# Patient Record
Sex: FEMALE | Race: WHITE | Employment: UNEMPLOYED | URBAN - METROPOLITAN AREA
[De-identification: names, ages, dates, MRNs, and addresses within clinical notes are randomized per-mention and may not be internally consistent; named-entity substitution may affect disease eponyms.]

---

## 2017-01-01 ENCOUNTER — APPOINTMENT (OUTPATIENT)
Dept: GENERAL RADIOLOGY | Facility: CLINIC | Age: 67
DRG: 374 | End: 2017-01-01
Attending: FAMILY MEDICINE
Payer: MEDICARE

## 2017-01-01 ENCOUNTER — CARE COORDINATION (OUTPATIENT)
Dept: GASTROENTEROLOGY | Facility: CLINIC | Age: 67
End: 2017-01-01

## 2017-01-01 ENCOUNTER — APPOINTMENT (OUTPATIENT)
Dept: GENERAL RADIOLOGY | Facility: CLINIC | Age: 67
DRG: 374 | End: 2017-01-01
Attending: INTERNAL MEDICINE
Payer: MEDICARE

## 2017-01-01 ENCOUNTER — HOSPITAL ENCOUNTER (OUTPATIENT)
Facility: CLINIC | Age: 67
Discharge: HOME | End: 2017-06-06
Attending: INTERNAL MEDICINE | Admitting: INTERNAL MEDICINE
Payer: MEDICARE

## 2017-01-01 ENCOUNTER — ONCOLOGY VISIT (OUTPATIENT)
Dept: ONCOLOGY | Facility: CLINIC | Age: 67
End: 2017-01-01
Attending: INTERNAL MEDICINE
Payer: MEDICARE

## 2017-01-01 ENCOUNTER — HOSPITAL ENCOUNTER (OUTPATIENT)
Dept: PET IMAGING | Facility: CLINIC | Age: 67
Discharge: HOME | End: 2017-04-12
Attending: INTERNAL MEDICINE | Admitting: INTERNAL MEDICINE
Payer: MEDICARE

## 2017-01-01 ENCOUNTER — DOCUMENTATION ONLY (OUTPATIENT)
Dept: ONCOLOGY | Facility: CLINIC | Age: 67
End: 2017-01-01

## 2017-01-01 ENCOUNTER — TELEPHONE (OUTPATIENT)
Dept: NUTRITION | Facility: CLINIC | Age: 67
End: 2017-01-01

## 2017-01-01 ENCOUNTER — TELEPHONE (OUTPATIENT)
Dept: ONCOLOGY | Facility: CLINIC | Age: 67
End: 2017-01-01

## 2017-01-01 ENCOUNTER — HOSPITAL ENCOUNTER (OUTPATIENT)
Dept: PHYSICAL THERAPY | Facility: CLINIC | Age: 67
Setting detail: THERAPIES SERIES
End: 2017-07-21
Attending: INTERNAL MEDICINE
Payer: MEDICARE

## 2017-01-01 ENCOUNTER — INFUSION THERAPY VISIT (OUTPATIENT)
Dept: INFUSION THERAPY | Facility: CLINIC | Age: 67
End: 2017-01-01
Attending: INTERNAL MEDICINE
Payer: MEDICARE

## 2017-01-01 ENCOUNTER — INFUSION THERAPY VISIT (OUTPATIENT)
Dept: INFUSION THERAPY | Facility: CLINIC | Age: 67
End: 2017-01-01
Attending: RADIOLOGY
Payer: MEDICARE

## 2017-01-01 ENCOUNTER — SURGERY (OUTPATIENT)
Age: 67
End: 2017-01-01

## 2017-01-01 ENCOUNTER — ONCOLOGY VISIT (OUTPATIENT)
Dept: ONCOLOGY | Facility: CLINIC | Age: 67
End: 2017-01-01
Attending: INTERNAL MEDICINE
Payer: COMMERCIAL

## 2017-01-01 ENCOUNTER — HOSPITAL ENCOUNTER (OUTPATIENT)
Facility: CLINIC | Age: 67
Discharge: HOME | End: 2017-05-10
Attending: INTERNAL MEDICINE | Admitting: INTERNAL MEDICINE
Payer: MEDICARE

## 2017-01-01 ENCOUNTER — TELEPHONE (OUTPATIENT)
Dept: FAMILY MEDICINE | Facility: CLINIC | Age: 67
End: 2017-01-01

## 2017-01-01 ENCOUNTER — ANESTHESIA EVENT (OUTPATIENT)
Dept: SURGERY | Facility: CLINIC | Age: 67
DRG: 374 | End: 2017-01-01
Payer: MEDICARE

## 2017-01-01 ENCOUNTER — HOSPITAL ENCOUNTER (OUTPATIENT)
Facility: CLINIC | Age: 67
Discharge: HOME | End: 2017-06-21
Attending: INTERNAL MEDICINE | Admitting: INTERNAL MEDICINE
Payer: MEDICARE

## 2017-01-01 ENCOUNTER — APPOINTMENT (OUTPATIENT)
Dept: GENERAL RADIOLOGY | Facility: CLINIC | Age: 67
DRG: 374 | End: 2017-01-01
Attending: RADIOLOGY
Payer: MEDICARE

## 2017-01-01 ENCOUNTER — APPOINTMENT (OUTPATIENT)
Dept: ULTRASOUND IMAGING | Facility: CLINIC | Age: 67
DRG: 374 | End: 2017-01-01
Attending: INTERNAL MEDICINE
Payer: MEDICARE

## 2017-01-01 ENCOUNTER — OFFICE VISIT (OUTPATIENT)
Dept: RADIATION THERAPY | Facility: OUTPATIENT CENTER | Age: 67
End: 2017-01-01

## 2017-01-01 ENCOUNTER — HOSPITAL ENCOUNTER (OUTPATIENT)
Facility: CLINIC | Age: 67
Discharge: HOME | End: 2017-04-04
Attending: INTERNAL MEDICINE | Admitting: INTERNAL MEDICINE
Payer: MEDICARE

## 2017-01-01 ENCOUNTER — HOSPITAL ENCOUNTER (OUTPATIENT)
Facility: CLINIC | Age: 67
Discharge: HOME | End: 2017-05-18
Attending: INTERNAL MEDICINE | Admitting: INTERNAL MEDICINE
Payer: MEDICARE

## 2017-01-01 ENCOUNTER — HOSPITAL ENCOUNTER (OUTPATIENT)
Facility: CLINIC | Age: 67
Discharge: HOME | End: 2017-03-21
Attending: INTERNAL MEDICINE | Admitting: INTERNAL MEDICINE
Payer: MEDICARE

## 2017-01-01 ENCOUNTER — ANESTHESIA (OUTPATIENT)
Dept: SURGERY | Facility: CLINIC | Age: 67
DRG: 374 | End: 2017-01-01
Payer: MEDICARE

## 2017-01-01 ENCOUNTER — HOSPITAL ENCOUNTER (OUTPATIENT)
Dept: PHYSICAL THERAPY | Facility: CLINIC | Age: 67
Setting detail: THERAPIES SERIES
End: 2017-07-24
Attending: INTERNAL MEDICINE
Payer: MEDICARE

## 2017-01-01 ENCOUNTER — ANESTHESIA EVENT (OUTPATIENT)
Dept: SURGERY | Facility: CLINIC | Age: 67
End: 2017-01-01
Payer: MEDICARE

## 2017-01-01 ENCOUNTER — HEALTH MAINTENANCE LETTER (OUTPATIENT)
Age: 67
End: 2017-01-01

## 2017-01-01 ENCOUNTER — OFFICE VISIT (OUTPATIENT)
Dept: PALLIATIVE MEDICINE | Facility: CLINIC | Age: 67
End: 2017-01-01
Payer: COMMERCIAL

## 2017-01-01 ENCOUNTER — APPOINTMENT (OUTPATIENT)
Dept: PHYSICAL THERAPY | Facility: CLINIC | Age: 67
DRG: 374 | End: 2017-01-01
Payer: MEDICARE

## 2017-01-01 ENCOUNTER — APPOINTMENT (OUTPATIENT)
Dept: OCCUPATIONAL THERAPY | Facility: CLINIC | Age: 67
DRG: 374 | End: 2017-01-01
Payer: MEDICARE

## 2017-01-01 ENCOUNTER — CARE COORDINATION (OUTPATIENT)
Dept: CARE COORDINATION | Facility: CLINIC | Age: 67
End: 2017-01-01

## 2017-01-01 ENCOUNTER — HOSPITAL ENCOUNTER (OUTPATIENT)
Facility: CLINIC | Age: 67
Discharge: HOME | End: 2017-01-10
Attending: INTERNAL MEDICINE | Admitting: INTERNAL MEDICINE
Payer: MEDICARE

## 2017-01-01 ENCOUNTER — TELEPHONE (OUTPATIENT)
Dept: NURSING | Facility: CLINIC | Age: 67
End: 2017-01-01

## 2017-01-01 ENCOUNTER — ANESTHESIA (OUTPATIENT)
Dept: SURGERY | Facility: CLINIC | Age: 67
End: 2017-01-01
Payer: MEDICARE

## 2017-01-01 ENCOUNTER — HOSPITAL ENCOUNTER (OUTPATIENT)
Facility: CLINIC | Age: 67
Discharge: HOME | End: 2017-06-27
Attending: INTERNAL MEDICINE | Admitting: INTERNAL MEDICINE
Payer: MEDICARE

## 2017-01-01 ENCOUNTER — HOSPITAL ENCOUNTER (OUTPATIENT)
Facility: CLINIC | Age: 67
Discharge: HOME | End: 2017-06-20
Attending: INTERNAL MEDICINE | Admitting: INTERNAL MEDICINE
Payer: MEDICARE

## 2017-01-01 ENCOUNTER — APPOINTMENT (OUTPATIENT)
Dept: CT IMAGING | Facility: CLINIC | Age: 67
DRG: 374 | End: 2017-01-01
Attending: FAMILY MEDICINE
Payer: MEDICARE

## 2017-01-01 ENCOUNTER — APPOINTMENT (OUTPATIENT)
Dept: CARDIOLOGY | Facility: CLINIC | Age: 67
DRG: 374 | End: 2017-01-01
Attending: INTERNAL MEDICINE
Payer: MEDICARE

## 2017-01-01 ENCOUNTER — OFFICE VISIT (OUTPATIENT)
Dept: SPIRITUAL SERVICES | Facility: CLINIC | Age: 67
End: 2017-01-01

## 2017-01-01 ENCOUNTER — HOSPITAL ENCOUNTER (OUTPATIENT)
Facility: CLINIC | Age: 67
Discharge: HOME | End: 2017-03-07
Attending: INTERNAL MEDICINE | Admitting: INTERNAL MEDICINE
Payer: MEDICARE

## 2017-01-01 ENCOUNTER — APPOINTMENT (OUTPATIENT)
Dept: GENERAL RADIOLOGY | Facility: CLINIC | Age: 67
End: 2017-01-01
Attending: INTERNAL MEDICINE
Payer: MEDICARE

## 2017-01-01 ENCOUNTER — INFUSION THERAPY VISIT (OUTPATIENT)
Dept: INFUSION THERAPY | Facility: CLINIC | Age: 67
DRG: 374 | End: 2017-01-01
Attending: INTERNAL MEDICINE
Payer: MEDICARE

## 2017-01-01 ENCOUNTER — HOSPITAL ENCOUNTER (INPATIENT)
Facility: CLINIC | Age: 67
LOS: 1 days | Discharge: HOME | DRG: 374 | End: 2017-05-06
Attending: INTERNAL MEDICINE | Admitting: INTERNAL MEDICINE
Payer: MEDICARE

## 2017-01-01 ENCOUNTER — HOSPITAL ENCOUNTER (OUTPATIENT)
Facility: CLINIC | Age: 67
Discharge: HOME | End: 2017-02-07
Attending: INTERNAL MEDICINE | Admitting: INTERNAL MEDICINE
Payer: MEDICARE

## 2017-01-01 ENCOUNTER — HOSPITAL ENCOUNTER (OUTPATIENT)
Dept: PHYSICAL THERAPY | Facility: CLINIC | Age: 67
Setting detail: THERAPIES SERIES
End: 2017-07-19
Attending: INTERNAL MEDICINE
Payer: MEDICARE

## 2017-01-01 ENCOUNTER — HOSPITAL ENCOUNTER (OUTPATIENT)
Facility: CLINIC | Age: 67
Discharge: HOME | End: 2017-01-03
Attending: INTERNAL MEDICINE | Admitting: INTERNAL MEDICINE
Payer: MEDICARE

## 2017-01-01 ENCOUNTER — APPOINTMENT (OUTPATIENT)
Dept: GENERAL RADIOLOGY | Facility: CLINIC | Age: 67
DRG: 374 | End: 2017-01-01
Attending: EMERGENCY MEDICINE
Payer: MEDICARE

## 2017-01-01 ENCOUNTER — HOSPITAL ENCOUNTER (OUTPATIENT)
Dept: MRI IMAGING | Facility: CLINIC | Age: 67
Discharge: HOME | End: 2017-05-04
Attending: INTERNAL MEDICINE | Admitting: INTERNAL MEDICINE
Payer: MEDICARE

## 2017-01-01 ENCOUNTER — HOSPITAL ENCOUNTER (OUTPATIENT)
Facility: CLINIC | Age: 67
Discharge: HOME | End: 2017-01-31
Attending: INTERNAL MEDICINE | Admitting: INTERNAL MEDICINE
Payer: MEDICARE

## 2017-01-01 ENCOUNTER — HOSPITAL ENCOUNTER (OUTPATIENT)
Dept: ULTRASOUND IMAGING | Facility: CLINIC | Age: 67
Discharge: HOME | End: 2017-05-02
Attending: INTERNAL MEDICINE | Admitting: INTERNAL MEDICINE
Payer: MEDICARE

## 2017-01-01 ENCOUNTER — HOSPITAL ENCOUNTER (OUTPATIENT)
Dept: PET IMAGING | Facility: CLINIC | Age: 67
Discharge: HOME | End: 2017-07-19
Attending: INTERNAL MEDICINE | Admitting: INTERNAL MEDICINE
Payer: MEDICARE

## 2017-01-01 ENCOUNTER — HOSPITAL ENCOUNTER (OUTPATIENT)
Facility: CLINIC | Age: 67
Discharge: HOME | End: 2017-01-17
Attending: INTERNAL MEDICINE | Admitting: INTERNAL MEDICINE
Payer: MEDICARE

## 2017-01-01 ENCOUNTER — HOSPITAL ENCOUNTER (OUTPATIENT)
Facility: CLINIC | Age: 67
Discharge: HOME | End: 2017-01-24
Attending: INTERNAL MEDICINE | Admitting: INTERNAL MEDICINE
Payer: MEDICARE

## 2017-01-01 ENCOUNTER — HOSPITAL ENCOUNTER (OUTPATIENT)
Facility: CLINIC | Age: 67
Discharge: HOME | End: 2017-04-18
Attending: INTERNAL MEDICINE | Admitting: INTERNAL MEDICINE
Payer: MEDICARE

## 2017-01-01 ENCOUNTER — HOSPITAL ENCOUNTER (INPATIENT)
Facility: CLINIC | Age: 67
LOS: 12 days | Discharge: HOME HEALTH | DRG: 374 | End: 2017-08-05
Attending: EMERGENCY MEDICINE | Admitting: FAMILY MEDICINE
Payer: MEDICARE

## 2017-01-01 ENCOUNTER — HOSPITAL ENCOUNTER (OUTPATIENT)
Facility: CLINIC | Age: 67
Discharge: HOME | End: 2017-07-11
Attending: INTERNAL MEDICINE | Admitting: INTERNAL MEDICINE
Payer: MEDICARE

## 2017-01-01 ENCOUNTER — HOSPITAL ENCOUNTER (OUTPATIENT)
Facility: CLINIC | Age: 67
Discharge: HOME | End: 2017-05-23
Attending: INTERNAL MEDICINE | Admitting: INTERNAL MEDICINE
Payer: MEDICARE

## 2017-01-01 VITALS
HEIGHT: 63 IN | RESPIRATION RATE: 18 BRPM | HEART RATE: 94 BPM | DIASTOLIC BLOOD PRESSURE: 82 MMHG | SYSTOLIC BLOOD PRESSURE: 131 MMHG | OXYGEN SATURATION: 98 % | BODY MASS INDEX: 29.69 KG/M2 | TEMPERATURE: 97.6 F | WEIGHT: 167.55 LBS

## 2017-01-01 VITALS
TEMPERATURE: 97.8 F | DIASTOLIC BLOOD PRESSURE: 61 MMHG | RESPIRATION RATE: 16 BRPM | BODY MASS INDEX: 27.23 KG/M2 | HEIGHT: 63 IN | OXYGEN SATURATION: 100 % | SYSTOLIC BLOOD PRESSURE: 95 MMHG | WEIGHT: 153.7 LBS | HEART RATE: 79 BPM

## 2017-01-01 VITALS
SYSTOLIC BLOOD PRESSURE: 129 MMHG | WEIGHT: 181.1 LBS | BODY MASS INDEX: 34.19 KG/M2 | DIASTOLIC BLOOD PRESSURE: 72 MMHG | OXYGEN SATURATION: 99 % | RESPIRATION RATE: 18 BRPM | TEMPERATURE: 98.2 F | HEART RATE: 100 BPM | HEIGHT: 61 IN

## 2017-01-01 VITALS
TEMPERATURE: 98 F | DIASTOLIC BLOOD PRESSURE: 75 MMHG | SYSTOLIC BLOOD PRESSURE: 122 MMHG | HEART RATE: 94 BPM | RESPIRATION RATE: 18 BRPM

## 2017-01-01 VITALS
RESPIRATION RATE: 16 BRPM | SYSTOLIC BLOOD PRESSURE: 137 MMHG | DIASTOLIC BLOOD PRESSURE: 83 MMHG | HEIGHT: 63 IN | BODY MASS INDEX: 29.61 KG/M2 | TEMPERATURE: 98.4 F | WEIGHT: 167.11 LBS | OXYGEN SATURATION: 99 %

## 2017-01-01 VITALS
SYSTOLIC BLOOD PRESSURE: 124 MMHG | WEIGHT: 193.5 LBS | HEIGHT: 62 IN | DIASTOLIC BLOOD PRESSURE: 81 MMHG | OXYGEN SATURATION: 97 % | HEART RATE: 107 BPM | TEMPERATURE: 98.7 F | RESPIRATION RATE: 18 BRPM | BODY MASS INDEX: 35.61 KG/M2

## 2017-01-01 VITALS
RESPIRATION RATE: 16 BRPM | SYSTOLIC BLOOD PRESSURE: 126 MMHG | DIASTOLIC BLOOD PRESSURE: 78 MMHG | HEART RATE: 92 BPM | TEMPERATURE: 97.3 F

## 2017-01-01 VITALS
WEIGHT: 169.5 LBS | TEMPERATURE: 97.9 F | SYSTOLIC BLOOD PRESSURE: 123 MMHG | OXYGEN SATURATION: 100 % | BODY MASS INDEX: 32 KG/M2 | DIASTOLIC BLOOD PRESSURE: 75 MMHG | RESPIRATION RATE: 16 BRPM | HEIGHT: 61 IN | HEART RATE: 96 BPM

## 2017-01-01 VITALS
HEART RATE: 79 BPM | OXYGEN SATURATION: 96 % | BODY MASS INDEX: 37.12 KG/M2 | HEIGHT: 61 IN | WEIGHT: 196.6 LBS | TEMPERATURE: 98.7 F | SYSTOLIC BLOOD PRESSURE: 116 MMHG | DIASTOLIC BLOOD PRESSURE: 76 MMHG

## 2017-01-01 VITALS
WEIGHT: 188.6 LBS | HEART RATE: 99 BPM | BODY MASS INDEX: 35.06 KG/M2 | DIASTOLIC BLOOD PRESSURE: 72 MMHG | SYSTOLIC BLOOD PRESSURE: 122 MMHG

## 2017-01-01 VITALS
HEART RATE: 95 BPM | BODY MASS INDEX: 32.05 KG/M2 | DIASTOLIC BLOOD PRESSURE: 61 MMHG | WEIGHT: 172.4 LBS | TEMPERATURE: 97.4 F | SYSTOLIC BLOOD PRESSURE: 115 MMHG

## 2017-01-01 VITALS
DIASTOLIC BLOOD PRESSURE: 81 MMHG | HEART RATE: 88 BPM | TEMPERATURE: 97 F | WEIGHT: 184.6 LBS | SYSTOLIC BLOOD PRESSURE: 129 MMHG | BODY MASS INDEX: 34.32 KG/M2

## 2017-01-01 VITALS — SYSTOLIC BLOOD PRESSURE: 119 MMHG | HEART RATE: 98 BPM | DIASTOLIC BLOOD PRESSURE: 77 MMHG | TEMPERATURE: 98.1 F

## 2017-01-01 VITALS — TEMPERATURE: 97.3 F | HEART RATE: 94 BPM | DIASTOLIC BLOOD PRESSURE: 69 MMHG | SYSTOLIC BLOOD PRESSURE: 120 MMHG

## 2017-01-01 VITALS
OXYGEN SATURATION: 100 % | TEMPERATURE: 98.3 F | WEIGHT: 174.9 LBS | SYSTOLIC BLOOD PRESSURE: 109 MMHG | RESPIRATION RATE: 18 BRPM | HEART RATE: 100 BPM | BODY MASS INDEX: 33.02 KG/M2 | DIASTOLIC BLOOD PRESSURE: 70 MMHG | HEIGHT: 61 IN

## 2017-01-01 VITALS
OXYGEN SATURATION: 96 % | DIASTOLIC BLOOD PRESSURE: 75 MMHG | BODY MASS INDEX: 35.98 KG/M2 | HEART RATE: 86 BPM | TEMPERATURE: 97.4 F | HEIGHT: 61 IN | SYSTOLIC BLOOD PRESSURE: 125 MMHG | WEIGHT: 190.6 LBS

## 2017-01-01 VITALS
SYSTOLIC BLOOD PRESSURE: 128 MMHG | HEART RATE: 80 BPM | DIASTOLIC BLOOD PRESSURE: 70 MMHG | WEIGHT: 194.6 LBS | BODY MASS INDEX: 36.18 KG/M2

## 2017-01-01 VITALS
SYSTOLIC BLOOD PRESSURE: 121 MMHG | TEMPERATURE: 97.5 F | TEMPERATURE: 96 F | RESPIRATION RATE: 18 BRPM | HEART RATE: 92 BPM | DIASTOLIC BLOOD PRESSURE: 74 MMHG | DIASTOLIC BLOOD PRESSURE: 68 MMHG | HEART RATE: 84 BPM | SYSTOLIC BLOOD PRESSURE: 127 MMHG

## 2017-01-01 VITALS — DIASTOLIC BLOOD PRESSURE: 72 MMHG | TEMPERATURE: 97.9 F | HEART RATE: 89 BPM | SYSTOLIC BLOOD PRESSURE: 127 MMHG

## 2017-01-01 VITALS
DIASTOLIC BLOOD PRESSURE: 78 MMHG | HEIGHT: 61 IN | TEMPERATURE: 98.3 F | TEMPERATURE: 97.6 F | BODY MASS INDEX: 33.78 KG/M2 | HEART RATE: 82 BPM | SYSTOLIC BLOOD PRESSURE: 114 MMHG | WEIGHT: 184 LBS | WEIGHT: 178.9 LBS | HEART RATE: 99 BPM | SYSTOLIC BLOOD PRESSURE: 124 MMHG | DIASTOLIC BLOOD PRESSURE: 67 MMHG | RESPIRATION RATE: 16 BRPM | BODY MASS INDEX: 34.21 KG/M2 | OXYGEN SATURATION: 99 %

## 2017-01-01 VITALS — TEMPERATURE: 97.5 F | DIASTOLIC BLOOD PRESSURE: 82 MMHG | HEART RATE: 102 BPM | SYSTOLIC BLOOD PRESSURE: 122 MMHG

## 2017-01-01 VITALS
WEIGHT: 170 LBS | BODY MASS INDEX: 31.28 KG/M2 | SYSTOLIC BLOOD PRESSURE: 134 MMHG | HEIGHT: 62 IN | HEART RATE: 106 BPM | DIASTOLIC BLOOD PRESSURE: 87 MMHG

## 2017-01-01 VITALS
RESPIRATION RATE: 16 BRPM | DIASTOLIC BLOOD PRESSURE: 73 MMHG | WEIGHT: 167.6 LBS | HEART RATE: 86 BPM | TEMPERATURE: 98.2 F | SYSTOLIC BLOOD PRESSURE: 127 MMHG | BODY MASS INDEX: 31.15 KG/M2

## 2017-01-01 VITALS
BODY MASS INDEX: 28.07 KG/M2 | HEART RATE: 88 BPM | SYSTOLIC BLOOD PRESSURE: 105 MMHG | DIASTOLIC BLOOD PRESSURE: 50 MMHG | WEIGHT: 158.4 LBS

## 2017-01-01 VITALS — DIASTOLIC BLOOD PRESSURE: 72 MMHG | TEMPERATURE: 98 F | SYSTOLIC BLOOD PRESSURE: 119 MMHG | HEART RATE: 104 BPM

## 2017-01-01 VITALS
SYSTOLIC BLOOD PRESSURE: 134 MMHG | BODY MASS INDEX: 29.41 KG/M2 | WEIGHT: 166 LBS | TEMPERATURE: 97.3 F | HEART RATE: 90 BPM | DIASTOLIC BLOOD PRESSURE: 66 MMHG

## 2017-01-01 VITALS — HEART RATE: 93 BPM | DIASTOLIC BLOOD PRESSURE: 69 MMHG | SYSTOLIC BLOOD PRESSURE: 121 MMHG | TEMPERATURE: 97.9 F

## 2017-01-01 VITALS
DIASTOLIC BLOOD PRESSURE: 82 MMHG | SYSTOLIC BLOOD PRESSURE: 143 MMHG | WEIGHT: 172.4 LBS | HEART RATE: 108 BPM | TEMPERATURE: 97.4 F | OXYGEN SATURATION: 100 % | BODY MASS INDEX: 32.05 KG/M2

## 2017-01-01 VITALS
DIASTOLIC BLOOD PRESSURE: 61 MMHG | WEIGHT: 159.8 LBS | SYSTOLIC BLOOD PRESSURE: 101 MMHG | OXYGEN SATURATION: 98 % | TEMPERATURE: 98.5 F | HEIGHT: 63 IN | HEART RATE: 110 BPM | BODY MASS INDEX: 28.31 KG/M2 | RESPIRATION RATE: 18 BRPM

## 2017-01-01 VITALS
RESPIRATION RATE: 18 BRPM | BODY MASS INDEX: 30.82 KG/M2 | WEIGHT: 173.94 LBS | DIASTOLIC BLOOD PRESSURE: 55 MMHG | SYSTOLIC BLOOD PRESSURE: 90 MMHG | HEART RATE: 107 BPM | OXYGEN SATURATION: 95 % | HEIGHT: 63 IN | TEMPERATURE: 96 F

## 2017-01-01 VITALS
DIASTOLIC BLOOD PRESSURE: 58 MMHG | HEART RATE: 85 BPM | WEIGHT: 191 LBS | SYSTOLIC BLOOD PRESSURE: 123 MMHG | BODY MASS INDEX: 35.51 KG/M2

## 2017-01-01 VITALS — SYSTOLIC BLOOD PRESSURE: 136 MMHG | TEMPERATURE: 97.8 F | HEART RATE: 80 BPM | DIASTOLIC BLOOD PRESSURE: 74 MMHG

## 2017-01-01 VITALS
OXYGEN SATURATION: 97 % | RESPIRATION RATE: 18 BRPM | BODY MASS INDEX: 35.51 KG/M2 | TEMPERATURE: 98.3 F | HEIGHT: 61 IN | WEIGHT: 188.1 LBS | HEART RATE: 123 BPM | SYSTOLIC BLOOD PRESSURE: 99 MMHG | DIASTOLIC BLOOD PRESSURE: 69 MMHG

## 2017-01-01 VITALS
HEIGHT: 62 IN | WEIGHT: 170.7 LBS | BODY MASS INDEX: 31.41 KG/M2 | HEART RATE: 113 BPM | SYSTOLIC BLOOD PRESSURE: 119 MMHG | RESPIRATION RATE: 16 BRPM | DIASTOLIC BLOOD PRESSURE: 79 MMHG

## 2017-01-01 VITALS
BODY MASS INDEX: 29.3 KG/M2 | HEIGHT: 63 IN | TEMPERATURE: 98.5 F | HEART RATE: 110 BPM | RESPIRATION RATE: 16 BRPM | OXYGEN SATURATION: 100 % | SYSTOLIC BLOOD PRESSURE: 111 MMHG | DIASTOLIC BLOOD PRESSURE: 83 MMHG | WEIGHT: 165.4 LBS

## 2017-01-01 VITALS
WEIGHT: 193.2 LBS | DIASTOLIC BLOOD PRESSURE: 61 MMHG | BODY MASS INDEX: 35.92 KG/M2 | HEART RATE: 81 BPM | SYSTOLIC BLOOD PRESSURE: 128 MMHG

## 2017-01-01 VITALS
SYSTOLIC BLOOD PRESSURE: 96 MMHG | HEIGHT: 63 IN | RESPIRATION RATE: 18 BRPM | OXYGEN SATURATION: 99 % | BODY MASS INDEX: 28.23 KG/M2 | DIASTOLIC BLOOD PRESSURE: 64 MMHG | TEMPERATURE: 98.7 F | WEIGHT: 159.3 LBS | HEART RATE: 111 BPM

## 2017-01-01 VITALS — SYSTOLIC BLOOD PRESSURE: 128 MMHG | DIASTOLIC BLOOD PRESSURE: 67 MMHG | HEART RATE: 91 BPM

## 2017-01-01 VITALS
BODY MASS INDEX: 34.84 KG/M2 | DIASTOLIC BLOOD PRESSURE: 70 MMHG | WEIGHT: 187.4 LBS | HEART RATE: 94 BPM | TEMPERATURE: 97.5 F | SYSTOLIC BLOOD PRESSURE: 121 MMHG

## 2017-01-01 VITALS — SYSTOLIC BLOOD PRESSURE: 130 MMHG | DIASTOLIC BLOOD PRESSURE: 68 MMHG | HEART RATE: 84 BPM

## 2017-01-01 VITALS — HEART RATE: 84 BPM | SYSTOLIC BLOOD PRESSURE: 114 MMHG | DIASTOLIC BLOOD PRESSURE: 56 MMHG | TEMPERATURE: 97.9 F

## 2017-01-01 VITALS — TEMPERATURE: 97.9 F | HEART RATE: 89 BPM | SYSTOLIC BLOOD PRESSURE: 120 MMHG | DIASTOLIC BLOOD PRESSURE: 59 MMHG

## 2017-01-01 VITALS — HEART RATE: 78 BPM | SYSTOLIC BLOOD PRESSURE: 121 MMHG | TEMPERATURE: 98 F | DIASTOLIC BLOOD PRESSURE: 63 MMHG

## 2017-01-01 VITALS — SYSTOLIC BLOOD PRESSURE: 123 MMHG | DIASTOLIC BLOOD PRESSURE: 69 MMHG | HEART RATE: 79 BPM

## 2017-01-01 VITALS — SYSTOLIC BLOOD PRESSURE: 112 MMHG | HEART RATE: 91 BPM | DIASTOLIC BLOOD PRESSURE: 59 MMHG | TEMPERATURE: 98.9 F

## 2017-01-01 VITALS — HEART RATE: 92 BPM | SYSTOLIC BLOOD PRESSURE: 110 MMHG | DIASTOLIC BLOOD PRESSURE: 63 MMHG | TEMPERATURE: 98.5 F

## 2017-01-01 VITALS
SYSTOLIC BLOOD PRESSURE: 129 MMHG | HEART RATE: 84 BPM | DIASTOLIC BLOOD PRESSURE: 72 MMHG | BODY MASS INDEX: 28.28 KG/M2 | WEIGHT: 159.6 LBS

## 2017-01-01 VITALS
WEIGHT: 161 LBS | SYSTOLIC BLOOD PRESSURE: 122 MMHG | HEART RATE: 90 BPM | BODY MASS INDEX: 28.53 KG/M2 | OXYGEN SATURATION: 96 % | TEMPERATURE: 97.6 F | DIASTOLIC BLOOD PRESSURE: 70 MMHG

## 2017-01-01 VITALS — TEMPERATURE: 98.7 F | HEART RATE: 104 BPM | SYSTOLIC BLOOD PRESSURE: 144 MMHG | DIASTOLIC BLOOD PRESSURE: 54 MMHG

## 2017-01-01 DIAGNOSIS — E86.0 DEHYDRATION: Primary | ICD-10-CM

## 2017-01-01 DIAGNOSIS — C16.0 MALIGNANT NEOPLASM OF CARDIA OF STOMACH (H): ICD-10-CM

## 2017-01-01 DIAGNOSIS — C16.0 GE JUNCTION CARCINOMA (H): Primary | ICD-10-CM

## 2017-01-01 DIAGNOSIS — R11.0 NAUSEA: ICD-10-CM

## 2017-01-01 DIAGNOSIS — C16.0 MALIGNANT NEOPLASM OF CARDIA OF STOMACH (H): Primary | ICD-10-CM

## 2017-01-01 DIAGNOSIS — E86.0 DEHYDRATION: ICD-10-CM

## 2017-01-01 DIAGNOSIS — C16.0 GE JUNCTION CARCINOMA (H): ICD-10-CM

## 2017-01-01 DIAGNOSIS — T45.1X5A CHEMOTHERAPY-INDUCED NAUSEA: Primary | ICD-10-CM

## 2017-01-01 DIAGNOSIS — I95.1 ORTHOSTATIC HYPOTENSION: ICD-10-CM

## 2017-01-01 DIAGNOSIS — T45.1X5A CHEMOTHERAPY-INDUCED NAUSEA: ICD-10-CM

## 2017-01-01 DIAGNOSIS — D70.9 NEUTROPENIA, UNSPECIFIED TYPE (H): ICD-10-CM

## 2017-01-01 DIAGNOSIS — R11.0 CHEMOTHERAPY-INDUCED NAUSEA: ICD-10-CM

## 2017-01-01 DIAGNOSIS — R45.7 EMOTIONAL STRESS: ICD-10-CM

## 2017-01-01 DIAGNOSIS — R05.9 COUGH: ICD-10-CM

## 2017-01-01 DIAGNOSIS — D75.9 CYTOPENIA: ICD-10-CM

## 2017-01-01 DIAGNOSIS — R13.10 DYSPHAGIA, UNSPECIFIED TYPE: ICD-10-CM

## 2017-01-01 DIAGNOSIS — J18.9 PNEUMONIA: ICD-10-CM

## 2017-01-01 DIAGNOSIS — R94.5 NONSPECIFIC ABNORMAL RESULTS OF LIVER FUNCTION STUDY: ICD-10-CM

## 2017-01-01 DIAGNOSIS — R74.8 ELEVATED LIVER ENZYMES: ICD-10-CM

## 2017-01-01 DIAGNOSIS — R11.0 CHEMOTHERAPY-INDUCED NAUSEA: Primary | ICD-10-CM

## 2017-01-01 DIAGNOSIS — E80.6 HYPERBILIRUBINEMIA: ICD-10-CM

## 2017-01-01 DIAGNOSIS — K86.89 PANCREATIC MASS: Primary | ICD-10-CM

## 2017-01-01 DIAGNOSIS — J90 PLEURAL EFFUSION ON LEFT: ICD-10-CM

## 2017-01-01 DIAGNOSIS — D70.1 CHEMOTHERAPY-INDUCED NEUTROPENIA (H): Primary | ICD-10-CM

## 2017-01-01 DIAGNOSIS — G62.9 PERIPHERAL POLYNEUROPATHY: Primary | ICD-10-CM

## 2017-01-01 DIAGNOSIS — J34.89 SINUS DRAINAGE: ICD-10-CM

## 2017-01-01 DIAGNOSIS — T45.1X5A CHEMOTHERAPY-INDUCED NEUTROPENIA (H): ICD-10-CM

## 2017-01-01 DIAGNOSIS — R06.09 DYSPNEA ON EXERTION: ICD-10-CM

## 2017-01-01 DIAGNOSIS — K83.09 CHOLANGITIS (H): Primary | ICD-10-CM

## 2017-01-01 DIAGNOSIS — T45.1X5A CHEMOTHERAPY-INDUCED NEUTROPENIA (H): Primary | ICD-10-CM

## 2017-01-01 DIAGNOSIS — J30.2 CHRONIC SEASONAL ALLERGIC RHINITIS, UNSPECIFIED TRIGGER: ICD-10-CM

## 2017-01-01 DIAGNOSIS — D70.1 CHEMOTHERAPY-INDUCED NEUTROPENIA (H): ICD-10-CM

## 2017-01-01 DIAGNOSIS — J18.9 PNEUMONIA OF RIGHT LOWER LOBE DUE TO INFECTIOUS ORGANISM: ICD-10-CM

## 2017-01-01 DIAGNOSIS — I89.0 LYMPHEDEMA: ICD-10-CM

## 2017-01-01 DIAGNOSIS — E88.09 HYPOALBUMINEMIA: ICD-10-CM

## 2017-01-01 DIAGNOSIS — G62.9 NEUROPATHY: ICD-10-CM

## 2017-01-01 DIAGNOSIS — R11.0 CHRONIC NAUSEA: Primary | ICD-10-CM

## 2017-01-01 DIAGNOSIS — D64.9 ANEMIA, UNSPECIFIED TYPE: ICD-10-CM

## 2017-01-01 DIAGNOSIS — R60.0 LOCALIZED EDEMA: ICD-10-CM

## 2017-01-01 DIAGNOSIS — R25.2 CRAMP OF LIMB: Primary | ICD-10-CM

## 2017-01-01 DIAGNOSIS — R11.0 NAUSEA: Primary | ICD-10-CM

## 2017-01-01 DIAGNOSIS — Z98.890 S/P ERCP: ICD-10-CM

## 2017-01-01 DIAGNOSIS — Z98.890 S/P ERCP: Primary | ICD-10-CM

## 2017-01-01 LAB
ABO + RH BLD: NORMAL
ABO + RH BLD: NORMAL
ALBUMIN SERPL-MCNC: 1.4 G/DL (ref 3.4–5)
ALBUMIN SERPL-MCNC: 1.7 G/DL (ref 3.4–5)
ALBUMIN SERPL-MCNC: 1.9 G/DL (ref 3.4–5)
ALBUMIN SERPL-MCNC: 2 G/DL (ref 3.4–5)
ALBUMIN SERPL-MCNC: 2.1 G/DL (ref 3.4–5)
ALBUMIN SERPL-MCNC: 2.2 G/DL (ref 3.4–5)
ALBUMIN SERPL-MCNC: 2.3 G/DL (ref 3.4–5)
ALBUMIN SERPL-MCNC: 2.3 G/DL (ref 3.4–5)
ALBUMIN SERPL-MCNC: 2.5 G/DL (ref 3.4–5)
ALBUMIN SERPL-MCNC: 2.9 G/DL (ref 3.4–5)
ALBUMIN SERPL-MCNC: 3 G/DL (ref 3.4–5)
ALBUMIN SERPL-MCNC: 3 G/DL (ref 3.4–5)
ALBUMIN SERPL-MCNC: 3.2 G/DL (ref 3.4–5)
ALBUMIN UR-MCNC: 10 MG/DL
ALP SERPL-CCNC: 108 U/L (ref 40–150)
ALP SERPL-CCNC: 126 U/L (ref 40–150)
ALP SERPL-CCNC: 129 U/L (ref 40–150)
ALP SERPL-CCNC: 135 U/L (ref 40–150)
ALP SERPL-CCNC: 172 U/L (ref 40–150)
ALP SERPL-CCNC: 202 U/L (ref 40–150)
ALP SERPL-CCNC: 250 U/L (ref 40–150)
ALP SERPL-CCNC: 263 U/L (ref 40–150)
ALP SERPL-CCNC: 278 U/L (ref 40–150)
ALP SERPL-CCNC: 484 U/L (ref 40–150)
ALP SERPL-CCNC: 63 U/L (ref 40–150)
ALP SERPL-CCNC: 65 U/L (ref 40–150)
ALP SERPL-CCNC: 69 U/L (ref 40–150)
ALP SERPL-CCNC: 71 U/L (ref 40–150)
ALP SERPL-CCNC: 72 U/L (ref 40–150)
ALT SERPL W P-5'-P-CCNC: 14 U/L (ref 0–50)
ALT SERPL W P-5'-P-CCNC: 15 U/L (ref 0–50)
ALT SERPL W P-5'-P-CCNC: 18 U/L (ref 0–50)
ALT SERPL W P-5'-P-CCNC: 19 U/L (ref 0–50)
ALT SERPL W P-5'-P-CCNC: 20 U/L (ref 0–50)
ALT SERPL W P-5'-P-CCNC: 20 U/L (ref 0–50)
ALT SERPL W P-5'-P-CCNC: 21 U/L (ref 0–50)
ALT SERPL W P-5'-P-CCNC: 237 U/L (ref 0–50)
ALT SERPL W P-5'-P-CCNC: 25 U/L (ref 0–50)
ALT SERPL W P-5'-P-CCNC: 261 U/L (ref 0–50)
ALT SERPL W P-5'-P-CCNC: 296 U/L (ref 0–50)
ALT SERPL W P-5'-P-CCNC: 30 U/L (ref 0–50)
ALT SERPL W P-5'-P-CCNC: 312 U/L (ref 0–50)
ALT SERPL W P-5'-P-CCNC: 37 U/L (ref 0–50)
ALT SERPL W P-5'-P-CCNC: 39 U/L (ref 0–50)
AMYLASE SERPL-CCNC: 12 U/L (ref 30–110)
AMYLASE SERPL-CCNC: 25 U/L (ref 30–110)
AMYLASE SERPL-CCNC: 30 U/L (ref 30–110)
AMYLASE SERPL-CCNC: 94 U/L (ref 30–110)
ANION GAP SERPL CALCULATED.3IONS-SCNC: 10 MMOL/L (ref 3–14)
ANION GAP SERPL CALCULATED.3IONS-SCNC: 11 MMOL/L (ref 3–14)
ANION GAP SERPL CALCULATED.3IONS-SCNC: 12 MMOL/L (ref 3–14)
ANION GAP SERPL CALCULATED.3IONS-SCNC: 12 MMOL/L (ref 3–14)
ANION GAP SERPL CALCULATED.3IONS-SCNC: 8 MMOL/L (ref 3–14)
ANION GAP SERPL CALCULATED.3IONS-SCNC: 9 MMOL/L (ref 3–14)
ANISOCYTOSIS BLD QL SMEAR: SLIGHT
ANISOCYTOSIS BLD QL SMEAR: SLIGHT
APPEARANCE FLD: NORMAL
APPEARANCE UR: CLEAR
AST SERPL W P-5'-P-CCNC: 11 U/L (ref 0–45)
AST SERPL W P-5'-P-CCNC: 15 U/L (ref 0–45)
AST SERPL W P-5'-P-CCNC: 16 U/L (ref 0–45)
AST SERPL W P-5'-P-CCNC: 17 U/L (ref 0–45)
AST SERPL W P-5'-P-CCNC: 20 U/L (ref 0–45)
AST SERPL W P-5'-P-CCNC: 20 U/L (ref 0–45)
AST SERPL W P-5'-P-CCNC: 22 U/L (ref 0–45)
AST SERPL W P-5'-P-CCNC: 226 U/L (ref 0–45)
AST SERPL W P-5'-P-CCNC: 23 U/L (ref 0–45)
AST SERPL W P-5'-P-CCNC: 244 U/L (ref 0–45)
AST SERPL W P-5'-P-CCNC: 257 U/L (ref 0–45)
AST SERPL W P-5'-P-CCNC: 33 U/L (ref 0–45)
AST SERPL W P-5'-P-CCNC: 344 U/L (ref 0–45)
AST SERPL W P-5'-P-CCNC: 35 U/L (ref 0–45)
AST SERPL W P-5'-P-CCNC: 37 U/L (ref 0–45)
BACTERIA SPEC CULT: NO GROWTH
BACTERIA SPEC CULT: NORMAL
BACTERIA SPEC CULT: NORMAL
BASE DEFICIT BLDV-SCNC: 0.6 MMOL/L
BASE DEFICIT BLDV-SCNC: 6.5 MMOL/L
BASE DEFICIT BLDV-SCNC: 9 MMOL/L
BASOPHILS # BLD AUTO: 0 10E9/L (ref 0–0.2)
BASOPHILS # BLD AUTO: 0.1 10E9/L (ref 0–0.2)
BASOPHILS # BLD AUTO: 0.1 10E9/L (ref 0–0.2)
BASOPHILS NFR BLD AUTO: 0 %
BASOPHILS NFR BLD AUTO: 0.1 %
BASOPHILS NFR BLD AUTO: 0.2 %
BASOPHILS NFR BLD AUTO: 0.3 %
BASOPHILS NFR BLD AUTO: 0.4 %
BASOPHILS NFR BLD AUTO: 0.4 %
BASOPHILS NFR BLD AUTO: 0.6 %
BASOPHILS NFR BLD AUTO: 0.6 %
BASOPHILS NFR BLD AUTO: 0.7 %
BASOPHILS NFR BLD AUTO: 0.8 %
BASOPHILS NFR BLD AUTO: 1.4 %
BASOPHILS NFR BLD AUTO: 1.6 %
BASOPHILS NFR BLD AUTO: 1.8 %
BASOPHILS NFR BLD AUTO: 2.2 %
BASOPHILS NFR BLD AUTO: 2.5 %
BILIRUB DIRECT SERPL-MCNC: 0.1 MG/DL (ref 0–0.2)
BILIRUB DIRECT SERPL-MCNC: 0.7 MG/DL (ref 0–0.2)
BILIRUB SERPL-MCNC: 0.3 MG/DL (ref 0.2–1.3)
BILIRUB SERPL-MCNC: 0.4 MG/DL (ref 0.2–1.3)
BILIRUB SERPL-MCNC: 0.5 MG/DL (ref 0.2–1.3)
BILIRUB SERPL-MCNC: 0.6 MG/DL (ref 0.2–1.3)
BILIRUB SERPL-MCNC: 0.9 MG/DL (ref 0.2–1.3)
BILIRUB SERPL-MCNC: 1.1 MG/DL (ref 0.2–1.3)
BILIRUB SERPL-MCNC: 1.8 MG/DL (ref 0.2–1.3)
BILIRUB SERPL-MCNC: 3.3 MG/DL (ref 0.2–1.3)
BILIRUB SERPL-MCNC: 3.3 MG/DL (ref 0.2–1.3)
BILIRUB SERPL-MCNC: 6.9 MG/DL (ref 0.2–1.3)
BILIRUB UR QL STRIP: NEGATIVE
BLD GP AB SCN SERPL QL: NORMAL
BLD PROD TYP BPU: NORMAL
BLD UNIT ID BPU: 0
BLD UNIT ID BPU: 0
BLOOD BANK CMNT PATIENT-IMP: NORMAL
BLOOD PRODUCT CODE: NORMAL
BLOOD PRODUCT CODE: NORMAL
BPU ID: NORMAL
BPU ID: NORMAL
BUN SERPL-MCNC: 10 MG/DL (ref 7–30)
BUN SERPL-MCNC: 11 MG/DL (ref 7–30)
BUN SERPL-MCNC: 11 MG/DL (ref 7–30)
BUN SERPL-MCNC: 12 MG/DL (ref 7–30)
BUN SERPL-MCNC: 15 MG/DL (ref 7–30)
BUN SERPL-MCNC: 17 MG/DL (ref 7–30)
BUN SERPL-MCNC: 18 MG/DL (ref 7–30)
BUN SERPL-MCNC: 19 MG/DL (ref 7–30)
BUN SERPL-MCNC: 22 MG/DL (ref 7–30)
BUN SERPL-MCNC: 5 MG/DL (ref 7–30)
BUN SERPL-MCNC: 7 MG/DL (ref 7–30)
BUN SERPL-MCNC: 8 MG/DL (ref 7–30)
BUN SERPL-MCNC: 9 MG/DL (ref 7–30)
C DIFF TOX B STL QL: NORMAL
CALCIUM SERPL-MCNC: 7.4 MG/DL (ref 8.5–10.1)
CALCIUM SERPL-MCNC: 7.4 MG/DL (ref 8.5–10.1)
CALCIUM SERPL-MCNC: 7.6 MG/DL (ref 8.5–10.1)
CALCIUM SERPL-MCNC: 7.6 MG/DL (ref 8.5–10.1)
CALCIUM SERPL-MCNC: 7.7 MG/DL (ref 8.5–10.1)
CALCIUM SERPL-MCNC: 7.8 MG/DL (ref 8.5–10.1)
CALCIUM SERPL-MCNC: 7.9 MG/DL (ref 8.5–10.1)
CALCIUM SERPL-MCNC: 8 MG/DL (ref 8.5–10.1)
CALCIUM SERPL-MCNC: 8.1 MG/DL (ref 8.5–10.1)
CALCIUM SERPL-MCNC: 8.2 MG/DL (ref 8.5–10.1)
CALCIUM SERPL-MCNC: 8.4 MG/DL (ref 8.5–10.1)
CALCIUM SERPL-MCNC: 8.5 MG/DL (ref 8.5–10.1)
CALCIUM SERPL-MCNC: 8.5 MG/DL (ref 8.5–10.1)
CALCIUM SERPL-MCNC: 8.6 MG/DL (ref 8.5–10.1)
CALCIUM SERPL-MCNC: 8.7 MG/DL (ref 8.5–10.1)
CALCIUM SERPL-MCNC: 8.7 MG/DL (ref 8.5–10.1)
CALCIUM SERPL-MCNC: 8.8 MG/DL (ref 8.5–10.1)
CALCIUM SERPL-MCNC: 8.9 MG/DL (ref 8.5–10.1)
CALCIUM SERPL-MCNC: 9 MG/DL (ref 8.5–10.1)
CHLORIDE SERPL-SCNC: 100 MMOL/L (ref 94–109)
CHLORIDE SERPL-SCNC: 103 MMOL/L (ref 94–109)
CHLORIDE SERPL-SCNC: 104 MMOL/L (ref 94–109)
CHLORIDE SERPL-SCNC: 104 MMOL/L (ref 94–109)
CHLORIDE SERPL-SCNC: 105 MMOL/L (ref 94–109)
CHLORIDE SERPL-SCNC: 106 MMOL/L (ref 94–109)
CHLORIDE SERPL-SCNC: 107 MMOL/L (ref 94–109)
CHLORIDE SERPL-SCNC: 108 MMOL/L (ref 94–109)
CHLORIDE SERPL-SCNC: 109 MMOL/L (ref 94–109)
CHLORIDE SERPL-SCNC: 109 MMOL/L (ref 94–109)
CHLORIDE SERPL-SCNC: 110 MMOL/L (ref 94–109)
CO2 SERPL-SCNC: 19 MMOL/L (ref 20–32)
CO2 SERPL-SCNC: 20 MMOL/L (ref 20–32)
CO2 SERPL-SCNC: 21 MMOL/L (ref 20–32)
CO2 SERPL-SCNC: 22 MMOL/L (ref 20–32)
CO2 SERPL-SCNC: 23 MMOL/L (ref 20–32)
CO2 SERPL-SCNC: 25 MMOL/L (ref 20–32)
CO2 SERPL-SCNC: 26 MMOL/L (ref 20–32)
COLOR FLD: YELLOW
COLOR UR AUTO: YELLOW
COPATH REPORT: NORMAL
COPATH REPORT: NORMAL
CREAT SERPL-MCNC: 0.54 MG/DL (ref 0.52–1.04)
CREAT SERPL-MCNC: 0.59 MG/DL (ref 0.52–1.04)
CREAT SERPL-MCNC: 0.59 MG/DL (ref 0.52–1.04)
CREAT SERPL-MCNC: 0.6 MG/DL (ref 0.52–1.04)
CREAT SERPL-MCNC: 0.61 MG/DL (ref 0.52–1.04)
CREAT SERPL-MCNC: 0.64 MG/DL (ref 0.52–1.04)
CREAT SERPL-MCNC: 0.65 MG/DL (ref 0.52–1.04)
CREAT SERPL-MCNC: 0.65 MG/DL (ref 0.52–1.04)
CREAT SERPL-MCNC: 0.66 MG/DL (ref 0.52–1.04)
CREAT SERPL-MCNC: 0.67 MG/DL (ref 0.52–1.04)
CREAT SERPL-MCNC: 0.68 MG/DL (ref 0.52–1.04)
CREAT SERPL-MCNC: 0.69 MG/DL (ref 0.52–1.04)
CREAT SERPL-MCNC: 0.7 MG/DL (ref 0.52–1.04)
CREAT SERPL-MCNC: 0.71 MG/DL (ref 0.52–1.04)
CREAT SERPL-MCNC: 0.75 MG/DL (ref 0.52–1.04)
CREAT SERPL-MCNC: 0.87 MG/DL (ref 0.52–1.04)
CREAT SERPL-MCNC: 0.98 MG/DL (ref 0.52–1.04)
CREAT SERPL-MCNC: 1.23 MG/DL (ref 0.52–1.04)
CREAT SERPL-MCNC: 1.31 MG/DL (ref 0.52–1.04)
CREAT SERPL-MCNC: 1.34 MG/DL (ref 0.52–1.04)
CREAT SERPL-MCNC: 1.36 MG/DL (ref 0.52–1.04)
CREAT SERPL-MCNC: 1.52 MG/DL (ref 0.52–1.04)
CREAT SERPL-MCNC: 1.55 MG/DL (ref 0.52–1.04)
CREAT SERPL-MCNC: 1.85 MG/DL (ref 0.52–1.04)
CREAT SERPL-MCNC: 1.98 MG/DL (ref 0.52–1.04)
CREAT UR-MCNC: 185 MG/DL
D DIMER PPP FEU-MCNC: 1.6 UG/ML FEU (ref 0–0.5)
DIFFERENTIAL METHOD BLD: ABNORMAL
ELLIPTOCYTES BLD QL SMEAR: SLIGHT
EOSINOPHIL # BLD AUTO: 0 10E9/L (ref 0–0.7)
EOSINOPHIL # BLD AUTO: 0.1 10E9/L (ref 0–0.7)
EOSINOPHIL NFR BLD AUTO: 0 %
EOSINOPHIL NFR BLD AUTO: 0.1 %
EOSINOPHIL NFR BLD AUTO: 0.3 %
EOSINOPHIL NFR BLD AUTO: 0.4 %
EOSINOPHIL NFR BLD AUTO: 0.5 %
EOSINOPHIL NFR BLD AUTO: 0.5 %
EOSINOPHIL NFR BLD AUTO: 0.6 %
EOSINOPHIL NFR BLD AUTO: 0.6 %
EOSINOPHIL NFR BLD AUTO: 0.7 %
EOSINOPHIL NFR BLD AUTO: 0.8 %
EOSINOPHIL NFR BLD AUTO: 0.8 %
EOSINOPHIL NFR BLD AUTO: 1.1 %
EOSINOPHIL NFR BLD AUTO: 1.7 %
EOSINOPHIL NFR BLD AUTO: 1.7 %
EOSINOPHIL NFR BLD AUTO: 1.8 %
EOSINOPHIL NFR BLD AUTO: 1.9 %
EOSINOPHIL NFR BLD AUTO: 2 %
EOSINOPHIL NFR BLD AUTO: 2.5 %
EOSINOPHIL NFR BLD AUTO: 2.9 %
EOSINOPHIL NFR BLD AUTO: 3.1 %
EOSINOPHIL NFR BLD AUTO: 3.7 %
EOSINOPHIL NFR BLD AUTO: 3.8 %
EOSINOPHIL NFR FLD MANUAL: 1 %
ERCP: NORMAL
ERCP: NORMAL
ERYTHROCYTE [DISTWIDTH] IN BLOOD BY AUTOMATED COUNT: 12.9 % (ref 10–15)
ERYTHROCYTE [DISTWIDTH] IN BLOOD BY AUTOMATED COUNT: 12.9 % (ref 10–15)
ERYTHROCYTE [DISTWIDTH] IN BLOOD BY AUTOMATED COUNT: 13.1 % (ref 10–15)
ERYTHROCYTE [DISTWIDTH] IN BLOOD BY AUTOMATED COUNT: 13.1 % (ref 10–15)
ERYTHROCYTE [DISTWIDTH] IN BLOOD BY AUTOMATED COUNT: 13.3 % (ref 10–15)
ERYTHROCYTE [DISTWIDTH] IN BLOOD BY AUTOMATED COUNT: 13.5 % (ref 10–15)
ERYTHROCYTE [DISTWIDTH] IN BLOOD BY AUTOMATED COUNT: 13.6 % (ref 10–15)
ERYTHROCYTE [DISTWIDTH] IN BLOOD BY AUTOMATED COUNT: 13.7 % (ref 10–15)
ERYTHROCYTE [DISTWIDTH] IN BLOOD BY AUTOMATED COUNT: 13.8 % (ref 10–15)
ERYTHROCYTE [DISTWIDTH] IN BLOOD BY AUTOMATED COUNT: 13.8 % (ref 10–15)
ERYTHROCYTE [DISTWIDTH] IN BLOOD BY AUTOMATED COUNT: 14.4 % (ref 10–15)
ERYTHROCYTE [DISTWIDTH] IN BLOOD BY AUTOMATED COUNT: 14.5 % (ref 10–15)
ERYTHROCYTE [DISTWIDTH] IN BLOOD BY AUTOMATED COUNT: 14.6 % (ref 10–15)
ERYTHROCYTE [DISTWIDTH] IN BLOOD BY AUTOMATED COUNT: 15.1 % (ref 10–15)
ERYTHROCYTE [DISTWIDTH] IN BLOOD BY AUTOMATED COUNT: 15.2 % (ref 10–15)
ERYTHROCYTE [DISTWIDTH] IN BLOOD BY AUTOMATED COUNT: 15.7 % (ref 10–15)
ERYTHROCYTE [DISTWIDTH] IN BLOOD BY AUTOMATED COUNT: 15.8 % (ref 10–15)
ERYTHROCYTE [DISTWIDTH] IN BLOOD BY AUTOMATED COUNT: 15.9 % (ref 10–15)
ERYTHROCYTE [DISTWIDTH] IN BLOOD BY AUTOMATED COUNT: 16.3 % (ref 10–15)
ERYTHROCYTE [DISTWIDTH] IN BLOOD BY AUTOMATED COUNT: 17.6 % (ref 10–15)
ERYTHROCYTE [DISTWIDTH] IN BLOOD BY AUTOMATED COUNT: 17.8 % (ref 10–15)
ERYTHROCYTE [DISTWIDTH] IN BLOOD BY AUTOMATED COUNT: 17.9 % (ref 10–15)
ERYTHROCYTE [DISTWIDTH] IN BLOOD BY AUTOMATED COUNT: 18.1 % (ref 10–15)
ERYTHROCYTE [DISTWIDTH] IN BLOOD BY AUTOMATED COUNT: 18.2 % (ref 10–15)
ERYTHROCYTE [DISTWIDTH] IN BLOOD BY AUTOMATED COUNT: 18.3 % (ref 10–15)
ERYTHROCYTE [DISTWIDTH] IN BLOOD BY AUTOMATED COUNT: 18.7 % (ref 10–15)
FERRITIN SERPL-MCNC: 584 NG/ML (ref 8–252)
FRACT EXCRET NA UR+SERPL-RTO: NORMAL %
GFR SERPL CREATININE-BSD FRML MDRD: 25 ML/MIN/1.7M2
GFR SERPL CREATININE-BSD FRML MDRD: 27 ML/MIN/1.7M2
GFR SERPL CREATININE-BSD FRML MDRD: 33 ML/MIN/1.7M2
GFR SERPL CREATININE-BSD FRML MDRD: 34 ML/MIN/1.7M2
GFR SERPL CREATININE-BSD FRML MDRD: 39 ML/MIN/1.7M2
GFR SERPL CREATININE-BSD FRML MDRD: 39 ML/MIN/1.7M2
GFR SERPL CREATININE-BSD FRML MDRD: 40 ML/MIN/1.7M2
GFR SERPL CREATININE-BSD FRML MDRD: 43 ML/MIN/1.7M2
GFR SERPL CREATININE-BSD FRML MDRD: 56 ML/MIN/1.7M2
GFR SERPL CREATININE-BSD FRML MDRD: 64 ML/MIN/1.7M2
GFR SERPL CREATININE-BSD FRML MDRD: 76 ML/MIN/1.7M2
GFR SERPL CREATININE-BSD FRML MDRD: 82 ML/MIN/1.7M2
GFR SERPL CREATININE-BSD FRML MDRD: 84 ML/MIN/1.7M2
GFR SERPL CREATININE-BSD FRML MDRD: 85 ML/MIN/1.7M2
GFR SERPL CREATININE-BSD FRML MDRD: 85 ML/MIN/1.7M2
GFR SERPL CREATININE-BSD FRML MDRD: 88 ML/MIN/1.7M2
GFR SERPL CREATININE-BSD FRML MDRD: 89 ML/MIN/1.7M2
GFR SERPL CREATININE-BSD FRML MDRD: ABNORMAL ML/MIN/1.7M2
GFR SERPL CREATININE-BSD FRML MDRD: NORMAL ML/MIN/1.7M2
GFR SERPL CREATININE-BSD FRML MDRD: NORMAL ML/MIN/1.7M2
GLUCOSE BLDC GLUCOMTR-MCNC: 107 MG/DL (ref 70–99)
GLUCOSE SERPL-MCNC: 100 MG/DL (ref 70–99)
GLUCOSE SERPL-MCNC: 100 MG/DL (ref 70–99)
GLUCOSE SERPL-MCNC: 113 MG/DL (ref 70–99)
GLUCOSE SERPL-MCNC: 115 MG/DL (ref 70–99)
GLUCOSE SERPL-MCNC: 115 MG/DL (ref 70–99)
GLUCOSE SERPL-MCNC: 117 MG/DL (ref 70–99)
GLUCOSE SERPL-MCNC: 120 MG/DL (ref 70–99)
GLUCOSE SERPL-MCNC: 125 MG/DL (ref 70–99)
GLUCOSE SERPL-MCNC: 134 MG/DL (ref 70–99)
GLUCOSE SERPL-MCNC: 134 MG/DL (ref 70–99)
GLUCOSE SERPL-MCNC: 136 MG/DL (ref 70–99)
GLUCOSE SERPL-MCNC: 137 MG/DL (ref 70–99)
GLUCOSE SERPL-MCNC: 139 MG/DL (ref 70–99)
GLUCOSE SERPL-MCNC: 143 MG/DL (ref 70–99)
GLUCOSE SERPL-MCNC: 145 MG/DL (ref 70–99)
GLUCOSE SERPL-MCNC: 151 MG/DL (ref 70–99)
GLUCOSE SERPL-MCNC: 158 MG/DL (ref 70–99)
GLUCOSE SERPL-MCNC: 89 MG/DL (ref 70–99)
GLUCOSE SERPL-MCNC: 92 MG/DL (ref 70–99)
GLUCOSE SERPL-MCNC: 92 MG/DL (ref 70–99)
GLUCOSE SERPL-MCNC: 97 MG/DL (ref 70–99)
GLUCOSE SERPL-MCNC: 97 MG/DL (ref 70–99)
GLUCOSE SERPL-MCNC: 99 MG/DL (ref 70–99)
GLUCOSE UR STRIP-MCNC: NEGATIVE MG/DL
GRAM STN SPEC: NORMAL
HCO3 BLDV-SCNC: 19 MMOL/L (ref 21–28)
HCO3 BLDV-SCNC: 20 MMOL/L (ref 21–28)
HCO3 BLDV-SCNC: 24 MMOL/L (ref 21–28)
HCT VFR BLD AUTO: 19.8 % (ref 35–47)
HCT VFR BLD AUTO: 25 % (ref 35–47)
HCT VFR BLD AUTO: 25.2 % (ref 35–47)
HCT VFR BLD AUTO: 25.8 % (ref 35–47)
HCT VFR BLD AUTO: 26.7 % (ref 35–47)
HCT VFR BLD AUTO: 27.5 % (ref 35–47)
HCT VFR BLD AUTO: 27.7 % (ref 35–47)
HCT VFR BLD AUTO: 27.7 % (ref 35–47)
HCT VFR BLD AUTO: 27.9 % (ref 35–47)
HCT VFR BLD AUTO: 27.9 % (ref 35–47)
HCT VFR BLD AUTO: 28 % (ref 35–47)
HCT VFR BLD AUTO: 28.3 % (ref 35–47)
HCT VFR BLD AUTO: 28.8 % (ref 35–47)
HCT VFR BLD AUTO: 29 % (ref 35–47)
HCT VFR BLD AUTO: 29 % (ref 35–47)
HCT VFR BLD AUTO: 29.1 % (ref 35–47)
HCT VFR BLD AUTO: 30.1 % (ref 35–47)
HCT VFR BLD AUTO: 30.1 % (ref 35–47)
HCT VFR BLD AUTO: 30.2 % (ref 35–47)
HCT VFR BLD AUTO: 30.4 % (ref 35–47)
HCT VFR BLD AUTO: 30.4 % (ref 35–47)
HCT VFR BLD AUTO: 33 % (ref 35–47)
HCT VFR BLD AUTO: 33.4 % (ref 35–47)
HCT VFR BLD AUTO: 34.4 % (ref 35–47)
HCT VFR BLD AUTO: 35.8 % (ref 35–47)
HCT VFR BLD AUTO: 36.7 % (ref 35–47)
HCT VFR BLD AUTO: 37.9 % (ref 35–47)
HCT VFR BLD AUTO: 38 % (ref 35–47)
HGB BLD-MCNC: 10 G/DL (ref 11.7–15.7)
HGB BLD-MCNC: 10.1 G/DL (ref 11.7–15.7)
HGB BLD-MCNC: 10.1 G/DL (ref 11.7–15.7)
HGB BLD-MCNC: 10.2 G/DL (ref 11.7–15.7)
HGB BLD-MCNC: 10.4 G/DL (ref 11.7–15.7)
HGB BLD-MCNC: 11.2 G/DL (ref 11.7–15.7)
HGB BLD-MCNC: 11.6 G/DL (ref 11.7–15.7)
HGB BLD-MCNC: 11.9 G/DL (ref 11.7–15.7)
HGB BLD-MCNC: 12.2 G/DL (ref 11.7–15.7)
HGB BLD-MCNC: 12.4 G/DL (ref 11.7–15.7)
HGB BLD-MCNC: 12.7 G/DL (ref 11.7–15.7)
HGB BLD-MCNC: 12.9 G/DL (ref 11.7–15.7)
HGB BLD-MCNC: 6.7 G/DL (ref 11.7–15.7)
HGB BLD-MCNC: 8.5 G/DL (ref 11.7–15.7)
HGB BLD-MCNC: 8.6 G/DL (ref 11.7–15.7)
HGB BLD-MCNC: 8.7 G/DL (ref 11.7–15.7)
HGB BLD-MCNC: 8.7 G/DL (ref 11.7–15.7)
HGB BLD-MCNC: 9.1 G/DL (ref 11.7–15.7)
HGB BLD-MCNC: 9.3 G/DL (ref 11.7–15.7)
HGB BLD-MCNC: 9.3 G/DL (ref 11.7–15.7)
HGB BLD-MCNC: 9.5 G/DL (ref 11.7–15.7)
HGB BLD-MCNC: 9.6 G/DL (ref 11.7–15.7)
HGB BLD-MCNC: 9.7 G/DL (ref 11.7–15.7)
HGB BLD-MCNC: 9.7 G/DL (ref 11.7–15.7)
HGB BLD-MCNC: 9.9 G/DL (ref 11.7–15.7)
HGB BLD-MCNC: 9.9 G/DL (ref 11.7–15.7)
HGB UR QL STRIP: NEGATIVE
IMM GRANULOCYTES # BLD: 0 10E9/L (ref 0–0.4)
IMM GRANULOCYTES # BLD: 0.1 10E9/L (ref 0–0.4)
IMM GRANULOCYTES NFR BLD: 0 %
IMM GRANULOCYTES NFR BLD: 0.2 %
IMM GRANULOCYTES NFR BLD: 0.3 %
IMM GRANULOCYTES NFR BLD: 0.5 %
IMM GRANULOCYTES NFR BLD: 0.6 %
IMM GRANULOCYTES NFR BLD: 0.8 %
IMM GRANULOCYTES NFR BLD: 0.8 %
INR PPP: 1.13 (ref 0.86–1.14)
INR PPP: 1.24 (ref 0.86–1.14)
INR PPP: 1.36 (ref 0.86–1.14)
INR PPP: 1.5 (ref 0.86–1.14)
IRON SATN MFR SERPL: 14 % (ref 15–46)
IRON SERPL-MCNC: 18 UG/DL (ref 35–180)
KETONES UR STRIP-MCNC: 5 MG/DL
LACTATE BLD-SCNC: 0.8 MMOL/L (ref 0.7–2.1)
LACTATE BLD-SCNC: 0.9 MMOL/L (ref 0.7–2.1)
LACTATE BLD-SCNC: 1 MMOL/L (ref 0.7–2.1)
LACTATE BLD-SCNC: 1.2 MMOL/L (ref 0.7–2.1)
LACTATE BLD-SCNC: 1.4 MMOL/L (ref 0.7–2.1)
LACTATE BLD-SCNC: 1.4 MMOL/L (ref 0.7–2.1)
LACTATE BLD-SCNC: 1.8 MMOL/L (ref 0.7–2.1)
LDH FLD L TO P-CCNC: 664 U/L
LDH SERPL L TO P-CCNC: 211 U/L (ref 81–234)
LDH SERPL L TO P-CCNC: 228 U/L (ref 81–234)
LEUKOCYTE ESTERASE UR QL STRIP: ABNORMAL
LIPASE SERPL-CCNC: 159 U/L (ref 73–393)
LIPASE SERPL-CCNC: 292 U/L (ref 73–393)
LIPASE SERPL-CCNC: 80 U/L (ref 73–393)
LIPASE SERPL-CCNC: 91 U/L (ref 73–393)
LYMPHOCYTES # BLD AUTO: 0.2 10E9/L (ref 0.8–5.3)
LYMPHOCYTES # BLD AUTO: 0.2 10E9/L (ref 0.8–5.3)
LYMPHOCYTES # BLD AUTO: 0.3 10E9/L (ref 0.8–5.3)
LYMPHOCYTES # BLD AUTO: 0.4 10E9/L (ref 0.8–5.3)
LYMPHOCYTES # BLD AUTO: 0.5 10E9/L (ref 0.8–5.3)
LYMPHOCYTES # BLD AUTO: 0.6 10E9/L (ref 0.8–5.3)
LYMPHOCYTES # BLD AUTO: 0.7 10E9/L (ref 0.8–5.3)
LYMPHOCYTES # BLD AUTO: 0.8 10E9/L (ref 0.8–5.3)
LYMPHOCYTES # BLD AUTO: 0.9 10E9/L (ref 0.8–5.3)
LYMPHOCYTES # BLD AUTO: 0.9 10E9/L (ref 0.8–5.3)
LYMPHOCYTES # BLD AUTO: 1.2 10E9/L (ref 0.8–5.3)
LYMPHOCYTES # BLD AUTO: 1.4 10E9/L (ref 0.8–5.3)
LYMPHOCYTES # BLD AUTO: 1.4 10E9/L (ref 0.8–5.3)
LYMPHOCYTES # BLD AUTO: 1.5 10E9/L (ref 0.8–5.3)
LYMPHOCYTES NFR BLD AUTO: 11.5 %
LYMPHOCYTES NFR BLD AUTO: 11.9 %
LYMPHOCYTES NFR BLD AUTO: 12.2 %
LYMPHOCYTES NFR BLD AUTO: 12.3 %
LYMPHOCYTES NFR BLD AUTO: 12.7 %
LYMPHOCYTES NFR BLD AUTO: 13.3 %
LYMPHOCYTES NFR BLD AUTO: 13.4 %
LYMPHOCYTES NFR BLD AUTO: 15.5 %
LYMPHOCYTES NFR BLD AUTO: 18.2 %
LYMPHOCYTES NFR BLD AUTO: 20.1 %
LYMPHOCYTES NFR BLD AUTO: 23 %
LYMPHOCYTES NFR BLD AUTO: 23.5 %
LYMPHOCYTES NFR BLD AUTO: 25.4 %
LYMPHOCYTES NFR BLD AUTO: 25.5 %
LYMPHOCYTES NFR BLD AUTO: 26.7 %
LYMPHOCYTES NFR BLD AUTO: 28 %
LYMPHOCYTES NFR BLD AUTO: 29.5 %
LYMPHOCYTES NFR BLD AUTO: 30 %
LYMPHOCYTES NFR BLD AUTO: 39 %
LYMPHOCYTES NFR BLD AUTO: 41.5 %
LYMPHOCYTES NFR BLD AUTO: 9.4 %
LYMPHOCYTES NFR BLD AUTO: 9.4 %
LYMPHOCYTES NFR FLD MANUAL: 8 %
Lab: NORMAL
MAGNESIUM SERPL-MCNC: 1.9 MG/DL (ref 1.6–2.3)
MCH RBC QN AUTO: 29.3 PG (ref 26.5–33)
MCH RBC QN AUTO: 29.3 PG (ref 26.5–33)
MCH RBC QN AUTO: 29.4 PG (ref 26.5–33)
MCH RBC QN AUTO: 29.5 PG (ref 26.5–33)
MCH RBC QN AUTO: 29.6 PG (ref 26.5–33)
MCH RBC QN AUTO: 29.6 PG (ref 26.5–33)
MCH RBC QN AUTO: 30 PG (ref 26.5–33)
MCH RBC QN AUTO: 30.7 PG (ref 26.5–33)
MCH RBC QN AUTO: 30.8 PG (ref 26.5–33)
MCH RBC QN AUTO: 30.8 PG (ref 26.5–33)
MCH RBC QN AUTO: 31 PG (ref 26.5–33)
MCH RBC QN AUTO: 31.1 PG (ref 26.5–33)
MCH RBC QN AUTO: 31.5 PG (ref 26.5–33)
MCH RBC QN AUTO: 31.6 PG (ref 26.5–33)
MCH RBC QN AUTO: 31.6 PG (ref 26.5–33)
MCH RBC QN AUTO: 31.7 PG (ref 26.5–33)
MCH RBC QN AUTO: 31.8 PG (ref 26.5–33)
MCH RBC QN AUTO: 31.8 PG (ref 26.5–33)
MCH RBC QN AUTO: 32.1 PG (ref 26.5–33)
MCH RBC QN AUTO: 32.2 PG (ref 26.5–33)
MCH RBC QN AUTO: 32.3 PG (ref 26.5–33)
MCH RBC QN AUTO: 32.4 PG (ref 26.5–33)
MCH RBC QN AUTO: 32.5 PG (ref 26.5–33)
MCH RBC QN AUTO: 32.8 PG (ref 26.5–33)
MCH RBC QN AUTO: 33 PG (ref 26.5–33)
MCH RBC QN AUTO: 33.2 PG (ref 26.5–33)
MCH RBC QN AUTO: 33.3 PG (ref 26.5–33)
MCH RBC QN AUTO: 33.4 PG (ref 26.5–33)
MCHC RBC AUTO-ENTMCNC: 32.6 G/DL (ref 31.5–36.5)
MCHC RBC AUTO-ENTMCNC: 32.9 G/DL (ref 31.5–36.5)
MCHC RBC AUTO-ENTMCNC: 33.3 G/DL (ref 31.5–36.5)
MCHC RBC AUTO-ENTMCNC: 33.3 G/DL (ref 31.5–36.5)
MCHC RBC AUTO-ENTMCNC: 33.4 G/DL (ref 31.5–36.5)
MCHC RBC AUTO-ENTMCNC: 33.4 G/DL (ref 31.5–36.5)
MCHC RBC AUTO-ENTMCNC: 33.5 G/DL (ref 31.5–36.5)
MCHC RBC AUTO-ENTMCNC: 33.6 G/DL (ref 31.5–36.5)
MCHC RBC AUTO-ENTMCNC: 33.7 G/DL (ref 31.5–36.5)
MCHC RBC AUTO-ENTMCNC: 33.8 G/DL (ref 31.5–36.5)
MCHC RBC AUTO-ENTMCNC: 33.9 G/DL (ref 31.5–36.5)
MCHC RBC AUTO-ENTMCNC: 33.9 G/DL (ref 31.5–36.5)
MCHC RBC AUTO-ENTMCNC: 34.1 G/DL (ref 31.5–36.5)
MCHC RBC AUTO-ENTMCNC: 34.2 G/DL (ref 31.5–36.5)
MCHC RBC AUTO-ENTMCNC: 34.3 G/DL (ref 31.5–36.5)
MCHC RBC AUTO-ENTMCNC: 34.4 G/DL (ref 31.5–36.5)
MCHC RBC AUTO-ENTMCNC: 34.4 G/DL (ref 31.5–36.5)
MCHC RBC AUTO-ENTMCNC: 34.6 G/DL (ref 31.5–36.5)
MCHC RBC AUTO-ENTMCNC: 34.7 G/DL (ref 31.5–36.5)
MCHC RBC AUTO-ENTMCNC: 34.7 G/DL (ref 31.5–36.5)
MCHC RBC AUTO-ENTMCNC: 34.8 G/DL (ref 31.5–36.5)
MCHC RBC AUTO-ENTMCNC: 34.9 G/DL (ref 31.5–36.5)
MCV RBC AUTO: 100 FL (ref 78–100)
MCV RBC AUTO: 85 FL (ref 78–100)
MCV RBC AUTO: 86 FL (ref 78–100)
MCV RBC AUTO: 87 FL (ref 78–100)
MCV RBC AUTO: 90 FL (ref 78–100)
MCV RBC AUTO: 90 FL (ref 78–100)
MCV RBC AUTO: 91 FL (ref 78–100)
MCV RBC AUTO: 92 FL (ref 78–100)
MCV RBC AUTO: 93 FL (ref 78–100)
MCV RBC AUTO: 94 FL (ref 78–100)
MCV RBC AUTO: 95 FL (ref 78–100)
MCV RBC AUTO: 96 FL (ref 78–100)
MCV RBC AUTO: 96 FL (ref 78–100)
MCV RBC AUTO: 97 FL (ref 78–100)
MCV RBC AUTO: 97 FL (ref 78–100)
MCV RBC AUTO: 98 FL (ref 78–100)
MCV RBC AUTO: 99 FL (ref 78–100)
MICRO REPORT STATUS: NORMAL
MONOCYTES # BLD AUTO: 0.2 10E9/L (ref 0–1.3)
MONOCYTES # BLD AUTO: 0.3 10E9/L (ref 0–1.3)
MONOCYTES # BLD AUTO: 0.4 10E9/L (ref 0–1.3)
MONOCYTES # BLD AUTO: 0.5 10E9/L (ref 0–1.3)
MONOCYTES # BLD AUTO: 0.6 10E9/L (ref 0–1.3)
MONOCYTES # BLD AUTO: 0.8 10E9/L (ref 0–1.3)
MONOCYTES # BLD AUTO: 0.8 10E9/L (ref 0–1.3)
MONOCYTES # BLD AUTO: 0.9 10E9/L (ref 0–1.3)
MONOCYTES # BLD AUTO: 0.9 10E9/L (ref 0–1.3)
MONOCYTES # BLD AUTO: 1 10E9/L (ref 0–1.3)
MONOCYTES # BLD AUTO: 1.2 10E9/L (ref 0–1.3)
MONOCYTES # BLD AUTO: 1.6 10E9/L (ref 0–1.3)
MONOCYTES NFR BLD AUTO: 10.8 %
MONOCYTES NFR BLD AUTO: 11.7 %
MONOCYTES NFR BLD AUTO: 11.7 %
MONOCYTES NFR BLD AUTO: 12.2 %
MONOCYTES NFR BLD AUTO: 13.2 %
MONOCYTES NFR BLD AUTO: 13.4 %
MONOCYTES NFR BLD AUTO: 14 %
MONOCYTES NFR BLD AUTO: 14.3 %
MONOCYTES NFR BLD AUTO: 14.7 %
MONOCYTES NFR BLD AUTO: 16.1 %
MONOCYTES NFR BLD AUTO: 16.8 %
MONOCYTES NFR BLD AUTO: 17 %
MONOCYTES NFR BLD AUTO: 18.2 %
MONOCYTES NFR BLD AUTO: 18.9 %
MONOCYTES NFR BLD AUTO: 20.2 %
MONOCYTES NFR BLD AUTO: 20.3 %
MONOCYTES NFR BLD AUTO: 22.6 %
MONOCYTES NFR BLD AUTO: 24.3 %
MONOCYTES NFR BLD AUTO: 28.2 %
MONOCYTES NFR BLD AUTO: 29.9 %
MONOCYTES NFR BLD AUTO: 8 %
MONOCYTES NFR BLD AUTO: 9.5 %
MONOS+MACROS NFR FLD MANUAL: 24 %
MUCOUS THREADS #/AREA URNS LPF: PRESENT /LPF
NEUTROPHILS # BLD AUTO: 0.9 10E9/L (ref 1.6–8.3)
NEUTROPHILS # BLD AUTO: 1.1 10E9/L (ref 1.6–8.3)
NEUTROPHILS # BLD AUTO: 1.3 10E9/L (ref 1.6–8.3)
NEUTROPHILS # BLD AUTO: 1.3 10E9/L (ref 1.6–8.3)
NEUTROPHILS # BLD AUTO: 1.5 10E9/L (ref 1.6–8.3)
NEUTROPHILS # BLD AUTO: 1.5 10E9/L (ref 1.6–8.3)
NEUTROPHILS # BLD AUTO: 1.6 10E9/L (ref 1.6–8.3)
NEUTROPHILS # BLD AUTO: 1.8 10E9/L (ref 1.6–8.3)
NEUTROPHILS # BLD AUTO: 2 10E9/L (ref 1.6–8.3)
NEUTROPHILS # BLD AUTO: 2.1 10E9/L (ref 1.6–8.3)
NEUTROPHILS # BLD AUTO: 2.5 10E9/L (ref 1.6–8.3)
NEUTROPHILS # BLD AUTO: 2.7 10E9/L (ref 1.6–8.3)
NEUTROPHILS # BLD AUTO: 2.8 10E9/L (ref 1.6–8.3)
NEUTROPHILS # BLD AUTO: 3.5 10E9/L (ref 1.6–8.3)
NEUTROPHILS # BLD AUTO: 4.4 10E9/L (ref 1.6–8.3)
NEUTROPHILS # BLD AUTO: 4.7 10E9/L (ref 1.6–8.3)
NEUTROPHILS # BLD AUTO: 4.9 10E9/L (ref 1.6–8.3)
NEUTROPHILS # BLD AUTO: 6.4 10E9/L (ref 1.6–8.3)
NEUTROPHILS NFR BLD AUTO: 41.8 %
NEUTROPHILS NFR BLD AUTO: 42.7 %
NEUTROPHILS NFR BLD AUTO: 45 %
NEUTROPHILS NFR BLD AUTO: 45.2 %
NEUTROPHILS NFR BLD AUTO: 48.4 %
NEUTROPHILS NFR BLD AUTO: 52.9 %
NEUTROPHILS NFR BLD AUTO: 54 %
NEUTROPHILS NFR BLD AUTO: 54.7 %
NEUTROPHILS NFR BLD AUTO: 54.8 %
NEUTROPHILS NFR BLD AUTO: 56.5 %
NEUTROPHILS NFR BLD AUTO: 60.2 %
NEUTROPHILS NFR BLD AUTO: 65.3 %
NEUTROPHILS NFR BLD AUTO: 65.5 %
NEUTROPHILS NFR BLD AUTO: 66.7 %
NEUTROPHILS NFR BLD AUTO: 69 %
NEUTROPHILS NFR BLD AUTO: 69.7 %
NEUTROPHILS NFR BLD AUTO: 70.1 %
NEUTROPHILS NFR BLD AUTO: 70.4 %
NEUTROPHILS NFR BLD AUTO: 73.6 %
NEUTROPHILS NFR BLD AUTO: 73.9 %
NEUTROPHILS NFR BLD AUTO: 74.1 %
NEUTROPHILS NFR BLD AUTO: 75.8 %
NEUTS BAND NFR FLD MANUAL: 18 %
NITRATE UR QL: NEGATIVE
NRBC # BLD AUTO: 0 10*3/UL
NRBC BLD AUTO-RTO: 0 /100
NT-PROBNP SERPL-MCNC: 528 PG/ML (ref 0–900)
NUM BPU REQUESTED: 2
OTHER CELLS FLD MANUAL: 49 %
OVALOCYTES BLD QL SMEAR: SLIGHT
PCO2 BLDV: 36 MM HG (ref 40–50)
PCO2 BLDV: 38 MM HG (ref 40–50)
PCO2 BLDV: 58 MM HG (ref 40–50)
PH BLDV: 7.15 PH (ref 7.32–7.43)
PH BLDV: 7.31 PH (ref 7.32–7.43)
PH BLDV: 7.43 PH (ref 7.32–7.43)
PH UR STRIP: 6 PH (ref 5–7)
PLATELET # BLD AUTO: 103 10E9/L (ref 150–450)
PLATELET # BLD AUTO: 110 10E9/L (ref 150–450)
PLATELET # BLD AUTO: 120 10E9/L (ref 150–450)
PLATELET # BLD AUTO: 135 10E9/L (ref 150–450)
PLATELET # BLD AUTO: 136 10E9/L (ref 150–450)
PLATELET # BLD AUTO: 139 10E9/L (ref 150–450)
PLATELET # BLD AUTO: 156 10E9/L (ref 150–450)
PLATELET # BLD AUTO: 168 10E9/L (ref 150–450)
PLATELET # BLD AUTO: 169 10E9/L (ref 150–450)
PLATELET # BLD AUTO: 174 10E9/L (ref 150–450)
PLATELET # BLD AUTO: 174 10E9/L (ref 150–450)
PLATELET # BLD AUTO: 176 10E9/L (ref 150–450)
PLATELET # BLD AUTO: 177 10E9/L (ref 150–450)
PLATELET # BLD AUTO: 178 10E9/L (ref 150–450)
PLATELET # BLD AUTO: 181 10E9/L (ref 150–450)
PLATELET # BLD AUTO: 182 10E9/L (ref 150–450)
PLATELET # BLD AUTO: 185 10E9/L (ref 150–450)
PLATELET # BLD AUTO: 186 10E9/L (ref 150–450)
PLATELET # BLD AUTO: 200 10E9/L (ref 150–450)
PLATELET # BLD AUTO: 202 10E9/L (ref 150–450)
PLATELET # BLD AUTO: 206 10E9/L (ref 150–450)
PLATELET # BLD AUTO: 208 10E9/L (ref 150–450)
PLATELET # BLD AUTO: 213 10E9/L (ref 150–450)
PLATELET # BLD AUTO: 236 10E9/L (ref 150–450)
PLATELET # BLD AUTO: 238 10E9/L (ref 150–450)
PLATELET # BLD AUTO: 257 10E9/L (ref 150–450)
PLATELET # BLD AUTO: 302 10E9/L (ref 150–450)
PLATELET # BLD AUTO: 345 10E9/L (ref 150–450)
PLATELET # BLD AUTO: 93 10E9/L (ref 150–450)
PLATELET # BLD EST: ABNORMAL 10*3/UL
PLATELET # BLD EST: NORMAL 10*3/UL
PO2 BLDV: 32 MM HG (ref 25–47)
PO2 BLDV: 33 MM HG (ref 25–47)
PO2 BLDV: 51 MM HG (ref 25–47)
POIKILOCYTOSIS BLD QL SMEAR: SLIGHT
POTASSIUM SERPL-SCNC: 2.5 MMOL/L (ref 3.4–5.3)
POTASSIUM SERPL-SCNC: 2.7 MMOL/L (ref 3.4–5.3)
POTASSIUM SERPL-SCNC: 2.9 MMOL/L (ref 3.4–5.3)
POTASSIUM SERPL-SCNC: 2.9 MMOL/L (ref 3.4–5.3)
POTASSIUM SERPL-SCNC: 3.2 MMOL/L (ref 3.4–5.3)
POTASSIUM SERPL-SCNC: 3.3 MMOL/L (ref 3.4–5.3)
POTASSIUM SERPL-SCNC: 3.3 MMOL/L (ref 3.4–5.3)
POTASSIUM SERPL-SCNC: 3.4 MMOL/L (ref 3.4–5.3)
POTASSIUM SERPL-SCNC: 3.5 MMOL/L (ref 3.4–5.3)
POTASSIUM SERPL-SCNC: 3.6 MMOL/L (ref 3.4–5.3)
POTASSIUM SERPL-SCNC: 3.7 MMOL/L (ref 3.4–5.3)
POTASSIUM SERPL-SCNC: 3.8 MMOL/L (ref 3.4–5.3)
POTASSIUM SERPL-SCNC: 3.9 MMOL/L (ref 3.4–5.3)
POTASSIUM SERPL-SCNC: 3.9 MMOL/L (ref 3.4–5.3)
POTASSIUM SERPL-SCNC: 4.1 MMOL/L (ref 3.4–5.3)
PROCALCITONIN SERPL-MCNC: 0.07 NG/ML
PROT FLD-MCNC: 2.7 G/DL
PROT SERPL-MCNC: 4.1 G/DL (ref 6.8–8.8)
PROT SERPL-MCNC: 4.2 G/DL (ref 6.8–8.8)
PROT SERPL-MCNC: 4.3 G/DL (ref 6.8–8.8)
PROT SERPL-MCNC: 4.7 G/DL (ref 6.8–8.8)
PROT SERPL-MCNC: 4.8 G/DL (ref 6.8–8.8)
PROT SERPL-MCNC: 5.1 G/DL (ref 6.8–8.8)
PROT SERPL-MCNC: 5.4 G/DL (ref 6.8–8.8)
PROT SERPL-MCNC: 5.6 G/DL (ref 6.8–8.8)
PROT SERPL-MCNC: 5.7 G/DL (ref 6.8–8.8)
PROT SERPL-MCNC: 6 G/DL (ref 6.8–8.8)
PROT SERPL-MCNC: 6.1 G/DL (ref 6.8–8.8)
PROT SERPL-MCNC: 6.2 G/DL (ref 6.8–8.8)
PROT SERPL-MCNC: 6.2 G/DL (ref 6.8–8.8)
PROT SERPL-MCNC: 6.3 G/DL (ref 6.8–8.8)
RBC # BLD AUTO: 2.13 10E12/L (ref 3.8–5.2)
RBC # BLD AUTO: 2.67 10E12/L (ref 3.8–5.2)
RBC # BLD AUTO: 2.7 10E12/L (ref 3.8–5.2)
RBC # BLD AUTO: 2.71 10E12/L (ref 3.8–5.2)
RBC # BLD AUTO: 2.74 10E12/L (ref 3.8–5.2)
RBC # BLD AUTO: 2.79 10E12/L (ref 3.8–5.2)
RBC # BLD AUTO: 2.88 10E12/L (ref 3.8–5.2)
RBC # BLD AUTO: 2.88 10E12/L (ref 3.8–5.2)
RBC # BLD AUTO: 2.9 10E12/L (ref 3.8–5.2)
RBC # BLD AUTO: 2.92 10E12/L (ref 3.8–5.2)
RBC # BLD AUTO: 2.94 10E12/L (ref 3.8–5.2)
RBC # BLD AUTO: 3.05 10E12/L (ref 3.8–5.2)
RBC # BLD AUTO: 3.05 10E12/L (ref 3.8–5.2)
RBC # BLD AUTO: 3.12 10E12/L (ref 3.8–5.2)
RBC # BLD AUTO: 3.12 10E12/L (ref 3.8–5.2)
RBC # BLD AUTO: 3.24 10E12/L (ref 3.8–5.2)
RBC # BLD AUTO: 3.28 10E12/L (ref 3.8–5.2)
RBC # BLD AUTO: 3.28 10E12/L (ref 3.8–5.2)
RBC # BLD AUTO: 3.36 10E12/L (ref 3.8–5.2)
RBC # BLD AUTO: 3.37 10E12/L (ref 3.8–5.2)
RBC # BLD AUTO: 3.54 10E12/L (ref 3.8–5.2)
RBC # BLD AUTO: 3.73 10E12/L (ref 3.8–5.2)
RBC # BLD AUTO: 3.77 10E12/L (ref 3.8–5.2)
RBC # BLD AUTO: 3.79 10E12/L (ref 3.8–5.2)
RBC # BLD AUTO: 3.96 10E12/L (ref 3.8–5.2)
RBC # BLD AUTO: 4.02 10E12/L (ref 3.8–5.2)
RBC # BLD AUTO: 4.14 10E12/L (ref 3.8–5.2)
RBC # BLD AUTO: 4.16 10E12/L (ref 3.8–5.2)
RBC #/AREA URNS AUTO: 2 /HPF (ref 0–2)
RBC MORPH BLD: NORMAL
SODIUM SERPL-SCNC: 134 MMOL/L (ref 133–144)
SODIUM SERPL-SCNC: 135 MMOL/L (ref 133–144)
SODIUM SERPL-SCNC: 137 MMOL/L (ref 133–144)
SODIUM SERPL-SCNC: 138 MMOL/L (ref 133–144)
SODIUM SERPL-SCNC: 139 MMOL/L (ref 133–144)
SODIUM SERPL-SCNC: 140 MMOL/L (ref 133–144)
SODIUM SERPL-SCNC: 141 MMOL/L (ref 133–144)
SODIUM SERPL-SCNC: 142 MMOL/L (ref 133–144)
SODIUM SERPL-SCNC: 142 MMOL/L (ref 133–144)
SODIUM UR-SCNC: 11 MMOL/L
SP GR UR STRIP: 1.02 (ref 1–1.03)
SPECIMEN EXP DATE BLD: NORMAL
SPECIMEN SOURCE FLD: NORMAL
SPECIMEN SOURCE: NORMAL
SQUAMOUS #/AREA URNS AUTO: 3 /HPF (ref 0–1)
TIBC SERPL-MCNC: 131 UG/DL (ref 240–430)
TRANSFERRIN SERPL-MCNC: 63 MG/DL (ref 210–360)
TRANSFUSION STATUS PATIENT QL: NORMAL
TROPONIN I SERPL-MCNC: 0.11 UG/L (ref 0–0.04)
TROPONIN I SERPL-MCNC: NORMAL UG/L (ref 0–0.04)
UPPER EUS: NORMAL
UPPER EUS: NORMAL
URN SPEC COLLECT METH UR: ABNORMAL
UROBILINOGEN UR STRIP-MCNC: NORMAL MG/DL (ref 0–2)
VANCOMYCIN SERPL-MCNC: 17.5 MG/L
VANCOMYCIN SERPL-MCNC: 17.5 MG/L
VANCOMYCIN SERPL-MCNC: 29.4 MG/L
WBC # BLD AUTO: 1.6 10E9/L (ref 4–11)
WBC # BLD AUTO: 1.7 10E9/L (ref 4–11)
WBC # BLD AUTO: 1.9 10E9/L (ref 4–11)
WBC # BLD AUTO: 2.2 10E9/L (ref 4–11)
WBC # BLD AUTO: 2.2 10E9/L (ref 4–11)
WBC # BLD AUTO: 2.3 10E9/L (ref 4–11)
WBC # BLD AUTO: 2.3 10E9/L (ref 4–11)
WBC # BLD AUTO: 2.6 10E9/L (ref 4–11)
WBC # BLD AUTO: 2.8 10E9/L (ref 4–11)
WBC # BLD AUTO: 2.9 10E9/L (ref 4–11)
WBC # BLD AUTO: 2.9 10E9/L (ref 4–11)
WBC # BLD AUTO: 3 10E9/L (ref 4–11)
WBC # BLD AUTO: 3.2 10E9/L (ref 4–11)
WBC # BLD AUTO: 3.5 10E9/L (ref 4–11)
WBC # BLD AUTO: 3.5 10E9/L (ref 4–11)
WBC # BLD AUTO: 3.6 10E9/L (ref 4–11)
WBC # BLD AUTO: 3.8 10E9/L (ref 4–11)
WBC # BLD AUTO: 4.6 10E9/L (ref 4–11)
WBC # BLD AUTO: 4.8 10E9/L (ref 4–11)
WBC # BLD AUTO: 5.3 10E9/L (ref 4–11)
WBC # BLD AUTO: 6.3 10E9/L (ref 4–11)
WBC # BLD AUTO: 7.1 10E9/L (ref 4–11)
WBC # BLD AUTO: 7.9 10E9/L (ref 4–11)
WBC # BLD AUTO: 8.7 10E9/L (ref 4–11)
WBC # FLD AUTO: NORMAL /UL
WBC #/AREA URNS AUTO: 4 /HPF (ref 0–2)

## 2017-01-01 PROCEDURE — 96365 THER/PROPH/DIAG IV INF INIT: CPT

## 2017-01-01 PROCEDURE — 25000128 H RX IP 250 OP 636: Performed by: NURSE PRACTITIONER

## 2017-01-01 PROCEDURE — 25000125 ZZHC RX 250: Performed by: INTERNAL MEDICINE

## 2017-01-01 PROCEDURE — 97810 ACUP 1/> WO ESTIM 1ST 15 MIN: CPT | Mod: GA | Performed by: FAMILY MEDICINE

## 2017-01-01 PROCEDURE — 40000099 ZZH STATISTIC LYMPHEDEMA VISIT: Performed by: PHYSICAL THERAPIST

## 2017-01-01 PROCEDURE — 0W9B3ZZ DRAINAGE OF LEFT PLEURAL CAVITY, PERCUTANEOUS APPROACH: ICD-10-PCS | Performed by: RADIOLOGY

## 2017-01-01 PROCEDURE — 99356 ZZC PROLONGED SERV,INPATIENT,1ST HR: CPT | Performed by: NURSE PRACTITIONER

## 2017-01-01 PROCEDURE — C1726 CATH, BAL DIL, NON-VASCULAR: HCPCS | Performed by: INTERNAL MEDICINE

## 2017-01-01 PROCEDURE — 80053 COMPREHEN METABOLIC PANEL: CPT | Performed by: INTERNAL MEDICINE

## 2017-01-01 PROCEDURE — 71000027 ZZH RECOVERY PHASE 2 EACH 15 MINS: Performed by: INTERNAL MEDICINE

## 2017-01-01 PROCEDURE — 85025 COMPLETE CBC W/AUTO DIFF WBC: CPT | Performed by: INTERNAL MEDICINE

## 2017-01-01 PROCEDURE — 82962 GLUCOSE BLOOD TEST: CPT

## 2017-01-01 PROCEDURE — 86850 RBC ANTIBODY SCREEN: CPT | Performed by: EMERGENCY MEDICINE

## 2017-01-01 PROCEDURE — A9270 NON-COVERED ITEM OR SERVICE: HCPCS | Mod: GY | Performed by: INTERNAL MEDICINE

## 2017-01-01 PROCEDURE — 12000000 ZZH R&B MED SURG/OB

## 2017-01-01 PROCEDURE — 82565 ASSAY OF CREATININE: CPT | Performed by: INTERNAL MEDICINE

## 2017-01-01 PROCEDURE — A9270 NON-COVERED ITEM OR SERVICE: HCPCS | Mod: GY | Performed by: NURSE PRACTITIONER

## 2017-01-01 PROCEDURE — 96366 THER/PROPH/DIAG IV INF ADDON: CPT

## 2017-01-01 PROCEDURE — 99215 OFFICE O/P EST HI 40 MIN: CPT | Performed by: INTERNAL MEDICINE

## 2017-01-01 PROCEDURE — 89051 BODY FLUID CELL COUNT: CPT | Performed by: FAMILY MEDICINE

## 2017-01-01 PROCEDURE — 25000132 ZZH RX MED GY IP 250 OP 250 PS 637: Mod: GY | Performed by: NURSE PRACTITIONER

## 2017-01-01 PROCEDURE — 97811 ACUP 1/> W/O ESTIM EA ADD 15: CPT | Mod: GA | Performed by: FAMILY MEDICINE

## 2017-01-01 PROCEDURE — 78815 PET IMAGE W/CT SKULL-THIGH: CPT | Mod: PS

## 2017-01-01 PROCEDURE — 25000128 H RX IP 250 OP 636: Performed by: INTERNAL MEDICINE

## 2017-01-01 PROCEDURE — 25000125 ZZHC RX 250: Performed by: EMERGENCY MEDICINE

## 2017-01-01 PROCEDURE — 96413 CHEMO IV INFUSION 1 HR: CPT

## 2017-01-01 PROCEDURE — 25000128 H RX IP 250 OP 636

## 2017-01-01 PROCEDURE — 97140 MANUAL THERAPY 1/> REGIONS: CPT | Mod: GP | Performed by: REHABILITATION PRACTITIONER

## 2017-01-01 PROCEDURE — 25000125 ZZHC RX 250: Performed by: RADIOLOGY

## 2017-01-01 PROCEDURE — 99214 OFFICE O/P EST MOD 30 MIN: CPT | Performed by: FAMILY MEDICINE

## 2017-01-01 PROCEDURE — P9041 ALBUMIN (HUMAN),5%, 50ML: HCPCS | Performed by: NURSE ANESTHETIST, CERTIFIED REGISTERED

## 2017-01-01 PROCEDURE — 84466 ASSAY OF TRANSFERRIN: CPT | Performed by: EMERGENCY MEDICINE

## 2017-01-01 PROCEDURE — 25000128 H RX IP 250 OP 636: Performed by: FAMILY MEDICINE

## 2017-01-01 PROCEDURE — 37000009 ZZH ANESTHESIA TECHNICAL FEE, EACH ADDTL 15 MIN: Performed by: INTERNAL MEDICINE

## 2017-01-01 PROCEDURE — S0171 BUMETANIDE 0.5 MG: HCPCS | Performed by: INTERNAL MEDICINE

## 2017-01-01 PROCEDURE — 25000125 ZZHC RX 250: Performed by: FAMILY MEDICINE

## 2017-01-01 PROCEDURE — 85027 COMPLETE CBC AUTOMATED: CPT | Performed by: FAMILY MEDICINE

## 2017-01-01 PROCEDURE — 37000008 ZZH ANESTHESIA TECHNICAL FEE, 1ST 30 MIN: Performed by: INTERNAL MEDICINE

## 2017-01-01 PROCEDURE — S0028 INJECTION, FAMOTIDINE, 20 MG: HCPCS | Performed by: INTERNAL MEDICINE

## 2017-01-01 PROCEDURE — 85379 FIBRIN DEGRADATION QUANT: CPT | Performed by: FAMILY MEDICINE

## 2017-01-01 PROCEDURE — 88305 TISSUE EXAM BY PATHOLOGIST: CPT | Performed by: INTERNAL MEDICINE

## 2017-01-01 PROCEDURE — 83735 ASSAY OF MAGNESIUM: CPT | Performed by: INTERNAL MEDICINE

## 2017-01-01 PROCEDURE — 25000128 H RX IP 250 OP 636: Performed by: NURSE ANESTHETIST, CERTIFIED REGISTERED

## 2017-01-01 PROCEDURE — A9270 NON-COVERED ITEM OR SERVICE: HCPCS | Mod: GY | Performed by: EMERGENCY MEDICINE

## 2017-01-01 PROCEDURE — 93306 TTE W/DOPPLER COMPLETE: CPT

## 2017-01-01 PROCEDURE — 96375 TX/PRO/DX INJ NEW DRUG ADDON: CPT

## 2017-01-01 PROCEDURE — 97116 GAIT TRAINING THERAPY: CPT | Mod: GP | Performed by: REHABILITATION PRACTITIONER

## 2017-01-01 PROCEDURE — 99232 SBSQ HOSP IP/OBS MODERATE 35: CPT | Performed by: INTERNAL MEDICINE

## 2017-01-01 PROCEDURE — 25500064 ZZH RX 255 OP 636: Performed by: INTERNAL MEDICINE

## 2017-01-01 PROCEDURE — 83605 ASSAY OF LACTIC ACID: CPT | Performed by: FAMILY MEDICINE

## 2017-01-01 PROCEDURE — 85610 PROTHROMBIN TIME: CPT | Performed by: INTERNAL MEDICINE

## 2017-01-01 PROCEDURE — 25000132 ZZH RX MED GY IP 250 OP 250 PS 637: Mod: GY | Performed by: FAMILY MEDICINE

## 2017-01-01 PROCEDURE — 96361 HYDRATE IV INFUSION ADD-ON: CPT

## 2017-01-01 PROCEDURE — 25000132 ZZH RX MED GY IP 250 OP 250 PS 637: Mod: GY | Performed by: INTERNAL MEDICINE

## 2017-01-01 PROCEDURE — 99233 SBSQ HOSP IP/OBS HIGH 50: CPT | Performed by: INTERNAL MEDICINE

## 2017-01-01 PROCEDURE — A9270 NON-COVERED ITEM OR SERVICE: HCPCS | Mod: GY | Performed by: FAMILY MEDICINE

## 2017-01-01 PROCEDURE — 25000125 ZZHC RX 250: Performed by: NURSE ANESTHETIST, CERTIFIED REGISTERED

## 2017-01-01 PROCEDURE — 96417 CHEMO IV INFUS EACH ADDL SEQ: CPT

## 2017-01-01 PROCEDURE — 96415 CHEMO IV INFUSION ADDL HR: CPT

## 2017-01-01 PROCEDURE — 40000275 ZZH STATISTIC RCP TIME EA 10 MIN

## 2017-01-01 PROCEDURE — 96368 THER/DIAG CONCURRENT INF: CPT

## 2017-01-01 PROCEDURE — 40000193 ZZH STATISTIC PT WARD VISIT: Performed by: REHABILITATION PRACTITIONER

## 2017-01-01 PROCEDURE — P9016 RBC LEUKOCYTES REDUCED: HCPCS | Performed by: EMERGENCY MEDICINE

## 2017-01-01 PROCEDURE — 93306 TTE W/DOPPLER COMPLETE: CPT | Mod: 26 | Performed by: INTERNAL MEDICINE

## 2017-01-01 PROCEDURE — 27210995 ZZH RX 272: Performed by: INTERNAL MEDICINE

## 2017-01-01 PROCEDURE — 88112 CYTOPATH CELL ENHANCE TECH: CPT | Mod: 26 | Performed by: INTERNAL MEDICINE

## 2017-01-01 PROCEDURE — 97110 THERAPEUTIC EXERCISES: CPT | Mod: GO

## 2017-01-01 PROCEDURE — 87493 C DIFF AMPLIFIED PROBE: CPT | Performed by: INTERNAL MEDICINE

## 2017-01-01 PROCEDURE — 83690 ASSAY OF LIPASE: CPT | Performed by: INTERNAL MEDICINE

## 2017-01-01 PROCEDURE — 80202 ASSAY OF VANCOMYCIN: CPT | Performed by: INTERNAL MEDICINE

## 2017-01-01 PROCEDURE — 97810 ACUP 1/> WO ESTIM 1ST 15 MIN: CPT | Performed by: FAMILY MEDICINE

## 2017-01-01 PROCEDURE — 83605 ASSAY OF LACTIC ACID: CPT | Performed by: INTERNAL MEDICINE

## 2017-01-01 PROCEDURE — 99222 1ST HOSP IP/OBS MODERATE 55: CPT | Mod: AI | Performed by: INTERNAL MEDICINE

## 2017-01-01 PROCEDURE — 83615 LACTATE (LD) (LDH) ENZYME: CPT | Performed by: INTERNAL MEDICINE

## 2017-01-01 PROCEDURE — 00000155 ZZHCL STATISTIC H-CELL BLOCK W/STAIN: Performed by: INTERNAL MEDICINE

## 2017-01-01 PROCEDURE — 96523 IRRIG DRUG DELIVERY DEVICE: CPT

## 2017-01-01 PROCEDURE — 96416 CHEMO PROLONG INFUSE W/PUMP: CPT

## 2017-01-01 PROCEDURE — 36430 TRANSFUSION BLD/BLD COMPNT: CPT

## 2017-01-01 PROCEDURE — 96367 TX/PROPH/DG ADDL SEQ IV INF: CPT

## 2017-01-01 PROCEDURE — 81001 URINALYSIS AUTO W/SCOPE: CPT | Performed by: EMERGENCY MEDICINE

## 2017-01-01 PROCEDURE — 34300033 ZZH RX 343: Performed by: INTERNAL MEDICINE

## 2017-01-01 PROCEDURE — 25000132 ZZH RX MED GY IP 250 OP 250 PS 637: Mod: GY | Performed by: EMERGENCY MEDICINE

## 2017-01-01 PROCEDURE — 40000269 ZZH STATISTIC NO CHARGE FACILITY FEE

## 2017-01-01 PROCEDURE — 99207 ZZC MOONLIGHTING INDICATOR: CPT | Performed by: FAMILY MEDICINE

## 2017-01-01 PROCEDURE — 25000128 H RX IP 250 OP 636: Performed by: EMERGENCY MEDICINE

## 2017-01-01 PROCEDURE — 40000797 XR CHEST PORT 1 VW

## 2017-01-01 PROCEDURE — 99232 SBSQ HOSP IP/OBS MODERATE 35: CPT | Mod: 25 | Performed by: INTERNAL MEDICINE

## 2017-01-01 PROCEDURE — 97140 MANUAL THERAPY 1/> REGIONS: CPT | Mod: GP | Performed by: PHYSICAL THERAPIST

## 2017-01-01 PROCEDURE — 84145 PROCALCITONIN (PCT): CPT | Performed by: FAMILY MEDICINE

## 2017-01-01 PROCEDURE — 27210794 ZZH OR GENERAL SUPPLY STERILE: Performed by: INTERNAL MEDICINE

## 2017-01-01 PROCEDURE — 84132 ASSAY OF SERUM POTASSIUM: CPT | Performed by: FAMILY MEDICINE

## 2017-01-01 PROCEDURE — 32555 ASPIRATE PLEURA W/ IMAGING: CPT

## 2017-01-01 PROCEDURE — 12000011 ZZH R&B MS OVERFLOW

## 2017-01-01 PROCEDURE — G8987 SELF CARE CURRENT STATUS: HCPCS | Mod: GP,CK | Performed by: PHYSICAL THERAPIST

## 2017-01-01 PROCEDURE — 20000002 ZZH R&B BMT INTERMEDIATE

## 2017-01-01 PROCEDURE — 27210190 US THORACENTESIS

## 2017-01-01 PROCEDURE — 84132 ASSAY OF SERUM POTASSIUM: CPT | Performed by: INTERNAL MEDICINE

## 2017-01-01 PROCEDURE — 74183 MRI ABD W/O CNTR FLWD CNTR: CPT

## 2017-01-01 PROCEDURE — 99285 EMERGENCY DEPT VISIT HI MDM: CPT | Performed by: EMERGENCY MEDICINE

## 2017-01-01 PROCEDURE — 80048 BASIC METABOLIC PNL TOTAL CA: CPT | Performed by: INTERNAL MEDICINE

## 2017-01-01 PROCEDURE — 97116 GAIT TRAINING THERAPY: CPT | Mod: GP | Performed by: PHYSICAL THERAPIST

## 2017-01-01 PROCEDURE — 40000193 ZZH STATISTIC PT WARD VISIT: Performed by: PHYSICAL THERAPIST

## 2017-01-01 PROCEDURE — 40000133 ZZH STATISTIC OT WARD VISIT

## 2017-01-01 PROCEDURE — 99214 OFFICE O/P EST MOD 30 MIN: CPT | Performed by: INTERNAL MEDICINE

## 2017-01-01 PROCEDURE — 99207 ZZC CDG-CODE CATEGORY CHANGED: CPT | Performed by: NURSE PRACTITIONER

## 2017-01-01 PROCEDURE — 93970 EXTREMITY STUDY: CPT

## 2017-01-01 PROCEDURE — 93005 ELECTROCARDIOGRAM TRACING: CPT

## 2017-01-01 PROCEDURE — 87205 SMEAR GRAM STAIN: CPT | Performed by: FAMILY MEDICINE

## 2017-01-01 PROCEDURE — 20000003 ZZH R&B ICU

## 2017-01-01 PROCEDURE — 25800025 ZZH RX 258: Performed by: ANESTHESIOLOGY

## 2017-01-01 PROCEDURE — 25800025 ZZH RX 258: Performed by: NURSE ANESTHETIST, CERTIFIED REGISTERED

## 2017-01-01 PROCEDURE — 25000566 ZZH SEVOFLURANE, EA 15 MIN: Performed by: INTERNAL MEDICINE

## 2017-01-01 PROCEDURE — 40000279 XR SURGERY CARM FLUORO GREATER THAN 5 MIN W STILLS: Mod: TC

## 2017-01-01 PROCEDURE — 76770 US EXAM ABDO BACK WALL COMP: CPT

## 2017-01-01 PROCEDURE — 82247 BILIRUBIN TOTAL: CPT | Performed by: INTERNAL MEDICINE

## 2017-01-01 PROCEDURE — 83605 ASSAY OF LACTIC ACID: CPT | Performed by: EMERGENCY MEDICINE

## 2017-01-01 PROCEDURE — 85025 COMPLETE CBC W/AUTO DIFF WBC: CPT | Performed by: FAMILY MEDICINE

## 2017-01-01 PROCEDURE — 25000128 H RX IP 250 OP 636: Performed by: RADIOLOGY

## 2017-01-01 PROCEDURE — 96360 HYDRATION IV INFUSION INIT: CPT

## 2017-01-01 PROCEDURE — 99211 OFF/OP EST MAY X REQ PHY/QHP: CPT

## 2017-01-01 PROCEDURE — 99223 1ST HOSP IP/OBS HIGH 75: CPT | Performed by: NURSE PRACTITIONER

## 2017-01-01 PROCEDURE — 80048 BASIC METABOLIC PNL TOTAL CA: CPT | Performed by: FAMILY MEDICINE

## 2017-01-01 PROCEDURE — 40000277 XR SURGERY CARM FLUORO LESS THAN 5 MIN W STILLS: Mod: TC

## 2017-01-01 PROCEDURE — G8988 SELF CARE GOAL STATUS: HCPCS | Mod: GP,CI | Performed by: PHYSICAL THERAPIST

## 2017-01-01 PROCEDURE — 97811 ACUP 1/> W/O ESTIM EA ADD 15: CPT | Performed by: FAMILY MEDICINE

## 2017-01-01 PROCEDURE — 83880 ASSAY OF NATRIURETIC PEPTIDE: CPT | Performed by: EMERGENCY MEDICINE

## 2017-01-01 PROCEDURE — 96411 CHEMO IV PUSH ADDL DRUG: CPT

## 2017-01-01 PROCEDURE — 80076 HEPATIC FUNCTION PANEL: CPT | Performed by: INTERNAL MEDICINE

## 2017-01-01 PROCEDURE — 83540 ASSAY OF IRON: CPT | Performed by: EMERGENCY MEDICINE

## 2017-01-01 PROCEDURE — 99207 ZZC CDG-CORRECTLY CODED, REVIEWED AND AGREE: CPT | Performed by: NURSE PRACTITIONER

## 2017-01-01 PROCEDURE — 36591 DRAW BLOOD OFF VENOUS DEVICE: CPT

## 2017-01-01 PROCEDURE — 40000274 ZZH STATISTIC RCP CONSULT EA 30 MIN

## 2017-01-01 PROCEDURE — 97165 OT EVAL LOW COMPLEX 30 MIN: CPT | Mod: GO

## 2017-01-01 PROCEDURE — 99211 OFF/OP EST MAY X REQ PHY/QHP: CPT | Mod: 25

## 2017-01-01 PROCEDURE — 99239 HOSP IP/OBS DSCHRG MGMT >30: CPT | Performed by: INTERNAL MEDICINE

## 2017-01-01 PROCEDURE — 84484 ASSAY OF TROPONIN QUANT: CPT | Performed by: INTERNAL MEDICINE

## 2017-01-01 PROCEDURE — 82803 BLOOD GASES ANY COMBINATION: CPT | Performed by: EMERGENCY MEDICINE

## 2017-01-01 PROCEDURE — 99212 OFFICE O/P EST SF 10 MIN: CPT | Mod: 25

## 2017-01-01 PROCEDURE — 71000014 ZZH RECOVERY PHASE 1 LEVEL 2 FIRST HR: Performed by: INTERNAL MEDICINE

## 2017-01-01 PROCEDURE — 82565 ASSAY OF CREATININE: CPT | Performed by: FAMILY MEDICINE

## 2017-01-01 PROCEDURE — 99233 SBSQ HOSP IP/OBS HIGH 50: CPT | Mod: 25 | Performed by: NURSE PRACTITIONER

## 2017-01-01 PROCEDURE — 84300 ASSAY OF URINE SODIUM: CPT | Performed by: INTERNAL MEDICINE

## 2017-01-01 PROCEDURE — 99207 ZZC CDG-MDM COMPONENT: MEETS MODERATE - UP CODED: CPT | Performed by: INTERNAL MEDICINE

## 2017-01-01 PROCEDURE — 83615 LACTATE (LD) (LDH) ENZYME: CPT | Performed by: FAMILY MEDICINE

## 2017-01-01 PROCEDURE — 94660 CPAP INITIATION&MGMT: CPT

## 2017-01-01 PROCEDURE — G0378 HOSPITAL OBSERVATION PER HR: HCPCS

## 2017-01-01 PROCEDURE — 97162 PT EVAL MOD COMPLEX 30 MIN: CPT | Mod: GP | Performed by: PHYSICAL THERAPIST

## 2017-01-01 PROCEDURE — 25800025 ZZH RX 258: Performed by: INTERNAL MEDICINE

## 2017-01-01 PROCEDURE — 36000059 ZZH SURGERY LEVEL 3 EA 15 ADDTL MIN UMMC: Performed by: INTERNAL MEDICINE

## 2017-01-01 PROCEDURE — 99285 EMERGENCY DEPT VISIT HI MDM: CPT | Mod: 25

## 2017-01-01 PROCEDURE — 86900 BLOOD TYPING SEROLOGIC ABO: CPT | Performed by: EMERGENCY MEDICINE

## 2017-01-01 PROCEDURE — 71260 CT THORAX DX C+: CPT

## 2017-01-01 PROCEDURE — 85049 AUTOMATED PLATELET COUNT: CPT | Performed by: INTERNAL MEDICINE

## 2017-01-01 PROCEDURE — 87040 BLOOD CULTURE FOR BACTERIA: CPT | Performed by: EMERGENCY MEDICINE

## 2017-01-01 PROCEDURE — 99221 1ST HOSP IP/OBS SF/LOW 40: CPT | Performed by: INTERNAL MEDICINE

## 2017-01-01 PROCEDURE — 84157 ASSAY OF PROTEIN OTHER: CPT | Performed by: FAMILY MEDICINE

## 2017-01-01 PROCEDURE — 71010 XR CHEST PORT 1 VW: CPT

## 2017-01-01 PROCEDURE — 96372 THER/PROPH/DIAG INJ SC/IM: CPT

## 2017-01-01 PROCEDURE — 99357 ZZC PROLONGED SERV,INPATIENT,EA ADD 1/2: CPT | Performed by: NURSE PRACTITIONER

## 2017-01-01 PROCEDURE — 76705 ECHO EXAM OF ABDOMEN: CPT

## 2017-01-01 PROCEDURE — 96372 THER/PROPH/DIAG INJ SC/IM: CPT | Mod: XS

## 2017-01-01 PROCEDURE — 0FJB8ZZ INSPECTION OF HEPATOBILIARY DUCT, VIA NATURAL OR ARTIFICIAL OPENING ENDOSCOPIC: ICD-10-PCS | Performed by: INTERNAL MEDICINE

## 2017-01-01 PROCEDURE — 94640 AIRWAY INHALATION TREATMENT: CPT

## 2017-01-01 PROCEDURE — 40000099 ZZH STATISTIC LYMPHEDEMA VISIT: Performed by: REHABILITATION PRACTITIONER

## 2017-01-01 PROCEDURE — 82150 ASSAY OF AMYLASE: CPT | Performed by: INTERNAL MEDICINE

## 2017-01-01 PROCEDURE — 40000940 XR CHEST PORT 1 VW

## 2017-01-01 PROCEDURE — 97161 PT EVAL LOW COMPLEX 20 MIN: CPT | Mod: GP | Performed by: PHYSICAL THERAPIST

## 2017-01-01 PROCEDURE — 88172 CYTP DX EVAL FNA 1ST EA SITE: CPT | Performed by: INTERNAL MEDICINE

## 2017-01-01 PROCEDURE — C1876 STENT, NON-COA/NON-COV W/DEL: HCPCS | Performed by: INTERNAL MEDICINE

## 2017-01-01 PROCEDURE — 96374 THER/PROPH/DIAG INJ IV PUSH: CPT

## 2017-01-01 PROCEDURE — 85610 PROTHROMBIN TIME: CPT | Performed by: FAMILY MEDICINE

## 2017-01-01 PROCEDURE — 27211024 ZZHC OR SUPPLY OTHER OPNP: Performed by: INTERNAL MEDICINE

## 2017-01-01 PROCEDURE — 85027 COMPLETE CBC AUTOMATED: CPT | Performed by: INTERNAL MEDICINE

## 2017-01-01 PROCEDURE — 97535 SELF CARE MNGMENT TRAINING: CPT | Mod: GO

## 2017-01-01 PROCEDURE — C1769 GUIDE WIRE: HCPCS | Performed by: INTERNAL MEDICINE

## 2017-01-01 PROCEDURE — 36000061 ZZH SURGERY LEVEL 3 W FLUORO 1ST 30 MIN - UMMC: Performed by: INTERNAL MEDICINE

## 2017-01-01 PROCEDURE — 0W993ZZ DRAINAGE OF RIGHT PLEURAL CAVITY, PERCUTANEOUS APPROACH: ICD-10-PCS | Performed by: RADIOLOGY

## 2017-01-01 PROCEDURE — 83550 IRON BINDING TEST: CPT | Performed by: EMERGENCY MEDICINE

## 2017-01-01 PROCEDURE — 71020 XR CHEST 2 VW: CPT

## 2017-01-01 PROCEDURE — 99291 CRITICAL CARE FIRST HOUR: CPT | Performed by: FAMILY MEDICINE

## 2017-01-01 PROCEDURE — 84484 ASSAY OF TROPONIN QUANT: CPT | Performed by: EMERGENCY MEDICINE

## 2017-01-01 PROCEDURE — 88112 CYTOPATH CELL ENHANCE TECH: CPT | Performed by: INTERNAL MEDICINE

## 2017-01-01 PROCEDURE — 99223 1ST HOSP IP/OBS HIGH 75: CPT | Mod: AI | Performed by: FAMILY MEDICINE

## 2017-01-01 PROCEDURE — 82150 ASSAY OF AMYLASE: CPT | Mod: 91 | Performed by: INTERNAL MEDICINE

## 2017-01-01 PROCEDURE — P9047 ALBUMIN (HUMAN), 25%, 50ML: HCPCS | Performed by: INTERNAL MEDICINE

## 2017-01-01 PROCEDURE — 25000128 H RX IP 250 OP 636: Mod: JW | Performed by: INTERNAL MEDICINE

## 2017-01-01 PROCEDURE — 40000170 ZZH STATISTIC PRE-PROCEDURE ASSESSMENT II: Performed by: INTERNAL MEDICINE

## 2017-01-01 PROCEDURE — A9585 GADOBUTROL INJECTION: HCPCS | Performed by: INTERNAL MEDICINE

## 2017-01-01 PROCEDURE — 84155 ASSAY OF PROTEIN SERUM: CPT | Performed by: FAMILY MEDICINE

## 2017-01-01 PROCEDURE — 97110 THERAPEUTIC EXERCISES: CPT | Mod: GP | Performed by: REHABILITATION PRACTITIONER

## 2017-01-01 PROCEDURE — 87070 CULTURE OTHR SPECIMN AEROBIC: CPT | Performed by: FAMILY MEDICINE

## 2017-01-01 PROCEDURE — 25000125 ZZHC RX 250: Mod: GY | Performed by: INTERNAL MEDICINE

## 2017-01-01 PROCEDURE — 80053 COMPREHEN METABOLIC PANEL: CPT | Performed by: EMERGENCY MEDICINE

## 2017-01-01 PROCEDURE — 82570 ASSAY OF URINE CREATININE: CPT | Performed by: INTERNAL MEDICINE

## 2017-01-01 PROCEDURE — 84155 ASSAY OF PROTEIN SERUM: CPT | Performed by: INTERNAL MEDICINE

## 2017-01-01 PROCEDURE — 82803 BLOOD GASES ANY COMBINATION: CPT | Performed by: FAMILY MEDICINE

## 2017-01-01 PROCEDURE — 88342 IMHCHEM/IMCYTCHM 1ST ANTB: CPT | Performed by: INTERNAL MEDICINE

## 2017-01-01 PROCEDURE — A9552 F18 FDG: HCPCS | Performed by: INTERNAL MEDICINE

## 2017-01-01 PROCEDURE — 88305 TISSUE EXAM BY PATHOLOGIST: CPT | Mod: 26 | Performed by: INTERNAL MEDICINE

## 2017-01-01 PROCEDURE — 86923 COMPATIBILITY TEST ELECTRIC: CPT | Performed by: EMERGENCY MEDICINE

## 2017-01-01 PROCEDURE — A9270 NON-COVERED ITEM OR SERVICE: HCPCS | Performed by: INTERNAL MEDICINE

## 2017-01-01 PROCEDURE — 0FBG4ZX EXCISION OF PANCREAS, PERCUTANEOUS ENDOSCOPIC APPROACH, DIAGNOSTIC: ICD-10-PCS | Performed by: INTERNAL MEDICINE

## 2017-01-01 PROCEDURE — 86901 BLOOD TYPING SEROLOGIC RH(D): CPT | Performed by: EMERGENCY MEDICINE

## 2017-01-01 PROCEDURE — 82728 ASSAY OF FERRITIN: CPT | Performed by: EMERGENCY MEDICINE

## 2017-01-01 PROCEDURE — 85025 COMPLETE CBC W/AUTO DIFF WBC: CPT | Performed by: EMERGENCY MEDICINE

## 2017-01-01 PROCEDURE — 00000102 ZZHCL STATISTIC CYTO WRIGHT STAIN TC: Performed by: INTERNAL MEDICINE

## 2017-01-01 PROCEDURE — 88173 CYTOPATH EVAL FNA REPORT: CPT | Performed by: INTERNAL MEDICINE

## 2017-01-01 PROCEDURE — C9399 UNCLASSIFIED DRUGS OR BIOLOG: HCPCS | Performed by: NURSE ANESTHETIST, CERTIFIED REGISTERED

## 2017-01-01 PROCEDURE — 88341 IMHCHEM/IMCYTCHM EA ADD ANTB: CPT | Performed by: INTERNAL MEDICINE

## 2017-01-01 DEVICE — STENT BILIARY SELF-EXPAND ZILVER 635 10MMX8CM: Type: IMPLANTABLE DEVICE | Site: BILE DUCT | Status: FUNCTIONAL

## 2017-01-01 RX ORDER — FUROSEMIDE 10 MG/ML
20 INJECTION INTRAMUSCULAR; INTRAVENOUS ONCE
Status: COMPLETED | OUTPATIENT
Start: 2017-01-01 | End: 2017-01-01

## 2017-01-01 RX ORDER — LIDOCAINE 40 MG/G
CREAM TOPICAL
Status: DISCONTINUED | OUTPATIENT
Start: 2017-01-01 | End: 2017-01-01 | Stop reason: HOSPADM

## 2017-01-01 RX ORDER — HEPARIN SODIUM (PORCINE) LOCK FLUSH IV SOLN 100 UNIT/ML 100 UNIT/ML
500 SOLUTION INTRAVENOUS ONCE
Status: DISCONTINUED | OUTPATIENT
Start: 2017-01-01 | End: 2017-01-01 | Stop reason: HOSPADM

## 2017-01-01 RX ORDER — SODIUM CHLORIDE 9 MG/ML
INJECTION, SOLUTION INTRAVENOUS ONCE
Status: COMPLETED | OUTPATIENT
Start: 2017-01-01 | End: 2017-01-01

## 2017-01-01 RX ORDER — METHYLPREDNISOLONE SODIUM SUCCINATE 125 MG/2ML
125 INJECTION, POWDER, LYOPHILIZED, FOR SOLUTION INTRAMUSCULAR; INTRAVENOUS
Status: CANCELLED
Start: 2017-01-01

## 2017-01-01 RX ORDER — LEVOFLOXACIN 5 MG/ML
750 INJECTION, SOLUTION INTRAVENOUS
Status: DISCONTINUED | OUTPATIENT
Start: 2017-01-01 | End: 2017-01-01

## 2017-01-01 RX ORDER — HEPARIN SODIUM (PORCINE) LOCK FLUSH IV SOLN 100 UNIT/ML 100 UNIT/ML
500 SOLUTION INTRAVENOUS ONCE
Status: COMPLETED | OUTPATIENT
Start: 2017-01-01 | End: 2017-01-01

## 2017-01-01 RX ORDER — HEPARIN SODIUM (PORCINE) LOCK FLUSH IV SOLN 100 UNIT/ML 100 UNIT/ML
5 SOLUTION INTRAVENOUS
Status: DISCONTINUED | OUTPATIENT
Start: 2017-01-01 | End: 2017-01-01 | Stop reason: HOSPADM

## 2017-01-01 RX ORDER — SODIUM CHLORIDE, SODIUM LACTATE, POTASSIUM CHLORIDE, CALCIUM CHLORIDE 600; 310; 30; 20 MG/100ML; MG/100ML; MG/100ML; MG/100ML
INJECTION, SOLUTION INTRAVENOUS CONTINUOUS PRN
Status: DISCONTINUED | OUTPATIENT
Start: 2017-01-01 | End: 2017-01-01

## 2017-01-01 RX ORDER — FLUTICASONE PROPIONATE 50 MCG
2 SPRAY, SUSPENSION (ML) NASAL DAILY
Status: DISCONTINUED | OUTPATIENT
Start: 2017-01-01 | End: 2017-01-01 | Stop reason: HOSPADM

## 2017-01-01 RX ORDER — ACETAMINOPHEN 325 MG/1
650 TABLET ORAL EVERY 6 HOURS PRN
Status: DISCONTINUED | OUTPATIENT
Start: 2017-01-01 | End: 2017-01-01 | Stop reason: HOSPADM

## 2017-01-01 RX ORDER — HEPARIN SODIUM (PORCINE) LOCK FLUSH IV SOLN 100 UNIT/ML 100 UNIT/ML
500 SOLUTION INTRAVENOUS ONCE
Status: CANCELLED
Start: 2017-01-01 | End: 2017-01-01

## 2017-01-01 RX ORDER — ALBUTEROL SULFATE 0.83 MG/ML
2.5 SOLUTION RESPIRATORY (INHALATION) EVERY 4 HOURS PRN
Status: DISCONTINUED | OUTPATIENT
Start: 2017-01-01 | End: 2017-01-01 | Stop reason: HOSPADM

## 2017-01-01 RX ORDER — LORAZEPAM 2 MG/ML
.5-1 INJECTION INTRAMUSCULAR EVERY 4 HOURS PRN
Status: DISCONTINUED | OUTPATIENT
Start: 2017-01-01 | End: 2017-01-01 | Stop reason: HOSPADM

## 2017-01-01 RX ORDER — SODIUM CHLORIDE 9 MG/ML
1000 INJECTION, SOLUTION INTRAVENOUS CONTINUOUS PRN
Status: CANCELLED
Start: 2017-01-01

## 2017-01-01 RX ORDER — FENTANYL CITRATE 50 UG/ML
INJECTION, SOLUTION INTRAMUSCULAR; INTRAVENOUS PRN
Status: DISCONTINUED | OUTPATIENT
Start: 2017-01-01 | End: 2017-01-01

## 2017-01-01 RX ORDER — FLUMAZENIL 0.1 MG/ML
0.2 INJECTION, SOLUTION INTRAVENOUS
Status: ACTIVE | OUTPATIENT
Start: 2017-01-01 | End: 2017-01-01

## 2017-01-01 RX ORDER — FLUOROURACIL 50 MG/ML
400 INJECTION, SOLUTION INTRAVENOUS ONCE
Status: CANCELLED | OUTPATIENT
Start: 2017-01-01

## 2017-01-01 RX ORDER — EPHEDRINE SULFATE 50 MG/ML
INJECTION, SOLUTION INTRAMUSCULAR; INTRAVENOUS; SUBCUTANEOUS PRN
Status: DISCONTINUED | OUTPATIENT
Start: 2017-01-01 | End: 2017-01-01

## 2017-01-01 RX ORDER — POTASSIUM CHLORIDE 1500 MG/1
20-40 TABLET, EXTENDED RELEASE ORAL
Status: DISCONTINUED | OUTPATIENT
Start: 2017-01-01 | End: 2017-01-01 | Stop reason: HOSPADM

## 2017-01-01 RX ORDER — HEPARIN SODIUM 5000 [USP'U]/.5ML
5000 INJECTION, SOLUTION INTRAVENOUS; SUBCUTANEOUS EVERY 12 HOURS
Status: DISCONTINUED | OUTPATIENT
Start: 2017-01-01 | End: 2017-01-01 | Stop reason: HOSPADM

## 2017-01-01 RX ORDER — ALBUTEROL SULFATE 90 UG/1
1-2 AEROSOL, METERED RESPIRATORY (INHALATION)
Status: CANCELLED
Start: 2017-01-01

## 2017-01-01 RX ORDER — ONDANSETRON 4 MG/1
4 TABLET, ORALLY DISINTEGRATING ORAL EVERY 6 HOURS PRN
Status: DISCONTINUED | OUTPATIENT
Start: 2017-01-01 | End: 2017-01-01

## 2017-01-01 RX ORDER — PROCHLORPERAZINE 25 MG
25 SUPPOSITORY, RECTAL RECTAL EVERY 12 HOURS PRN
Qty: 60 SUPPOSITORY | Refills: 0 | Status: CANCELLED | OUTPATIENT
Start: 2017-01-01

## 2017-01-01 RX ORDER — ALBUMIN (HUMAN) 12.5 G/50ML
12.5 SOLUTION INTRAVENOUS ONCE
Status: COMPLETED | OUTPATIENT
Start: 2017-01-01 | End: 2017-01-01

## 2017-01-01 RX ORDER — ALBUTEROL SULFATE 0.83 MG/ML
2.5 SOLUTION RESPIRATORY (INHALATION)
Status: CANCELLED | OUTPATIENT
Start: 2017-01-01

## 2017-01-01 RX ORDER — MEPERIDINE HYDROCHLORIDE 25 MG/ML
25 INJECTION INTRAMUSCULAR; INTRAVENOUS; SUBCUTANEOUS EVERY 30 MIN PRN
Status: CANCELLED | OUTPATIENT
Start: 2017-01-01

## 2017-01-01 RX ORDER — MAGNESIUM HYDROXIDE/ALUMINUM HYDROXICE/SIMETHICONE 120; 1200; 1200 MG/30ML; MG/30ML; MG/30ML
30 SUSPENSION ORAL EVERY 4 HOURS PRN
Status: ON HOLD | COMMUNITY
End: 2017-01-01

## 2017-01-01 RX ORDER — SODIUM CHLORIDE, SODIUM LACTATE, POTASSIUM CHLORIDE, CALCIUM CHLORIDE 600; 310; 30; 20 MG/100ML; MG/100ML; MG/100ML; MG/100ML
INJECTION, SOLUTION INTRAVENOUS CONTINUOUS
Status: DISCONTINUED | OUTPATIENT
Start: 2017-01-01 | End: 2017-01-01 | Stop reason: HOSPADM

## 2017-01-01 RX ORDER — DEXAMETHASONE SODIUM PHOSPHATE 4 MG/ML
4 INJECTION, SOLUTION INTRA-ARTICULAR; INTRALESIONAL; INTRAMUSCULAR; INTRAVENOUS; SOFT TISSUE EVERY 6 HOURS
Status: DISCONTINUED | OUTPATIENT
Start: 2017-01-01 | End: 2017-01-01 | Stop reason: HOSPADM

## 2017-01-01 RX ORDER — LORAZEPAM 2 MG/ML
0.5 INJECTION INTRAMUSCULAR EVERY 4 HOURS PRN
Status: CANCELLED
Start: 2017-01-01

## 2017-01-01 RX ORDER — EPINEPHRINE 0.3 MG/.3ML
0.3 INJECTION SUBCUTANEOUS EVERY 5 MIN PRN
Status: CANCELLED | OUTPATIENT
Start: 2017-01-01

## 2017-01-01 RX ORDER — LIDOCAINE/PRILOCAINE 2.5 %-2.5%
30 CREAM (GRAM) TOPICAL PRN
Qty: 30 G | Refills: 1 | Status: SHIPPED | OUTPATIENT
Start: 2017-01-01

## 2017-01-01 RX ORDER — ONDANSETRON 2 MG/ML
8 INJECTION INTRAMUSCULAR; INTRAVENOUS EVERY 4 HOURS PRN
Status: DISCONTINUED | OUTPATIENT
Start: 2017-01-01 | End: 2017-01-01 | Stop reason: CLARIF

## 2017-01-01 RX ORDER — PROCHLORPERAZINE 25 MG
25 SUPPOSITORY, RECTAL RECTAL EVERY 12 HOURS PRN
Qty: 60 SUPPOSITORY | Refills: 0 | Status: ON HOLD | OUTPATIENT
Start: 2017-01-01 | End: 2017-01-01

## 2017-01-01 RX ORDER — DEXTROSE MONOHYDRATE, SODIUM CHLORIDE, AND POTASSIUM CHLORIDE 50; 1.49; 9 G/1000ML; G/1000ML; G/1000ML
INJECTION, SOLUTION INTRAVENOUS CONTINUOUS
Status: DISCONTINUED | OUTPATIENT
Start: 2017-01-01 | End: 2017-01-01

## 2017-01-01 RX ORDER — DIPHENHYDRAMINE HYDROCHLORIDE 50 MG/ML
50 INJECTION INTRAMUSCULAR; INTRAVENOUS
Status: CANCELLED
Start: 2017-01-01

## 2017-01-01 RX ORDER — METOCLOPRAMIDE HYDROCHLORIDE 5 MG/ML
5 INJECTION INTRAMUSCULAR; INTRAVENOUS EVERY 6 HOURS PRN
Status: DISCONTINUED | OUTPATIENT
Start: 2017-01-01 | End: 2017-01-01 | Stop reason: HOSPADM

## 2017-01-01 RX ORDER — ACYCLOVIR 200 MG/1
60 CAPSULE ORAL ONCE
Status: COMPLETED | OUTPATIENT
Start: 2017-01-01 | End: 2017-01-01

## 2017-01-01 RX ORDER — HEPARIN SODIUM,PORCINE 10 UNIT/ML
5-10 VIAL (ML) INTRAVENOUS
Status: DISCONTINUED | OUTPATIENT
Start: 2017-01-01 | End: 2017-01-01 | Stop reason: HOSPADM

## 2017-01-01 RX ORDER — ONDANSETRON 2 MG/ML
8 INJECTION INTRAMUSCULAR; INTRAVENOUS EVERY 4 HOURS PRN
Status: DISCONTINUED | OUTPATIENT
Start: 2017-01-01 | End: 2017-01-01 | Stop reason: HOSPADM

## 2017-01-01 RX ORDER — IOPAMIDOL 755 MG/ML
71 INJECTION, SOLUTION INTRAVASCULAR ONCE
Status: COMPLETED | OUTPATIENT
Start: 2017-01-01 | End: 2017-01-01

## 2017-01-01 RX ORDER — FENTANYL CITRATE 50 UG/ML
25-50 INJECTION, SOLUTION INTRAMUSCULAR; INTRAVENOUS
Status: DISCONTINUED | OUTPATIENT
Start: 2017-01-01 | End: 2017-01-01 | Stop reason: HOSPADM

## 2017-01-01 RX ORDER — INDOMETHACIN 50 MG/1
100 SUPPOSITORY RECTAL
Status: DISCONTINUED | OUTPATIENT
Start: 2017-01-01 | End: 2017-01-01 | Stop reason: HOSPADM

## 2017-01-01 RX ORDER — INDOMETHACIN 50 MG/1
100 SUPPOSITORY RECTAL
Status: COMPLETED | OUTPATIENT
Start: 2017-01-01 | End: 2017-01-01

## 2017-01-01 RX ORDER — DEXTROSE MONOHYDRATE, SODIUM CHLORIDE, AND POTASSIUM CHLORIDE 50; 1.49; 9 G/1000ML; G/1000ML; G/1000ML
INJECTION, SOLUTION INTRAVENOUS CONTINUOUS
Status: DISCONTINUED | OUTPATIENT
Start: 2017-01-01 | End: 2017-01-01 | Stop reason: CLARIF

## 2017-01-01 RX ORDER — DRONABINOL 5 MG/1
5 CAPSULE ORAL 2 TIMES DAILY PRN
Qty: 60 CAPSULE | Refills: 1 | Status: SHIPPED | OUTPATIENT
Start: 2017-01-01 | End: 2017-01-01

## 2017-01-01 RX ORDER — PROCHLORPERAZINE MALEATE 10 MG
10 TABLET ORAL EVERY 8 HOURS
Qty: 90 TABLET | Refills: 3 | Status: SHIPPED | OUTPATIENT
Start: 2017-01-01

## 2017-01-01 RX ORDER — NALOXONE HYDROCHLORIDE 0.4 MG/ML
.1-.4 INJECTION, SOLUTION INTRAMUSCULAR; INTRAVENOUS; SUBCUTANEOUS
Status: DISCONTINUED | OUTPATIENT
Start: 2017-01-01 | End: 2017-01-01 | Stop reason: HOSPADM

## 2017-01-01 RX ORDER — HEPARIN SODIUM,PORCINE 10 UNIT/ML
5-10 VIAL (ML) INTRAVENOUS EVERY 24 HOURS
Status: DISCONTINUED | OUTPATIENT
Start: 2017-01-01 | End: 2017-01-01 | Stop reason: HOSPADM

## 2017-01-01 RX ORDER — PIPERACILLIN SODIUM, TAZOBACTAM SODIUM 3; .375 G/15ML; G/15ML
3.38 INJECTION, POWDER, LYOPHILIZED, FOR SOLUTION INTRAVENOUS EVERY 6 HOURS
Status: DISCONTINUED | OUTPATIENT
Start: 2017-01-01 | End: 2017-01-01 | Stop reason: HOSPADM

## 2017-01-01 RX ORDER — OXYCODONE HCL 20 MG/ML
5 CONCENTRATE, ORAL ORAL EVERY 4 HOURS PRN
Qty: 30 ML | Refills: 0 | Status: SHIPPED | OUTPATIENT
Start: 2017-01-01

## 2017-01-01 RX ORDER — LORAZEPAM 0.5 MG/1
0.5 TABLET ORAL EVERY 4 HOURS PRN
Qty: 30 TABLET | Refills: 3 | Status: SHIPPED | OUTPATIENT
Start: 2017-01-01 | End: 2017-01-01

## 2017-01-01 RX ORDER — ONDANSETRON 8 MG/1
8 TABLET, ORALLY DISINTEGRATING ORAL EVERY 8 HOURS PRN
Qty: 60 TABLET | Refills: 1 | Status: CANCELLED | OUTPATIENT
Start: 2017-01-01

## 2017-01-01 RX ORDER — LEVOFLOXACIN 500 MG/1
500 TABLET, FILM COATED ORAL DAILY
Qty: 10 TABLET | Refills: 0 | Status: ON HOLD | OUTPATIENT
Start: 2017-01-01 | End: 2017-01-01

## 2017-01-01 RX ORDER — FLUMAZENIL 0.1 MG/ML
0.2 INJECTION, SOLUTION INTRAVENOUS
Status: DISCONTINUED | OUTPATIENT
Start: 2017-01-01 | End: 2017-01-01 | Stop reason: HOSPADM

## 2017-01-01 RX ORDER — ACETAMINOPHEN 325 MG/1
325 TABLET ORAL EVERY 6 HOURS PRN
Status: DISCONTINUED | OUTPATIENT
Start: 2017-01-01 | End: 2017-01-01 | Stop reason: DRUGHIGH

## 2017-01-01 RX ORDER — SODIUM CHLORIDE 9 MG/ML
INJECTION, SOLUTION INTRAVENOUS CONTINUOUS
Status: DISCONTINUED | OUTPATIENT
Start: 2017-01-01 | End: 2017-01-01 | Stop reason: CLARIF

## 2017-01-01 RX ORDER — ACETAMINOPHEN 325 MG/1
650 TABLET ORAL EVERY 4 HOURS PRN
Status: DISCONTINUED | OUTPATIENT
Start: 2017-01-01 | End: 2017-01-01 | Stop reason: HOSPADM

## 2017-01-01 RX ORDER — HYDROMORPHONE HYDROCHLORIDE 1 MG/ML
0.5 INJECTION, SOLUTION INTRAMUSCULAR; INTRAVENOUS; SUBCUTANEOUS
Status: DISCONTINUED | OUTPATIENT
Start: 2017-01-01 | End: 2017-01-01 | Stop reason: HOSPADM

## 2017-01-01 RX ORDER — ONDANSETRON 2 MG/ML
4 INJECTION INTRAMUSCULAR; INTRAVENOUS EVERY 30 MIN PRN
Status: DISCONTINUED | OUTPATIENT
Start: 2017-01-01 | End: 2017-01-01 | Stop reason: HOSPADM

## 2017-01-01 RX ORDER — OLANZAPINE 5 MG/1
5 TABLET, ORALLY DISINTEGRATING ORAL
Qty: 15 TABLET | Refills: 1 | Status: SHIPPED | OUTPATIENT
Start: 2017-01-01

## 2017-01-01 RX ORDER — ONDANSETRON 2 MG/ML
4 INJECTION INTRAMUSCULAR; INTRAVENOUS EVERY 6 HOURS PRN
Status: DISCONTINUED | OUTPATIENT
Start: 2017-01-01 | End: 2017-01-01

## 2017-01-01 RX ORDER — POTASSIUM CHLORIDE 1.5 G/1.58G
20-40 POWDER, FOR SOLUTION ORAL
Status: DISCONTINUED | OUTPATIENT
Start: 2017-01-01 | End: 2017-01-01 | Stop reason: HOSPADM

## 2017-01-01 RX ORDER — ACETAMINOPHEN 325 MG/1
650 TABLET ORAL EVERY 4 HOURS PRN
Status: DISCONTINUED | OUTPATIENT
Start: 2017-01-01 | End: 2017-01-01

## 2017-01-01 RX ORDER — LIDOCAINE HYDROCHLORIDE 20 MG/ML
INJECTION, SOLUTION INFILTRATION; PERINEURAL PRN
Status: DISCONTINUED | OUTPATIENT
Start: 2017-01-01 | End: 2017-01-01

## 2017-01-01 RX ORDER — LEVOFLOXACIN 5 MG/ML
750 INJECTION, SOLUTION INTRAVENOUS EVERY 24 HOURS
Status: DISCONTINUED | OUTPATIENT
Start: 2017-01-01 | End: 2017-01-01

## 2017-01-01 RX ORDER — HEPARIN SODIUM (PORCINE) LOCK FLUSH IV SOLN 100 UNIT/ML 100 UNIT/ML
SOLUTION INTRAVENOUS
Status: COMPLETED
Start: 2017-01-01 | End: 2017-01-01

## 2017-01-01 RX ORDER — MULTIPLE VITAMINS W/ MINERALS TAB 9MG-400MCG
1 TAB ORAL DAILY
Status: DISCONTINUED | OUTPATIENT
Start: 2017-01-01 | End: 2017-01-01 | Stop reason: HOSPADM

## 2017-01-01 RX ORDER — LIDOCAINE 40 MG/G
CREAM TOPICAL
Status: COMPLETED | OUTPATIENT
Start: 2017-01-01 | End: 2017-01-01

## 2017-01-01 RX ORDER — PROCHLORPERAZINE MALEATE 5 MG
5 TABLET ORAL EVERY 6 HOURS
Status: DISCONTINUED | OUTPATIENT
Start: 2017-01-01 | End: 2017-01-01 | Stop reason: HOSPADM

## 2017-01-01 RX ORDER — LORAZEPAM 0.5 MG/1
.5-1 TABLET ORAL EVERY 4 HOURS PRN
Status: DISCONTINUED | OUTPATIENT
Start: 2017-01-01 | End: 2017-01-01 | Stop reason: HOSPADM

## 2017-01-01 RX ORDER — LEVOFLOXACIN 5 MG/ML
INJECTION, SOLUTION INTRAVENOUS PRN
Status: DISCONTINUED | OUTPATIENT
Start: 2017-01-01 | End: 2017-01-01

## 2017-01-01 RX ORDER — DEXAMETHASONE SODIUM PHOSPHATE 4 MG/ML
4 INJECTION, SOLUTION INTRA-ARTICULAR; INTRALESIONAL; INTRAMUSCULAR; INTRAVENOUS; SOFT TISSUE EVERY 6 HOURS
Status: DISCONTINUED | OUTPATIENT
Start: 2017-01-01 | End: 2017-01-01

## 2017-01-01 RX ORDER — POTASSIUM CL/LIDO/0.9 % NACL 10MEQ/0.1L
10 INTRAVENOUS SOLUTION, PIGGYBACK (ML) INTRAVENOUS
Status: DISCONTINUED | OUTPATIENT
Start: 2017-01-01 | End: 2017-01-01 | Stop reason: HOSPADM

## 2017-01-01 RX ORDER — ASCORBIC ACID 250 MG
250 TABLET,CHEWABLE ORAL DAILY
Status: DISCONTINUED | OUTPATIENT
Start: 2017-01-01 | End: 2017-01-01 | Stop reason: HOSPADM

## 2017-01-01 RX ORDER — IOPAMIDOL 408 MG/ML
INJECTION, SOLUTION INTRAVASCULAR PRN
Status: DISCONTINUED | OUTPATIENT
Start: 2017-01-01 | End: 2017-01-01 | Stop reason: HOSPADM

## 2017-01-01 RX ORDER — CARBOXYMETHYLCELLULOSE SODIUM 5 MG/ML
2 SOLUTION/ DROPS OPHTHALMIC DAILY
Status: DISCONTINUED | OUTPATIENT
Start: 2017-01-01 | End: 2017-01-01 | Stop reason: HOSPADM

## 2017-01-01 RX ORDER — FEXOFENADINE HCL 60 MG/1
60 TABLET, FILM COATED ORAL DAILY
Status: DISCONTINUED | OUTPATIENT
Start: 2017-01-01 | End: 2017-01-01 | Stop reason: HOSPADM

## 2017-01-01 RX ORDER — ATROPINE SULFATE 0.1 MG/ML
0.4 INJECTION INTRAVENOUS ONCE
Status: COMPLETED | OUTPATIENT
Start: 2017-01-01 | End: 2017-01-01

## 2017-01-01 RX ORDER — LORAZEPAM 2 MG/ML
.5-1 INJECTION INTRAMUSCULAR EVERY 6 HOURS PRN
Status: DISCONTINUED | OUTPATIENT
Start: 2017-01-01 | End: 2017-01-01 | Stop reason: HOSPADM

## 2017-01-01 RX ORDER — PROCHLORPERAZINE MALEATE 5 MG
5 TABLET ORAL EVERY 6 HOURS PRN
Status: DISCONTINUED | OUTPATIENT
Start: 2017-01-01 | End: 2017-01-01

## 2017-01-01 RX ORDER — PROPOFOL 10 MG/ML
INJECTION, EMULSION INTRAVENOUS PRN
Status: DISCONTINUED | OUTPATIENT
Start: 2017-01-01 | End: 2017-01-01

## 2017-01-01 RX ORDER — POTASSIUM CHLORIDE 29.8 MG/ML
20 INJECTION INTRAVENOUS
Status: DISCONTINUED | OUTPATIENT
Start: 2017-01-01 | End: 2017-01-01 | Stop reason: HOSPADM

## 2017-01-01 RX ORDER — ALBUMIN, HUMAN INJ 5% 5 %
SOLUTION INTRAVENOUS CONTINUOUS PRN
Status: DISCONTINUED | OUTPATIENT
Start: 2017-01-01 | End: 2017-01-01

## 2017-01-01 RX ORDER — ONDANSETRON 4 MG/1
8 TABLET, ORALLY DISINTEGRATING ORAL EVERY 8 HOURS
Status: DISCONTINUED | OUTPATIENT
Start: 2017-01-01 | End: 2017-01-01 | Stop reason: HOSPADM

## 2017-01-01 RX ORDER — ONDANSETRON 4 MG/1
8 TABLET, ORALLY DISINTEGRATING ORAL EVERY 8 HOURS PRN
Status: DISCONTINUED | OUTPATIENT
Start: 2017-01-01 | End: 2017-01-01

## 2017-01-01 RX ORDER — LORAZEPAM 0.5 MG/1
0.5 TABLET ORAL EVERY 4 HOURS PRN
Qty: 30 TABLET | Refills: 1 | Status: CANCELLED | OUTPATIENT
Start: 2017-01-01

## 2017-01-01 RX ORDER — LIDOCAINE/PRILOCAINE 2.5 %-2.5%
CREAM (GRAM) TOPICAL
Status: DISCONTINUED | OUTPATIENT
Start: 2017-01-01 | End: 2017-01-01 | Stop reason: RX

## 2017-01-01 RX ORDER — HYDROMORPHONE HYDROCHLORIDE 1 MG/ML
.3-.5 INJECTION, SOLUTION INTRAMUSCULAR; INTRAVENOUS; SUBCUTANEOUS EVERY 5 MIN PRN
Status: DISCONTINUED | OUTPATIENT
Start: 2017-01-01 | End: 2017-01-01 | Stop reason: HOSPADM

## 2017-01-01 RX ORDER — HEPARIN SODIUM (PORCINE) LOCK FLUSH IV SOLN 100 UNIT/ML 100 UNIT/ML
5 SOLUTION INTRAVENOUS
Status: DISCONTINUED | OUTPATIENT
Start: 2017-01-01 | End: 2017-01-01

## 2017-01-01 RX ORDER — POTASSIUM CHLORIDE 7.45 MG/ML
10 INJECTION INTRAVENOUS
Status: DISCONTINUED | OUTPATIENT
Start: 2017-01-01 | End: 2017-01-01 | Stop reason: HOSPADM

## 2017-01-01 RX ORDER — FUROSEMIDE 10 MG/ML
10 INJECTION INTRAMUSCULAR; INTRAVENOUS EVERY 4 HOURS
Status: DISCONTINUED | OUTPATIENT
Start: 2017-01-01 | End: 2017-01-01

## 2017-01-01 RX ORDER — ALUMINA, MAGNESIA, AND SIMETHICONE 2400; 2400; 240 MG/30ML; MG/30ML; MG/30ML
15 SUSPENSION ORAL EVERY 4 HOURS PRN
Status: DISCONTINUED | OUTPATIENT
Start: 2017-01-01 | End: 2017-01-01 | Stop reason: HOSPADM

## 2017-01-01 RX ORDER — HEPARIN SODIUM (PORCINE) LOCK FLUSH IV SOLN 100 UNIT/ML 100 UNIT/ML
SOLUTION INTRAVENOUS
Status: DISCONTINUED
Start: 2017-01-01 | End: 2017-01-01 | Stop reason: HOSPADM

## 2017-01-01 RX ORDER — DEXAMETHASONE SODIUM PHOSPHATE 4 MG/ML
2 INJECTION, SOLUTION INTRA-ARTICULAR; INTRALESIONAL; INTRAMUSCULAR; INTRAVENOUS; SOFT TISSUE EVERY 6 HOURS PRN
Status: DISCONTINUED | OUTPATIENT
Start: 2017-01-01 | End: 2017-01-01 | Stop reason: HOSPADM

## 2017-01-01 RX ORDER — HEPARIN SODIUM (PORCINE) LOCK FLUSH IV SOLN 100 UNIT/ML 100 UNIT/ML
SOLUTION INTRAVENOUS
Status: DISPENSED
Start: 2017-01-01 | End: 2017-01-01

## 2017-01-01 RX ORDER — LORAZEPAM 0.5 MG/1
0.5 TABLET ORAL EVERY 4 HOURS PRN
Qty: 30 TABLET | Refills: 3 | Status: SHIPPED | OUTPATIENT
Start: 2017-01-01

## 2017-01-01 RX ORDER — ONDANSETRON 8 MG/1
8 TABLET, ORALLY DISINTEGRATING ORAL EVERY 8 HOURS PRN
Qty: 60 TABLET | Refills: 1 | Status: SHIPPED | OUTPATIENT
Start: 2017-01-01

## 2017-01-01 RX ORDER — MAGNESIUM HYDROXIDE/ALUMINUM HYDROXICE/SIMETHICONE 120; 1200; 1200 MG/30ML; MG/30ML; MG/30ML
15-30 SUSPENSION ORAL 4 TIMES DAILY PRN
COMMUNITY

## 2017-01-01 RX ORDER — PYRIDOXINE HCL (VITAMIN B6) 25 MG
100 TABLET ORAL 2 TIMES DAILY
Qty: 60 ML | Refills: 3 | Status: SHIPPED | OUTPATIENT
Start: 2017-01-01

## 2017-01-01 RX ORDER — ONDANSETRON 2 MG/ML
INJECTION INTRAMUSCULAR; INTRAVENOUS PRN
Status: DISCONTINUED | OUTPATIENT
Start: 2017-01-01 | End: 2017-01-01

## 2017-01-01 RX ORDER — ONDANSETRON 8 MG/1
8 TABLET, ORALLY DISINTEGRATING ORAL EVERY 12 HOURS PRN
Qty: 20 TABLET | Refills: 0 | Status: SHIPPED | OUTPATIENT
Start: 2017-01-01 | End: 2017-01-01

## 2017-01-01 RX ORDER — PROCHLORPERAZINE MALEATE 5 MG
5 TABLET ORAL EVERY 6 HOURS PRN
Status: DISCONTINUED | OUTPATIENT
Start: 2017-01-01 | End: 2017-01-01 | Stop reason: HOSPADM

## 2017-01-01 RX ORDER — DIPHENHYDRAMINE HYDROCHLORIDE 50 MG/ML
50 INJECTION INTRAMUSCULAR; INTRAVENOUS
Status: DISCONTINUED | OUTPATIENT
Start: 2017-01-01 | End: 2017-01-01 | Stop reason: HOSPADM

## 2017-01-01 RX ORDER — DEXAMETHASONE SODIUM PHOSPHATE 4 MG/ML
2 INJECTION, SOLUTION INTRA-ARTICULAR; INTRALESIONAL; INTRAMUSCULAR; INTRAVENOUS; SOFT TISSUE EVERY 6 HOURS
Status: DISCONTINUED | OUTPATIENT
Start: 2017-01-01 | End: 2017-01-01

## 2017-01-01 RX ORDER — ONDANSETRON 2 MG/ML
8 INJECTION INTRAMUSCULAR; INTRAVENOUS EVERY 8 HOURS
Status: DISCONTINUED | OUTPATIENT
Start: 2017-01-01 | End: 2017-01-01

## 2017-01-01 RX ORDER — METHYLPREDNISOLONE SODIUM SUCCINATE 125 MG/2ML
125 INJECTION, POWDER, LYOPHILIZED, FOR SOLUTION INTRAMUSCULAR; INTRAVENOUS
Status: DISCONTINUED | OUTPATIENT
Start: 2017-01-01 | End: 2017-01-01 | Stop reason: HOSPADM

## 2017-01-01 RX ORDER — MULTIPLE VITAMINS W/ MINERALS TAB 9MG-400MCG
1 TAB ORAL DAILY
Status: DISCONTINUED | OUTPATIENT
Start: 2017-01-01 | End: 2017-01-01

## 2017-01-01 RX ORDER — ONDANSETRON 2 MG/ML
4 INJECTION INTRAMUSCULAR; INTRAVENOUS EVERY 6 HOURS PRN
Status: DISCONTINUED | OUTPATIENT
Start: 2017-01-01 | End: 2017-01-01 | Stop reason: HOSPADM

## 2017-01-01 RX ORDER — LORAZEPAM 0.5 MG/1
.5-1 TABLET ORAL EVERY 6 HOURS PRN
Status: DISCONTINUED | OUTPATIENT
Start: 2017-01-01 | End: 2017-01-01 | Stop reason: HOSPADM

## 2017-01-01 RX ORDER — LORAZEPAM 0.5 MG/1
0.5 TABLET ORAL AT BEDTIME
Status: DISCONTINUED | OUTPATIENT
Start: 2017-01-01 | End: 2017-01-01 | Stop reason: HOSPADM

## 2017-01-01 RX ORDER — DIPHENHYDRAMINE HYDROCHLORIDE 50 MG/ML
INJECTION INTRAMUSCULAR; INTRAVENOUS
Status: DISCONTINUED
Start: 2017-01-01 | End: 2017-01-01 | Stop reason: WASHOUT

## 2017-01-01 RX ORDER — IOPAMIDOL 510 MG/ML
INJECTION, SOLUTION INTRAVASCULAR PRN
Status: DISCONTINUED | OUTPATIENT
Start: 2017-01-01 | End: 2017-01-01 | Stop reason: HOSPADM

## 2017-01-01 RX ORDER — PROCHLORPERAZINE 25 MG
12.5 SUPPOSITORY, RECTAL RECTAL EVERY 12 HOURS PRN
Status: DISCONTINUED | OUTPATIENT
Start: 2017-01-01 | End: 2017-01-01

## 2017-01-01 RX ORDER — AZITHROMYCIN 500 MG/1
TABLET, FILM COATED ORAL
Qty: 7 TABLET | Refills: 0 | Status: SHIPPED | OUTPATIENT
Start: 2017-01-01 | End: 2017-01-01

## 2017-01-01 RX ORDER — MULTIPLE VITAMINS W/ MINERALS TAB 9MG-400MCG
1 TAB ORAL EVERY EVENING
Status: DISCONTINUED | OUTPATIENT
Start: 2017-01-01 | End: 2017-01-01 | Stop reason: HOSPADM

## 2017-01-01 RX ORDER — GADOBUTROL 604.72 MG/ML
10 INJECTION INTRAVENOUS ONCE
Status: COMPLETED | OUTPATIENT
Start: 2017-01-01 | End: 2017-01-01

## 2017-01-01 RX ORDER — HYDROMORPHONE HYDROCHLORIDE 1 MG/ML
.3-.5 INJECTION, SOLUTION INTRAMUSCULAR; INTRAVENOUS; SUBCUTANEOUS EVERY 10 MIN PRN
Status: DISCONTINUED | OUTPATIENT
Start: 2017-01-01 | End: 2017-01-01 | Stop reason: HOSPADM

## 2017-01-01 RX ORDER — ONDANSETRON 4 MG/1
4 TABLET, ORALLY DISINTEGRATING ORAL EVERY 30 MIN PRN
Status: DISCONTINUED | OUTPATIENT
Start: 2017-01-01 | End: 2017-01-01 | Stop reason: HOSPADM

## 2017-01-01 RX ORDER — ONDANSETRON 2 MG/ML
8 INJECTION INTRAMUSCULAR; INTRAVENOUS EVERY 4 HOURS PRN
Status: DISCONTINUED | OUTPATIENT
Start: 2017-01-01 | End: 2017-01-01

## 2017-01-01 RX ORDER — OXYCODONE HYDROCHLORIDE 5 MG/1
5-10 TABLET ORAL
Status: DISCONTINUED | OUTPATIENT
Start: 2017-01-01 | End: 2017-01-01 | Stop reason: HOSPADM

## 2017-01-01 RX ORDER — LORAZEPAM 2 MG/ML
0.5 INJECTION INTRAMUSCULAR AT BEDTIME
Status: DISCONTINUED | OUTPATIENT
Start: 2017-01-01 | End: 2017-01-01

## 2017-01-01 RX ORDER — HYDROMORPHONE HYDROCHLORIDE 1 MG/ML
INJECTION, SOLUTION INTRAMUSCULAR; INTRAVENOUS; SUBCUTANEOUS
Status: COMPLETED
Start: 2017-01-01 | End: 2017-01-01

## 2017-01-01 RX ORDER — FLUOROURACIL 50 MG/ML
400 INJECTION, SOLUTION INTRAVENOUS ONCE
Status: COMPLETED | OUTPATIENT
Start: 2017-01-01 | End: 2017-01-01

## 2017-01-01 RX ORDER — LOPERAMIDE HCL 2 MG
CAPSULE ORAL
Qty: 30 CAPSULE | Refills: 0 | Status: SHIPPED | OUTPATIENT
Start: 2017-01-01 | End: 2017-01-01

## 2017-01-01 RX ORDER — LIDOCAINE/PRILOCAINE 2.5 %-2.5%
30 CREAM (GRAM) TOPICAL PRN
Qty: 30 G | Refills: 1 | Status: SHIPPED | OUTPATIENT
Start: 2017-01-01 | End: 2017-01-01

## 2017-01-01 RX ORDER — LORAZEPAM 0.5 MG/1
0.5 TABLET ORAL EVERY 4 HOURS PRN
Status: DISCONTINUED | OUTPATIENT
Start: 2017-01-01 | End: 2017-01-01

## 2017-01-01 RX ORDER — SODIUM CHLORIDE 9 MG/ML
INJECTION, SOLUTION INTRAVENOUS CONTINUOUS
Status: DISCONTINUED | OUTPATIENT
Start: 2017-01-01 | End: 2017-01-01

## 2017-01-01 RX ORDER — PROCHLORPERAZINE MALEATE 10 MG
5 TABLET ORAL EVERY 6 HOURS PRN
Qty: 30 TABLET | Refills: 3 | Status: ON HOLD | OUTPATIENT
Start: 2017-01-01 | End: 2017-01-01

## 2017-01-01 RX ORDER — FUROSEMIDE 40 MG
40 TABLET ORAL DAILY
Status: DISCONTINUED | OUTPATIENT
Start: 2017-01-01 | End: 2017-01-01

## 2017-01-01 RX ORDER — DEXAMETHASONE SODIUM PHOSPHATE 4 MG/ML
4 INJECTION, SOLUTION INTRA-ARTICULAR; INTRALESIONAL; INTRAMUSCULAR; INTRAVENOUS; SOFT TISSUE EVERY 6 HOURS
Status: DISCONTINUED | OUTPATIENT
Start: 2017-01-01 | End: 2017-01-01 | Stop reason: CLARIF

## 2017-01-01 RX ORDER — MORPHINE SULFATE 2 MG/ML
2 INJECTION, SOLUTION INTRAMUSCULAR; INTRAVENOUS ONCE
Status: COMPLETED | OUTPATIENT
Start: 2017-01-01 | End: 2017-01-01

## 2017-01-01 RX ADMIN — HEPARIN SODIUM 5000 UNITS: 10000 INJECTION, SOLUTION INTRAVENOUS; SUBCUTANEOUS at 19:54

## 2017-01-01 RX ADMIN — ONDANSETRON 4 MG: 4 TABLET, ORALLY DISINTEGRATING ORAL at 16:41

## 2017-01-01 RX ADMIN — DEXAMETHASONE SODIUM PHOSPHATE: 10 INJECTION, SOLUTION INTRAMUSCULAR; INTRAVENOUS at 09:38

## 2017-01-01 RX ADMIN — GADOBUTROL 10 ML: 604.72 INJECTION INTRAVENOUS at 07:55

## 2017-01-01 RX ADMIN — SODIUM CHLORIDE, PRESERVATIVE FREE 5 ML: 5 INJECTION INTRAVENOUS at 12:08

## 2017-01-01 RX ADMIN — PIPERACILLIN SODIUM,TAZOBACTAM SODIUM 3.38 G: 3; .375 INJECTION, POWDER, FOR SOLUTION INTRAVENOUS at 07:54

## 2017-01-01 RX ADMIN — VITAMIN D, TAB 1000IU (100/BT) 2000 UNITS: 25 TAB at 08:35

## 2017-01-01 RX ADMIN — BUMETANIDE 1 MG/HR: 0.25 INJECTION, SOLUTION INTRAMUSCULAR; INTRAVENOUS at 04:29

## 2017-01-01 RX ADMIN — PROCHLORPERAZINE EDISYLATE 5 MG: 5 INJECTION INTRAMUSCULAR; INTRAVENOUS at 19:14

## 2017-01-01 RX ADMIN — SODIUM CHLORIDE, PRESERVATIVE FREE 500 UNITS: 5 INJECTION INTRAVENOUS at 11:10

## 2017-01-01 RX ADMIN — VITAMIN D, TAB 1000IU (100/BT) 2000 UNITS: 25 TAB at 07:55

## 2017-01-01 RX ADMIN — CARBOXYMETHYLCELLULOSE SODIUM 2 DROP: 5 SOLUTION/ DROPS OPHTHALMIC at 07:58

## 2017-01-01 RX ADMIN — PROCHLORPERAZINE EDISYLATE 5 MG: 5 INJECTION INTRAMUSCULAR; INTRAVENOUS at 06:06

## 2017-01-01 RX ADMIN — FLUTICASONE FUROATE AND VILANTEROL TRIFENATATE 1 PUFF: 100; 25 POWDER RESPIRATORY (INHALATION) at 09:44

## 2017-01-01 RX ADMIN — LORAZEPAM 0.5 MG: 0.5 TABLET ORAL at 02:19

## 2017-01-01 RX ADMIN — MULTIPLE VITAMINS W/ MINERALS TAB 1 TABLET: TAB at 08:34

## 2017-01-01 RX ADMIN — IRINOTECAN HYDROCHLORIDE 330 MG: 20 INJECTION, SOLUTION INTRAVENOUS at 12:04

## 2017-01-01 RX ADMIN — VANCOMYCIN HYDROCHLORIDE 1500 MG: 10 INJECTION, POWDER, LYOPHILIZED, FOR SOLUTION INTRAVENOUS at 19:12

## 2017-01-01 RX ADMIN — LORAZEPAM 0.5 MG: 0.5 TABLET ORAL at 21:05

## 2017-01-01 RX ADMIN — SODIUM CHLORIDE, PRESERVATIVE FREE 5 ML: 5 INJECTION INTRAVENOUS at 12:24

## 2017-01-01 RX ADMIN — IRINOTECAN HYDROCHLORIDE 330 MG: 20 INJECTION, SOLUTION INTRAVENOUS at 10:54

## 2017-01-01 RX ADMIN — POTASSIUM CHLORIDE 20 MEQ: 29.8 INJECTION, SOLUTION INTRAVENOUS at 08:28

## 2017-01-01 RX ADMIN — TAZOBACTAM SODIUM AND PIPERACILLIN SODIUM 4.5 G: 500; 4 INJECTION, SOLUTION INTRAVENOUS at 01:49

## 2017-01-01 RX ADMIN — PIPERACILLIN SODIUM,TAZOBACTAM SODIUM 3.38 G: 3; .375 INJECTION, POWDER, FOR SOLUTION INTRAVENOUS at 02:37

## 2017-01-01 RX ADMIN — TAZOBACTAM SODIUM AND PIPERACILLIN SODIUM 4.5 G: 500; 4 INJECTION, SOLUTION INTRAVENOUS at 08:00

## 2017-01-01 RX ADMIN — POTASSIUM CHLORIDE 40 MEQ: 1500 TABLET, EXTENDED RELEASE ORAL at 09:30

## 2017-01-01 RX ADMIN — DEXAMETHASONE SODIUM PHOSPHATE: 10 INJECTION, SOLUTION INTRAMUSCULAR; INTRAVENOUS at 09:12

## 2017-01-01 RX ADMIN — ONDANSETRON 8 MG: 4 TABLET, ORALLY DISINTEGRATING ORAL at 20:13

## 2017-01-01 RX ADMIN — TAZOBACTAM SODIUM AND PIPERACILLIN SODIUM 2.25 G: 250; 2 INJECTION, SOLUTION INTRAVENOUS at 08:59

## 2017-01-01 RX ADMIN — POTASSIUM CHLORIDE 10 MEQ: 14.9 INJECTION, SOLUTION, CONCENTRATE PARENTERAL at 18:11

## 2017-01-01 RX ADMIN — ONDANSETRON 4 MG: 2 INJECTION INTRAMUSCULAR; INTRAVENOUS at 15:48

## 2017-01-01 RX ADMIN — ACETAMINOPHEN 650 MG: 325 TABLET, FILM COATED ORAL at 01:43

## 2017-01-01 RX ADMIN — DEXAMETHASONE SODIUM PHOSPHATE: 10 INJECTION, SOLUTION INTRAMUSCULAR; INTRAVENOUS at 09:30

## 2017-01-01 RX ADMIN — LEUCOVORIN CALCIUM 658 MG: 350 INJECTION, POWDER, LYOPHILIZED, FOR SOLUTION INTRAMUSCULAR; INTRAVENOUS at 11:54

## 2017-01-01 RX ADMIN — FLUTICASONE FUROATE AND VILANTEROL TRIFENATATE 1 PUFF: 100; 25 POWDER RESPIRATORY (INHALATION) at 08:35

## 2017-01-01 RX ADMIN — ATROPINE SULFATE 0.4 MG: 0.4 INJECTION, SOLUTION INTRAMUSCULAR; INTRAVENOUS; SUBCUTANEOUS at 11:30

## 2017-01-01 RX ADMIN — CARBOXYMETHYLCELLULOSE SODIUM 2 DROP: 5 SOLUTION/ DROPS OPHTHALMIC at 08:34

## 2017-01-01 RX ADMIN — PROCHLORPERAZINE MALEATE 5 MG: 5 TABLET, FILM COATED ORAL at 16:10

## 2017-01-01 RX ADMIN — ROCURONIUM BROMIDE 10 MG: 10 INJECTION INTRAVENOUS at 14:33

## 2017-01-01 RX ADMIN — ONDANSETRON 8 MG: 2 INJECTION INTRAMUSCULAR; INTRAVENOUS at 08:40

## 2017-01-01 RX ADMIN — SODIUM CHLORIDE 150 MG: 9 INJECTION, SOLUTION INTRAVENOUS at 10:40

## 2017-01-01 RX ADMIN — ONDANSETRON 8 MG: 2 INJECTION INTRAMUSCULAR; INTRAVENOUS at 08:39

## 2017-01-01 RX ADMIN — PROCHLORPERAZINE MALEATE 5 MG: 5 TABLET, FILM COATED ORAL at 20:34

## 2017-01-01 RX ADMIN — TAZOBACTAM SODIUM AND PIPERACILLIN SODIUM 2.25 G: 250; 2 INJECTION, SOLUTION INTRAVENOUS at 08:40

## 2017-01-01 RX ADMIN — VANCOMYCIN HYDROCHLORIDE 1500 MG: 10 INJECTION, POWDER, LYOPHILIZED, FOR SOLUTION INTRAVENOUS at 17:44

## 2017-01-01 RX ADMIN — TAZOBACTAM SODIUM AND PIPERACILLIN SODIUM 4.5 G: 500; 4 INJECTION, SOLUTION INTRAVENOUS at 23:21

## 2017-01-01 RX ADMIN — DEXTROSE MONOHYDRATE 250 ML: 50 INJECTION, SOLUTION INTRAVENOUS at 10:10

## 2017-01-01 RX ADMIN — FLUTICASONE FUROATE AND VILANTEROL TRIFENATATE 1 PUFF: 100; 25 POWDER RESPIRATORY (INHALATION) at 08:46

## 2017-01-01 RX ADMIN — ALBUMIN (HUMAN): 12.5 SOLUTION INTRAVENOUS at 12:10

## 2017-01-01 RX ADMIN — SODIUM CHLORIDE, PRESERVATIVE FREE 500 UNITS: 5 INJECTION INTRAVENOUS at 11:39

## 2017-01-01 RX ADMIN — PROCHLORPERAZINE MALEATE 5 MG: 5 TABLET, FILM COATED ORAL at 11:32

## 2017-01-01 RX ADMIN — ONDANSETRON 8 MG: 2 INJECTION INTRAMUSCULAR; INTRAVENOUS at 09:06

## 2017-01-01 RX ADMIN — DEXTROSE MONOHYDRATE 250 ML: 50 INJECTION, SOLUTION INTRAVENOUS at 10:47

## 2017-01-01 RX ADMIN — PIPERACILLIN SODIUM,TAZOBACTAM SODIUM 3.38 G: 3; .375 INJECTION, POWDER, FOR SOLUTION INTRAVENOUS at 20:16

## 2017-01-01 RX ADMIN — PROCHLORPERAZINE EDISYLATE 5 MG: 5 INJECTION INTRAMUSCULAR; INTRAVENOUS at 06:19

## 2017-01-01 RX ADMIN — POTASSIUM CHLORIDE 40 MEQ: 1.5 POWDER, FOR SOLUTION ORAL at 10:43

## 2017-01-01 RX ADMIN — VANCOMYCIN HYDROCHLORIDE 1500 MG: 10 INJECTION, POWDER, LYOPHILIZED, FOR SOLUTION INTRAVENOUS at 16:15

## 2017-01-01 RX ADMIN — LORAZEPAM 0.5 MG: 0.5 TABLET ORAL at 01:00

## 2017-01-01 RX ADMIN — ONDANSETRON 4 MG: 4 TABLET, ORALLY DISINTEGRATING ORAL at 10:31

## 2017-01-01 RX ADMIN — SODIUM CHLORIDE 250 ML: 9 INJECTION, SOLUTION INTRAVENOUS at 09:57

## 2017-01-01 RX ADMIN — SODIUM CHLORIDE, PRESERVATIVE FREE 5 ML: 5 INJECTION INTRAVENOUS at 18:38

## 2017-01-01 RX ADMIN — DEXAMETHASONE SODIUM PHOSPHATE: 10 INJECTION, SOLUTION INTRAMUSCULAR; INTRAVENOUS at 10:29

## 2017-01-01 RX ADMIN — ALBUTEROL SULFATE 2.5 MG: 2.5 SOLUTION RESPIRATORY (INHALATION) at 00:55

## 2017-01-01 RX ADMIN — ONDANSETRON 8 MG: 2 INJECTION INTRAMUSCULAR; INTRAVENOUS at 15:43

## 2017-01-01 RX ADMIN — FAMOTIDINE 20 MG: 20 INJECTION, SOLUTION INTRAVENOUS at 10:11

## 2017-01-01 RX ADMIN — HEPARIN SODIUM 5000 UNITS: 10000 INJECTION, SOLUTION INTRAVENOUS; SUBCUTANEOUS at 19:52

## 2017-01-01 RX ADMIN — FUROSEMIDE 20 MG: 10 INJECTION, SOLUTION INTRAVENOUS at 18:38

## 2017-01-01 RX ADMIN — SODIUM CHLORIDE, PRESERVATIVE FREE 500 UNITS: 5 INJECTION INTRAVENOUS at 11:18

## 2017-01-01 RX ADMIN — POTASSIUM CHLORIDE: 2 INJECTION, SOLUTION, CONCENTRATE INTRAVENOUS at 09:07

## 2017-01-01 RX ADMIN — POTASSIUM CHLORIDE: 2 INJECTION, SOLUTION, CONCENTRATE INTRAVENOUS at 09:22

## 2017-01-01 RX ADMIN — TAZOBACTAM SODIUM AND PIPERACILLIN SODIUM 2.25 G: 250; 2 INJECTION, SOLUTION INTRAVENOUS at 01:57

## 2017-01-01 RX ADMIN — SODIUM CHLORIDE, PRESERVATIVE FREE 500 UNITS: 5 INJECTION INTRAVENOUS at 12:14

## 2017-01-01 RX ADMIN — LIDOCAINE HYDROCHLORIDE 8 ML: 10 INJECTION, SOLUTION INFILTRATION; PERINEURAL at 10:20

## 2017-01-01 RX ADMIN — FLUTICASONE FUROATE AND VILANTEROL TRIFENATATE 1 PUFF: 100; 25 POWDER RESPIRATORY (INHALATION) at 08:02

## 2017-01-01 RX ADMIN — PACLITAXEL 100 MG: 6 INJECTION, SOLUTION INTRAVENOUS at 09:55

## 2017-01-01 RX ADMIN — OMEPRAZOLE 40 MG: 20 CAPSULE, DELAYED RELEASE ORAL at 08:28

## 2017-01-01 RX ADMIN — CARBOPLATIN 200 MG: 10 INJECTION, SOLUTION INTRAVENOUS at 11:52

## 2017-01-01 RX ADMIN — SODIUM CHLORIDE 250 ML: 9 INJECTION, SOLUTION INTRAVENOUS at 09:31

## 2017-01-01 RX ADMIN — OXALIPLATIN 160 MG: 5 INJECTION, SOLUTION INTRAVENOUS at 11:56

## 2017-01-01 RX ADMIN — FLUTICASONE PROPIONATE 2 SPRAY: 50 SPRAY, METERED NASAL at 08:47

## 2017-01-01 RX ADMIN — ATROPINE SULFATE 0.4 MG: 0.4 INJECTION, SOLUTION INTRAMUSCULAR; INTRAVENOUS; SUBCUTANEOUS at 11:25

## 2017-01-01 RX ADMIN — LORAZEPAM 0.5 MG: 0.5 TABLET ORAL at 22:00

## 2017-01-01 RX ADMIN — VITAMIN D, TAB 1000IU (100/BT) 2000 UNITS: 25 TAB at 08:28

## 2017-01-01 RX ADMIN — ACETAMINOPHEN 650 MG: 325 TABLET, FILM COATED ORAL at 22:19

## 2017-01-01 RX ADMIN — IRINOTECAN HYDROCHLORIDE 330 MG: 20 INJECTION, SOLUTION INTRAVENOUS at 11:28

## 2017-01-01 RX ADMIN — LORAZEPAM 0.5 MG: 0.5 TABLET ORAL at 01:43

## 2017-01-01 RX ADMIN — PROCHLORPERAZINE MALEATE 5 MG: 5 TABLET, FILM COATED ORAL at 04:03

## 2017-01-01 RX ADMIN — GLUCAGON HYDROCHLORIDE 0.4 MG: 1 INJECTION, POWDER, FOR SOLUTION INTRAMUSCULAR; INTRAVENOUS; SUBCUTANEOUS at 16:07

## 2017-01-01 RX ADMIN — HYDROMORPHONE HYDROCHLORIDE 0.5 MG: 1 INJECTION, SOLUTION INTRAMUSCULAR; INTRAVENOUS; SUBCUTANEOUS at 09:50

## 2017-01-01 RX ADMIN — FEXOFENADINE 60 MG: 60 TABLET, FILM COATED ORAL at 08:39

## 2017-01-01 RX ADMIN — LEVOFLOXACIN 750 MG: 5 INJECTION, SOLUTION INTRAVENOUS at 21:29

## 2017-01-01 RX ADMIN — PROCHLORPERAZINE MALEATE 5 MG: 5 TABLET, FILM COATED ORAL at 04:37

## 2017-01-01 RX ADMIN — TAZOBACTAM SODIUM AND PIPERACILLIN SODIUM 4.5 G: 500; 4 INJECTION, SOLUTION INTRAVENOUS at 05:38

## 2017-01-01 RX ADMIN — POTASSIUM CHLORIDE 40 MEQ: 1.5 POWDER, FOR SOLUTION ORAL at 08:23

## 2017-01-01 RX ADMIN — LIDOCAINE HYDROCHLORIDE 60 MG: 20 INJECTION, SOLUTION INFILTRATION; PERINEURAL at 11:48

## 2017-01-01 RX ADMIN — PHENYLEPHRINE HYDROCHLORIDE 200 MCG: 10 INJECTION, SOLUTION INTRAMUSCULAR; INTRAVENOUS; SUBCUTANEOUS at 12:06

## 2017-01-01 RX ADMIN — POTASSIUM CHLORIDE 40 MEQ: 1500 TABLET, EXTENDED RELEASE ORAL at 11:50

## 2017-01-01 RX ADMIN — TAZOBACTAM SODIUM AND PIPERACILLIN SODIUM 4.5 G: 500; 4 INJECTION, SOLUTION INTRAVENOUS at 23:16

## 2017-01-01 RX ADMIN — MULTIPLE VITAMINS W/ MINERALS TAB 1 TABLET: TAB at 07:58

## 2017-01-01 RX ADMIN — SODIUM CHLORIDE, PRESERVATIVE FREE 500 UNITS: 5 INJECTION INTRAVENOUS at 12:16

## 2017-01-01 RX ADMIN — POTASSIUM CHLORIDE: 2 INJECTION, SOLUTION, CONCENTRATE INTRAVENOUS at 12:31

## 2017-01-01 RX ADMIN — HEPARIN SODIUM (PORCINE) LOCK FLUSH IV SOLN 100 UNIT/ML 5 ML: 100 SOLUTION at 16:57

## 2017-01-01 RX ADMIN — FAMOTIDINE 20 MG: 20 INJECTION, SOLUTION INTRAVENOUS at 10:27

## 2017-01-01 RX ADMIN — OMEPRAZOLE 40 MG: 20 CAPSULE, DELAYED RELEASE ORAL at 08:41

## 2017-01-01 RX ADMIN — PROCHLORPERAZINE EDISYLATE 5 MG: 5 INJECTION INTRAMUSCULAR; INTRAVENOUS at 11:39

## 2017-01-01 RX ADMIN — PROCHLORPERAZINE EDISYLATE 5 MG: 5 INJECTION INTRAMUSCULAR; INTRAVENOUS at 00:48

## 2017-01-01 RX ADMIN — PROCHLORPERAZINE MALEATE 5 MG: 5 TABLET, FILM COATED ORAL at 03:42

## 2017-01-01 RX ADMIN — DEXAMETHASONE SODIUM PHOSPHATE: 10 INJECTION, SOLUTION INTRAMUSCULAR; INTRAVENOUS at 10:15

## 2017-01-01 RX ADMIN — FLUTICASONE FUROATE AND VILANTEROL TRIFENATATE 1 PUFF: 100; 25 POWDER RESPIRATORY (INHALATION) at 09:06

## 2017-01-01 RX ADMIN — ONDANSETRON 4 MG: 2 INJECTION INTRAMUSCULAR; INTRAVENOUS at 16:07

## 2017-01-01 RX ADMIN — MULTIPLE VITAMINS W/ MINERALS TAB 1 TABLET: TAB at 08:39

## 2017-01-01 RX ADMIN — PROCHLORPERAZINE MALEATE 5 MG: 5 TABLET, FILM COATED ORAL at 15:54

## 2017-01-01 RX ADMIN — POTASSIUM CHLORIDE: 2 INJECTION, SOLUTION, CONCENTRATE INTRAVENOUS at 09:25

## 2017-01-01 RX ADMIN — TAZOBACTAM SODIUM AND PIPERACILLIN SODIUM 4.5 G: 500; 4 INJECTION, SOLUTION INTRAVENOUS at 11:14

## 2017-01-01 RX ADMIN — PROCHLORPERAZINE EDISYLATE 5 MG: 5 INJECTION INTRAMUSCULAR; INTRAVENOUS at 17:37

## 2017-01-01 RX ADMIN — SODIUM CHLORIDE 250 ML: 9 INJECTION, SOLUTION INTRAVENOUS at 09:49

## 2017-01-01 RX ADMIN — VITAMIN D, TAB 1000IU (100/BT) 2000 UNITS: 25 TAB at 08:40

## 2017-01-01 RX ADMIN — BUMETANIDE 0.5 MG/HR: 0.25 INJECTION, SOLUTION INTRAMUSCULAR; INTRAVENOUS at 17:27

## 2017-01-01 RX ADMIN — OMEPRAZOLE 40 MG: 20 CAPSULE, DELAYED RELEASE ORAL at 07:55

## 2017-01-01 RX ADMIN — FEXOFENADINE 60 MG: 60 TABLET, FILM COATED ORAL at 08:28

## 2017-01-01 RX ADMIN — FLUOROURACIL 750 MG: 50 INJECTION, SOLUTION INTRAVENOUS at 11:39

## 2017-01-01 RX ADMIN — DEXAMETHASONE SODIUM PHOSPHATE 4 MG: 4 INJECTION, SOLUTION INTRAMUSCULAR; INTRAVENOUS at 09:30

## 2017-01-01 RX ADMIN — LIDOCAINE HYDROCHLORIDE 30 ML: 20 SOLUTION ORAL; TOPICAL at 23:03

## 2017-01-01 RX ADMIN — FLUTICASONE PROPIONATE 2 SPRAY: 50 SPRAY, METERED NASAL at 08:46

## 2017-01-01 RX ADMIN — ALBUMIN (HUMAN) 12.5 G: 12.5 SOLUTION INTRAVENOUS at 10:02

## 2017-01-01 RX ADMIN — SODIUM CHLORIDE, PRESERVATIVE FREE 5 ML: 5 INJECTION INTRAVENOUS at 17:18

## 2017-01-01 RX ADMIN — FLUTICASONE PROPIONATE 2 SPRAY: 50 SPRAY, METERED NASAL at 08:28

## 2017-01-01 RX ADMIN — SODIUM CHLORIDE, PRESERVATIVE FREE 5 ML: 5 INJECTION INTRAVENOUS at 14:21

## 2017-01-01 RX ADMIN — LEVOFLOXACIN 500 MG: 5 INJECTION, SOLUTION INTRAVENOUS at 13:16

## 2017-01-01 RX ADMIN — LIDOCAINE HYDROCHLORIDE 8 ML: 10 INJECTION, SOLUTION EPIDURAL; INFILTRATION; INTRACAUDAL; PERINEURAL at 10:53

## 2017-01-01 RX ADMIN — SODIUM CHLORIDE 98 ML: 9 INJECTION, SOLUTION INTRAVENOUS at 05:34

## 2017-01-01 RX ADMIN — SODIUM CHLORIDE, POTASSIUM CHLORIDE, SODIUM LACTATE AND CALCIUM CHLORIDE: 600; 310; 30; 20 INJECTION, SOLUTION INTRAVENOUS at 12:53

## 2017-01-01 RX ADMIN — SODIUM CHLORIDE 1000 ML: 9 INJECTION, SOLUTION INTRAVENOUS at 10:15

## 2017-01-01 RX ADMIN — SODIUM CHLORIDE 250 ML: 9 INJECTION, SOLUTION INTRAVENOUS at 10:10

## 2017-01-01 RX ADMIN — TAZOBACTAM SODIUM AND PIPERACILLIN SODIUM 2.25 G: 250; 2 INJECTION, SOLUTION INTRAVENOUS at 20:23

## 2017-01-01 RX ADMIN — LEUCOVORIN CALCIUM 658 MG: 350 INJECTION, POWDER, LYOPHILIZED, FOR SOLUTION INTRAMUSCULAR; INTRAVENOUS at 11:55

## 2017-01-01 RX ADMIN — POTASSIUM CHLORIDE: 2 INJECTION, SOLUTION, CONCENTRATE INTRAVENOUS at 09:03

## 2017-01-01 RX ADMIN — ONDANSETRON 8 MG: 4 TABLET, ORALLY DISINTEGRATING ORAL at 04:02

## 2017-01-01 RX ADMIN — CARBOXYMETHYLCELLULOSE SODIUM 2 DROP: 5 SOLUTION/ DROPS OPHTHALMIC at 09:44

## 2017-01-01 RX ADMIN — FENTANYL CITRATE 100 MCG: 50 INJECTION, SOLUTION INTRAMUSCULAR; INTRAVENOUS at 11:48

## 2017-01-01 RX ADMIN — FLUTICASONE PROPIONATE 2 SPRAY: 50 SPRAY, METERED NASAL at 08:35

## 2017-01-01 RX ADMIN — PHENYLEPHRINE HYDROCHLORIDE 200 MCG: 10 INJECTION, SOLUTION INTRAMUSCULAR; INTRAVENOUS; SUBCUTANEOUS at 15:14

## 2017-01-01 RX ADMIN — FAMOTIDINE 20 MG: 20 INJECTION, SOLUTION INTRAVENOUS at 09:33

## 2017-01-01 RX ADMIN — OMEPRAZOLE 40 MG: 20 CAPSULE, DELAYED RELEASE ORAL at 08:34

## 2017-01-01 RX ADMIN — LORAZEPAM 0.5 MG: 0.5 TABLET ORAL at 22:17

## 2017-01-01 RX ADMIN — PROPOFOL 150 MG: 10 INJECTION, EMULSION INTRAVENOUS at 11:48

## 2017-01-01 RX ADMIN — ONDANSETRON 8 MG: 4 TABLET, ORALLY DISINTEGRATING ORAL at 13:17

## 2017-01-01 RX ADMIN — PROCHLORPERAZINE MALEATE 5 MG: 5 TABLET, FILM COATED ORAL at 22:32

## 2017-01-01 RX ADMIN — ROCURONIUM BROMIDE 20 MG: 10 INJECTION INTRAVENOUS at 13:55

## 2017-01-01 RX ADMIN — FLUTICASONE PROPIONATE 2 SPRAY: 50 SPRAY, METERED NASAL at 09:06

## 2017-01-01 RX ADMIN — SODIUM CHLORIDE 250 ML: 9 INJECTION, SOLUTION INTRAVENOUS at 08:50

## 2017-01-01 RX ADMIN — DIPHENHYDRAMINE HYDROCHLORIDE 25 MG: 50 INJECTION, SOLUTION INTRAMUSCULAR; INTRAVENOUS at 09:26

## 2017-01-01 RX ADMIN — TAZOBACTAM SODIUM AND PIPERACILLIN SODIUM 4.5 G: 500; 4 INJECTION, SOLUTION INTRAVENOUS at 05:22

## 2017-01-01 RX ADMIN — CARBOXYMETHYLCELLULOSE SODIUM 2 DROP: 5 SOLUTION/ DROPS OPHTHALMIC at 08:41

## 2017-01-01 RX ADMIN — PROCHLORPERAZINE MALEATE 5 MG: 5 TABLET, FILM COATED ORAL at 21:31

## 2017-01-01 RX ADMIN — FAMOTIDINE 20 MG: 20 INJECTION, SOLUTION INTRAVENOUS at 08:54

## 2017-01-01 RX ADMIN — MULTIPLE VITAMINS W/ MINERALS TAB 1 TABLET: TAB at 08:01

## 2017-01-01 RX ADMIN — FAMOTIDINE 20 MG: 20 INJECTION, SOLUTION INTRAVENOUS at 10:36

## 2017-01-01 RX ADMIN — TAZOBACTAM SODIUM AND PIPERACILLIN SODIUM 3.38 G: 375; 3 INJECTION, SOLUTION INTRAVENOUS at 02:21

## 2017-01-01 RX ADMIN — SODIUM CHLORIDE, PRESERVATIVE FREE 5 ML: 5 INJECTION INTRAVENOUS at 11:58

## 2017-01-01 RX ADMIN — TAZOBACTAM SODIUM AND PIPERACILLIN SODIUM 4.5 G: 500; 4 INJECTION, SOLUTION INTRAVENOUS at 16:36

## 2017-01-01 RX ADMIN — SUCCINYLCHOLINE CHLORIDE 90 MG: 20 INJECTION, SOLUTION INTRAMUSCULAR; INTRAVENOUS at 13:03

## 2017-01-01 RX ADMIN — SODIUM CHLORIDE 60 ML: 9 INJECTION, SOLUTION INTRAMUSCULAR; INTRAVENOUS; SUBCUTANEOUS at 07:55

## 2017-01-01 RX ADMIN — SODIUM CHLORIDE, PRESERVATIVE FREE 500 UNITS: 5 INJECTION INTRAVENOUS at 12:21

## 2017-01-01 RX ADMIN — TAZOBACTAM SODIUM AND PIPERACILLIN SODIUM 2.25 G: 250; 2 INJECTION, SOLUTION INTRAVENOUS at 13:27

## 2017-01-01 RX ADMIN — FENTANYL CITRATE 50 MCG: 50 INJECTION, SOLUTION INTRAMUSCULAR; INTRAVENOUS at 12:25

## 2017-01-01 RX ADMIN — SODIUM CHLORIDE: 9 INJECTION, SOLUTION INTRAVENOUS at 12:18

## 2017-01-01 RX ADMIN — ONDANSETRON 4 MG: 4 TABLET, ORALLY DISINTEGRATING ORAL at 08:43

## 2017-01-01 RX ADMIN — FAMOTIDINE 20 MG: 20 INJECTION, SOLUTION INTRAVENOUS at 12:33

## 2017-01-01 RX ADMIN — PROCHLORPERAZINE EDISYLATE 5 MG: 5 INJECTION INTRAMUSCULAR; INTRAVENOUS at 06:15

## 2017-01-01 RX ADMIN — SODIUM CHLORIDE 150 MG: 9 INJECTION, SOLUTION INTRAVENOUS at 12:11

## 2017-01-01 RX ADMIN — PROCHLORPERAZINE EDISYLATE 5 MG: 5 INJECTION INTRAMUSCULAR; INTRAVENOUS at 17:47

## 2017-01-01 RX ADMIN — CARBOPLATIN 200 MG: 10 INJECTION, SOLUTION INTRAVENOUS at 13:11

## 2017-01-01 RX ADMIN — FENTANYL CITRATE 50 MCG: 50 INJECTION, SOLUTION INTRAMUSCULAR; INTRAVENOUS at 14:01

## 2017-01-01 RX ADMIN — FILGRASTIM 300 MCG: 300 INJECTION, SOLUTION INTRAVENOUS; SUBCUTANEOUS at 10:46

## 2017-01-01 RX ADMIN — LEVOFLOXACIN 750 MG: 5 INJECTION, SOLUTION INTRAVENOUS at 20:33

## 2017-01-01 RX ADMIN — CARBOXYMETHYLCELLULOSE SODIUM 2 DROP: 5 SOLUTION/ DROPS OPHTHALMIC at 08:40

## 2017-01-01 RX ADMIN — HEPARIN SODIUM 5000 UNITS: 10000 INJECTION, SOLUTION INTRAVENOUS; SUBCUTANEOUS at 20:13

## 2017-01-01 RX ADMIN — ONDANSETRON 8 MG: 4 TABLET, ORALLY DISINTEGRATING ORAL at 11:58

## 2017-01-01 RX ADMIN — FLUTICASONE FUROATE AND VILANTEROL TRIFENATATE 1 PUFF: 100; 25 POWDER RESPIRATORY (INHALATION) at 08:29

## 2017-01-01 RX ADMIN — POTASSIUM CHLORIDE 10 MEQ: 14.9 INJECTION, SOLUTION, CONCENTRATE PARENTERAL at 19:21

## 2017-01-01 RX ADMIN — SODIUM CHLORIDE, POTASSIUM CHLORIDE, SODIUM LACTATE AND CALCIUM CHLORIDE: 600; 310; 30; 20 INJECTION, SOLUTION INTRAVENOUS at 22:31

## 2017-01-01 RX ADMIN — IOPAMIDOL 71 ML: 755 INJECTION, SOLUTION INTRAVENOUS at 05:34

## 2017-01-01 RX ADMIN — Medication 250 MG: at 07:55

## 2017-01-01 RX ADMIN — METOCLOPRAMIDE 5 MG: 5 INJECTION, SOLUTION INTRAMUSCULAR; INTRAVENOUS at 23:11

## 2017-01-01 RX ADMIN — TAZOBACTAM SODIUM AND PIPERACILLIN SODIUM 2.25 G: 250; 2 INJECTION, SOLUTION INTRAVENOUS at 09:31

## 2017-01-01 RX ADMIN — ONDANSETRON 8 MG: 2 INJECTION INTRAMUSCULAR; INTRAVENOUS at 00:51

## 2017-01-01 RX ADMIN — PROPOFOL 50 MG: 10 INJECTION, EMULSION INTRAVENOUS at 11:58

## 2017-01-01 RX ADMIN — CARBOXYMETHYLCELLULOSE SODIUM 2 DROP: 5 SOLUTION/ DROPS OPHTHALMIC at 08:38

## 2017-01-01 RX ADMIN — VITAMIN D, TAB 1000IU (100/BT) 2000 UNITS: 25 TAB at 08:37

## 2017-01-01 RX ADMIN — PACLITAXEL 95 MG: 6 INJECTION, SOLUTION INTRAVENOUS at 12:42

## 2017-01-01 RX ADMIN — PROCHLORPERAZINE MALEATE 5 MG: 5 TABLET, FILM COATED ORAL at 16:53

## 2017-01-01 RX ADMIN — POTASSIUM CHLORIDE: 2 INJECTION, SOLUTION, CONCENTRATE INTRAVENOUS at 09:35

## 2017-01-01 RX ADMIN — TAZOBACTAM SODIUM AND PIPERACILLIN SODIUM 3.38 G: 375; 3 INJECTION, SOLUTION INTRAVENOUS at 14:15

## 2017-01-01 RX ADMIN — DEXAMETHASONE SODIUM PHOSPHATE 4 MG: 4 INJECTION, SOLUTION INTRAMUSCULAR; INTRAVENOUS at 10:12

## 2017-01-01 RX ADMIN — TAZOBACTAM SODIUM AND PIPERACILLIN SODIUM 2.25 G: 250; 2 INJECTION, SOLUTION INTRAVENOUS at 19:52

## 2017-01-01 RX ADMIN — DEXAMETHASONE SODIUM PHOSPHATE 4 MG: 4 INJECTION, SOLUTION INTRAMUSCULAR; INTRAVENOUS at 13:05

## 2017-01-01 RX ADMIN — MIDAZOLAM HYDROCHLORIDE 2 MG: 1 INJECTION, SOLUTION INTRAMUSCULAR; INTRAVENOUS at 12:53

## 2017-01-01 RX ADMIN — HEPARIN SODIUM 5000 UNITS: 10000 INJECTION, SOLUTION INTRAVENOUS; SUBCUTANEOUS at 08:18

## 2017-01-01 RX ADMIN — DEXAMETHASONE SODIUM PHOSPHATE: 10 INJECTION, SOLUTION INTRAMUSCULAR; INTRAVENOUS at 09:05

## 2017-01-01 RX ADMIN — POTASSIUM CHLORIDE: 2 INJECTION, SOLUTION, CONCENTRATE INTRAVENOUS at 10:15

## 2017-01-01 RX ADMIN — SODIUM CHLORIDE, PRESERVATIVE FREE 5 ML: 5 INJECTION INTRAVENOUS at 16:19

## 2017-01-01 RX ADMIN — FLUTICASONE PROPIONATE 2 SPRAY: 50 SPRAY, METERED NASAL at 08:41

## 2017-01-01 RX ADMIN — POTASSIUM CHLORIDE 20 MEQ: 29.8 INJECTION, SOLUTION INTRAVENOUS at 10:03

## 2017-01-01 RX ADMIN — Medication 250 MG: at 20:34

## 2017-01-01 RX ADMIN — OMEPRAZOLE 40 MG: 20 CAPSULE, DELAYED RELEASE ORAL at 08:40

## 2017-01-01 RX ADMIN — TAZOBACTAM SODIUM AND PIPERACILLIN SODIUM 4.5 G: 500; 4 INJECTION, SOLUTION INTRAVENOUS at 12:15

## 2017-01-01 RX ADMIN — POTASSIUM CHLORIDE: 2 INJECTION, SOLUTION, CONCENTRATE INTRAVENOUS at 09:38

## 2017-01-01 RX ADMIN — POTASSIUM CHLORIDE: 2 INJECTION, SOLUTION, CONCENTRATE INTRAVENOUS at 09:32

## 2017-01-01 RX ADMIN — PROCHLORPERAZINE EDISYLATE 5 MG: 5 INJECTION INTRAMUSCULAR; INTRAVENOUS at 23:44

## 2017-01-01 RX ADMIN — ONDANSETRON 8 MG: 4 TABLET, ORALLY DISINTEGRATING ORAL at 04:19

## 2017-01-01 RX ADMIN — FEXOFENADINE 60 MG: 60 TABLET, FILM COATED ORAL at 08:46

## 2017-01-01 RX ADMIN — CARBOXYMETHYLCELLULOSE SODIUM 2 DROP: 5 SOLUTION/ DROPS OPHTHALMIC at 08:02

## 2017-01-01 RX ADMIN — SODIUM CHLORIDE, PRESERVATIVE FREE 5 ML: 5 INJECTION INTRAVENOUS at 11:01

## 2017-01-01 RX ADMIN — FAMOTIDINE 20 MG: 20 INJECTION, SOLUTION INTRAVENOUS at 09:26

## 2017-01-01 RX ADMIN — DEXAMETHASONE SODIUM PHOSPHATE: 10 INJECTION, SOLUTION INTRAMUSCULAR; INTRAVENOUS at 10:24

## 2017-01-01 RX ADMIN — WATER 100 ML: 100 IRRIGANT IRRIGATION at 13:47

## 2017-01-01 RX ADMIN — PROCHLORPERAZINE EDISYLATE 5 MG: 5 INJECTION INTRAMUSCULAR; INTRAVENOUS at 23:51

## 2017-01-01 RX ADMIN — ATROPINE SULFATE 0.4 MG: 0.4 INJECTION, SOLUTION INTRAMUSCULAR; INTRAVENOUS; SUBCUTANEOUS at 10:51

## 2017-01-01 RX ADMIN — OXALIPLATIN 141 MG: 5 INJECTION, SOLUTION INTRAVENOUS at 11:50

## 2017-01-01 RX ADMIN — ONDANSETRON 8 MG: 2 INJECTION INTRAMUSCULAR; INTRAVENOUS at 08:04

## 2017-01-01 RX ADMIN — FLUTICASONE PROPIONATE 2 SPRAY: 50 SPRAY, METERED NASAL at 08:38

## 2017-01-01 RX ADMIN — SODIUM CHLORIDE, PRESERVATIVE FREE 500 UNITS: 5 INJECTION INTRAVENOUS at 11:31

## 2017-01-01 RX ADMIN — ONDANSETRON 8 MG: 2 INJECTION INTRAMUSCULAR; INTRAVENOUS at 10:07

## 2017-01-01 RX ADMIN — ONDANSETRON 8 MG: 2 INJECTION INTRAMUSCULAR; INTRAVENOUS at 23:44

## 2017-01-01 RX ADMIN — FILGRASTIM 300 MCG: 300 INJECTION, SOLUTION INTRAVENOUS; SUBCUTANEOUS at 11:56

## 2017-01-01 RX ADMIN — CARBOPLATIN 200 MG: 10 INJECTION, SOLUTION INTRAVENOUS at 13:49

## 2017-01-01 RX ADMIN — POTASSIUM CHLORIDE 20 MEQ: 1500 TABLET, EXTENDED RELEASE ORAL at 11:13

## 2017-01-01 RX ADMIN — ONDANSETRON 8 MG: 2 INJECTION INTRAMUSCULAR; INTRAVENOUS at 08:01

## 2017-01-01 RX ADMIN — FENTANYL CITRATE 100 MCG: 50 INJECTION, SOLUTION INTRAMUSCULAR; INTRAVENOUS at 13:03

## 2017-01-01 RX ADMIN — MULTIPLE VITAMINS W/ MINERALS TAB 1 TABLET: TAB at 08:38

## 2017-01-01 RX ADMIN — FEXOFENADINE 60 MG: 60 TABLET, FILM COATED ORAL at 08:13

## 2017-01-01 RX ADMIN — FLUTICASONE PROPIONATE 2 SPRAY: 50 SPRAY, METERED NASAL at 08:02

## 2017-01-01 RX ADMIN — TAZOBACTAM SODIUM AND PIPERACILLIN SODIUM 2.25 G: 250; 2 INJECTION, SOLUTION INTRAVENOUS at 19:44

## 2017-01-01 RX ADMIN — GLUCAGON HYDROCHLORIDE 0.4 MG: 1 INJECTION, POWDER, FOR SOLUTION INTRAMUSCULAR; INTRAVENOUS; SUBCUTANEOUS at 12:27

## 2017-01-01 RX ADMIN — TAZOBACTAM SODIUM AND PIPERACILLIN SODIUM 2.25 G: 250; 2 INJECTION, SOLUTION INTRAVENOUS at 02:14

## 2017-01-01 RX ADMIN — TAZOBACTAM SODIUM AND PIPERACILLIN SODIUM 4.5 G: 500; 4 INJECTION, SOLUTION INTRAVENOUS at 23:50

## 2017-01-01 RX ADMIN — POTASSIUM CHLORIDE: 2 INJECTION, SOLUTION, CONCENTRATE INTRAVENOUS at 08:56

## 2017-01-01 RX ADMIN — PROCHLORPERAZINE EDISYLATE 5 MG: 5 INJECTION INTRAMUSCULAR; INTRAVENOUS at 00:20

## 2017-01-01 RX ADMIN — PROCHLORPERAZINE MALEATE 5 MG: 5 TABLET, FILM COATED ORAL at 11:28

## 2017-01-01 RX ADMIN — FLUTICASONE PROPIONATE 2 SPRAY: 50 SPRAY, METERED NASAL at 09:45

## 2017-01-01 RX ADMIN — SODIUM CHLORIDE 150 MG: 0.9 INJECTION, SOLUTION INTRAVENOUS at 10:23

## 2017-01-01 RX ADMIN — LEVOFLOXACIN 500 MG: 5 INJECTION, SOLUTION INTRAVENOUS at 12:10

## 2017-01-01 RX ADMIN — FLUTICASONE FUROATE AND VILANTEROL TRIFENATATE 1 PUFF: 100; 25 POWDER RESPIRATORY (INHALATION) at 08:41

## 2017-01-01 RX ADMIN — DEXAMETHASONE SODIUM PHOSPHATE: 10 INJECTION, SOLUTION INTRAMUSCULAR; INTRAVENOUS at 11:20

## 2017-01-01 RX ADMIN — CARBOPLATIN 240 MG: 10 INJECTION, SOLUTION INTRAVENOUS at 11:19

## 2017-01-01 RX ADMIN — PHENYLEPHRINE HYDROCHLORIDE 100 MCG: 10 INJECTION, SOLUTION INTRAMUSCULAR; INTRAVENOUS; SUBCUTANEOUS at 12:08

## 2017-01-01 RX ADMIN — FUROSEMIDE 10 MG: 10 INJECTION, SOLUTION INTRAVENOUS at 15:12

## 2017-01-01 RX ADMIN — PROCHLORPERAZINE EDISYLATE 5 MG: 5 INJECTION INTRAMUSCULAR; INTRAVENOUS at 12:32

## 2017-01-01 RX ADMIN — LORAZEPAM 0.5 MG: 0.5 TABLET ORAL at 22:06

## 2017-01-01 RX ADMIN — SODIUM CHLORIDE, PRESERVATIVE FREE 5 ML: 5 INJECTION INTRAVENOUS at 17:27

## 2017-01-01 RX ADMIN — FAMOTIDINE 20 MG: 20 INJECTION, SOLUTION INTRAVENOUS at 09:35

## 2017-01-01 RX ADMIN — SODIUM CHLORIDE 250 ML: 9 INJECTION, SOLUTION INTRAVENOUS at 11:06

## 2017-01-01 RX ADMIN — HEPARIN SODIUM 5000 UNITS: 10000 INJECTION, SOLUTION INTRAVENOUS; SUBCUTANEOUS at 08:47

## 2017-01-01 RX ADMIN — OXALIPLATIN 160 MG: 5 INJECTION, SOLUTION, CONCENTRATE INTRAVENOUS at 11:02

## 2017-01-01 RX ADMIN — SODIUM CHLORIDE 250 ML: 9 INJECTION, SOLUTION INTRAVENOUS at 10:16

## 2017-01-01 RX ADMIN — FEXOFENADINE 60 MG: 60 TABLET, FILM COATED ORAL at 08:44

## 2017-01-01 RX ADMIN — PHENYLEPHRINE HYDROCHLORIDE 200 MCG: 10 INJECTION, SOLUTION INTRAMUSCULAR; INTRAVENOUS; SUBCUTANEOUS at 13:08

## 2017-01-01 RX ADMIN — SODIUM CHLORIDE, PRESERVATIVE FREE 5 ML: 5 INJECTION INTRAVENOUS at 13:52

## 2017-01-01 RX ADMIN — OMEPRAZOLE 40 MG: 20 CAPSULE, DELAYED RELEASE ORAL at 08:39

## 2017-01-01 RX ADMIN — SODIUM CHLORIDE, PRESERVATIVE FREE 500 UNITS: 5 INJECTION INTRAVENOUS at 12:28

## 2017-01-01 RX ADMIN — LORAZEPAM 0.5 MG: 0.5 TABLET ORAL at 22:19

## 2017-01-01 RX ADMIN — PHENYLEPHRINE HYDROCHLORIDE 200 MCG: 10 INJECTION, SOLUTION INTRAMUSCULAR; INTRAVENOUS; SUBCUTANEOUS at 12:38

## 2017-01-01 RX ADMIN — MULTIPLE VITAMINS W/ MINERALS TAB 1 TABLET: TAB at 17:27

## 2017-01-01 RX ADMIN — TAZOBACTAM SODIUM AND PIPERACILLIN SODIUM 2.25 G: 250; 2 INJECTION, SOLUTION INTRAVENOUS at 15:22

## 2017-01-01 RX ADMIN — FAMOTIDINE 20 MG: 20 INJECTION, SOLUTION INTRAVENOUS at 08:55

## 2017-01-01 RX ADMIN — CARBOXYMETHYLCELLULOSE SODIUM 2 DROP: 5 SOLUTION/ DROPS OPHTHALMIC at 09:08

## 2017-01-01 RX ADMIN — FLUTICASONE PROPIONATE 2 SPRAY: 50 SPRAY, METERED NASAL at 08:29

## 2017-01-01 RX ADMIN — POTASSIUM CHLORIDE 10 MEQ: 14.9 INJECTION, SOLUTION, CONCENTRATE PARENTERAL at 21:03

## 2017-01-01 RX ADMIN — DEXAMETHASONE SODIUM PHOSPHATE: 10 INJECTION, SOLUTION INTRAMUSCULAR; INTRAVENOUS at 09:54

## 2017-01-01 RX ADMIN — POTASSIUM CHLORIDE 20 MEQ: 29.8 INJECTION, SOLUTION INTRAVENOUS at 11:15

## 2017-01-01 RX ADMIN — ONDANSETRON 8 MG: 2 INJECTION INTRAMUSCULAR; INTRAVENOUS at 16:25

## 2017-01-01 RX ADMIN — FEXOFENADINE 60 MG: 60 TABLET, FILM COATED ORAL at 08:04

## 2017-01-01 RX ADMIN — DEXAMETHASONE SODIUM PHOSPHATE 4 MG: 4 INJECTION, SOLUTION INTRAMUSCULAR; INTRAVENOUS at 09:35

## 2017-01-01 RX ADMIN — TAZOBACTAM SODIUM AND PIPERACILLIN SODIUM 2.25 G: 250; 2 INJECTION, SOLUTION INTRAVENOUS at 01:04

## 2017-01-01 RX ADMIN — POTASSIUM CHLORIDE 40 MEQ: 1500 TABLET, EXTENDED RELEASE ORAL at 08:52

## 2017-01-01 RX ADMIN — DEXTROSE MONOHYDRATE: 50 INJECTION, SOLUTION INTRAVENOUS at 08:52

## 2017-01-01 RX ADMIN — ONDANSETRON 8 MG: 2 INJECTION INTRAMUSCULAR; INTRAVENOUS at 00:23

## 2017-01-01 RX ADMIN — TAZOBACTAM SODIUM AND PIPERACILLIN SODIUM 4.5 G: 500; 4 INJECTION, SOLUTION INTRAVENOUS at 17:19

## 2017-01-01 RX ADMIN — VITAMIN D, TAB 1000IU (100/BT) 2000 UNITS: 25 TAB at 08:39

## 2017-01-01 RX ADMIN — FUROSEMIDE 10 MG: 10 INJECTION, SOLUTION INTRAVENOUS at 11:32

## 2017-01-01 RX ADMIN — ONDANSETRON 8 MG: 4 TABLET, ORALLY DISINTEGRATING ORAL at 11:32

## 2017-01-01 RX ADMIN — PROCHLORPERAZINE EDISYLATE 5 MG: 5 INJECTION INTRAMUSCULAR; INTRAVENOUS at 18:11

## 2017-01-01 RX ADMIN — FUROSEMIDE 10 MG/HR: 10 INJECTION, SOLUTION INTRAVENOUS at 08:41

## 2017-01-01 RX ADMIN — PROCHLORPERAZINE MALEATE 5 MG: 5 TABLET, FILM COATED ORAL at 11:49

## 2017-01-01 RX ADMIN — PROCHLORPERAZINE MALEATE 5 MG: 5 TABLET, FILM COATED ORAL at 22:17

## 2017-01-01 RX ADMIN — MULTIPLE VITAMINS W/ MINERALS TAB 1 TABLET: TAB at 08:28

## 2017-01-01 RX ADMIN — DIPHENHYDRAMINE HYDROCHLORIDE 25 MG: 50 INJECTION, SOLUTION INTRAMUSCULAR; INTRAVENOUS at 11:59

## 2017-01-01 RX ADMIN — SODIUM CHLORIDE, PRESERVATIVE FREE 5 ML: 5 INJECTION INTRAVENOUS at 12:37

## 2017-01-01 RX ADMIN — LORAZEPAM 0.5 MG: 0.5 TABLET ORAL at 22:33

## 2017-01-01 RX ADMIN — POTASSIUM CHLORIDE 10 MEQ: 14.9 INJECTION, SOLUTION, CONCENTRATE PARENTERAL at 16:53

## 2017-01-01 RX ADMIN — SUGAMMADEX 150 MG: 100 INJECTION, SOLUTION INTRAVENOUS at 14:29

## 2017-01-01 RX ADMIN — Medication 100 MG: at 07:55

## 2017-01-01 RX ADMIN — ONDANSETRON 8 MG: 2 INJECTION INTRAMUSCULAR; INTRAVENOUS at 12:59

## 2017-01-01 RX ADMIN — LORAZEPAM 0.5 MG: 0.5 TABLET ORAL at 22:32

## 2017-01-01 RX ADMIN — Medication 1 LOZENGE: at 23:20

## 2017-01-01 RX ADMIN — SODIUM CHLORIDE, PRESERVATIVE FREE 5 ML: 5 INJECTION INTRAVENOUS at 11:32

## 2017-01-01 RX ADMIN — SODIUM CHLORIDE, PRESERVATIVE FREE 500 UNITS: 5 INJECTION INTRAVENOUS at 11:25

## 2017-01-01 RX ADMIN — SODIUM CHLORIDE, POTASSIUM CHLORIDE, SODIUM LACTATE AND CALCIUM CHLORIDE 500 ML: 600; 310; 30; 20 INJECTION, SOLUTION INTRAVENOUS at 06:08

## 2017-01-01 RX ADMIN — LORAZEPAM 0.5 MG: 0.5 TABLET ORAL at 22:22

## 2017-01-01 RX ADMIN — ROCURONIUM BROMIDE 50 MG: 10 INJECTION INTRAVENOUS at 11:48

## 2017-01-01 RX ADMIN — ATROPINE SULFATE 0.4 MG: 0.1 INJECTION PARENTERAL at 11:58

## 2017-01-01 RX ADMIN — LORAZEPAM 0.5 MG: 0.5 TABLET ORAL at 21:00

## 2017-01-01 RX ADMIN — LIDOCAINE: 40 CREAM TOPICAL at 11:44

## 2017-01-01 RX ADMIN — DEXTROSE MONOHYDRATE: 50 INJECTION, SOLUTION INTRAVENOUS at 11:15

## 2017-01-01 RX ADMIN — SIMETHICONE 133 MG: 63.3; 3.7 SOLUTION/ DROPS ORAL at 13:46

## 2017-01-01 RX ADMIN — CARBOXYMETHYLCELLULOSE SODIUM 2 DROP: 5 SOLUTION/ DROPS OPHTHALMIC at 08:29

## 2017-01-01 RX ADMIN — LIDOCAINE HYDROCHLORIDE 5 ML: 10 INJECTION, SOLUTION INFILTRATION; PERINEURAL at 11:34

## 2017-01-01 RX ADMIN — VITAMIN D, TAB 1000IU (100/BT) 2000 UNITS: 25 TAB at 08:34

## 2017-01-01 RX ADMIN — FLUTICASONE FUROATE AND VILANTEROL TRIFENATATE 1 PUFF: 100; 25 POWDER RESPIRATORY (INHALATION) at 08:47

## 2017-01-01 RX ADMIN — ONDANSETRON 8 MG: 4 TABLET, ORALLY DISINTEGRATING ORAL at 20:43

## 2017-01-01 RX ADMIN — PROCHLORPERAZINE MALEATE 5 MG: 5 TABLET, FILM COATED ORAL at 21:00

## 2017-01-01 RX ADMIN — ONDANSETRON 8 MG: 2 INJECTION INTRAMUSCULAR; INTRAVENOUS at 16:53

## 2017-01-01 RX ADMIN — HEPARIN SODIUM 5000 UNITS: 10000 INJECTION, SOLUTION INTRAVENOUS; SUBCUTANEOUS at 08:28

## 2017-01-01 RX ADMIN — LORAZEPAM 0.5 MG: 0.5 TABLET ORAL at 21:12

## 2017-01-01 RX ADMIN — DEXAMETHASONE SODIUM PHOSPHATE: 10 INJECTION, SOLUTION INTRAMUSCULAR; INTRAVENOUS at 09:49

## 2017-01-01 RX ADMIN — HEPARIN SODIUM 5000 UNITS: 10000 INJECTION, SOLUTION INTRAVENOUS; SUBCUTANEOUS at 20:23

## 2017-01-01 RX ADMIN — FAMOTIDINE 20 MG: 20 INJECTION, SOLUTION INTRAVENOUS at 09:37

## 2017-01-01 RX ADMIN — OMEPRAZOLE 40 MG: 20 CAPSULE, DELAYED RELEASE ORAL at 08:18

## 2017-01-01 RX ADMIN — ONDANSETRON 8 MG: 4 TABLET, ORALLY DISINTEGRATING ORAL at 12:17

## 2017-01-01 RX ADMIN — POTASSIUM CHLORIDE: 2 INJECTION, SOLUTION, CONCENTRATE INTRAVENOUS at 11:06

## 2017-01-01 RX ADMIN — ONDANSETRON 8 MG: 4 TABLET, ORALLY DISINTEGRATING ORAL at 11:48

## 2017-01-01 RX ADMIN — SODIUM CHLORIDE, PRESERVATIVE FREE 5 ML: 5 INJECTION INTRAVENOUS at 15:24

## 2017-01-01 RX ADMIN — SODIUM CHLORIDE, PRESERVATIVE FREE 500 UNITS: 5 INJECTION INTRAVENOUS at 11:51

## 2017-01-01 RX ADMIN — ONDANSETRON 8 MG: 2 INJECTION INTRAMUSCULAR; INTRAVENOUS at 09:25

## 2017-01-01 RX ADMIN — VITAMIN D, TAB 1000IU (100/BT) 2000 UNITS: 25 TAB at 08:04

## 2017-01-01 RX ADMIN — OXALIPLATIN 160 MG: 100 INJECTION, SOLUTION, CONCENTRATE INTRAVENOUS at 09:25

## 2017-01-01 RX ADMIN — PHENYLEPHRINE HYDROCHLORIDE 200 MCG: 10 INJECTION, SOLUTION INTRAMUSCULAR; INTRAVENOUS; SUBCUTANEOUS at 15:56

## 2017-01-01 RX ADMIN — FEXOFENADINE 60 MG: 60 TABLET, FILM COATED ORAL at 08:38

## 2017-01-01 RX ADMIN — ALBUMIN (HUMAN) 12.5 G: 12.5 SOLUTION INTRAVENOUS at 13:11

## 2017-01-01 RX ADMIN — SODIUM CHLORIDE, PRESERVATIVE FREE 500 UNITS: 5 INJECTION INTRAVENOUS at 11:06

## 2017-01-01 RX ADMIN — CARBOXYMETHYLCELLULOSE SODIUM 2 DROP: 5 SOLUTION/ DROPS OPHTHALMIC at 22:29

## 2017-01-01 RX ADMIN — PROCHLORPERAZINE MALEATE 5 MG: 5 TABLET, FILM COATED ORAL at 10:43

## 2017-01-01 RX ADMIN — PROCHLORPERAZINE EDISYLATE 5 MG: 5 INJECTION INTRAMUSCULAR; INTRAVENOUS at 06:22

## 2017-01-01 RX ADMIN — VITAMIN D, TAB 1000IU (100/BT) 2000 UNITS: 25 TAB at 08:38

## 2017-01-01 RX ADMIN — SODIUM CHLORIDE, PRESERVATIVE FREE 5 ML: 5 INJECTION INTRAVENOUS at 12:02

## 2017-01-01 RX ADMIN — FAMOTIDINE 20 MG: 20 INJECTION, SOLUTION INTRAVENOUS at 10:58

## 2017-01-01 RX ADMIN — SODIUM CHLORIDE, PRESERVATIVE FREE 5 ML: 5 INJECTION INTRAVENOUS at 11:10

## 2017-01-01 RX ADMIN — LORAZEPAM 0.5 MG: 0.5 TABLET ORAL at 21:24

## 2017-01-01 RX ADMIN — DIPHENHYDRAMINE HYDROCHLORIDE 50 MG: 50 INJECTION, SOLUTION INTRAMUSCULAR; INTRAVENOUS at 09:19

## 2017-01-01 RX ADMIN — MORPHINE SULFATE 2 MG: 2 INJECTION, SOLUTION INTRAMUSCULAR; INTRAVENOUS at 02:25

## 2017-01-01 RX ADMIN — LEUCOVORIN CALCIUM 658 MG: 350 INJECTION, POWDER, LYOPHILIZED, FOR SOLUTION INTRAMUSCULAR; INTRAVENOUS at 09:25

## 2017-01-01 RX ADMIN — SODIUM CHLORIDE: 9 INJECTION, SOLUTION INTRAVENOUS at 11:52

## 2017-01-01 RX ADMIN — LEVOFLOXACIN 750 MG: 5 INJECTION, SOLUTION INTRAVENOUS at 20:29

## 2017-01-01 RX ADMIN — ONDANSETRON 8 MG: 2 INJECTION INTRAMUSCULAR; INTRAVENOUS at 15:22

## 2017-01-01 RX ADMIN — PHENYLEPHRINE HYDROCHLORIDE 100 MCG: 10 INJECTION, SOLUTION INTRAMUSCULAR; INTRAVENOUS; SUBCUTANEOUS at 12:23

## 2017-01-01 RX ADMIN — MULTIPLE VITAMINS W/ MINERALS TAB 1 TABLET: TAB at 08:40

## 2017-01-01 RX ADMIN — TAZOBACTAM SODIUM AND PIPERACILLIN SODIUM 4.5 G: 500; 4 INJECTION, SOLUTION INTRAVENOUS at 18:40

## 2017-01-01 RX ADMIN — SODIUM CHLORIDE 150 MG: 9 INJECTION, SOLUTION INTRAVENOUS at 09:52

## 2017-01-01 RX ADMIN — SODIUM CHLORIDE 150 MG: 9 INJECTION, SOLUTION INTRAVENOUS at 10:46

## 2017-01-01 RX ADMIN — LIDOCAINE HYDROCHLORIDE 80 MG: 20 INJECTION, SOLUTION INFILTRATION; PERINEURAL at 13:03

## 2017-01-01 RX ADMIN — POTASSIUM CHLORIDE: 2 INJECTION, SOLUTION, CONCENTRATE INTRAVENOUS at 08:54

## 2017-01-01 RX ADMIN — FAMOTIDINE 20 MG: 20 INJECTION, SOLUTION INTRAVENOUS at 10:25

## 2017-01-01 RX ADMIN — ONDANSETRON 8 MG: 2 INJECTION INTRAMUSCULAR; INTRAVENOUS at 00:48

## 2017-01-01 RX ADMIN — DEXAMETHASONE SODIUM PHOSPHATE: 4 INJECTION, SOLUTION INTRAMUSCULAR; INTRAVENOUS at 09:23

## 2017-01-01 RX ADMIN — LEVOFLOXACIN 750 MG: 5 INJECTION, SOLUTION INTRAVENOUS at 21:12

## 2017-01-01 RX ADMIN — OMEPRAZOLE 40 MG: 20 CAPSULE, DELAYED RELEASE ORAL at 08:36

## 2017-01-01 RX ADMIN — ONDANSETRON 4 MG: 4 TABLET, ORALLY DISINTEGRATING ORAL at 08:54

## 2017-01-01 RX ADMIN — FLUDEOXYGLUCOSE F-18 11.97 MCI.: 500 INJECTION, SOLUTION INTRAVENOUS at 11:12

## 2017-01-01 RX ADMIN — SODIUM CHLORIDE 1000 ML: 9 INJECTION, SOLUTION INTRAVENOUS at 14:14

## 2017-01-01 RX ADMIN — POTASSIUM CHLORIDE: 2 INJECTION, SOLUTION, CONCENTRATE INTRAVENOUS at 10:10

## 2017-01-01 RX ADMIN — OMEPRAZOLE 40 MG: 20 CAPSULE, DELAYED RELEASE ORAL at 08:01

## 2017-01-01 RX ADMIN — PHENYLEPHRINE HYDROCHLORIDE 100 MCG: 10 INJECTION, SOLUTION INTRAMUSCULAR; INTRAVENOUS; SUBCUTANEOUS at 12:29

## 2017-01-01 RX ADMIN — DEXTROSE MONOHYDRATE 330 MG: 50 INJECTION, SOLUTION INTRAVENOUS at 10:04

## 2017-01-01 RX ADMIN — DEXTROSE MONOHYDRATE 250 ML: 50 INJECTION, SOLUTION INTRAVENOUS at 09:46

## 2017-01-01 RX ADMIN — PACLITAXEL 101 MG: 6 INJECTION, SOLUTION INTRAVENOUS at 10:19

## 2017-01-01 RX ADMIN — PHENYLEPHRINE HYDROCHLORIDE 200 MCG: 10 INJECTION, SOLUTION INTRAMUSCULAR; INTRAVENOUS; SUBCUTANEOUS at 14:35

## 2017-01-01 RX ADMIN — SODIUM CHLORIDE: 9 INJECTION, SOLUTION INTRAVENOUS at 15:45

## 2017-01-01 RX ADMIN — DEXAMETHASONE SODIUM PHOSPHATE: 10 INJECTION, SOLUTION INTRAMUSCULAR; INTRAVENOUS at 10:47

## 2017-01-01 RX ADMIN — ONDANSETRON 8 MG: 4 TABLET, ORALLY DISINTEGRATING ORAL at 18:38

## 2017-01-01 RX ADMIN — HEPARIN SODIUM 5000 UNITS: 10000 INJECTION, SOLUTION INTRAVENOUS; SUBCUTANEOUS at 08:49

## 2017-01-01 RX ADMIN — HEPARIN SODIUM 5000 UNITS: 10000 INJECTION, SOLUTION INTRAVENOUS; SUBCUTANEOUS at 08:37

## 2017-01-01 RX ADMIN — FAMOTIDINE 20 MG: 20 INJECTION, SOLUTION INTRAVENOUS at 09:48

## 2017-01-01 RX ADMIN — DIPHENHYDRAMINE HYDROCHLORIDE 25 MG: 50 INJECTION, SOLUTION INTRAMUSCULAR; INTRAVENOUS at 10:11

## 2017-01-01 RX ADMIN — OMEPRAZOLE 40 MG: 20 CAPSULE, DELAYED RELEASE ORAL at 08:47

## 2017-01-01 RX ADMIN — TAZOBACTAM SODIUM AND PIPERACILLIN SODIUM 4.5 G: 500; 4 INJECTION, SOLUTION INTRAVENOUS at 13:55

## 2017-01-01 RX ADMIN — PHENYLEPHRINE HYDROCHLORIDE 100 MCG: 10 INJECTION, SOLUTION INTRAMUSCULAR; INTRAVENOUS; SUBCUTANEOUS at 12:41

## 2017-01-01 RX ADMIN — SODIUM CHLORIDE: 9 INJECTION, SOLUTION INTRAVENOUS at 17:06

## 2017-01-01 RX ADMIN — ONDANSETRON 4 MG: 2 INJECTION INTRAMUSCULAR; INTRAVENOUS at 14:22

## 2017-01-01 RX ADMIN — LEVOFLOXACIN 750 MG: 5 INJECTION, SOLUTION INTRAVENOUS at 20:21

## 2017-01-01 RX ADMIN — SODIUM CHLORIDE, PRESERVATIVE FREE 5 ML: 5 INJECTION INTRAVENOUS at 13:26

## 2017-01-01 RX ADMIN — CARBOXYMETHYLCELLULOSE SODIUM 2 DROP: 5 SOLUTION/ DROPS OPHTHALMIC at 08:28

## 2017-01-01 RX ADMIN — SODIUM CHLORIDE, PRESERVATIVE FREE 5 ML: 5 INJECTION INTRAVENOUS at 06:44

## 2017-01-01 RX ADMIN — ONDANSETRON 8 MG: 2 INJECTION INTRAMUSCULAR; INTRAVENOUS at 09:37

## 2017-01-01 RX ADMIN — MULTIPLE VITAMINS W/ MINERALS TAB 1 TABLET: TAB at 08:37

## 2017-01-01 RX ADMIN — PROCHLORPERAZINE EDISYLATE 5 MG: 5 INJECTION INTRAMUSCULAR; INTRAVENOUS at 18:35

## 2017-01-01 RX ADMIN — DEXAMETHASONE SODIUM PHOSPHATE: 10 INJECTION, SOLUTION INTRAMUSCULAR; INTRAVENOUS at 09:46

## 2017-01-01 RX ADMIN — FUROSEMIDE 20 MG: 10 INJECTION, SOLUTION INTRAVENOUS at 09:03

## 2017-01-01 RX ADMIN — OMEPRAZOLE 40 MG: 20 CAPSULE, DELAYED RELEASE ORAL at 08:04

## 2017-01-01 RX ADMIN — LEUCOVORIN CALCIUM 732 MG: 350 INJECTION, POWDER, LYOPHILIZED, FOR SOLUTION INTRAMUSCULAR; INTRAVENOUS at 10:04

## 2017-01-01 RX ADMIN — DEXTROSE MONOHYDRATE 250 ML: 50 INJECTION, SOLUTION INTRAVENOUS at 08:52

## 2017-01-01 RX ADMIN — CARBOXYMETHYLCELLULOSE SODIUM 2 DROP: 5 SOLUTION/ DROPS OPHTHALMIC at 08:19

## 2017-01-01 RX ADMIN — ONDANSETRON 8 MG: 4 TABLET, ORALLY DISINTEGRATING ORAL at 03:49

## 2017-01-01 RX ADMIN — PROCHLORPERAZINE EDISYLATE 5 MG: 5 INJECTION INTRAMUSCULAR; INTRAVENOUS at 06:14

## 2017-01-01 RX ADMIN — PROCHLORPERAZINE MALEATE 5 MG: 5 TABLET, FILM COATED ORAL at 10:04

## 2017-01-01 RX ADMIN — VANCOMYCIN HYDROCHLORIDE 1500 MG: 10 INJECTION, POWDER, LYOPHILIZED, FOR SOLUTION INTRAVENOUS at 18:52

## 2017-01-01 RX ADMIN — DEXAMETHASONE SODIUM PHOSPHATE: 10 INJECTION, SOLUTION INTRAMUSCULAR; INTRAVENOUS at 10:10

## 2017-01-01 RX ADMIN — FAMOTIDINE 20 MG: 20 INJECTION, SOLUTION INTRAVENOUS at 09:17

## 2017-01-01 RX ADMIN — SODIUM CHLORIDE, PRESERVATIVE FREE 5 ML: 5 INJECTION INTRAVENOUS at 13:15

## 2017-01-01 RX ADMIN — FAMOTIDINE 20 MG: 20 INJECTION, SOLUTION INTRAVENOUS at 08:52

## 2017-01-01 RX ADMIN — VITAMIN D, TAB 1000IU (100/BT) 2000 UNITS: 25 TAB at 17:26

## 2017-01-01 RX ADMIN — SODIUM CHLORIDE, POTASSIUM CHLORIDE, SODIUM LACTATE AND CALCIUM CHLORIDE: 600; 310; 30; 20 INJECTION, SOLUTION INTRAVENOUS at 11:25

## 2017-01-01 RX ADMIN — FEXOFENADINE 60 MG: 60 TABLET, FILM COATED ORAL at 08:41

## 2017-01-01 RX ADMIN — SODIUM CHLORIDE, PRESERVATIVE FREE 500 UNITS: 5 INJECTION INTRAVENOUS at 11:22

## 2017-01-01 RX ADMIN — ONDANSETRON 8 MG: 2 INJECTION INTRAMUSCULAR; INTRAVENOUS at 16:34

## 2017-01-01 RX ADMIN — PROCHLORPERAZINE MALEATE 5 MG: 5 TABLET, FILM COATED ORAL at 04:19

## 2017-01-01 RX ADMIN — SODIUM CHLORIDE 150 MG: 9 INJECTION, SOLUTION INTRAVENOUS at 11:36

## 2017-01-01 RX ADMIN — SODIUM CHLORIDE, PRESERVATIVE FREE 5 ML: 5 INJECTION INTRAVENOUS at 06:21

## 2017-01-01 RX ADMIN — FAMOTIDINE 20 MG: 20 INJECTION, SOLUTION INTRAVENOUS at 12:12

## 2017-01-01 RX ADMIN — FUROSEMIDE 20 MG: 10 INJECTION, SOLUTION INTRAVENOUS at 13:03

## 2017-01-01 RX ADMIN — ONDANSETRON 8 MG: 4 TABLET, ORALLY DISINTEGRATING ORAL at 03:42

## 2017-01-01 RX ADMIN — ALBUMIN (HUMAN): 12.5 SOLUTION INTRAVENOUS at 12:37

## 2017-01-01 RX ADMIN — FAMOTIDINE 20 MG: 20 INJECTION, SOLUTION INTRAVENOUS at 09:52

## 2017-01-01 RX ADMIN — HEPARIN SODIUM 5000 UNITS: 10000 INJECTION, SOLUTION INTRAVENOUS; SUBCUTANEOUS at 20:43

## 2017-01-01 RX ADMIN — FLUDEOXYGLUCOSE F-18 15.43 MCI.: 500 INJECTION, SOLUTION INTRAVENOUS at 11:44

## 2017-01-01 RX ADMIN — FEXOFENADINE 60 MG: 60 TABLET, FILM COATED ORAL at 09:44

## 2017-01-01 RX ADMIN — MIDAZOLAM HYDROCHLORIDE 2 MG: 1 INJECTION, SOLUTION INTRAMUSCULAR; INTRAVENOUS at 11:37

## 2017-01-01 RX ADMIN — HEPARIN SODIUM 5000 UNITS: 10000 INJECTION, SOLUTION INTRAVENOUS; SUBCUTANEOUS at 08:40

## 2017-01-01 RX ADMIN — SODIUM CHLORIDE, PRESERVATIVE FREE 5 ML: 5 INJECTION INTRAVENOUS at 09:06

## 2017-01-01 RX ADMIN — FAMOTIDINE 20 MG: 20 INJECTION, SOLUTION INTRAVENOUS at 10:01

## 2017-01-01 RX ADMIN — CARBOXYMETHYLCELLULOSE SODIUM 2 DROP: 5 SOLUTION/ DROPS OPHTHALMIC at 08:47

## 2017-01-01 RX ADMIN — SODIUM CHLORIDE, PRESERVATIVE FREE 5 ML: 5 INJECTION INTRAVENOUS at 11:17

## 2017-01-01 RX ADMIN — SODIUM CHLORIDE 250 ML: 9 INJECTION, SOLUTION INTRAVENOUS at 08:55

## 2017-01-01 RX ADMIN — FAMOTIDINE 20 MG: 20 INJECTION, SOLUTION INTRAVENOUS at 09:20

## 2017-01-01 RX ADMIN — VANCOMYCIN HYDROCHLORIDE 1500 MG: 10 INJECTION, POWDER, LYOPHILIZED, FOR SOLUTION INTRAVENOUS at 17:37

## 2017-01-01 RX ADMIN — DEXAMETHASONE SODIUM PHOSPHATE: 10 INJECTION, SOLUTION INTRAMUSCULAR; INTRAVENOUS at 09:07

## 2017-01-01 RX ADMIN — DEXAMETHASONE SODIUM PHOSPHATE: 10 INJECTION, SOLUTION INTRAMUSCULAR; INTRAVENOUS at 09:00

## 2017-01-01 RX ADMIN — PROCHLORPERAZINE MALEATE 5 MG: 5 TABLET, FILM COATED ORAL at 11:02

## 2017-01-01 RX ADMIN — DEXAMETHASONE SODIUM PHOSPHATE: 10 INJECTION, SOLUTION INTRAMUSCULAR; INTRAVENOUS at 09:08

## 2017-01-01 RX ADMIN — FENTANYL CITRATE 50 MCG: 50 INJECTION, SOLUTION INTRAMUSCULAR; INTRAVENOUS at 14:08

## 2017-01-01 RX ADMIN — SODIUM CHLORIDE: 9 INJECTION, SOLUTION INTRAVENOUS at 06:16

## 2017-01-01 RX ADMIN — TAZOBACTAM SODIUM AND PIPERACILLIN SODIUM 4.5 G: 500; 4 INJECTION, SOLUTION INTRAVENOUS at 18:53

## 2017-01-01 RX ADMIN — VITAMIN D, TAB 1000IU (100/BT) 2000 UNITS: 25 TAB at 08:01

## 2017-01-01 RX ADMIN — ONDANSETRON 8 MG: 4 TABLET, ORALLY DISINTEGRATING ORAL at 21:00

## 2017-01-01 RX ADMIN — ALBUTEROL SULFATE 2.5 MG: 2.5 SOLUTION RESPIRATORY (INHALATION) at 01:20

## 2017-01-01 RX ADMIN — FAMOTIDINE 20 MG: 20 INJECTION, SOLUTION INTRAVENOUS at 08:49

## 2017-01-01 RX ADMIN — Medication 100 MG: at 20:34

## 2017-01-01 RX ADMIN — TAZOBACTAM SODIUM AND PIPERACILLIN SODIUM 4.5 G: 500; 4 INJECTION, SOLUTION INTRAVENOUS at 05:30

## 2017-01-01 RX ADMIN — HEPARIN SODIUM 5000 UNITS: 10000 INJECTION, SOLUTION INTRAVENOUS; SUBCUTANEOUS at 21:01

## 2017-01-01 RX ADMIN — SODIUM CHLORIDE, PRESERVATIVE FREE 500 UNITS: 5 INJECTION INTRAVENOUS at 14:42

## 2017-01-01 RX ADMIN — DEXAMETHASONE SODIUM PHOSPHATE 4 MG: 4 INJECTION, SOLUTION INTRAMUSCULAR; INTRAVENOUS at 09:11

## 2017-01-01 RX ADMIN — LORAZEPAM 0.5 MG: 0.5 TABLET ORAL at 23:26

## 2017-01-01 RX ADMIN — PROCHLORPERAZINE EDISYLATE 5 MG: 5 INJECTION INTRAMUSCULAR; INTRAVENOUS at 11:34

## 2017-01-01 RX ADMIN — HEPARIN SODIUM 5000 UNITS: 10000 INJECTION, SOLUTION INTRAVENOUS; SUBCUTANEOUS at 19:51

## 2017-01-01 RX ADMIN — PHENYLEPHRINE HYDROCHLORIDE 200 MCG: 10 INJECTION, SOLUTION INTRAMUSCULAR; INTRAVENOUS; SUBCUTANEOUS at 12:21

## 2017-01-01 RX ADMIN — DEXAMETHASONE SODIUM PHOSPHATE: 10 INJECTION, SOLUTION INTRAMUSCULAR; INTRAVENOUS at 09:04

## 2017-01-01 RX ADMIN — LEUCOVORIN CALCIUM 658 MG: 350 INJECTION, POWDER, LYOPHILIZED, FOR SOLUTION INTRAMUSCULAR; INTRAVENOUS at 11:06

## 2017-01-01 RX ADMIN — SODIUM CHLORIDE, PRESERVATIVE FREE 5 ML: 5 INJECTION INTRAVENOUS at 10:44

## 2017-01-01 RX ADMIN — FILGRASTIM 300 MCG: 300 INJECTION, SOLUTION INTRAVENOUS; SUBCUTANEOUS at 10:28

## 2017-01-01 RX ADMIN — TAZOBACTAM SODIUM AND PIPERACILLIN SODIUM 3.38 G: 375; 3 INJECTION, SOLUTION INTRAVENOUS at 08:04

## 2017-01-01 RX ADMIN — FAMOTIDINE 20 MG: 20 INJECTION, SOLUTION INTRAVENOUS at 10:52

## 2017-01-01 RX ADMIN — FUROSEMIDE 20 MG/HR: 10 INJECTION, SOLUTION INTRAVENOUS at 16:05

## 2017-01-01 RX ADMIN — PROCHLORPERAZINE EDISYLATE 5 MG: 5 INJECTION INTRAMUSCULAR; INTRAVENOUS at 11:49

## 2017-01-01 RX ADMIN — Medication 5 MG: at 12:06

## 2017-01-01 RX ADMIN — FLUTICASONE FUROATE AND VILANTEROL TRIFENATATE 1 PUFF: 100; 25 POWDER RESPIRATORY (INHALATION) at 08:28

## 2017-01-01 RX ADMIN — POTASSIUM CHLORIDE 10 MEQ: 14.9 INJECTION, SOLUTION, CONCENTRATE PARENTERAL at 23:41

## 2017-01-01 RX ADMIN — FLUTICASONE FUROATE AND VILANTEROL TRIFENATATE 1 PUFF: 100; 25 POWDER RESPIRATORY (INHALATION) at 08:38

## 2017-01-01 RX ADMIN — PACLITAXEL 101 MG: 6 INJECTION, SOLUTION INTRAVENOUS at 12:02

## 2017-01-01 RX ADMIN — LORAZEPAM 0.5 MG: 0.5 TABLET ORAL at 21:31

## 2017-01-01 RX ADMIN — IOPAMIDOL 60 ML: 408 INJECTION, SOLUTION INTRAVASCULAR at 14:43

## 2017-01-01 RX ADMIN — FAMOTIDINE 20 MG: 20 INJECTION, SOLUTION INTRAVENOUS at 09:57

## 2017-01-01 RX ADMIN — SODIUM CHLORIDE: 9 INJECTION, SOLUTION INTRAVENOUS at 07:40

## 2017-01-01 RX ADMIN — FEXOFENADINE 60 MG: 60 TABLET, FILM COATED ORAL at 18:43

## 2017-01-01 RX ADMIN — TAZOBACTAM SODIUM AND PIPERACILLIN SODIUM 3.38 G: 375; 3 INJECTION, SOLUTION INTRAVENOUS at 20:57

## 2017-01-01 RX ADMIN — TAZOBACTAM SODIUM AND PIPERACILLIN SODIUM 2.25 G: 250; 2 INJECTION, SOLUTION INTRAVENOUS at 14:20

## 2017-01-01 RX ADMIN — SODIUM CHLORIDE, PRESERVATIVE FREE 500 UNITS: 5 INJECTION INTRAVENOUS at 13:28

## 2017-01-01 RX ADMIN — PROCHLORPERAZINE MALEATE 5 MG: 5 TABLET, FILM COATED ORAL at 16:50

## 2017-01-01 RX ADMIN — SODIUM CHLORIDE, PRESERVATIVE FREE 5 ML: 5 INJECTION INTRAVENOUS at 12:05

## 2017-01-01 RX ADMIN — PROPOFOL 150 MG: 10 INJECTION, EMULSION INTRAVENOUS at 13:03

## 2017-01-01 RX ADMIN — LEVOFLOXACIN 750 MG: 5 INJECTION, SOLUTION INTRAVENOUS at 21:06

## 2017-01-01 RX ADMIN — DEXAMETHASONE SODIUM PHOSPHATE: 10 INJECTION, SOLUTION INTRAMUSCULAR; INTRAVENOUS at 10:11

## 2017-01-01 ASSESSMENT — ENCOUNTER SYMPTOMS
ABDOMINAL PAIN: 0
BRUISES/BLEEDS EASILY: 0
SHORTNESS OF BREATH: 1
FATIGUE: 1
FEVER: 0

## 2017-01-01 ASSESSMENT — PAIN SCALES - GENERAL
PAINLEVEL: NO PAIN (0)
PAINLEVEL: MODERATE PAIN (4)
PAINLEVEL: NO PAIN (0)
PAINLEVEL: NO PAIN (0)
PAINLEVEL: NO PAIN (1)
PAINLEVEL: NO PAIN (0)

## 2017-01-01 ASSESSMENT — PAIN DESCRIPTION - DESCRIPTORS: DESCRIPTORS: SORE

## 2017-01-03 NOTE — PROGRESS NOTES
Infusion Nursing Note:  Bren Rendon presents today for PAC labs and chemotherapy.     Patient seen by provider today: No    Note: Labs drawn via her port per [place] protocol. Labs WNL's. Premeds and Chemotherapy infused without complications. Port flushed per [place] protocol. Pt. To return on 1/10/17 for another dose.    Intravenous Access:  Labs drawn without difficulty.  Implanted Port.    Treatment Conditions:  HGB     12.9   1/3/2017  WBC      3.8   1/3/2017   ANEU      2.8   1/3/2017  PLT      202   1/3/2017     NA      142   11/28/2016                POTASSIUM      3.8   11/28/2016        No results found for this basename: mag         CR     0.60   1/3/2017                JARED      9.0   11/28/2016             BILITOTAL      0.4   11/28/2016        ALBUMIN      3.8   11/28/2016                 ALT       29   11/28/2016        AST       19   11/28/2016  Results reviewed, labs MET treatment parameters, ok to proceed with treatment.      Post Infusion Assessment:  Patient tolerated infusion without incident.  Blood return noted pre and post infusion.  Site patent and intact, free from redness, edema or discomfort.  No evidence of extravasations.  Access discontinued per protocol.    Discharge Plan:   Patient and/or family verbalized understanding of discharge instructions and all questions answered.  Patient discharged in stable condition accompanied by: .  Departure Mode: Ambulatory.    Latoya Patel RN

## 2017-01-04 NOTE — PROGRESS NOTES
[facility]  SPECIALIZING IN BREAKTHROUGHS  Radiation Oncology    On Treatment Visit Note      Bren Rendon      Date: 2017   MRN: [id number]   : 1950  Diagnosis: T3N2M1 esophageal cancer      Reason for Visit:  On Radiation Treatment Visit     Treatment Summary to Date  Treatment Site: chest/abdomen Current Dose: 1260/4500 cGy Fractions:       Chemotherapy  Chemo concurrent with radx?: Yes  Oncologist: Dr. El  Drug Name/Frequency 1: weekly carbo/taxol    Subjective:       Nursing ROS:   Nutrition Alteration  Diet Type: Patient's Preference  Nutrition Note: small frequent meals, eating well, dysphagia improved this week  Skin  Skin Reaction: 0 - No changes  ENT and Mouth Exam  Mucositis - Current: 0 - None   Cardiovascular  Respiratory effort: 1 - Normal - without distress  Gastrointestinal  Nausea: 1 - One to two episodes of nausea/24  GI Note: nausea better with ativan and compazine  Genitourinary  Urinary Status: 0 - Normal  Pain Assessment  Pain Note: degenerative back pain    Objective:   /70 mmHg  Pulse 80  Wt 88.27 kg (194 lb 9.6 oz)    Labs:  CBC RESULTS:   Recent Labs   Lab Test  17   0800   WBC  3.8*   RBC  4.16   HGB  12.9   HCT  38.0   MCV  91   MCH  31.0   MCHC  33.9   RDW  12.9   PLT  202     ELECTROLYTES:  Recent Labs   Lab Test  17   0800   16   1121  16   1621   NA   --    --    --   142   POTASSIUM   --    --    --   3.8   CHLORIDE   --    --    --   106   JARED   --    --    --   9.0   CO2   --    --    --   26   BUN   --    --   6*  9   CR  0.60   < >   --   0.77   GLC   --    --    --   95    < > = values in this interval not displayed.       Assessment:    Tolerating radiation therapy well.  All questions and concerns addressed.    Plan:   1. Continue current therapy.      Mosaiq chart and setup information reviewed  MVCT/IGRT images checked  Medication Review  Med list reviewed with patient?: Yes  Med Note: instructed to  take tylenol vs advil for pain (from med onc)           Cassy Ortega MD

## 2017-01-04 NOTE — Clinical Note
2017      RE: Bren Rendon  [address] [place] [address]       [facility]  SPECIALIZING IN BREAKTHROUGHS  Radiation Oncology    On Treatment Visit Note      Bren Rendon      Date: 2017   MRN: [id number]   : 1950  Diagnosis: T3N2M1 esophageal cancer      Reason for Visit:  On Radiation Treatment Visit     Treatment Summary to Date  Treatment Site: chest/abdomen Current Dose: 1260/4500 cGy Fractions:       Chemotherapy  Chemo concurrent with radx?: Yes  Oncologist: Dr. El  Drug Name/Frequency 1: weekly carbo/taxol    Subjective:       Nursing ROS:   Nutrition Alteration  Diet Type: Patient's Preference  Nutrition Note: small frequent meals, eating well, dysphagia improved this week  Skin  Skin Reaction: 0 - No changes  ENT and Mouth Exam  Mucositis - Current: 0 - None   Cardiovascular  Respiratory effort: 1 - Normal - without distress  Gastrointestinal  Nausea: 1 - One to two episodes of nausea/24  GI Note: nausea better with ativan and compazine  Genitourinary  Urinary Status: 0 - Normal  Pain Assessment  Pain Note: degenerative back pain    Objective:   /70 mmHg  Pulse 80  Wt 88.27 kg (194 lb 9.6 oz)    Labs:  CBC RESULTS:   Recent Labs   Lab Test  17   0800   WBC  3.8*   RBC  4.16   HGB  12.9   HCT  38.0   MCV  91   MCH  31.0   MCHC  33.9   RDW  12.9   PLT  202     ELECTROLYTES:  Recent Labs   Lab Test  17   0800   16   1121  16   1621   NA   --    --    --   142   POTASSIUM   --    --    --   3.8   CHLORIDE   --    --    --   106   JARED   --    --    --   9.0   CO2   --    --    --   26   BUN   --    --   6*  9   CR  0.60   < >   --   0.77   GLC   --    --    --   95    < > = values in this interval not displayed.       Assessment:    Tolerating radiation therapy well.  All questions and concerns addressed.    Plan:   1. Continue current therapy.      Mosaiq chart and setup information reviewed  MVCT/IGRT images  checked  Medication Review  Med list reviewed with patient?: Yes  Med Note: instructed to take tylenol vs advil for pain (from med onc)           Cassy Ortega MD

## 2017-01-04 NOTE — MR AVS SNAPSHOT
After Visit Summary   1/4/2017    Bren Rendon    MRN: [id number]           Patient Information     Date Of Birth          1950        Visit Information        Provider Department      1/4/2017 12:00 PM Cassy Ortega MD [facility]        Today's Diagnoses     GE junction carcinoma (H)    -  1        Follow-ups after your visit        Your next 10 appointments already scheduled     Jan 05, 2017 11:45 AM   Linear Accelerator with Lr [facility] Rad Tech   [facility] ([facility])    [address], [address]  [place] [address]   [phone]            Jan 06, 2017 11:45 AM   Linear Accelerator with Lr [facility] Rad Tech   [facility] ([facility])    [address], [address]  [place] [address]   [phone]            Jan 09, 2017 11:30 AM   Linear Accelerator with Lr [facility] Rad Tech   [facility] ([facility])    [address], [address]  [place] [address]   [phone]            Ferdinand 10, 2017  8:00 AM   Level 3 with ROOM 6 [facility] ([facility])    [facility]  [address]  [place] [address]   [phone]            Ferdinand 10, 2017  8:45 AM   Return Visit with Aline El MD   [facility] ([facility])    [facility]  [address]  [place] [address]   [phone]            Ferdinand 10, 2017 11:30 AM   Linear Accelerator with Lr [facility] Rad Tech   [facility] ([facility])    [address]  [place] [address]   [phone]            Jan 11, 2017 11:30 AM   Linear Accelerator with Lr [facility] Rad Tech   [facility] ([facility])    [address]  [place] [address]   [phone]            Jan 11, 2017 11:45 AM   ON TREATMENT VISIT with Cassy Ortega MD   [facility] ([facility])     [address]  [facility] [address]   [phone]            Jan 12, 2017 11:30 AM   Linear Accelerator with Lr [facility]   [facility]iation Therapy Center ([facility])    [address]  [facility] [address]   [phone]            Jan 13, 2017 11:30 AM   Linear Accelerator with Lr [facility]   [facility] ([facility])    [address]  [facility] [address]   [phone]              Who to contact     Please call your clinic at [phone] to:    Ask questions about your health    Make or cancel appointments    Discuss your medicines    Learn about your test results    Speak to your doctor   If you have compliments or concerns about an experience at your clinic, or if you wish to file a complaint, please contact [facility] Patient Relations at [phone] or email us at [email or web address]         Additional Information About Your Visit        Measurablhart Information     [employer] gives you secure access to your electronic health record. If you see a primary care provider, you can also send messages to your care team and make appointments. If you have questions, please call your primary care clinic.  If you do not have a primary care provider, please call [phone] and they will assist you.      [employer] is an electronic gateway that provides easy, online access to your medical records. With [employer], you can request a clinic appointment, read your test results, renew a prescription or communicate with your care team.     To access your existing account, please contact your [facility] or call [phone] for assistance.        Care EveryWhere ID     This is your Care EveryWhere ID. This could be used by other organizations to access your [place] medical records  [id number]        Your Vitals Were     Pulse                   80            Blood Pressure  from Last 3 Encounters:   01/04/17 128/70   01/03/17 116/76   12/28/16 122/72    Weight from Last 3 Encounters:   01/04/17 88.27 kg (194 lb 9.6 oz)   01/03/17 89.177 kg (196 lb 9.6 oz)   12/28/16 89.631 kg (197 lb 9.6 oz)              Today, you had the following     No orders found for display       Primary Care Provider Office Phone # Fax #    Dillan Jackson Amos [phone]       [facility] [address] [facility] [address]        Thank you!     Thank you for choosing [facility]  for your care. Our goal is always to provide you with excellent care. Hearing back from our patients is one way we can continue to improve our services. Please take a few minutes to complete the written survey that you may receive in the mail after your visit with us. Thank you!             Your Updated Medication List - Protect others around you: Learn how to safely use, store and throw away your medicines at www.disposemymeds.org.          This list is accurate as of: 1/4/17  2:12 PM.  Always use your most recent med list.                   Brand Name Dispense Instructions for use    ADVIL PO      Take 200 mg by mouth as needed for moderate pain       ALLEGRA 60 MG tablet   Generic drug:  fexofenadine      Take 60 mg by mouth daily       lidocaine-prilocaine cream    EMLA    30 g    Apply 30 g topically as needed for moderate pain Apply to port site 1 hour before access.       LORazepam 0.5 MG tablet    ATIVAN    30 tablet    Take 1 tablet (0.5 mg) by mouth every 4 hours as needed (Anxiety, Nausea/Vomiting or Sleep)       Multi-vitamin Tabs tablet      Take 1 tablet by mouth daily       NASACORT AQ 55 MCG/ACT Inhaler   Generic drug:  triamcinolone      Spray 2 sprays into both nostrils daily       omega 3 1000 MG Caps      Take 1 g by mouth daily       omeprazole 40 MG capsule    priLOSEC    90 capsule    Take 1 capsule (40 mg) by mouth daily Take 30-60 minutes before a meal.       *  prochlorperazine 10 MG tablet    COMPAZINE    90 tablet    Take 0.5 tablets (5 mg) by mouth every 8 hours as needed for nausea or vomiting       * prochlorperazine 10 MG tablet    COMPAZINE    30 tablet    Take 0.5 tablets (5 mg) by mouth every 6 hours as needed (Nausea/Vomiting)       THERATEARS OP      Apply 2 drops to eye daily       TYLENOL 325 MG tablet   Generic drug:  acetaminophen      Take 325 mg by mouth every 6 hours as needed for mild pain       vitamin D 2000 UNITS tablet      Take 2,000 Units by mouth daily       * Notice:  This list has 2 medication(s) that are the same as other medications prescribed for you. Read the directions carefully, and ask your doctor or other care provider to review them with you.

## 2017-01-07 NOTE — TELEPHONE ENCOUNTER
Call Type: Triage Call    Presenting Problem: Stomach cancer. Is nauseated. Has kept her  breakfast down that she ate at 9 a.m.  Last compazine was last  night. Has not had lorazepam today.  She'll try the compazine now.  If after 1-1.5 hrs nausea persists she'll take lorazepam. She'll  call back an hour after taking the lorazepam, if she does take it,  if nausea persists. Bren stated she gets dizzy from the lorazepam.  I instructed her on the importance of safety when she takes this.  Triage Note:  Guideline Title: Nausea or Vomiting  Recommended Disposition: Provide Home/Self Care  Original Inclination: Wanted to speak with a nurse  Override Disposition:  Intended Action: Follow advice given  Physician Contacted: No  All other situations ?  YES  Abdominal pain is more bothersome than vomiting ? NO  Possible or known pregnancy ? NO  Recent onset of increased urination, thirst, hunger with weight loss, fatigue ? NO  Signs of dehydration ? NO  Known diabetic ? NO  New or worsening signs and symptoms that may indicate shock ? NO  Foreign body in mouth, throat, or esophagus ? NO  Smoke/carbon monoxide exposure ? NO  Any change in vision OR eye pain ? NO  Other vomiting of blood (red, black, coffee ground, scant, or large amount) ? NO  Following ingestion of toxic or caustic substance ? NO  Diagnosed with Meniere's disease or labyrinthitis AND not responding to previous  treatment recommended by provider ? NO  Excessive alcohol use for this individual in last few hours AND has vomited a few  times ? NO  Unbearable abdominal/pelvic pain ? NO  Abruptly stopped or decreased dose of corticosteroids ? NO  Vomiting red, bloody or coffee-ground material, more than streaks of blood or  scant amount (not following nosebleed within past day) ? NO  Continuous or excessive alcohol intake during a few hours AND not oriented to  person, time or place ? NO  Continuous or repeated vomiting for more than 8 hours AND unable to keep  any  fluids down ? NO  Diarrhea (without blood in stool) following crampy abdominal pain AND repeated  vomiting that has not improved with 3 days of home care ? NO  Loss of appetite for 3 or more days OR nausea for 7 or more days ? NO  Symptoms start suddenly with motion, especially travel (ship, plane, train, car) ?  NO  Any vomiting associated with a head injury, now or within previous 48 hours ? NO  New onset of 3-4 episodes vomiting or diarrhea following mild abdominal cramping ?  NO  Sudden onset of diarrhea, usually with abdominal pain, nausea and sometimes  vomiting, occurring within 36 hours after eating unpasteurized, raw or  undercooked foods OR drinking unpurified or nonchlorinated water ? NO  Nausea/vomiting associated with sudden onset of moderate to severe pain in  genitals, groin or lower abdomen. ? NO  New onset nausea/vomiting associated with stiff neck causing pain with forward  head movement, severe generalized headache, fever, or altered mental status ? NO  Sudden onset vomiting following ingestion or inhalation overdose (accidental or  intentional) of illegal, prescription, nonprescription or alternative drugs ? NO  Unable to take or keep down prescription medication (heart, respiratory, diabetes,  thyroid, antibiotics, birth control pills, corticosteroids) because of  nausea/vomiting ? NO  Nausea/vomiting AND fever 101.5 F (38.6 C) or higher AND urinary tract symptoms. ?  NO  Jaundice (yellowish tint to skin or whites of eyes) that is worsening or not  previously evaluated. ? NO  Any other cardiac signs/symptoms for more than 5 minutes, now or within last hour.  Pain is NOT associated with taking a deep breath or a productive cough, movement,  or touch to a localized area on the chest or upper body. ? NO  Three or more episodes of heartburn per week for two weeks or more AND no previous  evaluation by provider ? NO  Three or more episodes of indigestion/reflux per week for two weeks or more  AND  symptoms not relieved with home care ? NO  Vomiting associated with sudden, severe disabling head pain. ? NO  Bloody diarrhea AND has not been evaluated ? NO  Vomiting associated with sudden onset of focal neurological changes (difficulty  speaking, numbness, weakness, paralysis, loss of coordination, change in vision  such as double vision or loss of visual field) ? NO  Symptoms started after recent gastrointestinal (GI)/genitourinary () surgery or  procedure and have lasted longer than defined in provider specified discharge  information ? NO  Symptoms began after starting or changing dose of prescription, nonprescription,  alternative medication, or illicit drug within last 7 days ? NO  High to low (but not zero) risk of exposure to Ebola within the past 21 days ? NO  Traveled out of country in past 2 months and new onset of unexplained symptom(s) ?  NO  Abdominal pain, which may be colicky, followed by episodes of vomiting occurring  for more than 8 hours; abdominal distention may or may not occur ? NO  New onset of vomiting following recent radiation or chemotherapy AND symptoms are  not improving with 12 hours of home care ? NO  Currently being treated for gastrointestinal disease (pancreatitis, gallstones,  ulcers) AND symptoms not improving or are worsening using recommended treatment  for 24 hours or more ? NO  Onset of vomiting associated with severe, worsening headache, especially if  different from previous headaches                       See Provider within 4  hours ? NO  Physician Instructions:  Care Advice: SYMPTOM / CONDITION MANAGEMENT  Nausea Care Advice:  - Drink small amounts of clear, sweetened liquids or  ice cold drinks.   - Eat light, bland foods such as saltine crackers or  plain bread.   - Do not eat high fat, highly seasoned, high fiber, or high  sugar content foods.   - Avoid mixing hot food and cold foods.   - Eat  smaller, more frequent meals.   - Rest as much as possible in a  sitting or  in a propped lying position.  Do not lie flat for at least 2 hours after  eating.   - Do not take pain medication (such as aspirin, NSAIDs) while  nauseated.   - Rest as much as possible until symptoms improve since  activity may worsen nausea.

## 2017-01-09 NOTE — TELEPHONE ENCOUNTER
Patient is here today for Radiation and stopped by to review with nurse nausea unresolved with Compazine and Lorazepam.  Patient is on her way to Radiation today.  Will have chemotherapy tomorrow and will see Dr. El prior.  Called DANIEL Mosley in Radiation and she will check in with patient and have provider review with her as well.  They will connect with Oncology if they feel as though further action is needed by Oncology.    Adam Lay RN, BSN, OCN  1/9/2017, 11:32 AM

## 2017-01-09 NOTE — TELEPHONE ENCOUNTER
Patient returned reporting that she spoke with the Radiation provider about taking Maalox and they were ok with this plan.  Patient is also requesting Zofran for further relief.  Reviewed with Dr. El and ok.  Standing order placed.    Adam Lay RN, BSN, OCN  1/9/2017, 12:10 PM

## 2017-01-10 NOTE — PATIENT INSTRUCTIONS
We would like to see you back in clinic with Dr. El in 2 weeks with chemotherapy to be scheduled per treatment plan.  Copy of appointments, and after visit summary (AVS) given to patient.  If you have any questions during business hours (M-F 8 AM- 4PM), please call Jinny Van RN, BSN, OCN Oncology Hematology /Breast Cancer Navigator at [facility] [phone].   For questions after business hours, or on holidays/weekends, please call our after hours Nurse Triage line [phone]. Thank you.

## 2017-01-10 NOTE — PROGRESS NOTES
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  Eventually had upper endoscopy workup in early 11/2016, identified large fungating submucosal mass within the lower third of the esophagus in the gastric GE junction and in the cardia.  This mass was partially obstructing and circumferential.      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    CT 11/2016 that was negative in terms of mass, gastric wall thickening, lymphadenopathy, found diffuse fatty infiltrate of the liver, pancreatic atrophy.   PET 12/2016 found hypermetabolic uptake SUV 16.6 in the proximal to mid stomach compatible with the patient's recently diagnosed gastric cancer.No Esophageal activity. Suspicious heptogastric LN, PET active SUV 4.2  aorto caval LN.   EUS 12/2016 found A large, fungating and submucosal mass was found at the lower third of the esophagus at 34 cm from the incisors, which was nearly two-thirds circumferential  and is partially obstructing the esophagus and was seen extending to the gastric cardia, endo evidence of, acites was found. 4 abnormal lymph nodes were visualized in the lower paraesophageal mediastinum (level 8L).   Aortacaval LN endoscopic ultrasound-guided fine needle aspiration found positive for malignancy, consistent with metastatic adenocarcinoma consistent with upper gastrointestinal primary.    She is found to have extensive lower esophogeal/gastric cancer with near obstruction lower esophageal /GE junction tumor, cT3N+ (aorta caval LN + on biopsy), with ascites.     She was seen by Dr. Levy (12/14/2016) and deemed not a surgical  "candidate. She saw Rad-Onc and felt due to he local symptoms, RT is beneficial. Concurrent RT/wkly carbo/taxol is offered end of Dec 2016.     Other than dysphagia and weight loss she has no particular pain.  She is otherwise in her usual state of health.      PAST MEDICAL HISTORY:     1.  Reflux disease.     2.  History of cholecystitis.     3.  Rosacea.    4.  Degenerative disk disease.    5.  Obesity.   6.  Dupuytren's disease.      MEDICATIONS:  Reviewed in Epic system.      SOCIAL HISTORY:  She is retired.  She lives in [place].  She is a very active 66-year-old female.  Denies smoking.  She drinks 1-2 glasses of white wine every day.      FAMILY HISTORY:  Positive for dad  from lung cancer who smoked 4 packs per day.  Maternal grandmother  from breast cancer.  No family history of GI cancer.       REVIEW OF SYSTEMS:   Minimal dysphagia, she is eating small, soft frequent meals.   Delayed nausea, leg cramp. Rash on back.     PHYSICAL EXAMINATION:   GENERAL APPEARANCE:  A middle-aged woman, looks like her stated age, very pleasant, not in acute distress.     VITAL SIGNS: Blood pressure 124/81, pulse 107, temperature 98.7  F (37.1  C), temperature source Tympanic, resp. rate 18, height 1.562 m (5' 1.5\"), weight 87.771 kg (193 lb 8 oz), SpO2 97 %, not currently breastfeeding.  HEENT: The patient is normocephalic, atraumatic. Pupils are equally react to light.  Sclerae are anicteric.  Moist oral mucosa.  Negative pharynx.  No oral thrush.   NECK:  Supple.  No jugular venous distention.  Thyroid is not palpable.   LYMPH NODES:  Superficial lymphadenopathy is not appreciable in the bilateral cervical, supraclavicular, axillary or inguinal areas.   CARDIOVASCULAR:  S1, S2 regular with no murmurs or gallops.  No carotid or abdominal bruits.   PULMONARY:  Lungs are clear to auscultation and percussion bilaterally.  There is no wheezing or rhonchi.   GASTROINTESTINAL:  Abdomen is soft, nontender.  No " hepatosplenomegaly.  No signs of ascites.  No mass appreciable.   MUSCULOSKELETAL/EXTREMITIES:  No edema.  No cyanotic changes.  No signs of joint deformity.  No lymphedema.   NEUROLOGIC:  Cranial nerves II-XII are grossly intact.  Sensation intact.  Muscle strength and muscle tone symmetrical 5/5 throughout.   BACK:  No spinal or paraspinal tenderness.  No CVA tenderness.   SKIN:  No petechiae.  No rash.  No signs of cellulitis.   BREASTS:  Bilateral breast exam review.  No mass, no tenderness, no skin changes and no nipple discharge.      CURRENT LABORATORY DATA:   Today lymph 0.4 low, other cbc diff ok, cr is fine     Baseline cbc diff/CMP are nl, CEA 8.7 in 11/2016    Pathology 12/5/2016 Lymph node, aortacaval, endoscopic ultrasound-guided fine needle aspiration with shark core needle:   - Positive for malignancy.   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      EUS 12/5/2016, scope can't pass through GE junction-  A large, fungating and submucosal mass was found at the lower third of the esophagus at 34 cm from the incisors, which was nearly two-thirds circumferential  and is partially obstructing the esophagus and was seen extending to the gastric cardia.  Ascites was found.  4 abnormal lymph nodes were visualized in the lower paraesophageal mediastinum (level 8L). This will likely represent N2 on the evans staging.    Current Image  PET 11/30/2016  1. Hypermetabolic uptake SUV 16.6 in the proximal to mid stomach compatible with the patient's recently diagnosed gastric cancer.No Esophageal activity.   2. There is an elongated but slender gastrohepatic node just medial to the proximal stomach. Less than 1 cm in transverse dimension. Suspected to be hyper metabolic but SUV difficult to measure  separately from adjacent stomach.  3. Hypermetabolic  retroperitoneal node, aorto caval location just posterior to transverse duodenum with SUV max 4.2 compatible with metastasis.  4. Hypermetabolic hepatic flexure and distal ascending colon without CT abnormality is suspected to be physiologic.  5. Nonenlarged subcentimeter hypermetabolic node in the right side of the neck at the level of the tongue is nonspecific.    CT chest, abdomen and pelvis 11/05/2016 - esophageal GE junction within normal limits.  Mild fatty infiltrate of the liver, pancreatic atrophy.      OLD DATA REVIEW REVIEWED WITH SUMMARY:   CBC, diff, CMP from 2015, 2014 was satisfactory in terms of liver function tests, kidney function and bone marrow reserve.      ASSESSMENT AND PLAN:    1. 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  Biopsies via upper endoscopy was done in early November 2016.  She has a large fungating mass circumferential, partially obstructing lesion in that location. She had EUS/PET as further staging.     She is found to have extensive lower esophogeal/gastric cancer with near obstruction lower esophageal /GE junction tumor, cT3N+ (aorta caval LN + on biopsy), with ascites.     She was seen by Dr. Levy and deemed not a surgical candidate. She saw Rad-Onc and felt due to he local symptoms, RT is beneficial. Concurrent RT/wkly carbo/taxol is discussed. We talked about the side effects, goal of tx is to dowstaging, and palliation. Se made informed decision to proceed, end of Dec 2016.     She needs to be monitored closely during this concurrent chemo/RT.     Will also request Her2 status on her 11/4/2016 gastric  biopsy sample.     2. Leg carmp. Cut down IV benadryl to 25 mg.     3. Rash on back. Continue IV pepcid, benadryl.     4. Nausea. Compazine did not work, use ativan and ODT zofran. Add IV emend.    5. Lymphopenia. Advice high C drink po bid.     All Qs are answered to her statisfaction.     Total  time is 25 minutes, more than 15 minutes spent in counseling regarding her new cancer tx option, chemo and RT plan, side effects. Etc.

## 2017-01-10 NOTE — PROGRESS NOTES
Infusion Nursing Note:  Bren Rendon presents today for C1D15 Carbo/Taxol.   Patient seen by provider today: Yes: Dr. El    Note: N/A.    Intravenous Access:  Labs drawn without difficulty.  Implanted Port.    Treatment Conditions:  HGB     12.7   1/10/2017  WBC      2.9   1/10/2017   ANEU      2.0   1/10/2017  PLT      181   1/10/2017  CR     0.70   1/10/2017                  Results reviewed, labs MET treatment parameters, ok to proceed with treatment.      Post Infusion Assessment:  Patient tolerated Carbo and Taxol infusion without incident.  Blood return noted pre and post infusion.  Site patent and intact, free from redness, edema or discomfort.  No evidence of extravasations.  Access discontinued per protocol.    Discharge Plan:   Patient discharged in stable condition accompanied by: .  Departure Mode: Ambulatory.  Pt to return on C1D22 Carbo/Taxol on 1/24/17 at 8:00 am.     Tamera Hopkins RN

## 2017-01-10 NOTE — MR AVS SNAPSHOT
After Visit Summary   1/10/2017    Bren Rendon    MRN: [id number]           Patient Information     Date Of Birth          1950        Visit Information        Provider Department      1/10/2017 8:45 AM Aline El MD [facility]        Today's Diagnoses     Cramp of limb    -  1     GE junction carcinoma (H)         Chemotherapy-induced nausea         Lymphedema           Care Instructions    We would like to see you back in clinic with Dr. El in 2 weeks with chemotherapy to be scheduled per treatment plan.  Copy of appointments, and after visit summary (AVS) given to patient.  If you have any questions during business hours (M-F 8 AM- 4PM), please call Jinny Van RN, BSN, OCN Oncology Hematology /Breast Cancer Navigator at [facility] [phone].   For questions after business hours, or on holidays/weekends, please call our after hours Nurse Triage line [phone]. Thank you.        Follow-ups after your visit        Your next 10 appointments already scheduled     Ferdinand 10, 2017 11:30 AM   Linear Accelerator with L[facility]   Radiation Therapy Center ([facility])    [address], [address]  [facility] [address]   [phone]            Jan 11, 2017 11:30 AM   Linear Accelerator with Lr [facility]   Radiation Therapy Center ([facility])    [address], [address]  [facility] [address]   [phone]            Jan 11, 2017 11:45 AM   ON TREATMENT VISIT with Cassy Ortega MD   Radiation Therapy Center ([facility])    [address], [address]  [facility] [address]   [phone]            Jan 12, 2017 11:30 AM   Linear Accelerator with Lr [facility] Rad [facility]   Radiation Therapy Center ([facility])    [address], [address]  [facility] [address]   [phone]            Jan 13, 2017 11:30 AM   Linear Accelerator with Lr [facility] Rad [facility]   Radiation Therapy Center ([facility])    [address]  [place] [address]   [phone]            Jan 16, 2017 11:30 AM   Linear Accelerator with [facility]   Radiation Therapy Center ([facility])    [address]  [place] [address]   [phone]            Jan 17, 2017  8:00 AM   Level 3 with ROOM 4 [facility] ([facility])    [facility]  [address]  [facility] [address]   [phone]            Jan 17, 2017 11:30 AM   Linear Accelerator with [facility]   Radiation Therapy Center ([facility])    [address], [address]  [facility] [address]   [phone]            Jan 18, 2017 11:30 AM   Linear Accelerator with [facility] Rad [facility]   Radiation Therapy Center ([facility])    [address]  [facility] [address]   [phone]            Jan 18, 2017 11:45 AM   ON TREATMENT VISIT with Cassy Ortega MD   Radiation Therapy Center ([facility])    [address]  [facility] [address]   [phone]              Who to contact     If you have questions or need follow up information about today's clinic visit or your schedule please contact [facility] directly at [phone].  Normal or non-critical lab and imaging results will be communicated to you by MyChart, letter or phone within 4 business days after the clinic has received the results. If you do not hear from us within 7 days, please contact the clinic through [employer] or phone. If you have a critical or abnormal lab result, we will notify you by phone as soon as possible.  Submit refill requests through [employer] or call your pharmacy and they will forward the refill request to us. Please allow 3 business days for your refill to be completed.          Additional Information About Your Visit        [employer] Information     [employer] gives you secure access to your electronic health  "record. If you see a primary care provider, you can also send messages to your care team and make appointments. If you have questions, please call your primary care clinic.  If you do not have a primary care provider, please call [phone] and they will assist you.        Care EveryWhere ID     This is your Care EveryWhere ID. This could be used by other organizations to access your [place] medical records  [id number]        Your Vitals Were     Pulse Temperature Respirations    107 98.7  F (37.1  C) (Tympanic) 18    Height BMI (Body Mass Index) Pulse Oximetry    1.562 m (5' 1.5\") 35.97 kg/m2 97%    Breastfeeding?          No         Blood Pressure from Last 3 Encounters:   01/10/17 124/81   01/04/17 128/70   01/03/17 116/76    Weight from Last 3 Encounters:   01/10/17 87.771 kg (193 lb 8 oz)   01/04/17 88.27 kg (194 lb 9.6 oz)   01/03/17 89.177 kg (196 lb 9.6 oz)              Today, you had the following     No orders found for display       Primary Care Provider Office Phone # Fax #    Dillan Amos [phone]       [facility] [address] [address]        Thank you!     Thank you for choosing [facility]  for your care. Our goal is always to provide you with excellent care. Hearing back from our patients is one way we can continue to improve our services. Please take a few minutes to complete the written survey that you may receive in the mail after your visit with us. Thank you!             Your Updated Medication List - Protect others around you: Learn how to safely use, store and throw away your medicines at www.disposemymeds.org.          This list is accurate as of: 1/10/17 10:10 AM.  Always use your most recent med list.                   Brand Name Dispense Instructions for use    ADVIL PO      Take 200 mg by mouth as needed for moderate pain       ALLEGRA 60 MG tablet   Generic drug:  fexofenadine      Take 60 mg by mouth daily       " lidocaine-prilocaine cream    EMLA    30 g    Apply 30 g topically as needed for moderate pain Apply to port site 1 hour before access.       LORazepam 0.5 MG tablet    ATIVAN    30 tablet    Take 1 tablet (0.5 mg) by mouth every 4 hours as needed (Anxiety, Nausea/Vomiting or Sleep)       Multi-vitamin Tabs tablet      Take 1 tablet by mouth daily       NASACORT AQ 55 MCG/ACT Inhaler   Generic drug:  triamcinolone      Spray 2 sprays into both nostrils daily       omega 3 1000 MG Caps      Take 1 g by mouth daily       omeprazole 40 MG capsule    priLOSEC    90 capsule    Take 1 capsule (40 mg) by mouth daily Take 30-60 minutes before a meal.       ondansetron 8 MG ODT tab    ZOFRAN ODT    20 tablet    Take 1 tablet (8 mg) by mouth every 12 hours as needed for nausea       THERATEARS OP      Apply 2 drops to eye daily       TYLENOL 325 MG tablet   Generic drug:  acetaminophen      Take 325 mg by mouth every 6 hours as needed for mild pain       vitamin D 2000 UNITS tablet      Take 2,000 Units by mouth daily

## 2017-01-11 NOTE — MR AVS SNAPSHOT
After Visit Summary   1/11/2017    Bren Rendon    MRN: [id number]           Patient Information     Date Of Birth          1950        Visit Information        Provider Department      1/11/2017 11:45 AM Cassy Ortega MD [facility]        Today's Diagnoses     GE junction carcinoma (H)    -  1     Chemotherapy-induced nausea            Follow-ups after your visit        Your next 10 appointments already scheduled     Jan 12, 2017 11:30 AM   Linear Accelerator with Lr [facility] Rad Tech   [facility] ([facility])    [address], [address]  [place] [address]   [phone]            Jan 13, 2017 11:30 AM   Linear Accelerator with Lr [facility] Rad Tech   [facility] ([facility])    [address], [address]  [facility] [address]   [phone]            Jan 16, 2017 11:30 AM   Linear Accelerator with Lr [facility] Rad Tech   [facility] ([facility])    [address], [address]  [facility] [address]   [phone]            Jan 17, 2017  8:00 AM   Level 3 with ROOM 4 [facility] ([facility])    [facility]  [address]  [place] [address]   [phone]            Jan 17, 2017 11:30 AM   Linear Accelerator with Lr [facility] Rad Tech   [facility] ([facility])    [address]  [place] [address]   [phone]            Jan 18, 2017 11:30 AM   Linear Accelerator with Lr [facility] Rad Tech   [facility] ([facility])    [address]  [place] [address]   [phone]            Jan 18, 2017 11:45 AM   ON TREATMENT VISIT with Cassy Ortega MD   [facility] ([facility])    [address], [address]  [facility] [address]   [phone]            Jan 19, 2017 11:30 AM   Linear Accelerator with Lr [facility] Rad Tech   Radiation Therapy Center ([facility])    [address]  [facility] [address]   [phone]            Jan 20, 2017 11:30 AM   Linear Accelerator with Lr [facility] Rad [facility]   [facility] Therapy Center ([facility])    [address]  [facility] [address]   [phone]            Jan 23, 2017 11:30 AM   Linear Accelerator with Lr [facility]   Radiation Therapy [place] ([facility])    [address]  [facility] [address]   [phone]              Who to contact     Please call your clinic at [phone] to:    Ask questions about your health    Make or cancel appointments    Discuss your medicines    Learn about your test results    Speak to your doctor   If you have compliments or concerns about an experience at your clinic, or if you wish to file a complaint, please contact [facility] Patient Relations at [phone] or email us at [email or web address]         Additional Information About Your Visit        [email or web address] Information     Sup[employer]amed gives you secure access to your electronic health record. If you see a primary care provider, you can also send messages to your care team and make appointments. If you have questions, please call your primary care clinic.  If you do not have a primary care provider, please call [phone] and they will assist you.      Supramed is an electronic gateway that provides easy, online access to your medical records. With Sup[employer]amed, you can request a clinic appointment, read your test results, renew a prescription or communicate with your care team.     To access your existing account, please contact your [facility] or call [phone] for assistance.        Care EveryWhere ID     This is your Care EveryWhere ID. This could be used by other organizations to access your [place] medical records  [id number]        Your Vitals Were     Pulse                   81             Blood Pressure from Last 3 Encounters:   01/11/17 128/61   01/10/17 124/81   01/10/17 128/67    Weight from Last 3 Encounters:   01/11/17 87.635 kg (193 lb 3.2 oz)   01/10/17 87.771 kg (193 lb 8 oz)   01/04/17 88.27 kg (194 lb 9.6 oz)              Today, you had the following     No orders found for display         Today's Medication Changes          These changes are accurate as of: 1/11/17  1:26 PM.  If you have any questions, ask your nurse or doctor.               These medicines have changed or have updated prescriptions.        Dose/Directions    ondansetron 8 MG ODT tab   Commonly known as:  ZOFRAN ODT   This may have changed:  when to take this   Used for:  GE junction carcinoma (H), Chemotherapy-induced nausea   Changed by:  Cassy Ortega MD        Dose:  8 mg   Take 1 tablet (8 mg) by mouth every 8 hours as needed for nausea   Quantity:  60 tablet   Refills:  1            Where to get your medicines      These medications were sent to [address] [address]     Phone:  [phone]    - ondansetron 8 MG ODT tab             Primary Care Provider Office Phone # Fax #    Dillan Amos [phone]       [facility] [address] [facility] [address]        Thank you!     Thank you for choosing [facility]  for your care. Our goal is always to provide you with excellent care. Hearing back from our patients is one way we can continue to improve our services. Please take a few minutes to complete the written survey that you may receive in the mail after your visit with us. Thank you!             Your Updated Medication List - Protect others around you: Learn how to safely use, store and throw away your medicines at www.disposemymeds.org.          This list is accurate as of: 1/11/17  1:26 PM.  Always use your most recent med list.                   Brand Name Dispense Instructions for  use    ADVIL PO      Take 200 mg by mouth as needed for moderate pain       ALLEGRA 60 MG tablet   Generic drug:  fexofenadine      Take 60 mg by mouth daily       lidocaine-prilocaine cream    EMLA    30 g    Apply 30 g topically as needed for moderate pain Apply to port site 1 hour before access.       LORazepam 0.5 MG tablet    ATIVAN    30 tablet    Take 1 tablet (0.5 mg) by mouth every 4 hours as needed (Anxiety, Nausea/Vomiting or Sleep)       Multi-vitamin Tabs tablet      Take 1 tablet by mouth daily       NASACORT AQ 55 MCG/ACT Inhaler   Generic drug:  triamcinolone      Spray 2 sprays into both nostrils daily       omega 3 1000 MG Caps      Take 1 g by mouth daily       omeprazole 40 MG capsule    priLOSEC    90 capsule    Take 1 capsule (40 mg) by mouth daily Take 30-60 minutes before a meal.       ondansetron 8 MG ODT tab    ZOFRAN ODT    60 tablet    Take 1 tablet (8 mg) by mouth every 8 hours as needed for nausea       THERATEARS OP      Apply 2 drops to eye daily       TYLENOL 325 MG tablet   Generic drug:  acetaminophen      Take 325 mg by mouth every 6 hours as needed for mild pain       vitamin D 2000 UNITS tablet      Take 2,000 Units by mouth daily

## 2017-01-11 NOTE — Clinical Note
2017      RE: Bren Rendon  [address] [place] [place] [address]       [facility] PHYSICIANS  SPECIALIZING IN BREAKTHROUGHS  Radiation Oncology    On Treatment Visit Note      Bren Rendon      Date: 2017   MRN: [id number]   : 1950  Diagnosis: T3N2M1 esophageal cancer      Reason for Visit:  On Radiation Treatment Visit     Treatment Summary to Date  Treatment Site: chest/abdomen Current Dose: 2160/4500+ cGy Fractions: +      Chemotherapy  Chemo concurrent with radx?: Yes  Oncologist: Dr. El  Drug Name/Frequency 1: weekly carbo/taxol    Subjective:       Nursing ROS:   Nutrition Alteration  Diet Type: Patient's Preference  Skin  Skin Reaction: 0 - No changes  Cardiovascular  Respiratory effort: 1 - Normal - without distress  Gastrointestinal  Nausea: 1 - One to two episodes of nausea/24  GI Note: nausea managed better now with zofran and ativan  Genitourinary  Urinary Status: 0 - Normal  Pain Assessment  0-10 Pain Scale: 0  Pain Note: degenerative back pain    Objective:   /61 mmHg  Pulse 81  Wt 87.635 kg (193 lb 3.2 oz)    Labs:  CBC RESULTS:   Recent Labs   Lab Test  01/10/17   0810   WBC  2.9*   RBC  4.14   HGB  12.7   HCT  37.9   MCV  92   MCH  30.7   MCHC  33.5   RDW  12.9   PLT  181     ELECTROLYTES:  Recent Labs   Lab Test  01/10/17   0810   16   1121  16   1621   NA   --    --    --   142   POTASSIUM   --    --    --   3.8   CHLORIDE   --    --    --   106   JARED   --    --    --   9.0   CO2   --    --    --   26   BUN   --    --   6*  9   CR  0.70   < >   --   0.77   GLC   --    --    --   95    < > = values in this interval not displayed.       Assessment:    Tolerating radiation therapy well.  All questions and concerns addressed.    Plan:   1. Continue current therapy.        Mosaiq chart and setup information reviewed  MVCT/IGRT images checked  Medication Review  Med list reviewed with patient?: Yes  Med Note: will change to zofran  every 8 and decrease ativan to hs due to dizziness. Long discussion regarding the use of nausea meds.           MD Cassy Hernadez MD

## 2017-01-11 NOTE — PROGRESS NOTES
[facility] PHYSICIANS  SPECIALIZING IN BREAKTHROUGHS  Radiation Oncology    On Treatment Visit Note      Bren Rendon      Date: 2017   MRN: [id number]   : 1950  Diagnosis: T3N2M1 esophageal cancer      Reason for Visit:  On Radiation Treatment Visit     Treatment Summary to Date  Treatment Site: chest/abdomen Current Dose: 2160/4500+ cGy Fractions: +      Chemotherapy  Chemo concurrent with radx?: Yes  Oncologist: Dr. El  Drug Name/Frequency 1: weekly carbo/taxol    Subjective:       Nursing ROS:   Nutrition Alteration  Diet Type: Patient's Preference  Skin  Skin Reaction: 0 - No changes  Cardiovascular  Respiratory effort: 1 - Normal - without distress  Gastrointestinal  Nausea: 1 - One to two episodes of nausea/24  GI Note: nausea managed better now with zofran and ativan  Genitourinary  Urinary Status: 0 - Normal  Pain Assessment  0-10 Pain Scale: 0  Pain Note: degenerative back pain    Objective:   /61 mmHg  Pulse 81  Wt 87.635 kg (193 lb 3.2 oz)    Labs:  CBC RESULTS:   Recent Labs   Lab Test  01/10/17   0810   WBC  2.9*   RBC  4.14   HGB  12.7   HCT  37.9   MCV  92   MCH  30.7   MCHC  33.5   RDW  12.9   PLT  181     ELECTROLYTES:  Recent Labs   Lab Test  01/10/17   0810   16   1121  16   1621   NA   --    --    --   142   POTASSIUM   --    --    --   3.8   CHLORIDE   --    --    --   106   JARED   --    --    --   9.0   CO2   --    --    --   26   BUN   --    --   6*  9   CR  0.70   < >   --   0.77   GLC   --    --    --   95    < > = values in this interval not displayed.       Assessment:    Tolerating radiation therapy well.  All questions and concerns addressed.    Plan:   1. Continue current therapy.        Mosaiq chart and setup information reviewed  MVCT/IGRT images checked  Medication Review  Med list reviewed with patient?: Yes  Med Note: will change to zofran every 8 and decrease ativan to hs due to dizziness. Long discussion regarding the use of  nausea meds.           Cassy Ortega MD

## 2017-01-11 NOTE — Clinical Note
Date:January 13, 2017      Provider requested that no letter be sent. Do not send.       [facility] Health Information

## 2017-01-17 NOTE — PROGRESS NOTES
Infusion Nursing Note:  Bren Rendon presents today for PAC labs and chemotherapy.    Patient seen by provider today: Yes: Dr. El.    Note: Labs drawn via her port. Labs WNL's. Premeds and chemotherapy administered via her port without complications. Port flushed per [place] protocol. Pt. To return on 1/24/17 for labs and chemotherapy.    Intravenous Access:  Labs drawn without difficulty.  Implanted Port.    Treatment Conditions:  HGB     12.4   1/17/2017  WBC      2.2   1/17/2017   ANEU      1.6   1/17/2017  PLT      169   1/17/2017     NA      135   1/17/2017                POTASSIUM      3.8   1/17/2017        MAG      1.9   1/17/2017         CR     0.64   1/17/2017                JARED      8.5   1/17/2017             BILITOTAL      0.3   1/17/2017        ALBUMIN      3.2   1/17/2017                 ALT       37   1/17/2017        AST       22   1/17/2017  Results reviewed, labs MET treatment parameters, ok to proceed with treatment.      Post Infusion Assessment:  Patient tolerated infusion without incident.  Blood return noted pre and post infusion.  Site patent and intact, free from redness, edema or discomfort.  No evidence of extravasations.  Access discontinued per protocol.    Discharge Plan:   Patient and/or family verbalized understanding of discharge instructions and all questions answered.  Patient discharged in stable condition accompanied by: friend.  Departure Mode: Ambulatory.    Latoya Patel RN

## 2017-01-18 NOTE — Clinical Note
2017      RE: Bren Rendon  [address] [place] [address]       [facility] PHYSICIANS  SPECIALIZING IN BREAKTHROUGHS  Radiation Oncology    On Treatment Visit Note      Bren Rendon      Date: 2017   MRN: [id number]   : 1950  Diagnosis: T3N2M1 esophageal cancer      Reason for Visit:  On Radiation Treatment Visit     Treatment Summary to Date  Treatment Site: chest/abdomen Current Dose: 3060/4500 cGy Fractions:       Chemotherapy  Chemo concurrent with radx?: Yes  Oncologist: Dr. El  Drug Name/Frequency 1: weekly carbo/taxol    Subjective:       Nursing ROS:   Nutrition Alteration  Diet Type: Patient's Preference  Nutrition Note: small frequent meals, eating well, dysphagia improved this week  Skin  Skin Reaction: 0 - No changes  ENT and Mouth Exam  Mucositis - Current: 0 - None   ENT/Mouth Note: swallowing is easier  Cardiovascular  Respiratory effort: 1 - Normal - without distress  Gastrointestinal  Nausea: 1 - One to two episodes of nausea/24  GI Note: adding marinol rx  Genitourinary  Urinary Status: 0 - Normal  Pain Assessment  0-10 Pain Scale: 0  Pain Note: degenerative back pain    Objective:   /58 mmHg  Pulse 85  Wt 86.637 kg (191 lb)    Labs:  CBC RESULTS:   Recent Labs   Lab Test  17   0820   WBC  2.2*   RBC  4.02   HGB  12.4   HCT  36.7   MCV  91   MCH  30.8   MCHC  33.8   RDW  13.1   PLT  169     ELECTROLYTES:  Recent Labs   Lab Test  17   0820   NA  135   POTASSIUM  3.8   CHLORIDE  103   JARED  8.5   CO2  23   BUN  15   CR  0.64   GLC  151*       Assessment:    Tolerating radiation therapy well.  All questions and concerns addressed.    Plan:   1. Continue current therapy.      Mosaiq chart and setup information reviewed  MVCT/IGRT images checked  Medication Review  Med list reviewed with patient?: Yes           MD Cassy Hernadez MD

## 2017-01-18 NOTE — MR AVS SNAPSHOT
After Visit Summary   1/18/2017    Bren Rendon    MRN: [id number]           Patient Information     Date Of Birth          1950        Visit Information        Provider Department      1/18/2017 11:45 AM Cassy Ortega MD [facility]        Today's Diagnoses     GE junction carcinoma (H)    -  1        Follow-ups after your visit        Your next 10 appointments already scheduled     Jan 19, 2017 11:30 AM   Linear Accelerator with Lr [facility] Rad Tech   Radiation Therapy Center ([facility])    [address]  [place] [address]   [phone]            Jan 20, 2017 11:30 AM   Linear Accelerator with Lr [facility] Rad Tech   [facility] Therapy Center ([facility])    [address]  [place] [address]   [phone]            Jan 23, 2017 11:30 AM   Linear Accelerator with Lr [facility] Rad Tech   [facility] ([facility])    [address]  [place] [address]   [phone]            Jan 24, 2017  8:00 AM   Level 3 with ROOM 1 [facility] ([facility])    [facility]  [address]  [place] [address]   [phone]            Jan 24, 2017  8:30 AM   Return Visit with Aline El MD   [facility] ([facility])    [facility]  [address]  [place] [address]   [phone]            Jan 24, 2017 11:30 AM   Linear Accelerator with Lr [facility] Rad Tech   [facility] Therapy Center ([facility])    [address]  [place] [address]   [phone]            Jan 25, 2017 11:30 AM   Linear Accelerator with Lr [facility] Rad Tech   [facility] ([facility])    [address]  [place] [address]   [phone]            Jan 25, 2017 11:45 AM   ON TREATMENT VISIT with Cassy Ortega MD   [facility] ([facility])     [address]  [facility] [address]   [phone]            Jan 26, 2017 11:30 AM   Linear Accelerator with Lr [facility]   Radiation Therapy Center ([facility])    [address]  [facility] [address]   [phone]            Jan 27, 2017 11:30 AM   Linear Accelerator with Lr [facility]   Radiation Therapy [place] ([facility])    [address]  [facility] [address]   [phone]              Who to contact     Please call your clinic at [phone] to:    Ask questions about your health    Make or cancel appointments    Discuss your medicines    Learn about your test results    Speak to your doctor   If you have compliments or concerns about an experience at your clinic, or if you wish to file a complaint, please contact [facility] Patient Relations at [phone] or email us at [email or web address]         Additional Information About Your Visit        [employer] Information     F[employer]ite gives you secure access to your electronic health record. If you see a primary care provider, you can also send messages to your care team and make appointments. If you have questions, please call your primary care clinic.  If you do not have a primary care provider, please call [phone] and they will assist you.      F[employer]ite is an electronic gateway that provides easy, online access to your medical records. With [employer], you can request a clinic appointment, read your test results, renew a prescription or communicate with your care team.     To access your existing account, please contact your [facility] or call [phone] for assistance.        Care EveryWhere ID     This is your Care EveryWhere ID. This could be used by other organizations to access your [place] medical records  [id number]        Your Vitals Were     Pulse                   85            Blood Pressure  from Last 3 Encounters:   01/18/17 123/58   01/17/17 125/75   01/11/17 128/61    Weight from Last 3 Encounters:   01/18/17 86.637 kg (191 lb)   01/17/17 86.456 kg (190 lb 9.6 oz)   01/11/17 87.635 kg (193 lb 3.2 oz)              Today, you had the following     No orders found for display       Primary Care Provider Office Phone # Fax #    Dillan Jackson Amos [phone]       [facility] [address] [facility] [address]        Thank you!     Thank you for choosing [facility]  for your care. Our goal is always to provide you with excellent care. Hearing back from our patients is one way we can continue to improve our services. Please take a few minutes to complete the written survey that you may receive in the mail after your visit with us. Thank you!             Your Updated Medication List - Protect others around you: Learn how to safely use, store and throw away your medicines at www.disposemymeds.org.          This list is accurate as of: 1/18/17  1:21 PM.  Always use your most recent med list.                   Brand Name Dispense Instructions for use    ADVIL PO      Take 200 mg by mouth as needed for moderate pain       ALLEGRA 60 MG tablet   Generic drug:  fexofenadine      Take 60 mg by mouth daily       dronabinol 5 MG capsule    MARINOL    60 capsule    Take 1 capsule (5 mg) by mouth 2 times daily as needed       lidocaine-prilocaine cream    EMLA    30 g    Apply 30 g topically as needed for moderate pain Apply to port site 1 hour before access.       LORazepam 0.5 MG tablet    ATIVAN    30 tablet    Take 1 tablet (0.5 mg) by mouth every 4 hours as needed (Anxiety, Nausea/Vomiting or Sleep)       Multi-vitamin Tabs tablet      Take 1 tablet by mouth daily       NASACORT AQ 55 MCG/ACT Inhaler   Generic drug:  triamcinolone      Spray 2 sprays into both nostrils daily       omega 3 1000 MG Caps      Take 1 g by mouth daily       omeprazole 40 MG capsule     priLOSEC    90 capsule    Take 1 capsule (40 mg) by mouth daily Take 30-60 minutes before a meal.       ondansetron 8 MG ODT tab    ZOFRAN ODT    60 tablet    Take 1 tablet (8 mg) by mouth every 8 hours as needed for nausea       THERATEARS OP      Apply 2 drops to eye daily       TYLENOL 325 MG tablet   Generic drug:  acetaminophen      Take 325 mg by mouth every 6 hours as needed for mild pain       vitamin D 2000 UNITS tablet      Take 2,000 Units by mouth daily

## 2017-01-18 NOTE — Clinical Note
Date:January 19, 2017      Provider requested that no letter be sent. Do not send.       [facility] Health Information

## 2017-01-18 NOTE — PROGRESS NOTES
[facility] PHYSICIANS  SPECIALIZING IN BREAKTHROUGHS  Radiation Oncology    On Treatment Visit Note      Bren Rendon      Date: 2017   MRN: [id number]   : 1950  Diagnosis: T3N2M1 esophageal cancer      Reason for Visit:  On Radiation Treatment Visit     Treatment Summary to Date  Treatment Site: chest/abdomen Current Dose: 3060/4500 cGy Fractions:       Chemotherapy  Chemo concurrent with radx?: Yes  Oncologist: Dr. El  Drug Name/Frequency 1: weekly carbo/taxol    Subjective:       Nursing ROS:   Nutrition Alteration  Diet Type: Patient's Preference  Nutrition Note: small frequent meals, eating well, dysphagia improved this week  Skin  Skin Reaction: 0 - No changes  ENT and Mouth Exam  Mucositis - Current: 0 - None   ENT/Mouth Note: swallowing is easier  Cardiovascular  Respiratory effort: 1 - Normal - without distress  Gastrointestinal  Nausea: 1 - One to two episodes of nausea/24  GI Note: adding marinol rx  Genitourinary  Urinary Status: 0 - Normal  Pain Assessment  0-10 Pain Scale: 0  Pain Note: degenerative back pain    Objective:   /58 mmHg  Pulse 85  Wt 86.637 kg (191 lb)    Labs:  CBC RESULTS:   Recent Labs   Lab Test  17   0820   WBC  2.2*   RBC  4.02   HGB  12.4   HCT  36.7   MCV  91   MCH  30.8   MCHC  33.8   RDW  13.1   PLT  169     ELECTROLYTES:  Recent Labs   Lab Test  17   0820   NA  135   POTASSIUM  3.8   CHLORIDE  103   JARED  8.5   CO2  23   BUN  15   CR  0.64   GLC  151*       Assessment:    Tolerating radiation therapy well.  All questions and concerns addressed.    Plan:   1. Continue current therapy.      Mosaiq chart and setup information reviewed  MVCT/IGRT images checked  Medication Review  Med list reviewed with patient?: Yes           Cassy Ortega MD

## 2017-01-24 PROBLEM — E86.0 DEHYDRATION: Status: ACTIVE | Noted: 2017-01-01

## 2017-01-24 PROBLEM — R11.0 NAUSEA: Status: ACTIVE | Noted: 2017-01-01

## 2017-01-24 NOTE — PROGRESS NOTES
Infusion Nursing Note:  Bren MC Betsydarshana presents today for IVF and antiemetics, chemo held today per Dr. El. Pt neutropenic.  Patient seen by provider today: Yes:    present during visit today: Not Applicable.    Note: N/A.    Intravenous Access:  Implanted Port.    Treatment Conditions:  Results reviewed, labs did NOT meet treatment parameters:       Post Infusion Assessment:  Patient tolerated infusion without incident.    Discharge Plan:   Patient discharged in stable condition accompanied by: self.    Kathryn Sykes RN

## 2017-01-24 NOTE — PROGRESS NOTES
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  Eventually had upper endoscopy workup in early 11/2016, identified large fungating submucosal mass within the lower third of the esophagus in the gastric GE junction and in the cardia.  This mass was partially obstructing and circumferential.      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 Her 2 is negative by FISH.  CT 11/2016 that was negative in terms of mass, gastric wall thickening, lymphadenopathy, found diffuse fatty infiltrate of the liver, pancreatic atrophy.   PET 12/2016 found hypermetabolic uptake SUV 16.6 in the proximal to mid stomach compatible with the patient's recently diagnosed gastric cancer.No Esophageal activity. Suspicious heptogastric LN, PET active SUV 4.2  aorto caval LN.   EUS 12/2016 found A large, fungating and submucosal mass was found at the lower third of the esophagus at 34 cm from the incisors, which was nearly two-thirds circumferential  and is partially obstructing the esophagus and was seen extending to the gastric cardia, endo evidence of, acites was found. 4 abnormal lymph nodes were visualized in the lower paraesophageal mediastinum (level 8L).   Aortacaval LN endoscopic ultrasound-guided fine needle aspiration found positive for malignancy, consistent with metastatic adenocarcinoma consistent with upper gastrointestinal primary.    She is found to have extensive lower esophogeal/gastric cancer with near obstruction lower esophageal /GE junction tumor, cT3N+ (aorta caval LN + on biopsy), with ascites.     She was seen by Dr. Levy (12/14/2016) and  "deemed not a surgical candidate. She saw Rad-Onc and felt due to he local symptoms, RT is beneficial. Concurrent RT/wkly carbo/taxol is offered end of Dec 2016.     Other than dysphagia and weight loss she has no particular pain.  She is otherwise in her usual state of health.      PAST MEDICAL HISTORY:     1.  Reflux disease.     2.  History of cholecystitis.     3.  Rosacea.    4.  Degenerative disk disease.    5.  Obesity.   6.  Dupuytren's disease.      MEDICATIONS:  Reviewed in Epic system.      SOCIAL HISTORY:  She is retired.  She lives in [place].  She is a very active 66-year-old female.  Denies smoking.  She drinks 1-2 glasses of white wine every day.      FAMILY HISTORY:  Positive for dad  from lung cancer who smoked 4 packs per day.  Maternal grandmother  from breast cancer.  No family history of GI cancer.       REVIEW OF SYSTEMS:   progressive dysphagia, she is eating small, soft frequent meals and relay a lot more on protein shakes.    Delayed nausea is more despite ativan, zofran, emend.   She feels weak.     PHYSICAL EXAMINATION:   GENERAL APPEARANCE:  A middle-aged woman, looks like her stated age, very pleasant, not in acute distress.     VITAL SIGNS: Blood pressure 99/69, pulse 123, temperature 98.3  F (36.8  C), temperature source Tympanic, resp. rate 18, height 1.562 m (5' 1.5\"), weight 85.322 kg (188 lb 1.6 oz), SpO2 97 %, not currently breastfeeding.  HEENT: The patient is normocephalic, atraumatic. Pupils are equally react to light.  Sclerae are anicteric.  Moist oral mucosa.  Negative pharynx.  No oral thrush.   NECK:  Supple.  No jugular venous distention.  Thyroid is not palpable.   LYMPH NODES:  Superficial lymphadenopathy is not appreciable in the bilateral cervical, supraclavicular, axillary or inguinal areas.   CARDIOVASCULAR:  S1, S2 regular with no murmurs or gallops.  No carotid or abdominal bruits.   PULMONARY:  Lungs are clear to auscultation and percussion " bilaterally.  There is no wheezing or rhonchi.   GASTROINTESTINAL:  Abdomen is soft, nontender.  No hepatosplenomegaly.  No signs of ascites.  No mass appreciable.   MUSCULOSKELETAL/EXTREMITIES:  No edema.  No cyanotic changes.  No signs of joint deformity.  No lymphedema.   NEUROLOGIC:  Cranial nerves II-XII are grossly intact.  Sensation intact.  Muscle strength and muscle tone symmetrical 5/5 throughout.   BACK:  No spinal or paraspinal tenderness.  No CVA tenderness.   SKIN:  No petechiae.  No rash.  No signs of cellulitis.   BREASTS:  Bilateral breast exam review.  No mass, no tenderness, no skin changes and no nipple discharge.      CURRENT LABORATORY DATA:   Today lanc 1.1, cr is nl.      Baseline cbc diff/CMP are nl, CEA 8.7 in 11/2016    Pathology 12/5/2016 Lymph node, aortacaval, endoscopic ultrasound-guided fine needle aspiration with shark core needle:   - Positive for malignancy.   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 Her2- by FISH.      EUS 12/5/2016, scope can't pass through GE junction-  A large, fungating and submucosal mass was found at the lower third of the esophagus at 34 cm from the incisors, which was nearly two-thirds circumferential  and is partially obstructing the esophagus and was seen extending to the gastric cardia.  Ascites was found.  4 abnormal lymph nodes were visualized in the lower paraesophageal mediastinum (level 8L). This will likely represent N2 on the evans staging.    Current Image  PET 11/30/2016  1. Hypermetabolic uptake SUV 16.6 in the proximal to mid stomach compatible with the patient's recently diagnosed gastric cancer.No Esophageal activity.   2. There is an elongated but slender gastrohepatic node just medial to the proximal stomach. Less than 1 cm in transverse dimension. Suspected to be hyper  metabolic but SUV difficult to measure  separately from adjacent stomach.  3. Hypermetabolic retroperitoneal node, aorto caval location just posterior to transverse duodenum with SUV max 4.2 compatible with metastasis.  4. Hypermetabolic hepatic flexure and distal ascending colon without CT abnormality is suspected to be physiologic.  5. Nonenlarged subcentimeter hypermetabolic node in the right side of the neck at the level of the tongue is nonspecific.    CT chest, abdomen and pelvis 11/05/2016 - esophageal GE junction within normal limits.  Mild fatty infiltrate of the liver, pancreatic atrophy.      OLD DATA REVIEW REVIEWED WITH SUMMARY:   CBC, diff, CMP from 2015, 2014 was satisfactory in terms of liver function tests, kidney function and bone marrow reserve.      ASSESSMENT AND PLAN:    1. 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  Biopsies via upper endoscopy was done in early November 2016.  She has a large fungating mass circumferential, partially obstructing lesion in that location. She had EUS/PET as further staging.     She is found to have extensive lower esophogeal/gastric cancer with near obstruction lower esophageal /GE junction tumor, cT3N+ (aorta caval LN + on biopsy), with ascites.     She was seen by Dr. Levy and deemed not a surgical candidate. She saw Rad-Onc and felt due to he local symptoms, RT is beneficial. Concurrent RT/wkly carbo/taxol is discussed. We talked about the side effects, goal of tx is to dowstaging, and palliation. Se made informed decision to proceed, end of Dec 2016.     She needs to be monitored closely during this concurrent chemo/RT. She is having a rough time with constant dry heaves, nausea.     Will also request Her2 status by IHC on her 11/4/2016 gastric biopsy sample.     2. Neutropenia - delay chemo by 1 wk. Neutropenic precaution is advised.     3. Nausea with dysphagia. Added IV emend.  Compazine, ativan and ODT zofran,did not work, advice to try Marinol 5 mg po bid.   Hold chemo today. IVF and anti emetic are provided in therapy plan.     4. Low BP than her usual - IVF in therapy plan.

## 2017-01-24 NOTE — NURSING NOTE
"Bren Rendon is a 66 year old female who presents for:  Chief Complaint   Patient presents with     Oncology Clinic Visit     Recheck Esophageal CA, Lab & chemo today         Initial Vitals:  BP 99/69 mmHg  Pulse 123  Temp(Src) 98.3  F (36.8  C) (Tympanic)  Resp 18  Ht 1.562 m (5' 1.5\")  Wt 85.322 kg (188 lb 1.6 oz)  BMI 34.97 kg/m2  SpO2 97%  Breastfeeding? No Estimated body mass index is 34.97 kg/(m^2) as calculated from the following:    Height as of this encounter: 1.562 m (5' 1.5\").    Weight as of this encounter: 85.322 kg (188 lb 1.6 oz).. Body surface area is 1.92 meters squared. BP completed using cuff size: regular  No Pain (1) No LMP recorded. Patient is postmenopausal. Allergies and medications reviewed.     Medications: MEDICATION REFILLS NEEDED TODAY.  Pharmacy name entered into [facility]:      [facility] - [facility] [id number] [place] [address]    Comments: Recheck Esophageal CA, Lab & chemo today . Patient reports nausea & vomiting consistently.   10 minutes for nursing intake (face to face time)   Vanesa Hickey CMA          "

## 2017-01-24 NOTE — MR AVS SNAPSHOT
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 El in 3 weeks. We are delaying your chemotherapy until next week.  You will need to have IV fluids today, and twice a week. Your prescription (Marinol) has been given to you to hand carry to the pharmacy of your choice. When you are in need of a refill, please call your pharmacy and they will send us a request.  Copy of appointments, and after visit summary (AVS) given to patient.  If you have any questions during business hours (M-F 8 AM- 4PM), please call Jinny Van RN, BSN, OCN Oncology Hematology /Breast Cancer Navigator at [facility] [phone].   For questions after business hours, or on holidays/weekends, please call our after hours Nurse Triage line [phone]. Thank you.            Follow-ups after your visit        Your next 10 appointments already scheduled     Jan 24, 2017 11:30 AM   Linear Accelerator with Lr [facility]   Radiation Therapy Center ([facility])    [address]  [facility] [address]   [phone]            Jan 25, 2017 11:30 AM   Linear Accelerator with Lr [facility]   Radiation Therapy Center ([facility])    [address]  [facility] [address]   [phone]            Jan 25, 2017 11:45 AM   ON TREATMENT VISIT with Cassy Ortega MD   Radiation Therapy Center ([facility])    [address] [place] [address]  [address]  [facility] [address]   [phone]            Jan 26, 2017  9:00 AM   Level 2 with ROOM 5 [facility]fusion ([facility])    [facility]  [address]  [facility] [address]   [phone]            Jan 26, 2017 11:30 AM   Linear Accelerator with Lr U[facility] Rad Tech   Radiation Therapy Center ([facility])    [address]  [facility] [address]   [phone]            Jan 27, 2017 11:30 AM   Linear Accelerator with Lr Ump Rad Tech   Radiation Therapy Center ([facility])    [address]  [facility] [address]   [phone]            Jan 30, 2017 11:30 AM   Linear Accelerator with Lr Ump Rad Tech   Radiation Therapy Center ([facility])    [address]  [facility] [address]   [phone]            Jan 31, 2017  8:30 AM   Level 3 with ROOM 9 [facility] ([facility])    [facility]  [address]  [facility] [address]   [phone]            Jan 31, 2017 11:30 AM   Linear Accelerator with Lr Ump Rad Tech   Radiation Therapy Center ([facility])    [address]  [place] [address]   [phone]            Feb 01, 2017 11:30 AM   Linear Accelerator with Lr Ump Rad Tech   Radiation Therapy Center ([facility])    [address]  [facility] [address]   [phone]              Who to contact     If you have questions or need follow up information about today's clinic visit or your schedule please contact [facility] directly at [phone].  Normal or non-critical lab and imaging results will be communicated to you by MyChart, letter or phone within 4 business days after the clinic has received the results. If you do not hear from us within 7 days, please contact the clinic through MyChart or phone. If you have a critical or abnormal lab result,  "we will notify you by phone as soon as possible.  Submit refill requests through [employer] or call your pharmacy and they will forward the refill request to us. Please allow 3 business days for your refill to be completed.          Additional Information About Your Visit        [employer] Information     [employer] gives you secure access to your electronic health record. If you see a primary care provider, you can also send messages to your care team and make appointments. If you have questions, please call your primary care clinic.  If you do not have a primary care provider, please call [phone] and they will assist you.        Care EveryWhere ID     This is your Care EveryWhere ID. This could be used by other organizations to access your [facility] medical records  [id number]        Your Vitals Were     Pulse Temperature Respirations    123 98.3  F (36.8  C) (Tympanic) 18    Height BMI (Body Mass Index) Pulse Oximetry    1.562 m (5' 1.5\") 34.97 kg/m2 97%    Breastfeeding?          No         Blood Pressure from Last 3 Encounters:   01/24/17 99/69   01/18/17 123/58   01/17/17 125/75    Weight from Last 3 Encounters:   01/24/17 85.322 kg (188 lb 1.6 oz)   01/20/17 87 kg (191 lb 12.8 oz)   01/18/17 86.637 kg (191 lb)              Today, you had the following     No orders found for display         Where to get your medicines      Some of these will need a paper prescription and others can be bought over the counter.  Ask your nurse if you have questions.     Bring a paper prescription for each of these medications    - dronabinol 5 MG capsule       Primary Care Provider Office Phone # Fax #    Dillan Amos [phone]       [facility] [address] [facility] [address]        Thank you!     Thank you for choosing [facility]  for your care. Our goal is always to provide you with excellent care. Hearing back from our patients is one way we can continue to improve our " services. Please take a few minutes to complete the written survey that you may receive in the mail after your visit with us. Thank you!             Your Updated Medication List - Protect others around you: Learn how to safely use, store and throw away your medicines at www.disposemymeds.org.          This list is accurate as of: 1/24/17  9:39 AM.  Always use your most recent med list.                   Brand Name Dispense Instructions for use    ADVIL PO      Take 200 mg by mouth as needed for moderate pain       ALLEGRA 60 MG tablet   Generic drug:  fexofenadine      Take 60 mg by mouth daily       dronabinol 5 MG capsule    MARINOL    60 capsule    Take 1 capsule (5 mg) by mouth 2 times daily as needed       lidocaine-prilocaine cream    EMLA    30 g    Apply 30 g topically as needed for moderate pain Apply to port site 1 hour before access.       LORazepam 0.5 MG tablet    ATIVAN    30 tablet    Take 1 tablet (0.5 mg) by mouth every 4 hours as needed (Anxiety, Nausea/Vomiting or Sleep)       Multi-vitamin Tabs tablet      Take 1 tablet by mouth daily       NASACORT AQ 55 MCG/ACT Inhaler   Generic drug:  triamcinolone      Spray 2 sprays into both nostrils daily       omega 3 1000 MG Caps      Take 1 g by mouth daily       omeprazole 40 MG capsule    priLOSEC    90 capsule    Take 1 capsule (40 mg) by mouth daily Take 30-60 minutes before a meal.       ondansetron 8 MG ODT tab    ZOFRAN ODT    60 tablet    Take 1 tablet (8 mg) by mouth every 8 hours as needed for nausea       THERATEARS OP      Apply 2 drops to eye daily       TYLENOL 325 MG tablet   Generic drug:  acetaminophen      Take 325 mg by mouth every 6 hours as needed for mild pain       vitamin D 2000 UNITS tablet      Take 2,000 Units by mouth daily

## 2017-01-24 NOTE — PATIENT INSTRUCTIONS
We would like to see you back in clinic with Dr. El in 3 weeks. We are delaying your chemotherapy until next week.  You will need to have IV fluids today, and twice a week. Your prescription (Marinol) has been given to you to hand carry to the pharmacy of your choice. When you are in need of a refill, please call your pharmacy and they will send us a request.  Copy of appointments, and after visit summary (AVS) given to patient.  If you have any questions during business hours (M-F 8 AM- 4PM), please call Jinny Van RN, BSN, OCN Oncology Hematology /Breast Cancer Navigator at [facility][note 3] [phone].   For questions after business hours, or on holidays/weekends, please call our after hours Nurse Triage line [phone]. Thank you.

## 2017-01-24 NOTE — PROGRESS NOTES
HER 2 order placed in EPIC and is to be completed by IHC lab on gastric biopsy sample from 11.04.16. Per Dr. El, this will be done by the pathologist, not molecular and FISH has been completed.  Vanesa faxed request to IHC.

## 2017-01-25 NOTE — Clinical Note
Date:January 26, 2017      Provider requested that no letter be sent. Do not send.       [facility] Health Information

## 2017-01-25 NOTE — MR AVS SNAPSHOT
After Visit Summary   1/25/2017    Bren Rendon    MRN: [id number]           Patient Information     Date Of Birth          1950        Visit Information        Provider Department      1/25/2017 11:45 AM Cassy Ortega MD [facility]        Today's Diagnoses     GE junction carcinoma (H)    -  1        Follow-ups after your visit        Your next 10 appointments already scheduled     Jan 26, 2017  9:00 AM   Level 2 with ROOM 5 [facility] Cancer [facility]fusion ([facility])    [facility]  [address]  [place] [address]   [phone]            Jan 26, 2017 11:30 AM   Linear Accelerator with Lr [facility] Rad Tech   [facility] ([facility]    [address], [address]  [facility] [address]   [phone]            Jan 27, 2017  8:30 AM   Level 2 with ROOM 6 [facility] ([facility])    [facility]  [address]  [facility] [address]   [phone]            Jan 27, 2017 11:30 AM   Linear Accelerator with Lr [facility] Rad Tech   Rad[facility] Center ([facility])    [address], [address]  [place] [address]   [phone]            Jan 30, 2017 11:30 AM   Linear Accelerator with Lr [facility] Rad Tech   Radiation Therapy Center ([facility])    [address]  [place] [address]   [phone]            Jan 30, 2017  1:00 PM   Level 2 with ROOM 1 [facility] ([facility])    [facility]  [address]  [place] [address]   [phone]            Jan 31, 2017  8:30 AM   Level 3 with ROOM 9 [facility] ([facility])    [facility]  [address]  [place] [address]   [phone]            Jan 31, 2017 11:30 AM   Linear Accelerator with Lr [facility] Rad [facility]   Radiation Therapy Center  ([facility])    [address]  [facility] [address]   [phone]            Feb 01, 2017 11:30 AM   Linear Accelerator with Lr [facility]   [facility] Center ([facility])    [address]  [facility] [address]   [phone]            Feb 01, 2017 11:45 AM   ON TREATMENT VISIT with Cassy Ortega MD   [facility] ([facility])    [address]  [facility] [address]   [phone]              Who to contact     Please call your clinic at [phone] to:    Ask questions about your health    Make or cancel appointments    Discuss your medicines    Learn about your test results    Speak to your doctor   If you have compliments or concerns about an experience at your clinic, or if you wish to file a complaint, please contact [facility] Patient Relations at [phone] or email us at [email or web address]         Additional Information About Your Visit        [employer] Information     [employer] gives you secure access to your electronic health record. If you see a primary care provider, you can also send messages to your care team and make appointments. If you have questions, please call your primary care clinic.  If you do not have a primary care provider, please call [phone] and they will assist you.      [email or web address] is an electronic gateway that provides easy, online access to your medical records. With [email or web address], you can request a clinic appointment, read your test results, renew a prescription or communicate with your care team.     To access your existing account, please contact your [facility] Clinic or call [phone] for assistance.        Care EveryWhere ID     This is your Care EveryWhere ID. This could be used by other organizations to access your [place] medical records  [id number]        Your Vitals Were     Pulse                   99             Blood Pressure from Last 3 Encounters:   01/25/17 122/72   01/24/17 99/69   01/18/17 123/58    Weight from Last 3 Encounters:   01/25/17 85.548 kg (188 lb 9.6 oz)   01/24/17 85.322 kg (188 lb 1.6 oz)   01/20/17 87 kg (191 lb 12.8 oz)              Today, you had the following     No orders found for display       Primary Care Provider Office Phone # Fax #    Dillan Jackson Amos [phone]       [facility] [address] [facility] [address]        Thank you!     Thank you for choosing [facility]  for your care. Our goal is always to provide you with excellent care. Hearing back from our patients is one way we can continue to improve our services. Please take a few minutes to complete the written survey that you may receive in the mail after your visit with us. Thank you!             Your Updated Medication List - Protect others around you: Learn how to safely use, store and throw away your medicines at www.disposemymeds.org.          This list is accurate as of: 1/25/17 12:57 PM.  Always use your most recent med list.                   Brand Name Dispense Instructions for use    ADVIL PO      Take 200 mg by mouth as needed for moderate pain       ALLEGRA 60 MG tablet   Generic drug:  fexofenadine      Take 60 mg by mouth daily       dronabinol 5 MG capsule    MARINOL    60 capsule    Take 1 capsule (5 mg) by mouth 2 times daily as needed       lidocaine-prilocaine cream    EMLA    30 g    Apply 30 g topically as needed for moderate pain Apply to port site 1 hour before access.       LORazepam 0.5 MG tablet    ATIVAN    30 tablet    Take 1 tablet (0.5 mg) by mouth every 4 hours as needed (Anxiety, Nausea/Vomiting or Sleep)       Multi-vitamin Tabs tablet      Take 1 tablet by mouth daily       NASACORT AQ 55 MCG/ACT Inhaler   Generic drug:  triamcinolone      Spray 2 sprays into both nostrils daily       omega 3 1000 MG Caps      Take 1 g by mouth daily        omeprazole 40 MG capsule    priLOSEC    90 capsule    Take 1 capsule (40 mg) by mouth daily Take 30-60 minutes before a meal.       ondansetron 8 MG ODT tab    ZOFRAN ODT    60 tablet    Take 1 tablet (8 mg) by mouth every 8 hours as needed for nausea       THERATEARS OP      Apply 2 drops to eye daily       TYLENOL 325 MG tablet   Generic drug:  acetaminophen      Take 325 mg by mouth every 6 hours as needed for mild pain       vitamin D 2000 UNITS tablet      Take 2,000 Units by mouth daily

## 2017-01-25 NOTE — Clinical Note
2017      RE: Bren [facility]  [address]  [place] [address]       [facility] PHYSICIANS  SPECIALIZING IN BREAKTHROUGHS  Radiation Oncology    On Treatment Visit Note      Bren Rendon      Date: 2017   MRN: [id number]   : 1950  Diagnosis: T3N2M1 esophageal cancer      Reason for Visit:  On Radiation Treatment Visit     Treatment Summary to Date  Treatment Site: chest/abdomen Current Dose: 3960/4500 cGy Fractions:       Chemotherapy  Chemo concurrent with radx?: Yes  Oncologist: Dr. El  Drug Name/Frequency 1: weekly carbo/taxol    Subjective:       Nursing ROS:   Nutrition Alteration  Diet Type: Patient's Preference  Nutrition Note: small frequent meals, eating well, dysphagia improved this week  Skin  Skin Reaction: 0 - No changes  ENT and Mouth Exam  Mucositis - Current: 0 - None   ENT/Mouth Note: using s/s rinses for thick phlegm  Cardiovascular  Respiratory effort: 1 - Normal - without distress  Gastrointestinal  Nausea: 2 - Three to four 4 episodes of nausea/24  GI Note: marinol, zoftan, ativan, emend, IVF 2x per week  Genitourinary  Urinary Status: 0 - Normal  Pain Assessment  0-10 Pain Scale: 0    Objective:   /72 mmHg  Pulse 99  Wt 85.548 kg (188 lb 9.6 oz)    Labs:  CBC RESULTS:   Recent Labs   Lab Test  17   0804   WBC  1.7*   RBC  3.96   HGB  12.2   HCT  35.8   MCV  90   MCH  30.8   MCHC  34.1   RDW  13.1   PLT  136*     ELECTROLYTES:  Recent Labs   Lab Test  17   0804  17   0820   NA   --   135   POTASSIUM   --   3.8   CHLORIDE   --   103   JARED   --   8.5   CO2   --   23   BUN   --   15   CR  0.69  0.64   GLC   --   151*       Assessment:    Tolerating radiation therapy well.  All questions and concerns addressed.    Plan:   1. Continue current therapy.    2. Discussed with patient about nutrition during the therapy  3. See oncology for better nausea control.  4. Possible IV fluid daily prn      Mosaiq chart and setup information  reviewed  MVCT/IGRT images checked  Medication Review  Med list reviewed with patient?: Yes  Med Note: long discussion regarding nausea management           MD Cassy Hernadez MD

## 2017-01-25 NOTE — PROGRESS NOTES
[facility] PHYSICIANS  SPECIALIZING IN BREAKTHROUGHS  Radiation Oncology    On Treatment Visit Note      Bren Rendon      Date: 2017   MRN: [id number]   : 1950  Diagnosis: T3N2M1 esophageal cancer      Reason for Visit:  On Radiation Treatment Visit     Treatment Summary to Date  Treatment Site: chest/abdomen Current Dose: 3960/4500 cGy Fractions:       Chemotherapy  Chemo concurrent with radx?: Yes  Oncologist: Dr. El  Drug Name/Frequency 1: weekly carbo/taxol    Subjective:       Nursing ROS:   Nutrition Alteration  Diet Type: Patient's Preference  Nutrition Note: small frequent meals, eating well, dysphagia improved this week  Skin  Skin Reaction: 0 - No changes  ENT and Mouth Exam  Mucositis - Current: 0 - None   ENT/Mouth Note: using s/s rinses for thick phlegm  Cardiovascular  Respiratory effort: 1 - Normal - without distress  Gastrointestinal  Nausea: 2 - Three to four 4 episodes of nausea/24  GI Note: marinol, zoftan, ativan, emend, IVF 2x per week  Genitourinary  Urinary Status: 0 - Normal  Pain Assessment  0-10 Pain Scale: 0    Objective:   /72 mmHg  Pulse 99  Wt 85.548 kg (188 lb 9.6 oz)    Labs:  CBC RESULTS:   Recent Labs   Lab Test  17   0804   WBC  1.7*   RBC  3.96   HGB  12.2   HCT  35.8   MCV  90   MCH  30.8   MCHC  34.1   RDW  13.1   PLT  136*     ELECTROLYTES:  Recent Labs   Lab Test  17   0804  17   0820   NA   --   135   POTASSIUM   --   3.8   CHLORIDE   --   103   JARED   --   8.5   CO2   --   23   BUN   --   15   CR  0.69  0.64   GLC   --   151*       Assessment:    Tolerating radiation therapy well.  All questions and concerns addressed.    Plan:   1. Continue current therapy.    2. Discussed with patient about nutrition during the therapy  3. See oncology for better nausea control.  4. Possible IV fluid daily prn      Mosaiq chart and setup information reviewed  MVCT/IGRT images checked  Medication Review  Med list reviewed with  patient?: Yes  Med Note: long discussion regarding nausea management           Cassy Ortega MD

## 2017-01-26 NOTE — TELEPHONE ENCOUNTER
Patient stopped by clinic requesting a refill of lorazepam on behalf of self.  Last refill: 12.19.16  # 30 with 1 refills at [facility] pharmacy.  Last office visit:  01.24.17  Next office visit:  02.14.17    This is an appropriate refill, and has been printed, signed and given to patient to hand carry to the pharmacy of their choice. Jinny Van RN, BSN, OCN

## 2017-01-26 NOTE — TELEPHONE ENCOUNTER
Refill prescription failed to print.  Lorazepam prescription verbally called to pharmacist at [facility] pharmacy.  Patient aware.

## 2017-01-26 NOTE — PROGRESS NOTES
Infusion Nursing Note:  Bren Rendon presents today for IVFs and antiemetics.     Patient seen by provider today: No   present during visit today: Not Applicable.    Note: N/A.    Intravenous Access:  Implanted Port.    Treatment Conditions:  Not Applicable.      Post Infusion Assessment:  Patient tolerated infusion without incident.  Blood return noted pre and post infusion.  Site patent and intact, free from redness, edema or discomfort.  No evidence of extravasations.  Access discontinued per protocol.    Discharge Plan:   Patient discharged in stable condition accompanied by: self.  Departure Mode: Ambulatory.  Pt to return tomorrow at 8:30 am for IV fluids.     Tamera Hopkins RN

## 2017-01-27 NOTE — PROGRESS NOTES
Fax received from pharmacy indicating a prior authorization is needed for Dronabinol.  PA process obtained and is valid from 01.27.17-01.27.18  Reference case #[id number].    Pharmacy and patient updated with this approval.

## 2017-01-27 NOTE — PROGRESS NOTES
Infusion Nursing Note:  Bren Rendon presents today for IVFs and antiemetics.    Patient seen by provider today: No   present during visit today: Not Applicable.    Note: N/A.    Intravenous Access:  Implanted Port.    Treatment Conditions:  Not Applicable.      Post Infusion Assessment:  Patient tolerated infusion without incident.  Blood return noted pre and post infusion.  Site patent and intact, free from redness, edema or discomfort.  No evidence of extravasations.  Access discontinued per protocol.    Discharge Plan:   Patient discharged in stable condition accompanied by: .  Departure Mode: Ambulatory.  Pt to return on 1/30/17 at 1:00 pm for IVFs and antiemetics.     Tamera Hopkins RN

## 2017-01-30 NOTE — PROGRESS NOTES
Infusion Nursing Note:  Bren Rendon presents today for IVFs and antiemetics.     Patient seen by provider today: No   present during visit today: Not Applicable.    Note: N/A.    Intravenous Access:  Implanted Port.    Treatment Conditions:  Not Applicable.    Post Infusion Assessment:  Patient tolerated infusion without incident.  Blood return noted pre and post infusion.  Site patent and intact, free from redness, edema or discomfort.  No evidence of extravasations.  Access discontinued per protocol.    Discharge Plan:   Patient discharged in stable condition accompanied by: self.  Departure Mode: Ambulatory.  Pt to return tomorrow at 8:30 am for C1D29 Carbo/Taxol.      Tamera Hopkins RN

## 2017-01-31 NOTE — PROGRESS NOTES
Infusion Nursing Note:  Bren Rendon presents today for C1D29 Carbo/Taxol.    Patient seen by provider today: No   present during visit today: Not Applicable.    Note: ANC 0.9.  Chemo held today per Dr. El x 1 week.  Pt stayed for IV fluid and antiemetics as in therapy plan.    Intravenous Access:  Labs drawn without difficulty.  Implanted Port.    Treatment Conditions:  HGB     11.6   1/31/2017  WBC      1.6   1/31/2017   ANEU      0.9   1/31/2017  PLT       93   1/31/2017       CR     0.61   1/31/2017                  Results reviewed, labs did NOT meet treatment parameters: ANC 0.9.      Post Infusion Assessment:  Patient tolerated infusion without incident.  Blood return noted pre and post infusion.  Site patent and intact, free from redness, edema or discomfort.  No evidence of extravasations.  Access discontinued per protocol.    Discharge Plan:   Patient discharged in stable condition accompanied by: .  Departure Mode: Ambulatory.  Pt to return at 8:30 am for IVFs again..    Tamera Hopkins RN

## 2017-01-31 NOTE — PROGRESS NOTES
2017         Maki El MD   [facility]   [address] [place]  [address]      RE: Bren Rendon   MRN: [id number]   : 1950      Dear Dr. El:      Your patient, Ms. Bren Rendon, decided not to pursue any more radiation therapy on 2017.  As you know, Ms. 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  She received concurrent chemoradiation therapy for her adenocarcinoma and had radiation therapy in our clinic over the past 5 weeks with a total dose of 4500 cGy in 25 treatments at 180 cGy each fraction.  The patient did have a significant reaction toward the end of the therapy with significant nausea, vomiting and heartburn for which she required daily IV fluids toward the end of the therapy.  The decision is not to pursue any more therapy at this time.  She, therefore, received a total dose of 4500 cGy in 25 treatments instead of planned 5040 cGy in 28 treatments.  Her radiation therapy was given from 2016 to 2017.  She is scheduled to return to our clinic in 1 week for routine post radiation therapy office followup.  I will continue to monitor her condition closely over the next few weeks until she is fully recovered from her recent treatment.      Again, thank you very much for the referral of Ms. Rendon and allowing me to participate in the care of this fine lady.  If you have any questions about this patient, please do not hesitate to call.      Sincerely,          Cassy Ortega M.D., Ph.D.      Radiation Oncologist   [facility]   Radiation Therapy Center   Phone: [phone]    CC  Patient Care Team:  Dillan Amos MD as PCP - General  Ozmun, Betty, RN as Clinic Care Coordinator  Cassy Ortega MD as MD (Radiation Oncology)  Aline El MD as MD  (Oncology)  Chay Levy MD as MD (Thoracic Diseases)  CIPRIANO NEGRON

## 2017-01-31 NOTE — Clinical Note
2017      RE: Bren Rendon  [address] [place] [address]       2017         Maki El MD   [facility]   [address] [place]  [address]      RE: Bren Rendon   MRN: [id number]   : 1950      Dear Dr. El:      Your patient, Ms. Bren Rendon, decided not to pursue any more radiation therapy on 2017.  As you know, Ms. 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  She received concurrent chemoradiation therapy for her adenocarcinoma and had radiation therapy in our clinic over the past 5 weeks with a total dose of 4500 cGy in 25 treatments at 180 cGy each fraction.  The patient did have a significant reaction toward the end of the therapy with significant nausea, vomiting and heartburn for which she required daily IV fluids toward the end of the therapy.  The decision is not to pursue any more therapy at this time.  She, therefore, received a total dose of 4500 cGy in 25 treatments instead of planned 5040 cGy in 28 treatments.  Her radiation therapy was given from 2016 to 2017.  She is scheduled to return to our clinic in 1 week for routine post radiation therapy office followup.  I will continue to monitor her condition closely over the next few weeks until she is fully recovered from her recent treatment.      Again, thank you very much for the referral of Ms. Rendon and allowing me to participate in the care of this fine lady.  If you have any questions about this patient, please do not hesitate to call.      Sincerely,          Cassy Ortega M.D., Ph.D.      Radiation Oncologist   [facility]   Radiation Therapy Center   Phone: [phone]    CC  Patient Care Team:  Dillan Amos MD as PCP - General  Ozmun, Betty, RN as Clinic Care  Coordinator  Nikko, Aline Gambino MD as MD (Oncology)  Chay Levy MD as MD (Thoracic Diseases)

## 2017-01-31 NOTE — MR AVS SNAPSHOT
After Visit Summary   1/31/2017    Bren Rendon    MRN: [id number]           Patient Information     Date Of Birth          1950        Visit Information        Provider Department      1/31/2017 11:45 AM Cassy Ortega MD [facility]         Follow-ups after your visit        Your next 10 appointments already scheduled     Feb 01, 2017  8:30 AM   Level 2 with ROOM 9 [facility] ([facility])    [facility]  [address]  [facility] [address]   [phone]            Feb 01, 2017 11:30 AM   Linear Accelerator with L[facility]   [facility] ([facility])    [address], [address]  [facility] [address]   [phone]            Feb 02, 2017  8:30 AM   Level 2 with ROOM 6 [facility] ([facility])    [facility]  [address]  [facility] [address]   [phone]            Feb 02, 2017 11:30 AM   Linear Accelerator with Lr [facility]   [facility] ([facility])    [address]  [facility] [address]   [phone]            Feb 03, 2017  8:30 AM   Level 2 with ROOM 3 [facility] ([facility])    [facility]  [address]  [facility] [address]   [phone]            Feb 07, 2017  9:00 AM   Level 3 with ROOM 1 [facility] ([facility])    [facility]  [address]  [facility] [address]   [phone]            Feb 07, 2017 10:45 AM   Return Visit with Cassy Ortega MD   [facility] ([facility])    [address]  [facility] [address]   [phone]            Feb 09, 2017  9:00 AM   Level 2 with ROOM 1 [facility] ([facility])    [facility]  [address]  [facility] [address]   [phone]            Feb 14, 2017  9:15 AM   Return Visit with Aline El MD   [facility] ([facility])    [facility]  [address]  [facility] [address]   [phone]            Feb 14, 2017  9:30 AM   Level 2 with ROOM 2 [facility] ([facility])    [facility]  [address]  [facility] [address]   [phone]              Who to contact     Please call your clinic at [phone] to:    Ask questions about your health    Make or cancel appointments    Discuss your medicines    Learn about your test results    Speak to your doctor   If you have compliments or concerns about an experience at your clinic, or if you wish to file a complaint, please contact [facility] Patient Relations at [phone] or email us at [email or web address]         Additional Information About Your Visit        [employer] Information     [employer] gives you secure access to your electronic health record. If you see a primary care provider, you can also send messages to your care team and make appointments. If you have questions, please call your primary care clinic.  If you do not have a primary care provider, please call [phone] and they will assist you.      [employer] is an electronic gateway that provides easy, online access to your medical records. With [employer], you can request a clinic appointment, read your test results, renew a prescription or communicate with your care team.     To access your existing account, please contact your [facility] or call [phone] for assistance.        Care EveryWhere ID     This is your Care EveryWhere ID. This could be used by other organizations to access your [place] medical records  [id number]         Blood Pressure from Last 3 Encounters:   01/31/17 129/81   01/30/17  121/69   01/27/17 127/72    Weight from Last 3 Encounters:   01/25/17 85.548 kg (188 lb 9.6 oz)   01/24/17 85.322 kg (188 lb 1.6 oz)   01/20/17 87 kg (191 lb 12.8 oz)              Today, you had the following     No orders found for display       Primary Care Provider Office Phone # Fax #    Dillan Jackson Amos [phone]       [facility] [address] [facility] [address]        Thank you!     Thank you for choosing [facility]  for your care. Our goal is always to provide you with excellent care. Hearing back from our patients is one way we can continue to improve our services. Please take a few minutes to complete the written survey that you may receive in the mail after your visit with us. Thank you!             Your Updated Medication List - Protect others around you: Learn how to safely use, store and throw away your medicines at www.disposemymeds.org.          This list is accurate as of: 1/31/17  2:21 PM.  Always use your most recent med list.                   Brand Name Dispense Instructions for use    ADVIL PO      Take 200 mg by mouth as needed for moderate pain       ALLEGRA 60 MG tablet   Generic drug:  fexofenadine      Take 60 mg by mouth daily       dronabinol 5 MG capsule    MARINOL    60 capsule    Take 1 capsule (5 mg) by mouth 2 times daily as needed       lidocaine-prilocaine cream    EMLA    30 g    Apply 30 g topically as needed for moderate pain Apply to port site 1 hour before access.       LORazepam 0.5 MG tablet    ATIVAN    30 tablet    Take 1 tablet (0.5 mg) by mouth every 4 hours as needed (Anxiety, Nausea/Vomiting or Sleep)       Multi-vitamin Tabs tablet      Take 1 tablet by mouth daily       NASACORT AQ 55 MCG/ACT Inhaler   Generic drug:  triamcinolone      Spray 2 sprays into both nostrils daily       omega 3 1000 MG Caps      Take 1 g by mouth daily       omeprazole 40 MG capsule    priLOSEC    90 capsule    Take 1 capsule (40 mg)  by mouth daily Take 30-60 minutes before a meal.       ondansetron 8 MG ODT tab    ZOFRAN ODT    60 tablet    Take 1 tablet (8 mg) by mouth every 8 hours as needed for nausea       THERATEARS OP      Apply 2 drops to eye daily       TYLENOL 325 MG tablet   Generic drug:  acetaminophen      Take 325 mg by mouth every 6 hours as needed for mild pain       vitamin D 2000 UNITS tablet      Take 2,000 Units by mouth daily

## 2017-02-01 NOTE — PROGRESS NOTES
Infusion Nursing Note:  Bren Rendon presents today for IVF's, meds.    Patient seen by provider today: No   present during visit today: Not Applicable.    Note: N/A.    Intravenous Access:  Implanted Port.    Treatment Conditions:  Not Applicable.      Post Infusion Assessment:  Patient tolerated infusion without incident.  Blood return noted pre and post infusion.  Site patent and intact, free from redness, edema or discomfort.  Access discontinued per protocol.    Discharge Plan:   Patient discharged in stable condition accompanied by: .  Departure Mode: Ambulatory.  Returns tomorrow for more IVF's.    Nadja Krause RN

## 2017-02-01 NOTE — PROGRESS NOTES
SPIRITUAL HEALTH SERVICES  SPIRITUAL ASSESSMENT Progress Note  [facility]    PRIMARY FOCUS:     Emotional/spiritual/Jain distress    Support for coping    ILLNESS CIRCUMSTANCES:   Reviewed documentation. Reflective conversation shared with pt, Bren, and her , Naren, which integrated elements of illness and lali narratives.     Context of Serious Illness/Symptom(s) - Junction cancer involving stomach    Resources for Support - , family, Baptism family and friends    DISTRESS:     Emotional/Existential/Relational Distress - Bren stated that she felt the prayers of many and showed me her prayer shawl    Spiritual/Yazdanism Distress - None addressed    Social/Cultural/Economic Distress - None addressed    SPIRITUAL/Confucianism COPING:     Synagogue/Lali - [place]ism - attends [place] [address] [address]    Spiritual Practice(s) - Prayer was mentioned and welcomed    Emotional/Existential/Relational Connections - NA    GOALS OF CARE:    Goals of Care - Going through chemo therapy to get as much health and quality of life as possible    Meaning/Sense-Making - Not addressed    PLAN: I will check back with Bren during future infusion appointments    Montana Parra M.A, [facility]  Staff   Pager [phone]

## 2017-02-02 NOTE — PROGRESS NOTES
Infusion Nursing Note:  Bren Rendon presents today for IVF's.    Patient seen by provider today: No   present during visit today: Not Applicable.    Note: Pt states that she has some nausea today and is holding an emesis bag spitting frequently, she states that she has a build up of saliva and it makes her nauseus. Talked with Dr. El and she stated that pt should try her home meds for nausea and did want to prescribe anything further to give at this visit. Pt states she will try home meds this evening.     Intravenous Access:  Implanted Port.    Treatment Conditions:  Not Applicable.      Post Infusion Assessment:  Patient tolerated infusion without incident.  Site patent and intact, free from redness, edema or discomfort.  Access discontinued per protocol.    Discharge Plan:   Patient discharged in stable condition accompanied by: self.  Departure Mode: Ambulatory.    Annette Harmon RN

## 2017-02-03 NOTE — PROGRESS NOTES
Infusion Nursing Note:  Bren Rendon presents today for IVF's.    Patient seen by provider today: No   present during visit today: Not Applicable.    Note: pt had an episode of vomiting while here today.    Intravenous Access:  Implanted Port.    Treatment Conditions:  Not Applicable.      Post Infusion Assessment:  Patient tolerated infusion without incident.  Blood return noted pre and post infusion.  Site patent and intact, free from redness, edema or discomfort.  Access discontinued per protocol.    Discharge Plan:   Patient discharged in stable condition accompanied by: .  Departure Mode: Ambulatory.    Nadja Krause RN

## 2017-02-07 NOTE — PROGRESS NOTES
Infusion Nursing Note:  Bren Rendon presents today for Taxol and carboplatin.    Patient seen by provider today: No   present during visit today: Not Applicable.    Note: N/A.    Intravenous Access:  Labs drawn without difficulty.  Implanted Port.    Treatment Conditions:  HGB     11.9   2/7/2017  WBC      3.6   2/7/2017   ANEU      1.5   2/7/2017  PLT      208   2/7/2017  CR     0.71   2/7/2017                Results reviewed, labs MET treatment parameters, ok to proceed with treatment.      Post Infusion Assessment:  Patient tolerated infusion without incident.  Blood return noted pre and post infusion.  Access discontinued per protocol.    Discharge Plan:   Patient discharged in stable condition accompanied by: .  Departure Mode: Ambulatory.  Appt made for Bren to return on Thurs 2/9 for IV hydration and antiemetics if needed.  Pt is scheduled to see Dr. El on 2/14.    Samantha Omalley RN

## 2017-02-07 NOTE — Clinical Note
2/7/2017      RE: Bren Rendon  [address]  [place] [address]       RADIATION ONCOLOGY FOLLOW-UP VISIT  DATE: Feb 7, 2017    NAME: Bren Rendon  MRN: [id number]      DISEASE TREATED: Advanced adenocarcinoma of the GE junction/gastric cardia, clinical stage T3N2M1 with evidence of periaortic lymph node metastases    RADIATION THERAPY DELIVERED: 4500 cGy in 25 fractions    INTERVAL SINCE COMPLETION OF RT: 1 week since completion on 01/30/2017    SUBJECTIVE:  Ms. 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 She received concurrent chemoradiation therapy for her adenocarcinoma and had radiation therapy in our clinic over 5 weeks with a total dose of 4500 cGy in 25 treatments at 180 cGy each fraction. The patient did have a significant reaction toward the end of therapy with significant nausea, vomiting and heartburn for which she required daily IV fluids toward the end of the therapy. The decision was made not to pursue any more RT. She, therefore, received a total dose of 4500 cGy in 25 treatments instead of planned 5040 cGy in 28 treatments.  Her radiation therapy was given from 12/27/2016 to 01/30/2017.     She now presents today for her first followup exam since completing radiotherapy. Over the past week, the patient reports that she has been doing slightly better. Her nausea/vomiting has improved a bit and so has her pain when swallowing. She still feels quite fatigued, which is normal. She is able to eat anything she wishes, just not in high volumes. She continues to stay hydrated as best as she can. She does continue to complain of phlegm production, which is quite bothersome. Her last two cycles of chemo were held due to low blood counts, but was given the ok for chemo today. Will be following up with med onc later this morning.     PHYSICAL  EXAM:  VITALS: Wt 83.734 kg (184 lb 9.6 oz)  GEN: Appears well, alert, oriented, and in NAD  HEENT: NCAT, EOMI, normal conjunctiva  CV: Warm and well-perfused  RESP: No wheezing, breathing comfortably on room air  SKIN: Normal color and turgor  NEURO: No focal deficits, CN 3-12 grossly intact, normal gait  PSYCH: Appropriate mood and affect    LABS AND IMAGING: Reviewed    IMPRESSION:   Ms. 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 Her treatment course was ended slightly early secondary to poor tolerance and side effects. She therefore did not receive a boost dose to the gross tumor volume. Over the past week she has slightly improved in some of her side effects, but overall feels generally the same.     PLAN:  1. No more RT  2. RTC in 2 months for followup  3. Continue close followup with Dr. El in medical oncology.  4. Chemo per Dr. El    Ms. Rendon was seen and discussed with staff, Dr. Ortega.    Sumit Sharpe MD  Resident Radiation Oncology  [facility]      I have personally examined the patient, reviewed and edited the resident's note and agree with the plan of care.    Cassy Ortega M.D., Ph.D.      Radiation Oncologist   [facility]   [facility]   Phone: [phone]    CC  Patient Care Team:  Dillan Amos MD as PCP - Betty Menendez RN as Clinic Care Coordinator  Nikko, Aline Gambino MD as MD (Oncology)  Chay Levy MD as MD (Thoracic Diseases)

## 2017-02-07 NOTE — PROGRESS NOTES
RADIATION ONCOLOGY FOLLOW-UP VISIT  DATE: Feb 7, 2017    NAME: Bren Rendon  MRN: [id number]      DISEASE TREATED: Advanced adenocarcinoma of the GE junction/gastric cardia, clinical stage T3N2M1 with evidence of periaortic lymph node metastases    RADIATION THERAPY DELIVERED: 4500 cGy in 25 fractions    INTERVAL SINCE COMPLETION OF RT: 1 week since completion on 01/30/2017    SUBJECTIVE:  Ms. 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 She received concurrent chemoradiation therapy for her adenocarcinoma and had radiation therapy in our clinic over 5 weeks with a total dose of 4500 cGy in 25 treatments at 180 cGy each fraction. The patient did have a significant reaction toward the end of therapy with significant nausea, vomiting and heartburn for which she required daily IV fluids toward the end of the therapy. The decision was made not to pursue any more RT. She, therefore, received a total dose of 4500 cGy in 25 treatments instead of planned 5040 cGy in 28 treatments.  Her radiation therapy was given from 12/27/2016 to 01/30/2017.     She now presents today for her first followup exam since completing radiotherapy. Over the past week, the patient reports that she has been doing slightly better. Her nausea/vomiting has improved a bit and so has her pain when swallowing. She still feels quite fatigued, which is normal. She is able to eat anything she wishes, just not in high volumes. She continues to stay hydrated as best as she can. She does continue to complain of phlegm production, which is quite bothersome. Her last two cycles of chemo were held due to low blood counts, but was given the ok for chemo today. Will be following up with med onc later this morning.     PHYSICAL EXAM:  VITALS: Wt 83.734 kg (184 lb 9.6 oz)  GEN: Appears well, alert, oriented, and in  NAD  HEENT: NCAT, EOMI, normal conjunctiva  CV: Warm and well-perfused  RESP: No wheezing, breathing comfortably on room air  SKIN: Normal color and turgor  NEURO: No focal deficits, CN 3-12 grossly intact, normal gait  PSYCH: Appropriate mood and affect    LABS AND IMAGING: Reviewed    IMPRESSION:   Ms. 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 Her treatment course was ended slightly early secondary to poor tolerance and side effects. She therefore did not receive a boost dose to the gross tumor volume. Over the past week she has slightly improved in some of her side effects, but overall feels generally the same.     PLAN:  1. No more RT  2. RTC in 2 months for followup  3. Continue close followup with Dr. Negron in medical oncology.  4. Chemo per Dr. Negron    Ms. Rendon was seen and discussed with staff, Dr. Ortega.    Sumit Sharpe MD  Resident Radiation Oncology  [facility]      I have personally examined the patient, reviewed and edited the resident's note and agree with the plan of care.    Cassy Ortega M.D., Ph.D.      Radiation Oncologist   [facility]   Radiation Therapy Center   Phone: [phone]    CC  Patient Care Team:  Dillan Amos MD as PCP - Betty Menendez RN as Clinic Care Coordinator  Cassy Ortega MD as MD (Radiation Oncology)  Aline Negron MD as MD (Oncology)  Chay Levy MD as MD (Thoracic Diseases)  ALINE NEGRON

## 2017-02-07 NOTE — NURSING NOTE
"FOLLOW-UP VISIT    Patient Name: Bren Rendon      : 1950     Age: 66 year old        ______________________________________________________________________________     Chief Complaint   Patient presents with     Radiation Therapy     follow up     Wt 83.734 kg (184 lb 9.6 oz)     Date Radiation Completed: 17     Pain  Denies    Meds  Current Med List Reviewed: Yes  Medication Note:     Labs  Other Labs: had labs prior to chemo - was given ok to have chemo today, was held last 2 times due to low blood counts    Imaging  None    Skin: Warm  Dry  Intact  Energy Level: low    Other Appointments:     Date  MD Name: Dr. El      Other Notes: \"Feels the same\"   Nausea, phlegm, poor taste, throwing up in evenings. Working with med onc for IV fluids and anti emetic management.            "

## 2017-02-09 NOTE — PROGRESS NOTES
Infusion Nursing Note:  Bren Rendon presents today for IVF's.    Patient seen by provider today: No   present during visit today: Not Applicable.    Note: N/A.    Intravenous Access:  Implanted Port.    Treatment Conditions:  Not Applicable.      Post Infusion Assessment:  Patient tolerated infusion without incident.  Blood return noted pre and post infusion.  Site patent and intact, free from redness, edema or discomfort.  No evidence of extravasations.  Access discontinued per protocol.    Discharge Plan:   Patient discharged in stable condition accompanied by: .  Departure Mode: Ambulatory.    Nadja Krause RN

## 2017-02-14 NOTE — PROGRESS NOTES
Infusion Nursing Note:  Bren Rendon presents today for IVFs.    Patient seen by provider today: No   present during visit today: Not Applicable.    Note: N/A.    Intravenous Access:  Implanted Port.    Treatment Conditions:  Not Applicable.      Post Infusion Assessment:  Patient tolerated infusion without incident.  Blood return noted pre and post infusion.  Site patent and intact, free from redness, edema or discomfort.  No evidence of extravasations.  Access discontinued per protocol.    Discharge Plan:   Patient discharged in stable condition accompanied by: .  Departure Mode: Ambulatory.  Pt to return on 2/17/17 at 9:00 am for IV fluids.    Tamera Hopkins RN

## 2017-02-14 NOTE — PATIENT INSTRUCTIONS
Dr. El recommends IV fluids today and twice per week on Tuesday and Friday during your chemo break. We would like to see you back on February 27 th to further discuss your treatment plan. When you are in need of a refill, please call your pharmacy and they will send us a request.  Copy of appointments, and after visit summary (AVS) given to patient.  If you have any questions please call Fabiana Greenberg RN, BSN, OCN Oncology Hematology  [facility] [phone]. For questions after business hours, or on holidays/weekends, please call our after hours Nurse Triage line [phone]. Thank you.

## 2017-02-14 NOTE — NURSING NOTE
"Bren Rendon is a 66 year old female who presents for:  Chief Complaint   Patient presents with     Oncology Clinic Visit     3 week recheck Esophageal CA, review Her 2 results        Initial Vitals:  /72 (BP Location: Right arm, Patient Position: Chair, Cuff Size: Adult Regular)  Pulse 100  Temp 98.2  F (36.8  C) (Tympanic)  Resp 18  Ht 1.562 m (5' 1.5\")  Wt 82.1 kg (181 lb 1.6 oz)  SpO2 99%  Breastfeeding? No  BMI 33.67 kg/m2 Estimated body mass index is 33.67 kg/(m^2) as calculated from the following:    Height as of this encounter: 1.562 m (5' 1.5\").    Weight as of this encounter: 82.1 kg (181 lb 1.6 oz).. Body surface area is 1.89 meters squared. BP completed using cuff size: regular  No Pain (0) No LMP recorded. Patient is postmenopausal. Allergies and medications reviewed.     Medications: Medication refills not needed today.  Pharmacy name entered into [facility]:    [facility] [id number] [place] [address]    Comments: 3 week recheck Esophageal CA, review Her 2 results. Patient reports increased phlegm & saliva production. Incresed taste changes & trouble swallowing.  Increased  fatigue after a 2 hour visit with 15 month old Granddaughter.     7  minutes for nursing intake (face to face time)   Vanesa Hickey CMA        "

## 2017-02-14 NOTE — MR AVS SNAPSHOT
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)        Chemotherapy-induced nausea        Sinus drainage           Follow-ups after your visit        Your next 10 appointments already scheduled     Feb 17, 2017  9:00 AM CST   Level 2 with ROOM 3 [facility] ([facility])    [facility]  [address]  [facility] [address]   [phone]            Feb 21, 2017  9:00 AM CST   Level 2 with ROOM 6 [facility] ([facility])    [facility]  [address]  [facility] [address]   [phone]            Feb 24, 2017  9:00 AM CST   Level 2 with ROOM 1 [facility] ([facility])    [facility]  [address]  [facility] [address]   [phone]            Feb 28, 2017  9:30 AM CST   Return Visit with Aline El MD   [facility] ([facility])    [facility]  [address]  [facility] [address]   [phone]            Feb 28, 2017 10:00 AM CST   Level 2 with ROOM 9 [facility] ([facility])    [facility]  [address]  [facility] [address]   [phone]            Mar 03, 2017  9:30 AM CST   Level 2 with ROOM 8 [facility] ([facility])    [facility]  [address]  [facility] [address]   [phone]            Apr 04, 2017 11:15 AM CDT   Return Visit with Cassy Ortega MD   [facility] ([facility])    [address] [place] [address]  "[address], [address]  [facility] [address]   [phone]              Who to contact     If you have questions or need follow up information about today's clinic visit or your schedule please contact [facility] directly at [phone].  Normal or non-critical lab and imaging results will be communicated to you by MyChart, letter or phone within 4 business days after the clinic has received the results. If you do not hear from us within 7 days, please contact the clinic through Bilibothart or phone. If you have a critical or abnormal lab result, we will notify you by phone as soon as possible.  Submit refill requests through Self Point or call your pharmacy and they will forward the refill request to us. Please allow 3 business days for your refill to be completed.          Additional Information About Your Visit        Bilibothart Information     Self Point gives you secure access to your electronic health record. If you see a primary care provider, you can also send messages to your care team and make appointments. If you have questions, please call your primary care clinic.  If you do not have a primary care provider, please call [phone] and they will assist you.        Care EveryWhere ID     This is your Care EveryWhere ID. This could be used by other organizations to access your [place] medical records  [id number]        Your Vitals Were     Pulse Temperature Respirations Height Pulse Oximetry Breastfeeding?    100 98.2  F (36.8  C) (Tympanic) 18 1.562 m (5' 1.5\") 99% No    BMI (Body Mass Index)                   33.67 kg/m2            Blood Pressure from Last 3 Encounters:   02/14/17 129/72   02/09/17 110/63   02/07/17 114/67    Weight from Last 3 Encounters:   02/14/17 82.1 kg (181 lb 1.6 oz)   02/07/17 83.7 kg (184 lb 9.6 oz)   02/07/17 83.5 kg (184 lb)              Today, you had the following     No orders found for display         Today's Medication Changes          These changes are accurate as of: " 2/14/17 10:16 AM.  If you have any questions, ask your nurse or doctor.               Start taking these medicines.        Dose/Directions    prochlorperazine 25 MG Suppository   Commonly known as:  COMPAZINE   Used for:  Chemotherapy-induced nausea   Started by:  Aline El MD        Dose:  25 mg   Place 1 suppository (25 mg) rectally every 12 hours as needed for nausea   Quantity:  60 suppository   Refills:  0            Where to get your medicines      These medications were sent to [address] [address]     Phone:  [phone]     prochlorperazine 25 MG Suppository                Primary Care Provider Office Phone # Fax #    Dillan Jackson Amos [phone]       [facility] [address] [facility] [address]        Thank you!     Thank you for choosing [facility]  for your care. Our goal is always to provide you with excellent care. Hearing back from our patients is one way we can continue to improve our services. Please take a few minutes to complete the written survey that you may receive in the mail after your visit with us. Thank you!             Your Updated Medication List - Protect others around you: Learn how to safely use, store and throw away your medicines at www.disposemymeds.org.          This list is accurate as of: 2/14/17 10:16 AM.  Always use your most recent med list.                   Brand Name Dispense Instructions for use    ADVIL PO      Take 200 mg by mouth as needed for moderate pain       ALLEGRA 60 MG tablet   Generic drug:  fexofenadine      Take 60 mg by mouth daily       dronabinol 5 MG capsule    MARINOL    60 capsule    Take 1 capsule (5 mg) by mouth 2 times daily as needed       lidocaine-prilocaine cream    EMLA    30 g    Apply 30 g topically as needed for moderate pain Apply to port site 1 hour before access.       LORazepam 0.5 MG tablet    ATIVAN    30  tablet    Take 1 tablet (0.5 mg) by mouth every 4 hours as needed (Anxiety, Nausea/Vomiting or Sleep)       Multi-vitamin Tabs tablet      Take 1 tablet by mouth daily       NASACORT AQ 55 MCG/ACT Inhaler   Generic drug:  triamcinolone      Spray 2 sprays into both nostrils daily       omega 3 1000 MG Caps      Take 1 g by mouth daily       omeprazole 40 MG capsule    priLOSEC    90 capsule    Take 1 capsule (40 mg) by mouth daily Take 30-60 minutes before a meal.       ondansetron 8 MG ODT tab    ZOFRAN ODT    60 tablet    Take 1 tablet (8 mg) by mouth every 8 hours as needed for nausea       prochlorperazine 25 MG Suppository    COMPAZINE    60 suppository    Place 1 suppository (25 mg) rectally every 12 hours as needed for nausea       THERATEARS OP      Apply 2 drops to eye daily       TYLENOL 325 MG tablet   Generic drug:  acetaminophen      Take 325 mg by mouth every 6 hours as needed for mild pain       VITAMIN C PO      Take 250 mg by mouth daily       vitamin D 2000 UNITS tablet      Take 2,000 Units by mouth daily

## 2017-02-14 NOTE — MR AVS SNAPSHOT
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)           Follow-ups after your visit        Your next 10 appointments already scheduled     Feb 17, 2017  9:00 AM CST   Level 2 with ROOM 3 [facility] ([facility])    [facility]  [address]  [facility] [address]   [phone]            Feb 21, 2017  9:00 AM CST   Level 2 with ROOM 6 [facility] ([facility])    [facility]  [address]  [facility] [address]   [phone]            Feb 24, 2017  9:00 AM CST   Level 2 with ROOM 1 [facility] ([facility])    [facility]  [address]  [facility] [address]   [phone]            Feb 28, 2017  9:30 AM CST   Return Visit with Aline El MD   [facility] ([facility])    [facility]  [address]  [facility] [address]   [phone]            Feb 28, 2017 10:00 AM CST   Level 2 with ROOM 9 [facility] ([facility])    [facility]  [address]  [facility] [address]   [phone]            Mar 03, 2017  9:30 AM CST   Level 2 with ROOM 8 [facility] ([facility])    [facility]  [address]  [facility] [address]   [phone]            Apr 04, 2017 11:15 AM CDT   Return Visit with Cassy Ortega MD   [facility] ([facility])    [address] [facility]  [place]  [place] [address]   [phone]              Who to contact     If you have questions or need follow up information about today's clinic visit or your schedule please contact [facility] directly at [phone].  Normal or non-critical lab and imaging results will be communicated to you by MyChart, letter or phone within 4 business days after the clinic has received the results. If you do not hear from us within 7 days, please contact the clinic through Ferevohart or phone. If you have a critical or abnormal lab result, we will notify you by phone as soon as possible.  Submit refill requests through [email or web address] or call your pharmacy and they will forward the refill request to us. Please allow 3 business days for your refill to be completed.          Additional Information About Your Visit        Ferevoharmeebee Information     [email or web address] gives you secure access to your electronic health record. If you see a primary care provider, you can also send messages to your care team and make appointments. If you have questions, please call your primary care clinic.  If you do not have a primary care provider, please call [phone] and they will assist you.        Care EveryWhere ID     This is your Care EveryWhere ID. This could be used by other organizations to access your [place] medical records  [id number]         Blood Pressure from Last 3 Encounters:   02/14/17 129/72   02/09/17 110/63   02/07/17 114/67    Weight from Last 3 Encounters:   02/14/17 82.1 kg (181 lb 1.6 oz)   02/07/17 83.7 kg (184 lb 9.6 oz)   02/07/17 83.5 kg (184 lb)              Today, you had the following     No orders found for display         Today's Medication Changes          These changes are accurate as of: 2/14/17  4:26 PM.  If you have any questions, ask your nurse or doctor.               Start taking these medicines.        Dose/Directions    prochlorperazine 25 MG Suppository   Commonly known as:  COMPAZINE   Used for:  Chemotherapy-induced nausea    Started by:  Aline El MD        Dose:  25 mg   Place 1 suppository (25 mg) rectally every 12 hours as needed for nausea   Quantity:  60 suppository   Refills:  0            Where to get your medicines      These medications were sent to [facility]  [address] [facility]  [address] [facility] [address]     Phone:  [phone]     prochlorperazine 25 MG Suppository                Primary Care Provider Office Phone # Fax #    Dillan Jackson Amos [phone]       [facility] [address] [facility] [address]        Thank you!     Thank you for choosing [facility]  for your care. Our goal is always to provide you with excellent care. Hearing back from our patients is one way we can continue to improve our services. Please take a few minutes to complete the written survey that you may receive in the mail after your visit with us. Thank you!             Your Updated Medication List - Protect others around you: Learn how to safely use, store and throw away your medicines at www.disposemymeds.org.          This list is accurate as of: 2/14/17  4:26 PM.  Always use your most recent med list.                   Brand Name Dispense Instructions for use    ADVIL PO      Take 200 mg by mouth as needed for moderate pain       ALLEGRA 60 MG tablet   Generic drug:  fexofenadine      Take 60 mg by mouth daily       dronabinol 5 MG capsule    MARINOL    60 capsule    Take 1 capsule (5 mg) by mouth 2 times daily as needed       lidocaine-prilocaine cream    EMLA    30 g    Apply 30 g topically as needed for moderate pain Apply to port site 1 hour before access.       LORazepam 0.5 MG tablet    ATIVAN    30 tablet    Take 1 tablet (0.5 mg) by mouth every 4 hours as needed (Anxiety, Nausea/Vomiting or Sleep)       Multi-vitamin Tabs tablet      Take 1 tablet by mouth daily       NASACORT AQ 55 MCG/ACT Inhaler   Generic drug:  triamcinolone      Spray 2  sprays into both nostrils daily       omega 3 1000 MG Caps      Take 1 g by mouth daily       omeprazole 40 MG capsule    priLOSEC    90 capsule    Take 1 capsule (40 mg) by mouth daily Take 30-60 minutes before a meal.       ondansetron 8 MG ODT tab    ZOFRAN ODT    60 tablet    Take 1 tablet (8 mg) by mouth every 8 hours as needed for nausea       prochlorperazine 25 MG Suppository    COMPAZINE    60 suppository    Place 1 suppository (25 mg) rectally every 12 hours as needed for nausea       THERATEARS OP      Apply 2 drops to eye daily       TYLENOL 325 MG tablet   Generic drug:  acetaminophen      Take 325 mg by mouth every 6 hours as needed for mild pain       VITAMIN C PO      Take 250 mg by mouth daily       vitamin D 2000 UNITS tablet      Take 2,000 Units by mouth daily

## 2017-02-14 NOTE — PROGRESS NOTES
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  Eventually had upper endoscopy workup in early 11/2016, identified large fungating submucosal mass within the lower third of the esophagus in the gastric GE junction and in the cardia.  This mass was partially obstructing and circumferential.      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 Her 2 is negative by FISH and IHC.  CT 11/2016 that was negative in terms of mass, gastric wall thickening, lymphadenopathy, found diffuse fatty infiltrate of the liver, pancreatic atrophy.   PET 12/2016 found hypermetabolic uptake SUV 16.6 in the proximal to mid stomach compatible with the patient's recently diagnosed gastric cancer.No Esophageal activity. Suspicious heptogastric LN, PET active SUV 4.2  aorto caval LN.   EUS 12/2016 found A large, fungating and submucosal mass was found at the lower third of the esophagus at 34 cm from the incisors, which was nearly two-thirds circumferential  and is partially obstructing the esophagus and was seen extending to the gastric cardia, endo evidence of, acites was found. 4 abnormal lymph nodes were visualized in the lower paraesophageal mediastinum (level 8L).   Aortacaval LN endoscopic ultrasound-guided fine needle aspiration found positive for malignancy, consistent with metastatic adenocarcinoma consistent with upper gastrointestinal primary.    She is found to have extensive lower esophogeal/gastric cancer with near obstruction lower esophageal /GE junction tumor, cT3N+ (aorta caval LN + on biopsy), with ascites.     She was seen by Dr. Levy  "(2016) and deemed not a surgical candidate. She saw Rad-Onc and felt due to he local symptoms, RT is beneficial. Concurrent RT/wkly carbo/taxol is offered from end of Dec 2016 to 2017.     Other than dysphagia and weight loss she has no particular pain.  She is otherwise in her usual state of health.      PAST MEDICAL HISTORY:     1.  Reflux disease.     2.  History of cholecystitis.     3.  Rosacea.    4.  Degenerative disk disease.    5.  Obesity.   6.  Dupuytren's disease.      MEDICATIONS:  Reviewed in Epic system.      SOCIAL HISTORY:  She is retired.  She lives in [place].  She is a very active 66-year-old female.  Denies smoking.  She drinks 1-2 glasses of white wine every day.      FAMILY HISTORY:  Positive for dad  from lung cancer who smoked 4 packs per day.  Maternal grandmother  from breast cancer.  No family history of GI cancer.       REVIEW OF SYSTEMS:   progressive dysphagia, she is eating small, soft frequent meals and relay a lot more on protein shakes.    +nausea despite ativan, zofran, emend, marinol  She feels weak.     PHYSICAL EXAMINATION:   GENERAL APPEARANCE:  A middle-aged woman, looks like her stated age, very pleasant, not in acute distress.     VITAL SIGNS: Blood pressure 129/72, pulse 100, temperature 98.2  F (36.8  C), temperature source Tympanic, resp. rate 18, height 1.562 m (5' 1.5\"), weight 82.1 kg (181 lb 1.6 oz), SpO2 99 %, not currently breastfeeding.  HEENT: The patient is normocephalic, atraumatic. Pupils are equally react to light.  Sclerae are anicteric.  Moist oral mucosa.  Negative pharynx.  No oral thrush.   NECK:  Supple.  No jugular venous distention.  Thyroid is not palpable.   LYMPH NODES:  Superficial lymphadenopathy is not appreciable in the bilateral cervical, supraclavicular, axillary or inguinal areas.   CARDIOVASCULAR:  S1, S2 regular with no murmurs or gallops.  No carotid or abdominal bruits.   PULMONARY:  Lungs are clear to " auscultation and percussion bilaterally.  There is no wheezing or rhonchi.   GASTROINTESTINAL:  Abdomen is soft, nontender.  No hepatosplenomegaly.  No signs of ascites.  No mass appreciable.   MUSCULOSKELETAL/EXTREMITIES:  No edema.  No cyanotic changes.  No signs of joint deformity.  No lymphedema.   NEUROLOGIC:  Cranial nerves II-XII are grossly intact.  Sensation intact.  Muscle strength and muscle tone symmetrical 5/5 throughout.   BACK:  No spinal or paraspinal tenderness.  No CVA tenderness.   SKIN:  No petechiae.  No rash.  No signs of cellulitis.   BREASTS:  Bilateral breast exam review.  No mass, no tenderness, no skin changes and no nipple discharge.      CURRENT LABORATORY DATA:   Cbc diff, cr are fine     OLD DATA REVIEW REVIEWED WITH SUMMARY:   Baseline cbc diff/CMP are nl, CEA 8.7 in 11/2016    Pathology 12/5/2016 Lymph node, aortacaval, endoscopic ultrasound-guided fine needle aspiration with shark core needle:   - Positive for malignancy.   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 Her2- by FISH.      EUS 12/5/2016, scope can't pass through GE junction-  A large, fungating and submucosal mass was found at the lower third of the esophagus at 34 cm from the incisors, which was nearly two-thirds circumferential  and is partially obstructing the esophagus and was seen extending to the gastric cardia.  Ascites was found.  4 abnormal lymph nodes were visualized in the lower paraesophageal mediastinum (level 8L). This will likely represent N2 on the evans staging.    PET 11/30/2016  1. Hypermetabolic uptake SUV 16.6 in the proximal to mid stomach compatible with the patient's recently diagnosed gastric cancer.No Esophageal activity.   2. There is an elongated but slender gastrohepatic node just medial to the proximal stomach. Less than 1 cm  in transverse dimension. Suspected to be hyper metabolic but SUV difficult to measure  separately from adjacent stomach.  3. Hypermetabolic retroperitoneal node, aorto caval location just posterior to transverse duodenum with SUV max 4.2 compatible with metastasis.  4. Hypermetabolic hepatic flexure and distal ascending colon without CT abnormality is suspected to be physiologic.  5. Nonenlarged subcentimeter hypermetabolic node in the right side of the neck at the level of the tongue is nonspecific.    CT chest, abdomen and pelvis 11/05/2016 - esophageal GE junction within normal limits.  Mild fatty infiltrate of the liver, pancreatic atrophy.         ASSESSMENT AND PLAN:    1. 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  Biopsies via upper endoscopy was done in early November 2016.  She has a large fungating mass circumferential, partially obstructing lesion in that location.   She is found to have extensive lower esophogeal/gastric cancer with near obstruction lower esophageal /GE junction tumor, cT3N+ (aorta caval LN + on biopsy), with ascites. Her 2 - by FISH and IHc.     She was seen by Dr. Levy and deemed not a surgical candidate. She saw Rad-Onc and felt due to he local symptoms, RT is beneficial. Concurrent RT/wkly carbo/taxol is discussed. We talked about the side effects, goal of tx is to dowstaging, and palliation. Se made informed decision to proceed, end of Dec 2016.     She is to get 2-3 wks break then get on systemic chemo likely FOLFOX.     2. Neutropenia - Neutropenic precaution is advised.     3. Nausea with dysphagia despite IV emend, ativan and ODT zofran, and Marinol 5 mg po bid.   Suspect local absorption issue. Advice to try ODT zofran and supp compazine.   She is to get ongoing IVF 2 x per wk prn support.

## 2017-02-17 NOTE — MR AVS SNAPSHOT
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)           Follow-ups after your visit        Your next 10 appointments already scheduled     Feb 21, 2017  9:00 AM CST   Level 2 with ROOM 6 [facility] ([facility])    [facility] [place]  [address]  [facility] [address]   [phone]            Feb 24, 2017  9:00 AM CST   Level 2 with ROOM 1 [facility] ([facility])    [facility] [place]  [address]  [facility] [address]   [phone]            Feb 28, 2017  9:30 AM CST   Return Visit with Aline El MD   [facility] ([facility])    [facility] [place]  [address]  [facility] [address]   [phone]            Feb 28, 2017 10:00 AM CST   Level 2 with ROOM 9 [facility] ([facility])    [facility] [place]  [address]  [facility] [address]   [phone]            Mar 03, 2017  9:30 AM CST   Level 2 with ROOM 8 [facility] ([facility])    [facility]  [address]  [facility] [address]   [phone]            Apr 04, 2017 11:15 AM CDT   Return Visit with Cassy Ortega MD   [facility] ([facility])    [address]  [facility] [address]   [phone]              Who to contact     If you have questions or need follow up information about today's clinic visit or your schedule please contact [facility] INF[facility] directly at  [phone].  Normal or non-critical lab and imaging results will be communicated to you by MyChart, letter or phone within 4 business days after the clinic has received the results. If you do not hear from us within 7 days, please contact the clinic through [employer] or phone. If you have a critical or abnormal lab result, we will notify you by phone as soon as possible.  Submit refill requests through [employer] or call your pharmacy and they will forward the refill request to us. Please allow 3 business days for your refill to be completed.          Additional Information About Your Visit        [employer] Information     [employer] gives you secure access to your electronic health record. If you see a primary care provider, you can also send messages to your care team and make appointments. If you have questions, please call your primary care clinic.  If you do not have a primary care provider, please call [phone] and they will assist you.        Care EveryWhere ID     This is your Care EveryWhere ID. This could be used by other organizations to access your [place] medical records  [id number]        Your Vitals Were     Pulse Temperature                80 97.8  F (36.6  C) (Oral)           Blood Pressure from Last 3 Encounters:   02/17/17 136/74   02/14/17 129/72   02/09/17 110/63    Weight from Last 3 Encounters:   02/14/17 82.1 kg (181 lb 1.6 oz)   02/07/17 83.7 kg (184 lb 9.6 oz)   02/07/17 83.5 kg (184 lb)              Today, you had the following     No orders found for display       Primary Care Provider Office Phone # Fax #    Dillan Amos [phone]       [facility] [address] [facility] [address] [facility] [address]        Thank you!     Thank you for choosing [facility]  for your care. Our goal is always to provide you with excellent care. Hearing back from our patients is one way we can continue to improve our services. Please take a few minutes to complete the  written survey that you may receive in the mail after your visit with us. Thank you!             Your Updated Medication List - Protect others around you: Learn how to safely use, store and throw away your medicines at www.disposemymeds.org.          This list is accurate as of: 2/17/17 12:33 PM.  Always use your most recent med list.                   Brand Name Dispense Instructions for use    ADVIL PO      Take 200 mg by mouth as needed for moderate pain       ALLEGRA 60 MG tablet   Generic drug:  fexofenadine      Take 60 mg by mouth daily       dronabinol 5 MG capsule    MARINOL    60 capsule    Take 1 capsule (5 mg) by mouth 2 times daily as needed       lidocaine-prilocaine cream    EMLA    30 g    Apply 30 g topically as needed for moderate pain Apply to port site 1 hour before access.       LORazepam 0.5 MG tablet    ATIVAN    30 tablet    Take 1 tablet (0.5 mg) by mouth every 4 hours as needed (Anxiety, Nausea/Vomiting or Sleep)       Multi-vitamin Tabs tablet      Take 1 tablet by mouth daily       NASACORT AQ 55 MCG/ACT Inhaler   Generic drug:  triamcinolone      Spray 2 sprays into both nostrils daily       omega 3 1000 MG Caps      Take 1 g by mouth daily       omeprazole 40 MG capsule    priLOSEC    90 capsule    Take 1 capsule (40 mg) by mouth daily Take 30-60 minutes before a meal.       ondansetron 8 MG ODT tab    ZOFRAN ODT    60 tablet    Take 1 tablet (8 mg) by mouth every 8 hours as needed for nausea       prochlorperazine 25 MG Suppository    COMPAZINE    60 suppository    Place 1 suppository (25 mg) rectally every 12 hours as needed for nausea       THERATEARS OP      Apply 2 drops to eye daily       TYLENOL 325 MG tablet   Generic drug:  acetaminophen      Take 325 mg by mouth every 6 hours as needed for mild pain       VITAMIN C PO      Take 250 mg by mouth daily       vitamin D 2000 UNITS tablet      Take 2,000 Units by mouth daily

## 2017-02-17 NOTE — PROGRESS NOTES
Infusion Nursing Note:  Bren Rendon presents today for IVF's, antiemetics.    Patient seen by provider today: No   present during visit today: Not Applicable.    Note: N/A.    Intravenous Access:  Implanted Port.    Treatment Conditions:  Not Applicable.      Post Infusion Assessment:  Patient tolerated infusion without incident.  Blood return noted pre and post infusion.  Site patent and intact, free from redness, edema or discomfort.  Access discontinued per protocol.    Discharge Plan:   Patient discharged in stable condition accompanied by: friend.  Departure Mode: Ambulatory.    Nadja Krause RN

## 2017-02-21 NOTE — MR AVS SNAPSHOT
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)        Dehydration        Nausea           Follow-ups after your visit        Your next 10 appointments already scheduled     Feb 24, 2017  9:00 AM CST   Level 2 with ROOM 1 [facility] ([facility])    [facility]  [address]  [facility] [address]   [phone]            Feb 28, 2017  9:30 AM CST   Return Visit with Aline El MD   [facility] ([facility])    [facility]  [address]  [facility] [address]   [phone]            Feb 28, 2017 10:00 AM CST   Level 2 with ROOM 9 [facility] ([facility])    [facility]  [address]  [facility] [address]   [phone]            Mar 03, 2017  9:30 AM CST   Level 2 with ROOM 8 [facility] ([facility])    [facility]  [address]  [facility] [address]   [phone]            Apr 04, 2017 11:15 AM CDT   Return Visit with Cassy Ortega MD   [facility] ([facility])    [address]  [facility] [address]   [phone]              Who to contact     If you have questions or need follow up information about today's clinic visit or your schedule please contact [facility] directly at [phone].  Normal or non-critical lab and imaging results will be communicated to you by MyChart, letter or phone within 4 business days after the clinic has received the results. If you do not hear from us within 7 days,  please contact the clinic through [employer] or phone. If you have a critical or abnormal lab result, we will notify you by phone as soon as possible.  Submit refill requests through [employer] or call your pharmacy and they will forward the refill request to us. Please allow 3 business days for your refill to be completed.          Additional Information About Your Visit        [employer] Information     [employer] gives you secure access to your electronic health record. If you see a primary care provider, you can also send messages to your care team and make appointments. If you have questions, please call your primary care clinic.  If you do not have a primary care provider, please call [phone] and they will assist you.        Care EveryWhere ID     This is your Care EveryWhere ID. This could be used by other organizations to access your [place] medical records  [id number]         Blood Pressure from Last 3 Encounters:   02/17/17 136/74   02/14/17 129/72   02/09/17 110/63    Weight from Last 3 Encounters:   02/14/17 82.1 kg (181 lb 1.6 oz)   02/07/17 83.7 kg (184 lb 9.6 oz)   02/07/17 83.5 kg (184 lb)              Today, you had the following     No orders found for display       Primary Care Provider Office Phone # Fax #    Dillan Amos [phone]       [facility] [address] [facility] [address]        Thank you!     Thank you for choosing [facility]  for your care. Our goal is always to provide you with excellent care. Hearing back from our patients is one way we can continue to improve our services. Please take a few minutes to complete the written survey that you may receive in the mail after your visit with us. Thank you!             Your Updated Medication List - Protect others around you: Learn how to safely use, store and throw away your medicines at www.disposemymeds.org.          This list is accurate as of: 2/21/17  3:39 PM.  Always use your most  recent med list.                   Brand Name Dispense Instructions for use    ADVIL PO      Take 200 mg by mouth as needed for moderate pain       ALLEGRA 60 MG tablet   Generic drug:  fexofenadine      Take 60 mg by mouth daily       dronabinol 5 MG capsule    MARINOL    60 capsule    Take 1 capsule (5 mg) by mouth 2 times daily as needed       lidocaine-prilocaine cream    EMLA    30 g    Apply 30 g topically as needed for moderate pain Apply to port site 1 hour before access.       LORazepam 0.5 MG tablet    ATIVAN    30 tablet    Take 1 tablet (0.5 mg) by mouth every 4 hours as needed (Anxiety, Nausea/Vomiting or Sleep)       Multi-vitamin Tabs tablet      Take 1 tablet by mouth daily       NASACORT AQ 55 MCG/ACT Inhaler   Generic drug:  triamcinolone      Spray 2 sprays into both nostrils daily       omega 3 1000 MG Caps      Take 1 g by mouth daily       omeprazole 40 MG capsule    priLOSEC    90 capsule    Take 1 capsule (40 mg) by mouth daily Take 30-60 minutes before a meal.       ondansetron 8 MG ODT tab    ZOFRAN ODT    60 tablet    Take 1 tablet (8 mg) by mouth every 8 hours as needed for nausea       prochlorperazine 25 MG Suppository    COMPAZINE    60 suppository    Place 1 suppository (25 mg) rectally every 12 hours as needed for nausea       THERATEARS OP      Apply 2 drops to eye daily       TYLENOL 325 MG tablet   Generic drug:  acetaminophen      Take 325 mg by mouth every 6 hours as needed for mild pain       VITAMIN C PO      Take 250 mg by mouth daily       vitamin D 2000 UNITS tablet      Take 2,000 Units by mouth daily

## 2017-02-21 NOTE — PROGRESS NOTES
Infusion Nursing Note:  Bren Rendon presents today for IV hydration and antiemetics.    Patient seen by provider today: No   present during visit today: Not Applicable.    Note: N/A.    Intravenous Access:  Implanted Port.    Treatment Conditions:  Not Applicable.      Post Infusion Assessment:  Patient tolerated infusion without incident.    Discharge Plan:   Patient discharged in stable condition accompanied by: self.  Pt will try to take in sufficient food and fluids. She does have an appt for IV hydration on Friday 2/24 but hopes to cancel.  Samantha Omalley RN

## 2017-02-28 NOTE — NURSING NOTE
"Bren Rendon is a 66 year old female who presents for:  Chief Complaint   Patient presents with     Oncology Clinic Visit     2 week recheck Esophageal CA        Initial Vitals:  /78 (BP Location: Right arm, Patient Position: Chair, Cuff Size: Adult Regular)  Pulse 99  Temp 97.6  F (36.4  C) (Tympanic)  Resp 16  Ht 1.562 m (5' 1.5\")  Wt 81.1 kg (178 lb 14.4 oz)  SpO2 99%  Breastfeeding? No  BMI 33.26 kg/m2 Estimated body mass index is 33.26 kg/(m^2) as calculated from the following:    Height as of this encounter: 1.562 m (5' 1.5\").    Weight as of this encounter: 81.1 kg (178 lb 14.4 oz).. Body surface area is 1.88 meters squared. BP completed using cuff size: regular  No Pain (0) No LMP recorded. Patient is postmenopausal. Allergies and medications reviewed.     Medications: MEDICATION REFILLS NEEDED TODAY.  Pharmacy name entered into [facility]:      [facility] - [place], [place] - [address] [address]    Comments: 2 week recheck Esophageal CA.   7  minutes for nursing intake (face to face time)   Vanesa Hickey CMA        "

## 2017-02-28 NOTE — PROGRESS NOTES
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  Eventually had upper endoscopy workup in early 11/2016, identified large fungating submucosal mass within the lower third of the esophagus in the gastric GE junction and in the cardia.  This mass was partially obstructing and circumferential.      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 Her 2 is negative by FISH and IHC.  CT 11/2016 that was negative in terms of mass, gastric wall thickening, lymphadenopathy, found diffuse fatty infiltrate of the liver, pancreatic atrophy.   PET 12/2016 found hypermetabolic uptake SUV 16.6 in the proximal to mid stomach compatible with the patient's recently diagnosed gastric cancer.No Esophageal activity. Suspicious heptogastric LN, PET active SUV 4.2  aorto caval LN.   EUS 12/2016 found A large, fungating and submucosal mass was found at the lower third of the esophagus at 34 cm from the incisors, which was nearly two-thirds circumferential  and is partially obstructing the esophagus and was seen extending to the gastric cardia, endo evidence of, acites was found. 4 abnormal lymph nodes were visualized in the lower paraesophageal mediastinum (level 8L).   Aortacaval LN endoscopic ultrasound-guided fine needle aspiration found positive for malignancy, consistent with metastatic adenocarcinoma consistent with upper gastrointestinal primary.    She is found to have extensive lower esophogeal/gastric cancer with near obstruction lower esophageal /GE junction tumor, cT3N+ (aorta caval LN + on biopsy), with ascites.     She was seen by Dr. Levy  "(2016) and deemed not a surgical candidate. She saw Rad-Onc and felt due to he local symptoms, RT is beneficial. Concurrent RT/wkly carbo/taxol is offered from end of Dec 2016 to 2017.     Other than dysphagia and weight loss she has no particular pain.  She is otherwise in her usual state of health.      PAST MEDICAL HISTORY:     1.  Reflux disease.     2.  History of cholecystitis.     3.  Rosacea.    4.  Degenerative disk disease.    5.  Obesity.   6.  Dupuytren's disease.      MEDICATIONS:  Reviewed in Epic system.      SOCIAL HISTORY:  She is retired.  She lives in [place].  She is a very active 66-year-old female.  Denies smoking.  She drinks 1-2 glasses of white wine every day.      FAMILY HISTORY:  Positive for dad  from lung cancer who smoked 4 packs per day.  Maternal grandmother  from breast cancer.  No family history of GI cancer.       REVIEW OF SYSTEMS:   Nausea and eating are much better without chemo/RT.   She feels stronger.     PHYSICAL EXAMINATION:   GENERAL APPEARANCE:  A middle-aged woman, looks like her stated age, very pleasant, not in acute distress.     VITAL SIGNS: Blood pressure 124/78, pulse 99, temperature 97.6  F (36.4  C), temperature source Tympanic, resp. rate 16, height 1.562 m (5' 1.5\"), weight 81.1 kg (178 lb 14.4 oz), SpO2 99 %, not currently breastfeeding.  HEENT: The patient is normocephalic, atraumatic. Pupils are equally react to light.  Sclerae are anicteric.  Moist oral mucosa.  Negative pharynx.  No oral thrush.   NECK:  Supple.  No jugular venous distention.  Thyroid is not palpable.   LYMPH NODES:  Superficial lymphadenopathy is not appreciable in the bilateral cervical, supraclavicular, axillary or inguinal areas.   CARDIOVASCULAR:  S1, S2 regular with no murmurs or gallops.  No carotid or abdominal bruits.   PULMONARY:  Lungs are clear to auscultation and percussion bilaterally.  There is no wheezing or rhonchi.   GASTROINTESTINAL:  Abdomen is " soft, nontender.  No hepatosplenomegaly.  No signs of ascites.  No mass appreciable.   MUSCULOSKELETAL/EXTREMITIES:  No edema.  No cyanotic changes.  No signs of joint deformity.  No lymphedema.   NEUROLOGIC:  Cranial nerves II-XII are grossly intact.  Sensation intact.  Muscle strength and muscle tone symmetrical 5/5 throughout.   BACK:  No spinal or paraspinal tenderness.  No CVA tenderness.   SKIN:  No petechiae.  No rash.  No signs of cellulitis.   BREASTS:  Bilateral breast exam review.  No mass, no tenderness, no skin changes and no nipple discharge.      CURRENT LABORATORY DATA:   Cbc diff, cr are fine     OLD DATA REVIEW REVIEWED WITH SUMMARY:   Baseline cbc diff/CMP are nl, CEA 8.7 in 11/2016    Pathology 12/5/2016 Lymph node, aortacaval, endoscopic ultrasound-guided fine needle aspiration with shark core needle:   - Positive for malignancy.   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 Her2- by FISH/IHC.      EUS 12/5/2016, scope can't pass through GE junction-  A large, fungating and submucosal mass was found at the lower third of the esophagus at 34 cm from the incisors, which was nearly two-thirds circumferential  and is partially obstructing the esophagus and was seen extending to the gastric cardia.  Ascites was found.  4 abnormal lymph nodes were visualized in the lower paraesophageal mediastinum (level 8L). This will likely represent N2 on the evans staging.    PET 11/30/2016  1. Hypermetabolic uptake SUV 16.6 in the proximal to mid stomach compatible with the patient's recently diagnosed gastric cancer.No Esophageal activity.   2. There is an elongated but slender gastrohepatic node just medial to the proximal stomach. Less than 1 cm in transverse dimension. Suspected to be hyper metabolic but SUV difficult to measure  separately from  adjacent stomach.  3. Hypermetabolic retroperitoneal node, aorto caval location just posterior to transverse duodenum with SUV max 4.2 compatible with metastasis.  4. Hypermetabolic hepatic flexure and distal ascending colon without CT abnormality is suspected to be physiologic.  5. Nonenlarged subcentimeter hypermetabolic node in the right side of the neck at the level of the tongue is nonspecific.    CT chest, abdomen and pelvis 11/05/2016 - esophageal GE junction within normal limits.  Mild fatty infiltrate of the liver, pancreatic atrophy.         ASSESSMENT AND PLAN:    1. 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  Biopsies via upper endoscopy was done in early November 2016.  She has a large fungating mass circumferential, partially obstructing lesion in that location.   She is found to have extensive lower esophogeal/gastric cancer with near obstruction lower esophageal /GE junction tumor, cT3N+ (aorta caval LN + on biopsy), with ascites. Her 2 - by FISH and IHc.     She was seen by Dr. Levy and deemed not a surgical candidate. She saw Rad-Onc and felt due to he local symptoms, RT is beneficial. Concurrent RT/wkly carbo/taxol is discussed. We talked about the side effects, goal of tx is to dowstaging, and palliation. Se made informed decision to proceed. She finished it early Feb.    She is on break, which helps to build her up.  We discussed palliative FOLFOX, how it is delivered, side effects, she made informed decision to proceed. Plan to start 3/7/2017.     Due restaging PET in April.     2. Nausea with dysphagia.   Advice ODT zofran, compazine supp. Ativan at bed time.   With this regimen, she is able to eat, and keep food down and has nl BM.    She is off IVF support now.     Total time is 25 minutes, more than 15 minutes spent in counseling regarding her disease status, tx recommendation, chemo side effects.

## 2017-02-28 NOTE — PROGRESS NOTES
"Chemotherapy Education  Patient is a 66 year old female here today for chemotherapy education, accompanied by her  and son.  Pt has a cancer diagnosis of stomach cancer and their main concern is side effects related to tx.  Their Oncologist is Dr. El.  Christina diaz and their support person:  Treatment Goal/Regimen/Duration; FOLFOX x 3 for Palliation and rationale for strict adherence, specific medication names including pre-treatment medications and at home scheduled or as needed medications, delivery methods, and side effects and management; including skin changes/hand-foot syndrome, anemia, neutropenia, thrombocytopenia, diarrhea/constipation, hair loss syndrome, memory changes/ \"chemobrain\", mouth sores, taste changes, neuropathy, fatigue, infertility, myelosuppression, and risk of extravasation or infiltration.  Infection prevention, and monitoring of lab values, what lab tests and what changes of these values meant, along with the possibility of hydration or blood product transfusion, or the need to defer or hold treatment.    General Chemotherapy Information, including ways it is excreted from the body and cleaning and containment of vomitus or other bodily fluid, use of the bathroom, sexual health and intimacy, what to do if needing to miss a treatment, when to call a provider and the need for staff to wear protective equipment.  Importance of Central line care (port) or IV site care.  Proper use of the take home infusion pump, troubleshooting, and who to call if there is a malfunction.  Written Information: written information including the \"Getting Ready for Chemotherapy: What to Expect, Before, During, and After your Treatment\" booklet, specific drug information guides printed from [employer], [employer] booklets \"Eating Hints: Before, During, and After Cancer Treatment\" and \"Chemotherapy and You\".  Also, a folder with information on when to contact the provider, various programs offered at [facility], and our business card with contact information given; Oncology Clinic, RN Case Manager, and the after hours Nurse Advise Line.  No barriers to learning identified. Patient and family verbalized understanding of all written and verbal information. All questions answered to patients satisfaction.   General Orientation to the Medical Oncology department, Infusion Services department, Huc/scheduling, bathrooms and usual flow of the treatment day provided as well as introduction to the Infusion nurses.    Bottle Pump Infuser:  Patient education sheet given to patient regarding how to use, care for, and monitor the infuser pump.  Patient also instructed to check to ensure balloon is deflating throughout the day. Model of bottle infuser shown to patient to allow for questions and for patient to become familiar with infuser pump.  Other Concerns:   Pt instructed to call with further questions or concerns.  Patient states understanding and is in agreement with this plan.  Copy of appointments, and after visit summary (AVS) given to patient. Patient discharged     Face to Face Time with Patient: 30 min.    Fabiana Greenberg RN, BSN, OCN  Oncology Hematology   [facility]  Phone [phone]

## 2017-02-28 NOTE — MR AVS SNAPSHOT
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 El recommends FOLFOX chemotherapy to begin next Tuesday and would like to see you back with your 2nd cycle. Your prescription has been sent to:     [facility] [id number] [facility] [address]  Phone: [phone] Fax: [phone]       When you are in need of a refill, please call your pharmacy and they will send us a request.  Copy of appointments, and after visit summary (AVS) given to patient.  If you have any questions please call Fabiana Greenberg RN, BSN, OCN Oncology Hematology  [facility] [phone]. For questions after business hours, or on holidays/weekends, please call our after hours Nurse Triage line [phone]. Thank you.             Follow-ups after your visit        Your next 10 appointments already scheduled     Mar 07, 2017  8:00 AM CST   Level 6 with ROOM 5 [facility] ([facility])    [facility]  [address]  [facility] [address]   [phone]            Mar 09, 2017 12:00 PM CST   Level O with ROOM 5 [facility] ([facility])    [facility]  [address]  [facility] [address]   [phone]            Mar 21, 2017  8:00 AM CDT   Level 6 with ROOM 8 [facility] ([facility])    [facility]  [address]  [facility] [address]   [phone]            Mar 21, 2017  9:00 AM CDT    Return Visit with Aline El MD   [facility] ([facility])    [facility]  [address]  [facility] [address]   [phone]            Mar 23, 2017 12:00 PM CDT   Level O with ROOM 8 [facility] ([facility])    [facility]  [address]  [facility] [address]   [phone]            Apr 04, 2017 11:15 AM CDT   Return Visit with Cassy Ortega MD   [facility]    [address]  [facility] [address]   [phone]              Who to contact     If you have questions or need follow up information about today's clinic visit or your schedule please contact [facility] directly at [phone].  Normal or non-critical lab and imaging results will be communicated to you by MyChart, letter or phone within 4 business days after the clinic has received the results. If you do not hear from us within 7 days, please contact the clinic through [employer] or phone. If you have a critical or abnormal lab result, we will notify you by phone as soon as possible.  Submit refill requests through [employer] or call your pharmacy and they will forward the refill request to us. Please allow 3 business days for your refill to be completed.          Additional Information About Your Visit        MyChart Information     [employer] gives you secure access to your electronic health record. If you see a primary care provider, you can also send messages to your care team and make appointments. If you have questions, please call your primary care clinic.  If you do not have a primary care provider, please call [phone] and they will assist you.        Care EveryWhere ID     This is your Care EveryWhere ID. This could be used by other organizations to access your [place] medical records  [id number]        Your Vitals Were     Pulse Temperature Respirations Height  "Pulse Oximetry Breastfeeding?    99 97.6  F (36.4  C) (Tympanic) 16 1.562 m (5' 1.5\") 99% No    BMI (Body Mass Index)                   33.26 kg/m2            Blood Pressure from Last 3 Encounters:   02/28/17 124/78   02/17/17 136/74   02/14/17 129/72    Weight from Last 3 Encounters:   02/28/17 81.1 kg (178 lb 14.4 oz)   02/14/17 82.1 kg (181 lb 1.6 oz)   02/07/17 83.7 kg (184 lb 9.6 oz)              Today, you had the following     No orders found for display         Today's Medication Changes          These changes are accurate as of: 2/28/17 11:48 AM.  If you have any questions, ask your nurse or doctor.               These medicines have changed or have updated prescriptions.        Dose/Directions    prochlorperazine 25 MG Suppository   Commonly known as:  COMPAZINE   This may have changed:  additional instructions   Used for:  Chemotherapy-induced nausea        Dose:  25 mg   Place 1 suppository (25 mg) rectally every 12 hours as needed for nausea   Quantity:  60 suppository   Refills:  0                Primary Care Provider Office Phone # Fax #    Dillan Amos [phone]       [place] [address]        Thank you!     Thank you for choosing [facility]  for your care. Our goal is always to provide you with excellent care. Hearing back from our patients is one way we can continue to improve our services. Please take a few minutes to complete the written survey that you may receive in the mail after your visit with us. Thank you!             Your Updated Medication List - Protect others around you: Learn how to safely use, store and throw away your medicines at www.disposemymeds.org.          This list is accurate as of: 2/28/17 11:48 AM.  Always use your most recent med list.                   Brand Name Dispense Instructions for use    ADVIL PO      Take 200 mg by mouth as needed for moderate pain       ALLEGRA 60 MG tablet "   Generic drug:  fexofenadine      Take 60 mg by mouth daily       lidocaine-prilocaine cream    EMLA    30 g    Apply 30 g topically as needed for moderate pain Apply to port site 1 hour before access.       LORazepam 0.5 MG tablet    ATIVAN    30 tablet    Take 1 tablet (0.5 mg) by mouth every 4 hours as needed (Anxiety, Nausea/Vomiting or Sleep)       Multi-vitamin Tabs tablet      Take 1 tablet by mouth daily       NASACORT AQ 55 MCG/ACT Inhaler   Generic drug:  triamcinolone      Spray 2 sprays into both nostrils daily       omega 3 1000 MG Caps      Take 1 g by mouth daily       omeprazole 40 MG capsule    priLOSEC    90 capsule    Take 1 capsule (40 mg) by mouth daily Take 30-60 minutes before a meal.       ondansetron 8 MG ODT tab    ZOFRAN ODT    60 tablet    Take 1 tablet (8 mg) by mouth every 8 hours as needed for nausea       prochlorperazine 25 MG Suppository    COMPAZINE    60 suppository    Place 1 suppository (25 mg) rectally every 12 hours as needed for nausea       THERATEARS OP      Apply 2 drops to eye daily       TYLENOL 325 MG tablet   Generic drug:  acetaminophen      Take 325 mg by mouth every 6 hours as needed for mild pain       VITAMIN C PO      Take 250 mg by mouth daily       vitamin D 2000 UNITS tablet      Take 2,000 Units by mouth daily

## 2017-02-28 NOTE — PATIENT INSTRUCTIONS
Dr. El recommends FOLFOX chemotherapy to begin next Tuesday and would like to see you back with your 2nd cycle. Your prescription has been sent to:     [facility] [id number] [facility] [address]  Phone: [phone] Fax: [phone]       When you are in need of a refill, please call your pharmacy and they will send us a request.  Copy of appointments, and after visit summary (AVS) given to patient.  If you have any questions please call Fabiana Greenberg RN, BSN, OCN Oncology Hematology  [facility] [phone]. For questions after business hours, or on holidays/weekends, please call our after hours Nurse Triage line [phone]. Thank you.

## 2017-03-07 NOTE — PROGRESS NOTES
Infusion Nursing Note:  Bren MC Betsydarshana presents today for FOLFOX.    Patient seen by provider today: No   present during visit today: Not Applicable.    Note: N/A.    Intravenous Access:  Implanted Port.    Treatment Conditions:  Results reviewed, labs MET treatment parameters, ok to proceed with treatment.      Post Infusion Assessment:  Patient tolerated infusion without incident.    Discharge Plan:   Patient discharged in stable condition accompanied by: .    Abel Bianchi RN

## 2017-03-07 NOTE — MR AVS SNAPSHOT
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)           Follow-ups after your visit        Your next 10 appointments already scheduled     Mar 09, 2017 12:00 PM CST   Level O with ROOM 5 [facility] ([facility])    [facility]  [address]  [facility] [address]   [phone]            Mar 21, 2017  8:00 AM CDT   Level 6 with ROOM 8 [facility] ([facility])    [facility]  [address]  [facility] [address]   [phone]            Mar 21, 2017  9:00 AM CDT   Return Visit with Aline El MD   [facility] ([facility])    [facility]  [address]  [facility] [address]   [phone]            Mar 23, 2017 12:00 PM CDT   Level O with ROOM 8 [facility] ([facility])    [facility]  [address]  [facility] [address]   [phone]            Apr 04, 2017 11:15 AM CDT   Return Visit with Cassy Ortega MD   [facility] ([facility])    [address] [facility]  [facility] [address]   [phone]              Who to contact     If you have questions or need follow up information about today's clinic visit or your schedule please contact [facility] directly at [phone].  Normal or non-critical lab and imaging results will be communicated to you by MyChart, letter or phone within 4 business days after the clinic has received the results. If you do not hear from us within 7 days, please contact the clinic through  [employer] or phone. If you have a critical or abnormal lab result, we will notify you by phone as soon as possible.  Submit refill requests through [employer] or call your pharmacy and they will forward the refill request to us. Please allow 3 business days for your refill to be completed.          Additional Information About Your Visit        [employer] Information     [employer] gives you secure access to your electronic health record. If you see a primary care provider, you can also send messages to your care team and make appointments. If you have questions, please call your primary care clinic.  If you do not have a primary care provider, please call [phone] and they will assist you.        Care EveryWhere ID     This is your Care EveryWhere ID. This could be used by other organizations to access your [place] medical records  [id number]        Your Vitals Were     Pulse Temperature Respirations             92 97.3  F (36.3  C) 16          Blood Pressure from Last 3 Encounters:   03/07/17 126/78   02/28/17 124/78   02/17/17 136/74    Weight from Last 3 Encounters:   02/28/17 81.1 kg (178 lb 14.4 oz)   02/14/17 82.1 kg (181 lb 1.6 oz)   02/07/17 83.7 kg (184 lb 9.6 oz)              We Performed the Following     CBC with platelets differential     Comprehensive metabolic panel     MD INSTRUCTION FOR PROTOCOL     Treatment Conditions          Today's Medication Changes          These changes are accurate as of: 3/7/17  1:21 PM.  If you have any questions, ask your nurse or doctor.               These medicines have changed or have updated prescriptions.        Dose/Directions    prochlorperazine 25 MG Suppository   Commonly known as:  COMPAZINE   This may have changed:  additional instructions   Used for:  Chemotherapy-induced nausea        Dose:  25 mg   Place 1 suppository (25 mg) rectally every 12 hours as needed for nausea   Quantity:  60 suppository   Refills:  0                Primary Care Provider Office  Phone # Fax #    Dillan Amos [phone]       [facility] [address] [facility] [address]        Thank you!     Thank you for choosing [facility]  for your care. Our goal is always to provide you with excellent care. Hearing back from our patients is one way we can continue to improve our services. Please take a few minutes to complete the written survey that you may receive in the mail after your visit with us. Thank you!             Your Updated Medication List - Protect others around you: Learn how to safely use, store and throw away your medicines at www.disposemymeds.org.          This list is accurate as of: 3/7/17  1:21 PM.  Always use your most recent med list.                   Brand Name Dispense Instructions for use    ADVIL PO      Take 200 mg by mouth as needed for moderate pain       ALLEGRA 60 MG tablet   Generic drug:  fexofenadine      Take 60 mg by mouth daily       lidocaine-prilocaine cream    EMLA    30 g    Apply 30 g topically as needed for moderate pain Apply to port site 1 hour before access.       LORazepam 0.5 MG tablet    ATIVAN    30 tablet    Take 1 tablet (0.5 mg) by mouth every 4 hours as needed (Anxiety, Nausea/Vomiting or Sleep)       Multi-vitamin Tabs tablet      Take 1 tablet by mouth daily       NASACORT AQ 55 MCG/ACT Inhaler   Generic drug:  triamcinolone      Spray 2 sprays into both nostrils daily       omega 3 1000 MG Caps      Take 1 g by mouth daily       omeprazole 40 MG capsule    priLOSEC    90 capsule    Take 1 capsule (40 mg) by mouth daily Take 30-60 minutes before a meal.       ondansetron 8 MG ODT tab    ZOFRAN ODT    60 tablet    Take 1 tablet (8 mg) by mouth every 8 hours as needed for nausea       prochlorperazine 25 MG Suppository    COMPAZINE    60 suppository    Place 1 suppository (25 mg) rectally every 12 hours as needed for nausea       THERATEARS OP      Apply 2 drops to eye daily       TYLENOL 325  MG tablet   Generic drug:  acetaminophen      Take 325 mg by mouth every 6 hours as needed for mild pain       VITAMIN C PO      Take 250 mg by mouth daily       vitamin D 2000 UNITS tablet      Take 2,000 Units by mouth daily

## 2017-03-09 NOTE — PROGRESS NOTES
Infusion Nursing Note:  Bren Rendon presents today for Pump Removal .    Patient seen by provider today: No   present during visit today: Not Applicable.    Note: N/A.    Intravenous Access:  Implanted Port.    Treatment Conditions:  Not Applicable.      Post Infusion Assessment:  Site patent and intact, free from redness, edema or discomfort.    Discharge Plan:   Patient discharged in stable condition accompanied by: .    Sakina Blackwood RN

## 2017-03-09 NOTE — MR AVS SNAPSHOT
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)    -  1       Follow-ups after your visit        Your next 10 appointments already scheduled     Mar 21, 2017  8:00 AM CDT   Level 6 with ROOM 8 [facility] ([facility])    [facility]  [address]  [facility] [address]   [phone]            Mar 21, 2017  9:00 AM CDT   Return Visit with Aline El MD   [facility] ([facility])    [facility]  [address]  [facility] [address]   [phone]            Mar 23, 2017 12:00 PM CDT   Level O with ROOM 8 [facility] ([facility])    [facility]  [address]  [facility] [address]   [phone]            Apr 04, 2017 11:15 AM CDT   Return Visit with Cassy Ortega MD   [facility] Center ([facility])    [address], [address]  [facility] [address]   [phone]              Who to contact     If you have questions or need follow up information about today's clinic visit or your schedule please contact [facility] directly at [phone].  Normal or non-critical lab and imaging results will be communicated to you by MyChart, letter or phone within 4 business days after the clinic has received the results. If you do not hear from us within 7 days, please contact the clinic through MyChart or phone. If you have a critical or abnormal lab result, we will notify you by phone as soon as possible.  Submit refill requests through [employer] or call your pharmacy and they will forward the refill request to us. Please allow 3 business days  for your refill to be completed.          Additional Information About Your Visit        MyChart Information     [employer] gives you secure access to your electronic health record. If you see a primary care provider, you can also send messages to your care team and make appointments. If you have questions, please call your primary care clinic.  If you do not have a primary care provider, please call [phone] and they will assist you.        Care EveryWhere ID     This is your Care EveryWhere ID. This could be used by other organizations to access your [facility] medical records  [id number]        Your Vitals Were     Pulse Temperature Respirations             94 98  F (36.7  C) (Oral) 18          Blood Pressure from Last 3 Encounters:   03/09/17 122/75   03/07/17 126/78   02/28/17 124/78    Weight from Last 3 Encounters:   02/28/17 81.1 kg (178 lb 14.4 oz)   02/14/17 82.1 kg (181 lb 1.6 oz)   02/07/17 83.7 kg (184 lb 9.6 oz)              We Performed the Following     Central Venous Catheter: implanted port (Port-A-Cath, Power Port)          Today's Medication Changes          These changes are accurate as of: 3/9/17 10:56 AM.  If you have any questions, ask your nurse or doctor.               These medicines have changed or have updated prescriptions.        Dose/Directions    prochlorperazine 25 MG Suppository   Commonly known as:  COMPAZINE   This may have changed:  additional instructions   Used for:  Chemotherapy-induced nausea        Dose:  25 mg   Place 1 suppository (25 mg) rectally every 12 hours as needed for nausea   Quantity:  60 suppository   Refills:  0                Primary Care Provider Office Phone # Fax #    Dillan Amos [phone]       [facility] [address] [address]        Thank you!     Thank you for choosing [facility]  for your care. Our goal is always to provide you with excellent care. Hearing back from our patients is  one way we can continue to improve our services. Please take a few minutes to complete the written survey that you may receive in the mail after your visit with us. Thank you!             Your Updated Medication List - Protect others around you: Learn how to safely use, store and throw away your medicines at www.disposemymeds.org.          This list is accurate as of: 3/9/17 10:56 AM.  Always use your most recent med list.                   Brand Name Dispense Instructions for use    ADVIL PO      Take 200 mg by mouth as needed for moderate pain       ALLEGRA 60 MG tablet   Generic drug:  fexofenadine      Take 60 mg by mouth daily       lidocaine-prilocaine cream    EMLA    30 g    Apply 30 g topically as needed for moderate pain Apply to port site 1 hour before access.       LORazepam 0.5 MG tablet    ATIVAN    30 tablet    Take 1 tablet (0.5 mg) by mouth every 4 hours as needed (Anxiety, Nausea/Vomiting or Sleep)       Multi-vitamin Tabs tablet      Take 1 tablet by mouth daily       NASACORT AQ 55 MCG/ACT Inhaler   Generic drug:  triamcinolone      Spray 2 sprays into both nostrils daily       omega 3 1000 MG Caps      Take 1 g by mouth daily       omeprazole 40 MG capsule    priLOSEC    90 capsule    Take 1 capsule (40 mg) by mouth daily Take 30-60 minutes before a meal.       ondansetron 8 MG ODT tab    ZOFRAN ODT    60 tablet    Take 1 tablet (8 mg) by mouth every 8 hours as needed for nausea       prochlorperazine 25 MG Suppository    COMPAZINE    60 suppository    Place 1 suppository (25 mg) rectally every 12 hours as needed for nausea       THERATEARS OP      Apply 2 drops to eye daily       TYLENOL 325 MG tablet   Generic drug:  acetaminophen      Take 325 mg by mouth every 6 hours as needed for mild pain       VITAMIN C PO      Take 250 mg by mouth daily       vitamin D 2000 UNITS tablet      Take 2,000 Units by mouth daily

## 2017-03-10 NOTE — TELEPHONE ENCOUNTER
Called Pt for a status check status post Cycle 1 day 1 of FOLFOX initiated on 3/7/17. Pt denies symptoms at this time stating that she feels well and has taking a few of her antiemetics prophylacticly including her dissolvable zofran and compazine suppository which seems to be keeping the nausea at bay.      Informed Pt that we do not typically recommend any form of suppositories or enemas during chemotherapy treatment due to a risk of infection and bleeding but that I would verify with Dr. El on Monday whether or not it is ok for her to continue to do so. In the meantime encouraged pt to take her zofran first as needed and only to take the compazine if her nausea is not alleviated by the first.     Pt verbalized understanding and agreed with the plan. Pt states that she was given this along with the dissolveable zofran since her mass abuts her stomach and she has a hard time tolerating pills and will wait for my response on Monday.

## 2017-03-13 NOTE — TELEPHONE ENCOUNTER
Called Pt back to inform her that Dr. El authorizes her to take the compazine suppository for up to 3 days after each chemotherapy infusion but not after that due to an increase risk of bleeding and infection. Encouraged pt to take her dissolvable zofran as needed for the nausea instead btw treatments and to call me if this is not effective alone.     Pt then stated that she has been taking 2 dissolvable zofran the last few days during the day and 1 ativan at night and so far it has been controlling the nausea. Encouraged pt to continue this regimen as needed for the nausea and to call me back with any que/concerns.   Pt verbalized understanding.

## 2017-03-21 NOTE — MR AVS SNAPSHOT
After Visit Summary   3/21/2017    Bren Rendon    MRN: [id number]           Patient Information     Date Of Birth          1950        Visit Information        Provider Department      3/21/2017 9:00 AM Aline El MD [facility]        Today's Diagnoses     GE junction carcinoma (H)        Chemotherapy-induced nausea          Care Instructions    Dr. El recommends a PET scan after your treatment on April 4th and would like to see you back to review the results.  When you are in need of a refill, please call your pharmacy and they will send us a request.  Copy of appointments, and after visit summary (AVS) given to patient.  If you have any questions please call Fabiana Greenberg RN, BSN, OCN Oncology Hematology  [facility] [phone]. For questions after business hours, or on holidays/weekends, please call our after hours Nurse Triage line [phone]. Thank you.           Follow-ups after your visit        Your next 10 appointments already scheduled     Mar 23, 2017 12:00 PM CDT   Level O with ROOM 8 [facility]fusion ([facility])    [facility]  [address]  [facility] [address]   [phone]            Apr 04, 2017  8:00 AM CDT   Level 6 with ROOM 9 [facility] ([facility])    [facility]  [address]  [facility] [address]   [phone]            Apr 04, 2017 11:15 AM CDT   Return Visit with Cassy Ortega MD   Radiation Therapy Center ([facility])    [address]  [facility] [address]   [phone]            Apr 06, 2017 12:00 PM CDT   Level O with ROOM 8 [facility] ([facility])    [facility]  [address]  [facility] [address]   [phone]            Apr 12, 2017 10:45 AM CDT   PE NPET ONCOLOGY (EYES TO THIGHS) with  [id number]   [facility] Pet CT ([facility])    [address]  [facility] [address]   [phone]           Tell your doctor:   If there is any chance you may be pregnant or if you are breastfeeding.   If you have problems lying in small spaces (claustrophobia). If you do, your doctor may give you medicine to help you relax. If you have diabetes:   Have your exam early in the morning. Your blood glucose will go up as the day goes by.   Your glucose level must be 180 or less at the start of the exam. Please take any medicines you need to ensure this blood glucose level. 24 hours before your scan: Don t do any heavy exercise. (No jogging, aerobics or other workouts.) Exercise will make your pictures less accurate. 6 hours before your scan:   Stop all food and liquids (except water).   Do not chew gum or suck on mints.   If you need to take medicine with food, you may take it with a few crackers.  Please call your Imaging Department at your exam site with any questions.            Apr 13, 2017 10:30 AM CDT   Return Visit with Aline El MD   [facility] ([facility])    [facility]  [address]  [facility] [address] [phone]              Future tests that were ordered for you today     Open Future Orders        Priority Expected Expires Ordered    PET Oncology (Eyes to Thighs) Routine 4/5/2017 3/21/2018 3/21/2017            Who to contact     If you have questions or need follow up information about today's clinic visit or your schedule please contact [facility] directly at [phone].  Normal or non-critical lab and imaging results will be communicated to you by MyChart, letter or phone within 4 business days after the clinic has received the results. If you do not hear from us within 7 days, please contact the clinic through MyChart or phone. If you have a critical or abnormal lab result, we will notify you by  "phone as soon as possible.  Submit refill requests through [employer] or call your pharmacy and they will forward the refill request to us. Please allow 3 business days for your refill to be completed.          Additional Information About Your Visit        [employer] Information     [employer] gives you secure access to your electronic health record. If you see a primary care provider, you can also send messages to your care team and make appointments. If you have questions, please call your primary care clinic.  If you do not have a primary care provider, please call [phone] and they will assist you.        Care EveryWhere ID     This is your Care EveryWhere ID. This could be used by other organizations to access your [facility] medical records  [id number]        Your Vitals Were     Pulse Temperature Respirations Height Pulse Oximetry Breastfeeding?    100 98.3  F (36.8  C) (Tympanic) 18 1.562 m (5' 1.5\") 100% No    BMI (Body Mass Index)                   32.52 kg/m2            Blood Pressure from Last 3 Encounters:   03/21/17 109/70   03/09/17 122/75   03/07/17 126/78    Weight from Last 3 Encounters:   03/21/17 79.3 kg (174 lb 14.4 oz)   02/28/17 81.1 kg (178 lb 14.4 oz)   02/14/17 82.1 kg (181 lb 1.6 oz)                 Today's Medication Changes          These changes are accurate as of: 3/21/17 11:09 AM.  If you have any questions, ask your nurse or doctor.               Stop taking these medicines if you haven't already. Please contact your care team if you have questions.     ADVIL PO   Stopped by:  Aline El MD                    Primary Care Provider Office Phone # Fax #    Dillan Amos [phone]       [facility] [address] [address]        Thank you!     Thank you for choosing [facility]  for your care. Our goal is always to provide you with excellent care. Hearing back from our patients is one way we can continue to improve our services. " Please take a few minutes to complete the written survey that you may receive in the mail after your visit with us. Thank you!             Your Updated Medication List - Protect others around you: Learn how to safely use, store and throw away your medicines at www.disposemymeds.org.          This list is accurate as of: 3/21/17 11:09 AM.  Always use your most recent med list.                   Brand Name Dispense Instructions for use    ALLEGRA 60 MG tablet   Generic drug:  fexofenadine      Take 60 mg by mouth daily       alum & mag hydroxide-simethicone 200-200-20 MG/5ML Susp suspension    MYLANTA/MAALOX     Take 30 mLs by mouth every 4 hours as needed for indigestion       lidocaine-prilocaine cream    EMLA    30 g    Apply 30 g topically as needed for moderate pain Apply to port site 1 hour before access.       LORazepam 0.5 MG tablet    ATIVAN    30 tablet    Take 1 tablet (0.5 mg) by mouth every 4 hours as needed (Anxiety, Nausea/Vomiting or Sleep)       Multi-vitamin Tabs tablet      Take 1 tablet by mouth daily       NASACORT AQ 55 MCG/ACT Inhaler   Generic drug:  triamcinolone      Spray 2 sprays into both nostrils daily       omega 3 1000 MG Caps      Take 1 g by mouth daily       omeprazole 40 MG capsule    priLOSEC    90 capsule    Take 1 capsule (40 mg) by mouth daily Take 30-60 minutes before a meal.       ondansetron 8 MG ODT tab    ZOFRAN ODT    60 tablet    Take 1 tablet (8 mg) by mouth every 8 hours as needed for nausea       prochlorperazine 25 MG Suppository    COMPAZINE    60 suppository    Place 1 suppository (25 mg) rectally every 12 hours as needed for nausea       THERATEARS OP      Apply 2 drops to eye daily       TYLENOL 325 MG tablet   Generic drug:  acetaminophen      Take 325 mg by mouth every 6 hours as needed for mild pain       VITAMIN B6 PO      Take 100 mg by mouth 2 times daily Reported on 3/21/2017       VITAMIN C PO      Take 250 mg by mouth daily       vitamin D 2000 UNITS  tablet      Take 2,000 Units by mouth daily

## 2017-03-21 NOTE — MR AVS SNAPSHOT
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)           Follow-ups after your visit        Your next 10 appointments already scheduled     Mar 23, 2017 12:00 PM CDT   Level O with ROOM 8 [facility] ([facility])    [facility]  [address]  [facility] [address]   [phone]            Apr 04, 2017  8:00 AM CDT   Level 6 with ROOM 9 [facility] ([facility])    [facility]  [address]  [facility] [address]   [phone]            Apr 04, 2017 11:15 AM CDT   Return Visit with Cassy Ortega MD   [facility] ([facility])    [address]  [facility] [address]   [phone]            Apr 06, 2017 12:00 PM CDT   Level O with ROOM 8 [facility] ([facility])    [facility]  [address]  [facility] [address]   [phone]            Apr 12, 2017 10:45 AM CDT   PE NPET ONCOLOGY (EYES TO THIGHS) with [id number]   [facility] ([facility])    [address] [address]   [phone]           Tell your doctor:   If there is any chance you may be pregnant or if you are breastfeeding.   If you have problems lying in small spaces (claustrophobia). If you do, your doctor may give you medicine to help you relax. If you have diabetes:   Have your exam early in the morning. Your blood glucose will go up as the day goes by.   Your glucose level must be 180 or less at the start of the exam. Please take any medicines you need to ensure this  blood glucose level. 24 hours before your scan: Don t do any heavy exercise. (No jogging, aerobics or other workouts.) Exercise will make your pictures less accurate. 6 hours before your scan:   Stop all food and liquids (except water).   Do not chew gum or suck on mints.   If you need to take medicine with food, you may take it with a few crackers.  Please call your Imaging Department at your exam site with any questions.            Apr 13, 2017 10:30 AM CDT   Return Visit with Aline El MD   [facility] ([facility])    [facility]  [address]  [facility] [address]   [phone]              Future tests that were ordered for you today     Open Future Orders        Priority Expected Expires Ordered    PET Oncology (Eyes to Thighs) Routine 4/5/2017 3/21/2018 3/21/2017            Who to contact     If you have questions or need follow up information about today's clinic visit or your schedule please contact [facility] directly at [phone].  Normal or non-critical lab and imaging results will be communicated to you by Value Payment Systemshart, letter or phone within 4 business days after the clinic has received the results. If you do not hear from us within 7 days, please contact the clinic through HouseFixt or phone. If you have a critical or abnormal lab result, we will notify you by phone as soon as possible.  Submit refill requests through Lightera or call your pharmacy and they will forward the refill request to us. Please allow 3 business days for your refill to be completed.          Additional Information About Your Visit        Value Payment SystemsharProofpoint Information     Lightera gives you secure access to your electronic health record. If you see a primary care provider, you can also send messages to your care team and make appointments. If you have questions, please call your primary care clinic.  If you do not have a primary care provider, please call [phone] and  they will assist you.        Care EveryWhere ID     This is your Care EveryWhere ID. This could be used by other organizations to access your [facility] medical records  [id number]         Blood Pressure from Last 3 Encounters:   03/21/17 109/70   03/09/17 122/75   03/07/17 126/78    Weight from Last 3 Encounters:   03/21/17 79.3 kg (174 lb 14.4 oz)   02/28/17 81.1 kg (178 lb 14.4 oz)   02/14/17 82.1 kg (181 lb 1.6 oz)              We Performed the Following     CBC with platelets differential     Comprehensive metabolic panel          Today's Medication Changes          These changes are accurate as of: 3/21/17  3:34 PM.  If you have any questions, ask your nurse or doctor.               Stop taking these medicines if you haven't already. Please contact your care team if you have questions.     ADVIL PO   Stopped by:  Aline El MD                    Primary Care Provider Office Phone # Fax #    Dillan Jackson Amos [phone]       [facility] [address] [address]        Thank you!     Thank you for choosing [facility]  for your care. Our goal is always to provide you with excellent care. Hearing back from our patients is one way we can continue to improve our services. Please take a few minutes to complete the written survey that you may receive in the mail after your visit with us. Thank you!             Your Updated Medication List - Protect others around you: Learn how to safely use, store and throw away your medicines at www.disposemymeds.org.          This list is accurate as of: 3/21/17  3:34 PM.  Always use your most recent med list.                   Brand Name Dispense Instructions for use    ALLEGRA 60 MG tablet   Generic drug:  fexofenadine      Take 60 mg by mouth daily       alum & mag hydroxide-simethicone 200-200-20 MG/5ML Susp suspension    MYLANTA/MAALOX     Take 30 mLs by mouth every 4 hours as needed for indigestion        lidocaine-prilocaine cream    EMLA    30 g    Apply 30 g topically as needed for moderate pain Apply to port site 1 hour before access.       LORazepam 0.5 MG tablet    ATIVAN    30 tablet    Take 1 tablet (0.5 mg) by mouth every 4 hours as needed (Anxiety, Nausea/Vomiting or Sleep)       Multi-vitamin Tabs tablet      Take 1 tablet by mouth daily       NASACORT AQ 55 MCG/ACT Inhaler   Generic drug:  triamcinolone      Spray 2 sprays into both nostrils daily       omega 3 1000 MG Caps      Take 1 g by mouth daily       omeprazole 40 MG capsule    priLOSEC    90 capsule    Take 1 capsule (40 mg) by mouth daily Take 30-60 minutes before a meal.       ondansetron 8 MG ODT tab    ZOFRAN ODT    60 tablet    Take 1 tablet (8 mg) by mouth every 8 hours as needed for nausea       prochlorperazine 25 MG Suppository    COMPAZINE    60 suppository    Place 1 suppository (25 mg) rectally every 12 hours as needed for nausea       THERATEARS OP      Apply 2 drops to eye daily       TYLENOL 325 MG tablet   Generic drug:  acetaminophen      Take 325 mg by mouth every 6 hours as needed for mild pain       VITAMIN B6 PO      Take 100 mg by mouth 2 times daily Reported on 3/21/2017       VITAMIN C PO      Take 250 mg by mouth daily       vitamin D 2000 UNITS tablet      Take 2,000 Units by mouth daily

## 2017-03-21 NOTE — PROGRESS NOTES
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  Eventually had upper endoscopy workup in early 11/2016, identified large fungating submucosal mass within the lower third of the esophagus in the gastric GE junction and in the cardia.  This mass was partially obstructing and circumferential.      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 Her 2 is negative by FISH and IHC.  CT 11/2016 that was negative in terms of mass, gastric wall thickening, lymphadenopathy, found diffuse fatty infiltrate of the liver, pancreatic atrophy.   PET 12/2016 found hypermetabolic uptake SUV 16.6 in the proximal to mid stomach compatible with the patient's recently diagnosed gastric cancer.No Esophageal activity. Suspicious heptogastric LN, PET active SUV 4.2  aorto caval LN.   EUS 12/2016 found A large, fungating and submucosal mass was found at the lower third of the esophagus at 34 cm from the incisors, which was nearly two-thirds circumferential  and is partially obstructing the esophagus and was seen extending to the gastric cardia, endo evidence of, acites was found. 4 abnormal lymph nodes were visualized in the lower paraesophageal mediastinum (level 8L).   Aortacaval LN endoscopic ultrasound-guided fine needle aspiration found positive for malignancy, consistent with metastatic adenocarcinoma consistent with upper gastrointestinal primary.    She is found to have extensive lower esophogeal/gastric cancer with near obstruction lower esophageal /GE junction tumor, cT3N+ (aorta caval LN + on biopsy), with ascites.     She was seen by Dr. Levy  "(2016) and deemed not a surgical candidate. She saw Rad-Onc and felt due to he local symptoms, RT is beneficial. Concurrent RT/wkly carbo/taxol is offered from end of Dec 2016 to 2017.     Other than dysphagia and weight loss she has no particular pain.  She is otherwise in her usual state of health.      PAST MEDICAL HISTORY:     1.  Reflux disease.     2.  History of cholecystitis.     3.  Rosacea.    4.  Degenerative disk disease.    5.  Obesity.   6.  Dupuytren's disease.      MEDICATIONS:  Reviewed in Epic system.      SOCIAL HISTORY:  She is retired.  She lives in [place].  She is a very active 66-year-old female.  Denies smoking.  She drinks 1-2 glasses of white wine every day.      FAMILY HISTORY:  Positive for dad  from lung cancer who smoked 4 packs per day.  Maternal grandmother  from breast cancer.  No family history of GI cancer.       REVIEW OF SYSTEMS:   Nausea and eating are much better without chemo/RT.   She feels stronger.   Start to feel tingling sensation on finger tips and toes.     PHYSICAL EXAMINATION:   GENERAL APPEARANCE:  A middle-aged woman, looks like her stated age, very pleasant, not in acute distress.     VITAL SIGNS: Blood pressure 109/70, pulse 100, temperature 98.3  F (36.8  C), temperature source Tympanic, resp. rate 18, height 1.562 m (5' 1.5\"), weight 79.3 kg (174 lb 14.4 oz), SpO2 100 %, not currently breastfeeding.  HEENT: The patient is normocephalic, atraumatic. Pupils are equally react to light.  Sclerae are anicteric.  Moist oral mucosa.  Negative pharynx.  No oral thrush.   NECK:  Supple.  No jugular venous distention.  Thyroid is not palpable.   LYMPH NODES:  Superficial lymphadenopathy is not appreciable in the bilateral cervical, supraclavicular, axillary or inguinal areas.   CARDIOVASCULAR:  S1, S2 regular with no murmurs or gallops.  No carotid or abdominal bruits.   PULMONARY:  Lungs are clear to auscultation and percussion bilaterally.  " There is no wheezing or rhonchi.   GASTROINTESTINAL:  Abdomen is soft, nontender.  No hepatosplenomegaly.  No signs of ascites.  No mass appreciable.   MUSCULOSKELETAL/EXTREMITIES:  No edema.  No cyanotic changes.  No signs of joint deformity.  No lymphedema.   NEUROLOGIC:  Cranial nerves II-XII are grossly intact.  Sensation intact.  Muscle strength and muscle tone symmetrical 5/5 throughout.   BACK:  No spinal or paraspinal tenderness.  No CVA tenderness.   SKIN:  No petechiae.  No rash.  No signs of cellulitis.      CURRENT LABORATORY DATA:   Cbc diff, cr are OK, ANC 1.5     OLD DATA REVIEW REVIEWED WITH SUMMARY:   Baseline cbc diff/CMP are nl, CEA 8.7 in 11/2016    Pathology 12/5/2016 Lymph node, aortacaval, endoscopic ultrasound-guided fine needle aspiration with shark core needle:   - Positive for malignancy.   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 Her2- by FISH/IHC.      EUS 12/5/2016, scope can't pass through GE junction-  A large, fungating and submucosal mass was found at the lower third of the esophagus at 34 cm from the incisors, which was nearly two-thirds circumferential  and is partially obstructing the esophagus and was seen extending to the gastric cardia.  Ascites was found.  4 abnormal lymph nodes were visualized in the lower paraesophageal mediastinum (level 8L). This will likely represent N2 on the evans staging.    PET 11/30/2016  1. Hypermetabolic uptake SUV 16.6 in the proximal to mid stomach compatible with the patient's recently diagnosed gastric cancer.No Esophageal activity.   2. There is an elongated but slender gastrohepatic node just medial to the proximal stomach. Less than 1 cm in transverse dimension. Suspected to be hyper metabolic but SUV difficult to measure  separately from adjacent stomach.  3. Hypermetabolic  retroperitoneal node, aorto caval location just posterior to transverse duodenum with SUV max 4.2 compatible with metastasis.  4. Hypermetabolic hepatic flexure and distal ascending colon without CT abnormality is suspected to be physiologic.  5. Nonenlarged subcentimeter hypermetabolic node in the right side of the neck at the level of the tongue is nonspecific.    CT chest, abdomen and pelvis 11/05/2016 - esophageal GE junction within normal limits.  Mild fatty infiltrate of the liver, pancreatic atrophy.         ASSESSMENT AND PLAN:    1. 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  Biopsies via upper endoscopy was done in early November 2016.  She has a large fungating mass circumferential, partially obstructing lesion in that location.   She is found to have extensive lower esophogeal/gastric cancer with near obstruction lower esophageal /GE junction tumor, cT3N+ (aorta caval LN + on biopsy), with ascites. Her 2 - by FISH and IHc.     She was seen by Dr. Levy and deemed not a surgical candidate. She saw Rad-Onc and felt due to he local symptoms, RT is beneficial. Concurrent RT/wkly carbo/taxol is discussed. We talked about the side effects, goal of tx is to dowstaging, and palliation. Se made informed decision to proceed. She finished it early Feb.    She is on break, which helps to build her up.  We discussed palliative FOLFOX, how it is delivered, side effects, she made informed decision to proceed. It started 3/7/2017.   Due to nuetropenia. Deleted 5 fu bolus starting 3/21/2017.     She needs to be monitored closely during this course.     Due restaging PET in April.     2. Nausea with dysphagia.   Advice ODT zofran, limit supp.for 2-3 days post chemo. Ativan at bed time.   With this regimen, she is able to eat, and keep food down and has nl BM.    She is off IVF support now.     3. Developing mild nuetropenia. Deleted 5 fu bolus  starting 3/21/2017.   Neutropenic precaution is advised.     4. Developing neuropathy. Advice vit B 6 200 mg po qd to bid. .

## 2017-03-21 NOTE — PROGRESS NOTES
"Infusion Nursing Note:  Bren Rendon presents today for FOLFOX.    Patient seen by provider today: Yes: Dr. El   present during visit today: Not Applicable.    Note: Pt to return 3/213/17 for pump DC.    Intravenous Access:  Implanted Port.    Treatment Conditions:  Lab Results   Component Value Date    HGB 9.9 03/21/2017     Lab Results   Component Value Date    WBC 2.3 03/21/2017      Lab Results   Component Value Date    ANEU 1.3 03/21/2017     Lab Results   Component Value Date     03/21/2017      Lab Results   Component Value Date     03/21/2017                   Lab Results   Component Value Date    POTASSIUM 3.7 03/21/2017           Lab Results   Component Value Date    MAG 1.9 01/17/2017            Lab Results   Component Value Date    CR 0.65 03/21/2017                   Lab Results   Component Value Date    JARED 8.6 03/21/2017                Lab Results   Component Value Date    BILITOTAL 0.4 03/21/2017           Lab Results   Component Value Date    ALBUMIN 3.2 03/21/2017                    Lab Results   Component Value Date    ALT 25 03/21/2017           Lab Results   Component Value Date    AST 20 03/21/2017     Results reviewed, labs MET treatment parameters, ok to proceed with treatment.      Post Infusion Assessment:  Patient tolerated infusion without incident.  Blood return noted pre and post infusion.    Discharge Plan:   Patient discharged in stable condition accompanied by: friend.  Departure Mode: Ambulatory.  Prior to discharge: Port is secured in place with tegaderm and flushed with 10cc NS with positive blood return noted.  Continuous home infusion CADD pump connected.    All connectors secured in place and clamps taped open.    Pump started, \"running\" noted on display (CADD): YES.  Patient instructed to call our clinic or [facility] with any questions or concerns at home.  Patient verbalized understanding.    Patient set up for pump disconnect 3/23/17 in " clinic      .    Sakina Blackwood RN

## 2017-03-21 NOTE — NURSING NOTE
"Bren Rendon is a 66 year old female who presents for:  Chief Complaint   Patient presents with     Oncology Clinic Visit     3 week recheck Esophageal CA, Labs & Chemo today        Initial Vitals:  /70 (BP Location: Right arm, Patient Position: Chair, Cuff Size: Adult Regular)  Pulse 100  Temp 98.3  F (36.8  C) (Tympanic)  Resp 18  Ht 1.562 m (5' 1.5\")  Wt 79.3 kg (174 lb 14.4 oz)  SpO2 100%  Breastfeeding? No  BMI 32.52 kg/m2 Estimated body mass index is 32.52 kg/(m^2) as calculated from the following:    Height as of this encounter: 1.562 m (5' 1.5\").    Weight as of this encounter: 79.3 kg (174 lb 14.4 oz).. Body surface area is 1.85 meters squared. BP completed using cuff size: regular  No Pain (0) No LMP recorded. Patient is postmenopausal. Allergies and medications reviewed.     Medications: MEDICATION REFILLS NEEDED TODAY.  Pharmacy name entered into [facility]:    [facility] [address] [facility]    Comments: 3 week recheck Esophageal CA, Labs & Chemo today     7 minutes for nursing intake (face to face time)   Vanesa Hickey CMA        "

## 2017-03-21 NOTE — PATIENT INSTRUCTIONS
Dr. El recommends a PET scan after your treatment on April 4th and would like to see you back to review the results.  When you are in need of a refill, please call your pharmacy and they will send us a request.  Copy of appointments, and after visit summary (AVS) given to patient.  If you have any questions please call Fabiana Greenberg RN, BSN, OCN Oncology Hematology  [facility] [phone]. For questions after business hours, or on holidays/weekends, please call our after hours Nurse Triage line [phone]. Thank you.

## 2017-03-23 NOTE — MR AVS SNAPSHOT
After Visit Summary   3/23/2017    Bren Rendon    MRN: [id number]           Patient Information     Date Of Birth          1950        Visit Information        Provider Department      3/23/2017 12:00 PM ROOM 8 [facility]        Today's Diagnoses     GE junction carcinoma (H)    -  1       Follow-ups after your visit        Your next 10 appointments already scheduled     Mar 23, 2017 12:00 PM CDT   Level O with ROOM 8 [facility] ([facility])    [facility]  [address]  [facility] [address]   [phone]            Apr 04, 2017  8:00 AM CDT   Level 6 with ROOM 9 [facility] ([facility])    [facility]  [address]  [facility] [address]   [phone]            Apr 04, 2017 11:15 AM CDT   Return Visit with Cassy Ortega MD   [facility] Center ([facility])    [address]  [facility] [address]   [phone]            Apr 06, 2017 12:00 PM CDT   Level O with ROOM 8 [facility] ([facility])    [facility]  [address]  [facility] [address]   [phone]            Apr 12, 2017 10:45 AM CDT   PE NPET ONCOLOGY (EYES TO THIGHS) with WYPETCT1   [facility] Pet CT ([facility])    [address] [facility] [address]   [phone]           Tell your doctor:   If there is any chance you may be pregnant or if you are breastfeeding.   If you have problems lying in small spaces (claustrophobia). If you do, your doctor may give you medicine to help you relax. If you have diabetes:   Have your exam early in the morning. Your blood glucose will go up as the day goes by.   Your glucose level must be 180 or less at the start of the exam. Please take any medicines you need to ensure this blood glucose level. 24 hours before your scan:  Don t do any heavy exercise. (No jogging, aerobics or other workouts.) Exercise will make your pictures less accurate. 6 hours before your scan:   Stop all food and liquids (except water).   Do not chew gum or suck on mints.   If you need to take medicine with food, you may take it with a few crackers.  Please call your Imaging Department at your exam site with any questions.            Apr 13, 2017 10:30 AM CDT   Return Visit with Aline El MD   [facility] ([facility])    [facility]  [address]  [facility] [address] [phone]              Who to contact     If you have questions or need follow up information about today's clinic visit or your schedule please contact [facility] directly at [phone].  Normal or non-critical lab and imaging results will be communicated to you by MyChart, letter or phone within 4 business days after the clinic has received the results. If you do not hear from us within 7 days, please contact the clinic through [employer] or phone. If you have a critical or abnormal lab result, we will notify you by phone as soon as possible.  Submit refill requests through [employer] or call your pharmacy and they will forward the refill request to us. Please allow 3 business days for your refill to be completed.          Additional Information About Your Visit        MyChart Information     [employer] gives you secure access to your electronic health record. If you see a primary care provider, you can also send messages to your care team and make appointments. If you have questions, please call your primary care clinic.  If you do not have a primary care provider, please call [phone] and they will assist you.        Care EveryWhere ID     This is your Care EveryWhere ID. This could be used by other organizations to access your [facility] medical records  [id number]        Your Vitals Were     Pulse Temperature                 98 98.1  F (36.7  C) (Oral)           Blood Pressure from Last 3 Encounters:   03/23/17 119/77   03/21/17 109/70   03/09/17 122/75    Weight from Last 3 Encounters:   03/21/17 79.3 kg (174 lb 14.4 oz)   02/28/17 81.1 kg (178 lb 14.4 oz)   02/14/17 82.1 kg (181 lb 1.6 oz)              Today, you had the following     No orders found for display       Primary Care Provider Office Phone # Fax #    Dillan Jackson Amos [phone]       [facility] [address] [address]        Thank you!     Thank you for choosing [facility]  for your care. Our goal is always to provide you with excellent care. Hearing back from our patients is one way we can continue to improve our services. Please take a few minutes to complete the written survey that you may receive in the mail after your visit with us. Thank you!             Your Updated Medication List - Protect others around you: Learn how to safely use, store and throw away your medicines at www.disposemymeds.org.          This list is accurate as of: 3/23/17 11:41 AM.  Always use your most recent med list.                   Brand Name Dispense Instructions for use    ALLEGRA 60 MG tablet   Generic drug:  fexofenadine      Take 60 mg by mouth daily       alum & mag hydroxide-simethicone 200-200-20 MG/5ML Susp suspension    MYLANTA/MAALOX     Take 30 mLs by mouth every 4 hours as needed for indigestion       lidocaine-prilocaine cream    EMLA    30 g    Apply 30 g topically as needed for moderate pain Apply to port site 1 hour before access.       LORazepam 0.5 MG tablet    ATIVAN    30 tablet    Take 1 tablet (0.5 mg) by mouth every 4 hours as needed (Anxiety, Nausea/Vomiting or Sleep)       Multi-vitamin Tabs tablet      Take 1 tablet by mouth daily       NASACORT AQ 55 MCG/ACT Inhaler   Generic drug:  triamcinolone      Spray 2 sprays into both nostrils daily       omega 3 1000 MG Caps      Take 1 g by mouth daily        omeprazole 40 MG capsule    priLOSEC    90 capsule    Take 1 capsule (40 mg) by mouth daily Take 30-60 minutes before a meal.       ondansetron 8 MG ODT tab    ZOFRAN ODT    60 tablet    Take 1 tablet (8 mg) by mouth every 8 hours as needed for nausea       prochlorperazine 25 MG Suppository    COMPAZINE    60 suppository    Place 1 suppository (25 mg) rectally every 12 hours as needed for nausea       THERATEARS OP      Apply 2 drops to eye daily       TYLENOL 325 MG tablet   Generic drug:  acetaminophen      Take 325 mg by mouth every 6 hours as needed for mild pain       VITAMIN B6 PO      Take 100 mg by mouth 2 times daily Reported on 3/21/2017       VITAMIN C PO      Take 250 mg by mouth daily       vitamin D 2000 UNITS tablet      Take 2,000 Units by mouth daily

## 2017-03-23 NOTE — PROGRESS NOTES
Infusion Nursing Note:  Bren [facility] Betsydarshana presents today for pump disconnect.    Patient seen by provider today: No   present during visit today: Not Applicable.    Note: N/A.    Intravenous Access:  Implanted Port.    Treatment Conditions:  Not Applicable.      Post Infusion Assessment:  Fluorouracil infusion completed.  Access discontinued per protocol.    Discharge Plan:   Reviewed neutropenic precautions with pt again today.  Questions answered and pt verbalizes understanding of teaching.   Patient discharged in stable condition accompanied by: self.  Departure Mode: Ambulatory.  Next appt is 4/4 for chemo.    Samantha Omalley RN

## 2017-03-29 NOTE — TELEPHONE ENCOUNTER
"Telephone Triage:    Pt is a 66 year old female, 8 day(s) post chemotherapy infusion of C2D1 FOLFOX given on 3/21/17 .    Diagnosis: Cancer of the GE Junction    Oncology provider is:  Dr. El    Chief complaint: Abdominal Pain     Pain: 4-5 on pain scale with 0 being no pain and 10 being intolerable. Pt described the pain \"discomfort\" and \"pinching\".    Assessment: VM left by Pt at 4:54 PM yesterday 3/28/17 stating that she was experiencing abdominal pressure where her tumor is pushing up into her chest and requesting an appt with Dr. El today.     Returned Pt's call this morning who states that her symptoms have since resolved. Pt reports 2 episodes of central abdominal \"discomfort\" while sitting on her couch and bent over working on something on her coffee table. Pt states that she took some maalox and stood up to stretch a few times which resolved the symptoms about 15 minutes later and states that she might of been \"pinching\" something while bending over which caused the discomfort. Pt denies pain since then.     Pt also wondering what dose of Vitamin C she should be taking stating that she is not able to tolerate the vitamin C powder that Dr. El recommended. Verify with Dr. Lord and informed Pt that we recommend 500 mg daily. Pt states that she has a 500 mg chewable tablet and will continue to take that as directed.     Recommendations: Patient instructed to call with any questions or concerns or if pain worsens and/or to call the RN on-call if she has any que/concerns in the evening or weekend. Patient states understanding and is in agreement with this plan.    Approximately 5 minutes spent on telephone with patient reviewing assessment and symptom(s) and providing symptom management.    Fabiana Greenberg RN, BSN, OCN  Oncology Hematology   [facility]  [phone]              "

## 2017-04-04 NOTE — LETTER
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 She received concurrent chemoradiation therapy for her adenocarcinoma and had radiation therapy in our clinic over 5 weeks with a total dose of 4500 cGy in 25 treatments at 180 cGy each fraction. The patient did have a significant reaction toward the end of therapy with significant nausea, vomiting and heartburn for which she required daily IV fluids toward the end of the therapy. The decision was made not to pursue any more RT. She, therefore, received a total dose of 4500 cGy in 25 treatments instead of planned 5040 cGy in 28 treatments.  Her radiation therapy was given from 12/27/2016 to 01/30/2017.     She presents today for routine followup. Over the past 2 months, her nausea has improved remarkably, and her appetite is essentially back to baseline. She still feels some moderate fatigue, which is normal particularly given that she has continued on chemotherapy. She has not had significant recent trouble with nutrition or hydration, and she denies dysphagia. She remains under the care of Dr. El in medical oncology, and is scheduled for a restaging PET/CT 4/12/2017.    PHYSICAL EXAM:  VITALS: /61  Pulse 95  Temp 97.4  F (36.3  C) (Tympanic)  Wt 78.2 kg (172 lb 6.4 oz)  BMI 32.05 kg/m2  GEN: Appears well, alert, oriented, and in  NAD  HEENT: NCAT, EOMI, normal conjunctiva  CV: Warm and well-perfused  RESP: No wheezing, breathing comfortably on room air  SKIN: Normal color and turgor  NEURO: No focal deficits, CN 3-12 grossly intact, normal gait  PSYCH: Appropriate mood and affect    LABS AND IMAGING: Reviewed    IMPRESSION:   Ms. 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 Her treatment course was ended slightly early secondary to poor tolerance and side effects. She therefore did not receive a boost dose to the gross tumor volume. She has recovered well from her radiation-related toxicities.    PLAN:  1. RTC on a PRN basis.  2. Continue close followup with Dr. El in medical oncology.  3. Chemo and surveillance imaging per Dr. El.    Ms. Rendon was seen and discussed with staff, Dr. Ortega.    Valerio Henderson MD PGY-3  Radiation Oncology Resident, [facility]      I have personally examined the patient, reviewed and edited the resident's note and agree with the plan of care.    Cassy rOtega M.D., Ph.D.      Radiation Oncologist   [facility]   Radiation Therapy Center   Phone: [phone]      CC  Patient Care Team:  Dillan Amos MD as PCP - General  Ozmun, Betty, RN as Clinic Care Coordinator    Nikko, Aline Gambino MD as MD (Oncology)  Chay Levy MD as MD (Thoracic Diseases)

## 2017-04-04 NOTE — NURSING NOTE
FOLLOW-UP VISIT    Patient Name: Bren Renodn      : 1950     Age: 66 year old        ______________________________________________________________________________     Chief Complaint   Patient presents with     Radiation Therapy     Follow up appointment, esophageal cancer     /61  Pulse 95  Temp 97.4  F (36.3  C) (Tympanic)  Wt 78.2 kg (172 lb 6.4 oz)  BMI 32.05 kg/m2     Pain  Denies    Meds  Current Med List Reviewed: Yes    Imaging  None, PET scheduled next week    On Chemo?: Yes  Esophagitis: None, eating/swallowing well  Nausea:nausea improved  Bowel: normal  Skin: Warm  Dry  Energy Level: getting better, but still on chemo  Weight:   Vitals:    17 1133   Weight: 78.2 kg (172 lb 6.4 oz)       Other Appointments:     Date  Oncologist:  Dr. Nikko Abrams today   Surgeon:      Other Notes:  Upcoming PET scan next week

## 2017-04-04 NOTE — MR AVS SNAPSHOT
After Visit Summary   4/4/2017    Bren Rendon    MRN: [id number]           Patient Information     Date Of Birth          1950        Visit Information        Provider Department      4/4/2017 11:15 AM Cassy Ortega MD Radiation Therapy Center         Follow-ups after your visit        Your next 10 appointments already scheduled     Apr 06, 2017 12:00 PM CDT   Level O with ROOM 8 [facility] ([facility])    [facility]  [address]  [facility] [address]   [phone]            Apr 12, 2017 10:45 AM CDT   PE NPET ONCOLOGY (EYES TO THIGHS) with WYPETCT1   [facility] Pet CT ([facility])    [address]  [facility] [address]   [phone]           Tell your doctor:   If there is any chance you may be pregnant or if you are breastfeeding.   If you have problems lying in small spaces (claustrophobia). If you do, your doctor may give you medicine to help you relax. If you have diabetes:   Have your exam early in the morning. Your blood glucose will go up as the day goes by.   Your glucose level must be 180 or less at the start of the exam. Please take any medicines you need to ensure this blood glucose level. 24 hours before your scan: Don t do any heavy exercise. (No jogging, aerobics or other workouts.) Exercise will make your pictures less accurate. 6 hours before your scan:   Stop all food and liquids (except water).   Do not chew gum or suck on mints.   If you need to take medicine with food, you may take it with a few crackers.  Please call your Imaging Department at your exam site with any questions.            Apr 13, 2017 10:30 AM CDT   Return Visit with Aline El MD   [facility] ([facility])    [facility]  [address]  [facility] [address]   [phone]              Who to contact     Please call your clinic at [phone]  to:    Ask questions about your health    Make or cancel appointments    Discuss your medicines    Learn about your test results    Speak to your doctor   If you have compliments or concerns about an experience at your clinic, or if you wish to file a complaint, please contact [facility] Patient Relations at [phone] or email us at [email or web address]         Additional Information About Your Visit        MyChart Information     [employer] gives you secure access to your electronic health record. If you see a primary care provider, you can also send messages to your care team and make appointments. If you have questions, please call your primary care clinic.  If you do not have a primary care provider, please call [phone] and they will assist you.      [employer] is an electronic gateway that provides easy, online access to your medical records. With [employer], you can request a clinic appointment, read your test results, renew a prescription or communicate with your care team.     To access your existing account, please contact your [facility] or call [phone] for assistance.        Care EveryWhere ID     This is your Care EveryWhere ID. This could be used by other organizations to access your [facility] medical records  [id number]        Your Vitals Were     Pulse Temperature BMI (Body Mass Index)             95 97.4  F (36.3  C) (Tympanic) 32.05 kg/m2          Blood Pressure from Last 3 Encounters:   04/04/17 115/61   04/04/17 143/82   03/23/17 119/77    Weight from Last 3 Encounters:   04/04/17 78.2 kg (172 lb 6.4 oz)   04/04/17 78.2 kg (172 lb 6.4 oz)   03/21/17 79.3 kg (174 lb 14.4 oz)              Today, you had the following     No orders found for display       Primary Care Provider Office Phone # Fax #    Dillan Amos [phone]       [facility] [address] [address]         Thank you!     Thank you for choosing [facility]  for your care. Our goal is always to provide you with excellent care. Hearing back from our patients is one way we can continue to improve our services. Please take a few minutes to complete the written survey that you may receive in the mail after your visit with us. Thank you!             Your Updated Medication List - Protect others around you: Learn how to safely use, store and throw away your medicines at www.disposemymeds.org.          This list is accurate as of: 4/4/17  3:14 PM.  Always use your most recent med list.                   Brand Name Dispense Instructions for use    ALLEGRA 60 MG tablet   Generic drug:  fexofenadine      Take 60 mg by mouth daily       alum & mag hydroxide-simethicone 200-200-20 MG/5ML Susp suspension    MYLANTA/MAALOX     Take 30 mLs by mouth every 4 hours as needed for indigestion       lidocaine-prilocaine cream    EMLA    30 g    Apply 30 g topically as needed for moderate pain Apply to port site 1 hour before access.       LORazepam 0.5 MG tablet    ATIVAN    30 tablet    Take 1 tablet (0.5 mg) by mouth every 4 hours as needed (Anxiety, Nausea/Vomiting or Sleep)       Multi-vitamin Tabs tablet      Take 1 tablet by mouth daily       NASACORT AQ 55 MCG/ACT Inhaler   Generic drug:  triamcinolone      Spray 2 sprays into both nostrils daily       omega 3 1000 MG Caps      Take 1 g by mouth daily       omeprazole 40 MG capsule    priLOSEC    90 capsule    Take 1 capsule (40 mg) by mouth daily Take 30-60 minutes before a meal.       ondansetron 8 MG ODT tab    ZOFRAN ODT    60 tablet    Take 1 tablet (8 mg) by mouth every 8 hours as needed for nausea       prochlorperazine 25 MG Suppository    COMPAZINE    60 suppository    Place 1 suppository (25 mg) rectally every 12 hours as needed for nausea       THERATEARS OP      Apply 2 drops to eye daily       TYLENOL 325 MG tablet   Generic drug:  acetaminophen      Take  325 mg by mouth every 6 hours as needed for mild pain       VITAMIN B6 PO      Take 100 mg by mouth 2 times daily Reported on 3/21/2017       VITAMIN C PO      Take 250 mg by mouth daily       vitamin D 2000 UNITS tablet      Take 2,000 Units by mouth daily

## 2017-04-04 NOTE — PROGRESS NOTES
Infusion Nursing Note:  Bren Rendon presents today for PAC labs and chemotherapy.    Patient seen by provider today: No   present during visit today: Not Applicable.    Note: Labs drawn via her port per [place] protocol. Labs WNL's. Premeds and IV chemotherapy infused without complications. 5 FU Pump hooked up at 2 PM without complications. Pt. To return on Thursday at 12 PM for a 5 FU pump DC.      Intravenous Access:  Labs drawn without difficulty.  Implanted Port.    Treatment Conditions:  Lab Results   Component Value Date    HGB 9.7 04/04/2017     Lab Results   Component Value Date    WBC 2.8 04/04/2017      Lab Results   Component Value Date    ANEU 1.5 04/04/2017     Lab Results   Component Value Date     04/04/2017      Lab Results   Component Value Date     04/04/2017                   Lab Results   Component Value Date    POTASSIUM 3.8 04/04/2017           Lab Results   Component Value Date    MAG 1.9 01/17/2017            Lab Results   Component Value Date    CR 0.68 04/04/2017                   Lab Results   Component Value Date    JARED 9.0 04/04/2017                Lab Results   Component Value Date    BILITOTAL 0.3 04/04/2017           Lab Results   Component Value Date    ALBUMIN 3.2 04/04/2017                    Lab Results   Component Value Date    ALT 20 04/04/2017           Lab Results   Component Value Date    AST 16 04/04/2017     Results reviewed, labs MET treatment parameters, ok to proceed with treatment.      Post Infusion Assessment:  Patient tolerated infusion without incident.  Blood return noted pre and post infusion.  Site patent and intact, free from redness, edema or discomfort.  No evidence of extravasations.    Discharge Plan:   Patient and/or family verbalized understanding of discharge instructions and all questions answered.  Patient discharged in stable condition accompanied by: .  Departure Mode: Ambulatory.  Prior to discharge: Port is secured  "in place with tegaderm and flushed with 10cc NS with positive blood return noted.  Continuous home infusion CADD pump connected.    All connectors secured in place and clamps taped open.    Pump started, \"running\" noted on display (CADD): YES.  Patient instructed to call our clinic or [facility] with any questions or concerns at home.  Patient verbalized understanding.    Patient set up for pump disconnect at our clinic on Thursday at 12 PM.          Latoya Patel RN                        "

## 2017-04-04 NOTE — MR AVS SNAPSHOT
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)           Follow-ups after your visit        Your next 10 appointments already scheduled     Apr 06, 2017 12:00 PM CDT   Level O with ROOM 8 [facility] ([facility])    [facility]  [address]  [facility] [address]   [phone]            Apr 12, 2017 10:45 AM CDT   PE NPET ONCOLOGY (EYES TO THIGHS) with WYPETCT1   [facility] ([facility])    [address] [facility] [address]   [phone]           Tell your doctor:   If there is any chance you may be pregnant or if you are breastfeeding.   If you have problems lying in small spaces (claustrophobia). If you do, your doctor may give you medicine to help you relax. If you have diabetes:   Have your exam early in the morning. Your blood glucose will go up as the day goes by.   Your glucose level must be 180 or less at the start of the exam. Please take any medicines you need to ensure this blood glucose level. 24 hours before your scan: Don t do any heavy exercise. (No jogging, aerobics or other workouts.) Exercise will make your pictures less accurate. 6 hours before your scan:   Stop all food and liquids (except water).   Do not chew gum or suck on mints.   If you need to take medicine with food, you may take it with a few crackers.  Please call your Imaging Department at your exam site with any questions.            Apr 13, 2017 10:30 AM CDT   Return Visit with Aline El MD   [facility] ([facility])    [facility]  [address]  [facility]  [id number] [phone]              Who to contact     If you have questions or need follow up information about today's clinic visit or your schedule please contact [facility] directly at [phone].  Normal or non-critical lab and imaging results will be communicated to you by MyChart, letter or phone within 4 business days after the clinic has received the results. If you do not hear from us within 7 days, please contact the clinic through [employer] or phone. If you have a critical or abnormal lab result, we will notify you by phone as soon as possible.  Submit refill requests through [employer] [place] or call your pharmacy and they will forward the refill request to us. Please allow 3 business days for your refill to be completed.          Additional Information About Your Visit        [email or web address] Information     [employer] [place] gives you secure access to your electronic health record. If you see a primary care provider, you can also send messages to your care team and make appointments. If you have questions, please call your primary care clinic.  If you do not have a primary care provider, please call [phone] and they will assist you.        Care EveryWhere ID     This is your Care EveryWhere ID. This could be used by other organizations to access your [place] medical records  [id number]        Your Vitals Were     Pulse Temperature Pulse Oximetry BMI (Body Mass Index)          108 97.4  F (36.3  C) (Oral) 100% 32.05 kg/m2         Blood Pressure from Last 3 Encounters:   04/04/17 115/61   04/04/17 143/82   03/23/17 119/77    Weight from Last 3 Encounters:   04/04/17 78.2 kg (172 lb 6.4 oz)   04/04/17 78.2 kg (172 lb 6.4 oz)   03/21/17 79.3 kg (174 lb 14.4 oz)              We Performed the Following     CBC with platelets differential     Comprehensive metabolic panel        Primary Care Provider Office Phone # Fax #    Dillan Amos [phone]       [facility] [address]  [facility] [address]        Thank you!     Thank you for choosing [facility]  for your care. Our goal is always to provide you with excellent care. Hearing back from our patients is one way we can continue to improve our services. Please take a few minutes to complete the written survey that you may receive in the mail after your visit with us. Thank you!             Your Updated Medication List - Protect others around you: Learn how to safely use, store and throw away your medicines at www.disposemymeds.org.          This list is accurate as of: 4/4/17  4:00 PM.  Always use your most recent med list.                   Brand Name Dispense Instructions for use    ALLEGRA 60 MG tablet   Generic drug:  fexofenadine      Take 60 mg by mouth daily       alum & mag hydroxide-simethicone 200-200-20 MG/5ML Susp suspension    MYLANTA/MAALOX     Take 30 mLs by mouth every 4 hours as needed for indigestion       lidocaine-prilocaine cream    EMLA    30 g    Apply 30 g topically as needed for moderate pain Apply to port site 1 hour before access.       LORazepam 0.5 MG tablet    ATIVAN    30 tablet    Take 1 tablet (0.5 mg) by mouth every 4 hours as needed (Anxiety, Nausea/Vomiting or Sleep)       Multi-vitamin Tabs tablet      Take 1 tablet by mouth daily       NASACORT AQ 55 MCG/ACT Inhaler   Generic drug:  triamcinolone      Spray 2 sprays into both nostrils daily       omega 3 1000 MG Caps      Take 1 g by mouth daily       omeprazole 40 MG capsule    priLOSEC    90 capsule    Take 1 capsule (40 mg) by mouth daily Take 30-60 minutes before a meal.       ondansetron 8 MG ODT tab    ZOFRAN ODT    60 tablet    Take 1 tablet (8 mg) by mouth every 8 hours as needed for nausea       prochlorperazine 25 MG Suppository    COMPAZINE    60 suppository    Place 1 suppository (25 mg) rectally every 12 hours as needed for nausea       THERATEARS OP      Apply 2 drops to eye daily       TYLENOL 325 MG tablet    Generic drug:  acetaminophen      Take 325 mg by mouth every 6 hours as needed for mild pain       VITAMIN B6 PO      Take 100 mg by mouth 2 times daily Reported on 3/21/2017       VITAMIN C PO      Take 250 mg by mouth daily       vitamin D 2000 UNITS tablet      Take 2,000 Units by mouth daily

## 2017-04-05 NOTE — TELEPHONE ENCOUNTER
"Bren Rendon is a 66 year old female who calls today with c/o constipation. She gives the following history:    USUAL PATTERN OF ELIMINATION:  Frequency- every 3 days  Consistancy- \"regular\" not hard or too soft  Amount- \"good amount\"  Color- brown    CURRENT PATTERN OF ELIMINATION:   Onset - 6 days ago.  Date of last bowel movement: 6 days ago  Consistency - hard  Amount of stool - small    ASSOCIATED SYMPTOMS:  denies  PRECIPITATING FACTORS: none  ALLEVIATING FACTORS: none  PREDISPOSING FACTORS: age >65      PATIENT INSTRUCTION:  Bren advised to take the following medication(s)/treatment per standing orders:  Milk of magnesia or magnesium citrate today.  If relief, take stool softener daily.  If no relief or symptoms worsen, be seen in urgent care.    Bren advised of SYMPTOM MANAGEMENT as follows:    DIETARY INTERVENTIONS:  1) Increase intake of high fiber foods (whole grain)  2) Increase intake of fluids to 2-3 litres/day (unless containdicated)  3) Use natural laxatives (prune juice and senna tea)  4) Drink hot beverages with early morning meal    BEHAVIORAL INTERVENTIONS:  1) Respond immediately to the urge to defecate  2) Establish pattern of toileting to promote regularity  3) Increase physical activity as tolerated    Bren verbalizes understanding of the above instructions.  "

## 2017-04-06 NOTE — PROGRESS NOTES
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 She received concurrent chemoradiation therapy for her adenocarcinoma and had radiation therapy in our clinic over 5 weeks with a total dose of 4500 cGy in 25 treatments at 180 cGy each fraction. The patient did have a significant reaction toward the end of therapy with significant nausea, vomiting and heartburn for which she required daily IV fluids toward the end of the therapy. The decision was made not to pursue any more RT. She, therefore, received a total dose of 4500 cGy in 25 treatments instead of planned 5040 cGy in 28 treatments.  Her radiation therapy was given from 12/27/2016 to 01/30/2017.     She presents today for routine followup. Over the past 2 months, her nausea has improved remarkably, and her appetite is essentially back to baseline. She still feels some moderate fatigue, which is normal particularly given that she has continued on chemotherapy. She has not had significant recent trouble with nutrition or hydration, and she denies dysphagia. She remains under the care of Dr. El in medical oncology, and is scheduled for a restaging PET/CT 4/12/2017.    PHYSICAL EXAM:  VITALS: /61  Pulse 95  Temp 97.4  F (36.3  C) (Tympanic)  Wt 78.2 kg (172 lb 6.4 oz)  BMI 32.05 kg/m2  GEN: Appears well, alert, oriented, and in NAD  HEENT: NCAT, EOMI, normal conjunctiva  CV: Warm and well-perfused  RESP: No  wheezing, breathing comfortably on room air  SKIN: Normal color and turgor  NEURO: No focal deficits, CN 3-12 grossly intact, normal gait  PSYCH: Appropriate mood and affect    LABS AND IMAGING: Reviewed    IMPRESSION:   Ms. 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 Her treatment course was ended slightly early secondary to poor tolerance and side effects. She therefore did not receive a boost dose to the gross tumor volume. She has recovered well from her radiation-related toxicities.    PLAN:  1. RTC on a PRN basis.  2. Continue close followup with Dr. El in medical oncology.  3. Chemo and surveillance imaging per Dr. El.    Ms. Rendon was seen and discussed with staff, Dr. Ortega.    Valerio Henderson MD PGY-3  Radiation Oncology Resident, [facility]      I have personally examined the patient, reviewed and edited the resident's note and agree with the plan of care.    Cassy Ortega M.D., Ph.D.      Radiation Oncologist   [facility]   Radiation Therapy Center   Phone: [phone]      CC  Patient Care Team:  Dillan Amos MD as PCP - General  Ozmun, Betty, RN as Clinic Care Coordinator  Cassy Ortega MD as MD (Radiation Oncology)  Aline El MD as MD (Oncology)  Chay Levy MD as MD (Thoracic Diseases)

## 2017-04-06 NOTE — MR AVS SNAPSHOT
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   If you have problems lying in small spaces (claustrophobia). If you do, your doctor may give you medicine to help you relax. If you have diabetes:   Have your exam early in the morning. Your blood glucose will go up as the day goes by.   Your glucose level must be 180 or less at the start of the exam. Please take any medicines you need to ensure this blood glucose level. 24 hours before your scan: Don t do any heavy exercise. (No jogging, aerobics or other workouts.) Exercise will make your pictures less accurate. 6 hours before your scan:   Stop all food and liquids (except water).   Do not chew gum or suck on mints.   If you need to take medicine with food, you may take it with a few crackers.  Please call your Imaging Department at your exam site with any questions.            Apr 13, 2017 10:30 AM CDT   Return Visit with Aline El MD   [facility] ([facility])    [facility]  [address]  [facility] [address] [phone]              Who to contact     If you have questions or need follow up information about today's clinic visit or your schedule please contact [facility] directly at [phone].  Normal or non-critical lab and  imaging results will be communicated to you by MyChart, letter or phone within 4 business days after the clinic has received the results. If you do not hear from us within 7 days, please contact the clinic through [employer] or phone. If you have a critical or abnormal lab result, we will notify you by phone as soon as possible.  Submit refill requests through [employer] or call your pharmacy and they will forward the refill request to us. Please allow 3 business days for your refill to be completed.          Additional Information About Your Visit        [employer] Information     [employer] gives you secure access to your electronic health record. If you see a primary care provider, you can also send messages to your care team and make appointments. If you have questions, please call your primary care clinic.  If you do not have a primary care provider, please call [phone] and they will assist you.        Care EveryWhere ID     This is your Care EveryWhere ID. This could be used by other organizations to access your [place] medical records  [id number]        Your Vitals Were     Pulse Temperature                102 97.5  F (36.4  C) (Oral)           Blood Pressure from Last 3 Encounters:   04/06/17 122/82   04/04/17 115/61   04/04/17 143/82    Weight from Last 3 Encounters:   04/04/17 78.2 kg (172 lb 6.4 oz)   04/04/17 78.2 kg (172 lb 6.4 oz)   03/21/17 79.3 kg (174 lb 14.4 oz)              We Performed the Following     Central Venous Catheter: implanted port (Port-A-Cath, Power Port)        Primary Care Provider Office Phone # Fax #    Dillan Amos [phone]       [facility] [address] [facility] [address]        Thank you!     Thank you for choosing [facility]  for your care. Our goal is always to provide you with excellent care. Hearing back from our patients is one way we can continue to improve our services. Please take a few minutes to complete the  written survey that you may receive in the mail after your visit with us. Thank you!             Your Updated Medication List - Protect others around you: Learn how to safely use, store and throw away your medicines at www.disposemymeds.org.          This list is accurate as of: 4/6/17  1:26 PM.  Always use your most recent med list.                   Brand Name Dispense Instructions for use    ALLEGRA 60 MG tablet   Generic drug:  fexofenadine      Take 60 mg by mouth daily       alum & mag hydroxide-simethicone 200-200-20 MG/5ML Susp suspension    MYLANTA/MAALOX     Take 30 mLs by mouth every 4 hours as needed for indigestion       lidocaine-prilocaine cream    EMLA    30 g    Apply 30 g topically as needed for moderate pain Apply to port site 1 hour before access.       LORazepam 0.5 MG tablet    ATIVAN    30 tablet    Take 1 tablet (0.5 mg) by mouth every 4 hours as needed (Anxiety, Nausea/Vomiting or Sleep)       Multi-vitamin Tabs tablet      Take 1 tablet by mouth daily       NASACORT AQ 55 MCG/ACT Inhaler   Generic drug:  triamcinolone      Spray 2 sprays into both nostrils daily       omega 3 1000 MG Caps      Take 1 g by mouth daily       omeprazole 40 MG capsule    priLOSEC    90 capsule    Take 1 capsule (40 mg) by mouth daily Take 30-60 minutes before a meal.       ondansetron 8 MG ODT tab    ZOFRAN ODT    60 tablet    Take 1 tablet (8 mg) by mouth every 8 hours as needed for nausea       prochlorperazine 25 MG Suppository    COMPAZINE    60 suppository    Place 1 suppository (25 mg) rectally every 12 hours as needed for nausea       THERATEARS OP      Apply 2 drops to eye daily       TYLENOL 325 MG tablet   Generic drug:  acetaminophen      Take 325 mg by mouth every 6 hours as needed for mild pain       VITAMIN B6 PO      Take 100 mg by mouth 2 times daily Reported on 3/21/2017       VITAMIN C PO      Take 250 mg by mouth daily       vitamin D 2000 UNITS tablet      Take 2,000 Units by mouth daily

## 2017-04-13 NOTE — PATIENT INSTRUCTIONS
Dr. El is referring your for acupuncture for your neuropathy and recommends that you continue your FOLFOX treatment and would like to see you back in 1 month with your next treatment for a follow up.   When you are in need of a refill, please call your pharmacy and they will send us a request.  Copy of appointments, and after visit summary (AVS) given to patient.  If you have any questions please call Fabiana Greenberg RN, BSN, OCN Oncology Hematology  [facility] [phone]. For questions after business hours, or on holidays/weekends, please call our after hours Nurse Triage line [phone]. Thank you.

## 2017-04-13 NOTE — PROGRESS NOTES
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  Eventually had upper endoscopy workup in early 2016, identified large fungating submucosal mass within the lower third of the esophagus in the gastric GE junction and in the cardia.  This mass was partially obstructing and circumferential.      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 Her 2 is negative by FISH and IHC.    Further work up with CT, EUS, PET  found to have extensive lower esophogeal/gastric cancer with near obstruction lower esophageal /GE junction tumor, cT3N+ (aorta caval LN + on biopsy), with ascites.     She was seen by Dr. Levy (2016) and deemed not a surgical candidate. She saw Rad-Onc and felt due to he local symptoms, RT is beneficial. Concurrent RT/wkly carbo/taxol is offered from end of Dec 2016 to 2017. She then went on to have FOLFOX since early 2017.  Restaging PET in 2017  indicated significant response. tx is continued.     Other than dysphagia and weight loss she has no particular pain.  She is otherwise in her usual state of health.      PAST MEDICAL HISTORY:     1.  Reflux disease.     2.  History of cholecystitis.     3.  Rosacea.    4.  Degenerative disk disease.    5.  Obesity.   6.  Dupuytren's disease.      MEDICATIONS:  Reviewed in Epic system.      SOCIAL HISTORY:  She is retired.  She lives in [place].  She is a very active 66-year-old female.  Denies smoking.  She drinks 1-2 glasses of white wine every day.      FAMILY HISTORY:  Positive for dad  from lung cancer who smoked 4 packs per day.  Maternal grandmother  " from breast cancer.  No family history of GI cancer.       REVIEW OF SYSTEMS:   Nausea and eating are much better on FOLFOX.  She feels stronger.   Start to feel tingling sensation on finger tips and toes.     PHYSICAL EXAMINATION:   GENERAL APPEARANCE:  A middle-aged woman, looks like her stated age, very pleasant, not in acute distress.     VITAL SIGNS: Blood pressure 123/75, pulse 96, temperature 97.9  F (36.6  C), temperature source Tympanic, resp. rate 16, height 1.562 m (5' 1.5\"), weight 76.9 kg (169 lb 8 oz), SpO2 100 %, not currently breastfeeding.  HEENT: The patient is normocephalic, atraumatic. Pupils are equally react to light.  Sclerae are anicteric.  Moist oral mucosa.  Negative pharynx.  No oral thrush.   NECK:  Supple.  No jugular venous distention.  Thyroid is not palpable.   LYMPH NODES:  Superficial lymphadenopathy is not appreciable in the bilateral cervical, supraclavicular, axillary or inguinal areas.   CARDIOVASCULAR:  S1, S2 regular with no murmurs or gallops.  No carotid or abdominal bruits.   PULMONARY:  Lungs are clear to auscultation and percussion bilaterally.  There is no wheezing or rhonchi.   GASTROINTESTINAL:  Abdomen is soft, nontender.  No hepatosplenomegaly.  No signs of ascites.  No mass appreciable.   MUSCULOSKELETAL/EXTREMITIES:  No edema.  No cyanotic changes.  No signs of joint deformity.  No lymphedema.   NEUROLOGIC:  Cranial nerves II-XII are grossly intact.  Sensation intact.  Muscle strength and muscle tone symmetrical 5/5 throughout.   BACK:  No spinal or paraspinal tenderness.  No CVA tenderness.   SKIN:  No petechiae.  No rash.  No signs of cellulitis.      CURRENT LABORATORY DATA:   Cbc diff, cr are OK, ANC 1.5    Current Image  2017 PET  1. A tiny left superior mediastinal node measures less than 1 cm and demonstrates an SUV max 2.7 which may be reactive in nature.  2. residual FDG activity in the region of the GE junction mass when compared to prior " study. Currently this measures an SUV max 4.7, previously 16.2, indicating a significant interval improvement.  3. Residual FDG activity is noted involving the gastric cardia when compared to prior exam but has significantly improved as described above.     OLD DATA REVIEW REVIEWED WITH SUMMARY:   Baseline cbc diff/CMP are nl, CEA 8.7 in 11/2016    Pathology 12/5/2016 Lymph node, aortacaval, endoscopic ultrasound-guided fine needle aspiration with shark core needle:   - Positive for malignancy.   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 Her2- by FISH/IHC.      EUS 12/5/2016, scope can't pass through GE junction-  A large, fungating and submucosal mass was found at the lower third of the esophagus at 34 cm from the incisors, which was nearly two-thirds circumferential  and is partially obstructing the esophagus and was seen extending to the gastric cardia.  Ascites was found.  4 abnormal lymph nodes were visualized in the lower paraesophageal mediastinum (level 8L). This will likely represent N2 on the evans staging.    PET 11/30/2016  1. Hypermetabolic uptake SUV 16.6 in the proximal to mid stomach compatible with the patient's recently diagnosed gastric cancer.No Esophageal activity.   2. There is an elongated but slender gastrohepatic node just medial to the proximal stomach. Less than 1 cm in transverse dimension. Suspected to be hyper metabolic but SUV difficult to measure  separately from adjacent stomach.  3. Hypermetabolic retroperitoneal node, aorto caval location just posterior to transverse duodenum with SUV max 4.2 compatible with metastasis.  4. Hypermetabolic hepatic flexure and distal ascending colon without CT abnormality is suspected to be physiologic.  5. Nonenlarged subcentimeter hypermetabolic node in the right side of the neck at  the level of the tongue is nonspecific.    CT chest, abdomen and pelvis 11/05/2016 - esophageal GE junction within normal limits.  Mild fatty infiltrate of the liver, pancreatic atrophy.         ASSESSMENT AND PLAN:    1. 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  She is found to have extensive lower esophogeal/gastric cancer with near obstruction lower esophageal /GE junction tumor, cT3N+ (aorta caval LN + on biopsy), with ascites. Her 2 - by FISH and IHc.     She was seen by Dr. Levy and deemed not a surgical candidate.     She saw Rad-Onc, concurrent RT/wkly carbo/taxol was offered till early Feb.  She then went on to have palliative FOLFOX.  Due to nuetropenia. Deleted 5 fu bolus starting 3/21/2017.     She has VGPR by PET in 4/2017. Will ask Dr. Levy again the resectability.     She is to continue FOLFOX for now. Dose reduce oxali to 75 mg /m2 due to neuropathy.       2. Nausea with dysphagia.   Advice ODT zofran, Ativan at bed time.   With this regimen, she is able to eat, and keep food down and has nl BM.    Will see if acupuncture could help.     3. Developing mild nuetropenia. Deleted 5 fu bolus starting 3/21/2017.   Neutropenic precaution is advised.     4. Developing neuropathy. Advice vit B 6 200 mg po qd to bid. Refer to acupuncture, dose reduce oxali.

## 2017-04-13 NOTE — NURSING NOTE
"Bren Rendon is a 66 year old female who presents for:  Chief Complaint   Patient presents with     Oncology Clinic Visit     Recheck Esophageal CA, review labs & PET scan        Initial Vitals:  /75 (BP Location: Right arm, Patient Position: Chair, Cuff Size: Adult Regular)  Pulse 96  Temp 97.9  F (36.6  C) (Tympanic)  Resp 16  Ht 1.562 m (5' 1.5\")  Wt 76.9 kg (169 lb 8 oz)  SpO2 100%  Breastfeeding? No  BMI 31.51 kg/m2 Estimated body mass index is 31.51 kg/(m^2) as calculated from the following:    Height as of this encounter: 1.562 m (5' 1.5\").    Weight as of this encounter: 76.9 kg (169 lb 8 oz).. Body surface area is 1.83 meters squared. BP completed using cuff size: regular  No Pain (0) No LMP recorded. Patient is postmenopausal. Allergies and medications reviewed.     Medications: Medication refills not needed today.  Pharmacy name entered into [facility]:      [facility] [address] [facility]    Comments: Recheck Esophageal CA, review labs & PET scan    7 minutes for nursing intake (face to face time)   Vanesa Hickey CMA        "

## 2017-04-13 NOTE — MR AVS SNAPSHOT
After Visit Summary   4/13/2017    Bren Rendon    MRN: [id number]           Patient Information     Date Of Birth          1950        Visit Information        Provider Department      4/13/2017 10:30 AM Aline El MD [facility]        Today's Diagnoses     GE junction carcinoma (H)    -  1    Chemotherapy-induced nausea        Neuropathy (H)        Chemotherapy-induced neutropenia (H)          Care Instructions    Dr. El is referring your for acupuncture for your neuropathy and recommends that you continue your FOLFOX treatment and would like to see you back in 1 month with your next treatment for a follow up.   When you are in need of a refill, please call your pharmacy and they will send us a request.  Copy of appointments, and after visit summary (AVS) given to patient.  If you have any questions please call Fabiana Greenberg RN, BSN, OCN Oncology Hematology  [facility] [phone]. For questions after business hours, or on holidays/weekends, please call our after hours Nurse Triage line [phone]. Thank you.             Follow-ups after your visit        Additional Services     ACUPUNCTURE REFERRAL       Your provider has referred you to: [facility]: [facility] [phone]   [email or web address]    Please be aware that coverage of these services is subject to the terms and limitations of your health insurance plan.  Call member services at your health plan with any benefit or coverage questions.      Please bring the following with you to your appointment:    (1) Any X-Rays, CTs or MRIs which have been performed.  Contact the facility where they were done to arrange for  prior to your scheduled appointment.  (2) List of current medications   (3) This referral request   (4) Any documents/labs given to you for this referral  Services Requested:  Acupuncture    Please answer the following questions:    1. How long have you been treating this patient for this pain issue?      1 year(s)    2. Have there been any of the following problematic behaviors?      Medication Management Issues: NO      Chemical Dependency: NO      Psychiatric History or Current Psych/Social Issues: NO    3. Has the patient been to any other pain clinics and/or programs?     no                  Your next 10 appointments already scheduled     Apr 18, 2017  8:30 AM CDT   Level 6 with ROOM 4 [facility] ([facility])    [facility]  [address]  [facility] [address]   [phone]            Apr 20, 2017 12:00 PM CDT   Level O with ROOM 8 [facility] ([facility])    [facility]  [address]  [facility] [address]   [phone]            Apr 27, 2017  9:00 AM CDT   New Visit with Amilcar Zambrano MD   [facility] [place] [facility])    [address]  [address]  [facility] [address]   [phone]            May 02, 2017  8:00 AM CDT   Level 6 with ROOM 8 [facility] ([facility])    [facility]  [address]  [facility] [address]   [phone]            May 04, 2017 12:00 PM CDT   Level O with ROOM 8 [facility] ([facility])    [facility]  [address]  [facility] [address]   [phone]            May 16, 2017  8:00 AM CDT   Level 6 with ROOM 7 [facility] ([facility])    [facility]  [address]  [facility] [address]   [phone]            May 16, 2017  9:00 AM CDT   Return Visit with Aline El MD   [facility] ([facility])    [facility]  [address]  [facility] [address]   [phone]            May 18, 2017 12:00 PM CDT   Level O with ROOM 8 [facility] ([facility])    [facility]  [address]  [facility] [address]   [phone]              Who to contact     If you have questions or need follow up information about today's clinic visit or your schedule please contact [facility] directly at [phone].  Normal or non-critical lab and imaging results will be communicated to you by [employer], letter or phone within 4 business days after the clinic has received the results. If you do not hear from us within 7 days, please contact the clinic through [employer] or phone. If you have a critical or abnormal lab result, we will notify you by phone as soon as possible.  Submit refill requests through [employer] or call your pharmacy and they will forward the refill request to us. Please allow 3 business days for your refill to be completed.          Additional Information About Your Visit        [employer] Information     [employer] gives you secure access to your electronic health record. If you see a primary care provider, you can also send messages to your care team and make appointments. If you have questions, please call your primary care clinic.  If you do not have a primary care provider, please call [phone] and they will assist you.        Care EveryWhere ID     This is your Care EveryWhere ID. This could be used by other organizations to access your [place] medical records  [id number]        Your Vitals Were     Pulse Temperature Respirations Height Pulse Oximetry Breastfeeding?    96 97.9  F (36.6  C) (Tympanic) 16 1.562 m (5' 1.5\") 100% No    BMI (Body Mass Index)                   31.51 kg/m2            Blood Pressure from Last 3 Encounters:   04/13/17 123/75   04/06/17 122/82   04/04/17 115/61    Weight from Last 3 Encounters:   04/13/17 76.9 kg (169 lb 8 oz)   04/04/17 78.2 kg " (172 lb 6.4 oz)   04/04/17 78.2 kg (172 lb 6.4 oz)              We Performed the Following     ACUPUNCTURE REFERRAL        Primary Care Provider Office Phone # Fax #    iDllan Amos [phone]       [facility] [address] [facility] [address]        Thank you!     Thank you for choosing [facility]  for your care. Our goal is always to provide you with excellent care. Hearing back from our patients is one way we can continue to improve our services. Please take a few minutes to complete the written survey that you may receive in the mail after your visit with us. Thank you!             Your Updated Medication List - Protect others around you: Learn how to safely use, store and throw away your medicines at www.disposemymeds.org.          This list is accurate as of: 4/13/17 12:01 PM.  Always use your most recent med list.                   Brand Name Dispense Instructions for use    ALLEGRA 60 MG tablet   Generic drug:  fexofenadine      Take 60 mg by mouth daily       alum & mag hydroxide-simethicone 200-200-20 MG/5ML Susp suspension    MYLANTA/MAALOX     Take 30 mLs by mouth every 4 hours as needed for indigestion       lidocaine-prilocaine cream    EMLA    30 g    Apply 30 g topically as needed for moderate pain Apply to port site 1 hour before access.       LORazepam 0.5 MG tablet    ATIVAN    30 tablet    Take 1 tablet (0.5 mg) by mouth every 4 hours as needed (Anxiety, Nausea/Vomiting or Sleep)       Multi-vitamin Tabs tablet      Take 1 tablet by mouth daily       NASACORT AQ 55 MCG/ACT Inhaler   Generic drug:  triamcinolone      Spray 2 sprays into both nostrils daily       omega 3 1000 MG Caps      Take 1 g by mouth daily       omeprazole 40 MG capsule    priLOSEC    90 capsule    Take 1 capsule (40 mg) by mouth daily Take 30-60 minutes before a meal.       ondansetron 8 MG ODT tab    ZOFRAN ODT    60 tablet    Take 1 tablet (8 mg) by mouth every 8 hours  as needed for nausea       prochlorperazine 25 MG Suppository    COMPAZINE    60 suppository    Place 1 suppository (25 mg) rectally every 12 hours as needed for nausea       THERATEARS OP      Apply 2 drops to eye daily       TYLENOL 325 MG tablet   Generic drug:  acetaminophen      Take 325 mg by mouth every 6 hours as needed for mild pain       VITAMIN B6 PO      Take 100 mg by mouth 2 times daily Reported on 3/21/2017       VITAMIN C PO      Take 250 mg by mouth daily       vitamin D 2000 UNITS tablet      Take 2,000 Units by mouth daily

## 2017-04-18 NOTE — PROGRESS NOTES
"Infusion Nursing Note:  Bren Rendon presents today for FOLFOX.    Patient seen by provider today: No   present during visit today: Not Applicable.    Note: N/A.    Intravenous Access:  Labs drawn without difficulty.  Implanted Port.    Treatment Conditions:  Lab Results   Component Value Date    HGB 9.3 04/18/2017     Lab Results   Component Value Date    WBC 3.0 04/18/2017      Lab Results   Component Value Date    ANEU 2.1 04/18/2017     Lab Results   Component Value Date     04/18/2017      Lab Results   Component Value Date     04/18/2017                   Lab Results   Component Value Date    POTASSIUM 3.9 04/18/2017           Lab Results   Component Value Date    MAG 1.9 01/17/2017            Lab Results   Component Value Date    CR 0.60 04/18/2017                   Lab Results   Component Value Date    JARED 8.9 04/18/2017                Lab Results   Component Value Date    BILITOTAL 0.3 04/18/2017           Lab Results   Component Value Date    ALBUMIN 3.0 04/18/2017                    Lab Results   Component Value Date    ALT 20 04/18/2017           Lab Results   Component Value Date    AST 15 04/18/2017     Results reviewed, labs MET treatment parameters, ok to proceed with treatment.      Post Infusion Assessment:  Patient tolerated infusion without incident.  Blood return noted pre and post infusion.  Access discontinued per protocol.  Prior to discharge: Port is secured in place with tegaderm and flushed with 10cc NS with positive blood return noted.  Continuous home infusion CADD pump connected.    All connectors secured in place and clamps taped open.    Pump started, \"running\" noted on display (CADD): YES.  Patient instructed to call our clinic or [facility] with any questions or concerns at home.  Patient verbalized understanding.    Patient set up for pump disconnect at our clinic on Thurs 4/20 at 1200.        Discharge Plan:   Patient discharged in stable " condition accompanied by: friend.  Departure Mode: Ambulatory.      Samantha Omalley RN

## 2017-04-18 NOTE — MR AVS SNAPSHOT
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)           Follow-ups after your visit        Your next 10 appointments already scheduled     Apr 20, 2017 12:00 PM CDT   Level O with ROOM 8 [facility] ([facility])    [facility]  [address]  [facility] [address]   [phone]            Apr 27, 2017  9:00 AM CDT   New Visit with Amilcar Zambrano MD   [facility] [place] ([facility])    [address]  [address]  [facility] [address]   [phone]            May 02, 2017  8:00 AM CDT   Level 6 with ROOM 8 [facility] ([facility])    [facility]  [address]  [facility] [address]   [phone]            May 04, 2017 12:00 PM CDT   Level O with ROOM 8 [facility] ([facility])    [facility]  [address]  [facility] [address]   [phone]            May 16, 2017  8:00 AM CDT   Level 6 with ROOM 7 [facility] ([facility])    [facility]  [address]  [facility] [address]   [phone]            May 16, 2017  9:00 AM CDT   Return Visit with Aline El MD   [facility] ([facility])    [facility]  [address]  [facility] [address]   [phone]            May 18, 2017 12:00 PM CDT   Level O with ROOM 8 [facility] ([facility])    [facility]  [address]  [place]  [address] [phone]              Who to contact     If you have questions or need follow up information about today's clinic visit or your schedule please contact [facility] directly at [phone].  Normal or non-critical lab and imaging results will be communicated to you by MyChart, letter or phone within 4 business days after the clinic has received the results. If you do not hear from us within 7 days, please contact the clinic through [facility] [employer] or phone. If you have a critical or abnormal lab result, we will notify you by phone as soon as possible.  Submit refill requests through [employer] or call your pharmacy and they will forward the refill request to us. Please allow 3 business days for your refill to be completed.          Additional Information About Your Visit        Location [email or web address] Information     [employer] gives you secure access to your electronic health record. If you see a primary care provider, you can also send messages to your care team and make appointments. If you have questions, please call your primary care clinic.  If you do not have a primary care provider, please call [phone] and they will assist you.        Care EveryWhere ID     This is your Care EveryWhere ID. This could be used by other organizations to access your [place] medical records  [id number]        Your Vitals Were     Pulse Temperature                78 98  F (36.7  C) (Oral)           Blood Pressure from Last 3 Encounters:   04/18/17 121/63   04/13/17 123/75   04/06/17 122/82    Weight from Last 3 Encounters:   04/13/17 76.9 kg (169 lb 8 oz)   04/04/17 78.2 kg (172 lb 6.4 oz)   04/04/17 78.2 kg (172 lb 6.4 oz)              We Performed the Following     CBC with platelets differential     Comprehensive metabolic panel        Primary Care Provider Office Phone # Fax #    Dillan Amos [phone]       [facility] [address] [facility] [address]        Thank you!      Thank you for choosing [facility]  for your care. Our goal is always to provide you with excellent care. Hearing back from our patients is one way we can continue to improve our services. Please take a few minutes to complete the written survey that you may receive in the mail after your visit with us. Thank you!             Your Updated Medication List - Protect others around you: Learn how to safely use, store and throw away your medicines at www.disposemymeds.org.          This list is accurate as of: 4/18/17  2:15 PM.  Always use your most recent med list.                   Brand Name Dispense Instructions for use    ALLEGRA 60 MG tablet   Generic drug:  fexofenadine      Take 60 mg by mouth daily       alum & mag hydroxide-simethicone 200-200-20 MG/5ML Susp suspension    MYLANTA/MAALOX     Take 30 mLs by mouth every 4 hours as needed for indigestion       lidocaine-prilocaine cream    EMLA    30 g    Apply 30 g topically as needed for moderate pain Apply to port site 1 hour before access.       LORazepam 0.5 MG tablet    ATIVAN    30 tablet    Take 1 tablet (0.5 mg) by mouth every 4 hours as needed (Anxiety, Nausea/Vomiting or Sleep)       Multi-vitamin Tabs tablet      Take 1 tablet by mouth daily       NASACORT AQ 55 MCG/ACT Inhaler   Generic drug:  triamcinolone      Spray 2 sprays into both nostrils daily       omega 3 1000 MG Caps      Take 1 g by mouth daily       omeprazole 40 MG capsule    priLOSEC    90 capsule    Take 1 capsule (40 mg) by mouth daily Take 30-60 minutes before a meal.       ondansetron 8 MG ODT tab    ZOFRAN ODT    60 tablet    Take 1 tablet (8 mg) by mouth every 8 hours as needed for nausea       prochlorperazine 25 MG Suppository    COMPAZINE    60 suppository    Place 1 suppository (25 mg) rectally every 12 hours as needed for nausea       THERATEARS OP      Apply 2 drops to eye daily       TYLENOL 325 MG tablet   Generic drug:  acetaminophen      Take 325 mg by mouth  every 6 hours as needed for mild pain       VITAMIN B6 PO      Take 100 mg by mouth 2 times daily Reported on 3/21/2017       VITAMIN C PO      Take 250 mg by mouth daily       vitamin D 2000 UNITS tablet      Take 2,000 Units by mouth daily

## 2017-04-20 NOTE — PROGRESS NOTES
5FU CIVI pump discontinued.  Pt states some nausea, taking antiemetics with relief noted, may start allergy pills as she feels her sinuses may have some affect on her nausea. Kathryn Sykes RN

## 2017-04-20 NOTE — MR AVS SNAPSHOT
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)    -  1       Follow-ups after your visit        Your next 10 appointments already scheduled     Apr 27, 2017  9:00 AM CDT   New Visit with Amilcar Zambrano MD   [facility] [place] ([facility])    [address]  [address]  [place] [address]   [phone]            May 02, 2017  8:00 AM CDT   Level 6 with ROOM 8 [facility] ([facility])    [facility]  [address]  [facility] [address]   [phone]            May 04, 2017 12:00 PM CDT   Level O with ROOM 8 [facility] ([facility])    [facility]  [address]  [facility] [address]   [phone]            May 16, 2017  8:00 AM CDT   Level 6 with ROOM 7 [facility] ([facility])    [facility]  [address]  [facility] [address]   [phone]            May 16, 2017  9:00 AM CDT   Return Visit with Aline El MD   [facility] ([facility])    [facility]  [address]  [facility] [address]   [phone]            May 18, 2017 12:00 PM CDT   Level O with ROOM 8 [facility] ([facility])    [facility]  [address]  [facility] [address]   [phone]              Who to contact     If you have questions or need follow up information about today's clinic visit or your schedule please contact [facility] directly at [phone].  Normal or non-critical lab and  imaging results will be communicated to you by MyChart, letter or phone within 4 business days after the clinic has received the results. If you do not hear from us within 7 days, please contact the clinic through [employer] or phone. If you have a critical or abnormal lab result, we will notify you by phone as soon as possible.  Submit refill requests through [employer] or call your pharmacy and they will forward the refill request to us. Please allow 3 business days for your refill to be completed.          Additional Information About Your Visit        [employer] Information     [employer] gives you secure access to your electronic health record. If you see a primary care provider, you can also send messages to your care team and make appointments. If you have questions, please call your primary care clinic.  If you do not have a primary care provider, please call [phone] and they will assist you.        Care EveryWhere ID     This is your Care EveryWhere ID. This could be used by other organizations to access your [place] medical records  [id number]        Your Vitals Were     Pulse Temperature                104 98.7  F (37.1  C) (Oral)           Blood Pressure from Last 3 Encounters:   04/20/17 144/54   04/18/17 121/63   04/13/17 123/75    Weight from Last 3 Encounters:   04/13/17 76.9 kg (169 lb 8 oz)   04/04/17 78.2 kg (172 lb 6.4 oz)   04/04/17 78.2 kg (172 lb 6.4 oz)              We Performed the Following     Central Venous Catheter: implanted port (Port-A-Cath, Power Port)        Primary Care Provider Office Phone # Fax #    Dillan Jackson Amos [phone]       [facility] [address] [facility] [address]        Thank you!     Thank you for choosing [facility]  for your care. Our goal is always to provide you with excellent care. Hearing back from our patients is one way we can continue to improve our services. Please take a few minutes to complete the written  survey that you may receive in the mail after your visit with us. Thank you!             Your Updated Medication List - Protect others around you: Learn how to safely use, store and throw away your medicines at www.disposemymeds.org.          This list is accurate as of: 4/20/17  3:53 PM.  Always use your most recent med list.                   Brand Name Dispense Instructions for use    ALLEGRA 60 MG tablet   Generic drug:  fexofenadine      Take 60 mg by mouth daily       alum & mag hydroxide-simethicone 200-200-20 MG/5ML Susp suspension    MYLANTA/MAALOX     Take 30 mLs by mouth every 4 hours as needed for indigestion       lidocaine-prilocaine cream    EMLA    30 g    Apply 30 g topically as needed for moderate pain Apply to port site 1 hour before access.       LORazepam 0.5 MG tablet    ATIVAN    30 tablet    Take 1 tablet (0.5 mg) by mouth every 4 hours as needed (Anxiety, Nausea/Vomiting or Sleep)       Multi-vitamin Tabs tablet      Take 1 tablet by mouth daily       NASACORT AQ 55 MCG/ACT Inhaler   Generic drug:  triamcinolone      Spray 2 sprays into both nostrils daily       omega 3 1000 MG Caps      Take 1 g by mouth daily       omeprazole 40 MG capsule    priLOSEC    90 capsule    Take 1 capsule (40 mg) by mouth daily Take 30-60 minutes before a meal.       ondansetron 8 MG ODT tab    ZOFRAN ODT    60 tablet    Take 1 tablet (8 mg) by mouth every 8 hours as needed for nausea       prochlorperazine 25 MG Suppository    COMPAZINE    60 suppository    Place 1 suppository (25 mg) rectally every 12 hours as needed for nausea       THERATEARS OP      Apply 2 drops to eye daily       TYLENOL 325 MG tablet   Generic drug:  acetaminophen      Take 325 mg by mouth every 6 hours as needed for mild pain       VITAMIN B6 PO      Take 100 mg by mouth 2 times daily Reported on 3/21/2017       VITAMIN C PO      Take 250 mg by mouth daily       vitamin D 2000 UNITS tablet      Take 2,000 Units by mouth daily

## 2017-04-27 NOTE — PROGRESS NOTES
S: 66-year-old female past medical history is significant for recently diagnosed gastric malignancy with current chemotherapy with Dr. Maki SKELTON oncology, obesity, allergic rhinitis, external hemorrhoids, rosacea, GERD, presents to the acupuncture clinic for consideration of acupuncture to help her address emerging peripheral neuropathy symptoms that are felt to be related to her current chemotherapy for her gastric malignancy.  The patient identifies approximate 6 weeks of symptoms involving primarily her feet but also her hands in close proximity to when she receives chemotherapy beginning with the 3rd round of chemotherapy.  Symptoms include pins and needles type dysesthesia sensation to the feet and cramping up sensation in her hands.  None of these sensations has been sustained but the feet are becoming progressively more dysesthetic and for longer periods of time following the chemotherapy as well as feeling clumsy with ambulation at times.  She also notes nausea that is at times precipitated by the smell and appearance of meat only.  She is fine with fruit, vegetables, carbohydrates.  She wonders if we can address the nausea concomitantly with the peripheral neuropathy symptoms.    Active Ambulatory Problems     Diagnosis Date Noted     Degeneration of lumbar or lumbosacral intervertebral disc 10/25/2006     Obesity 10/25/2006     Allergic rhinitis 07/24/2007     External hemorrhoids 03/29/2010     Rosacea 03/29/2010     Bunion of great toe 03/29/2010     CARDIOVASCULAR SCREENING; LDL GOAL LESS THAN 160 10/31/2010     Postmenopausal bleeding 11/09/2011     SK (seborrheic keratosis) 02/11/2013     Lentigo 02/11/2013     Xerosis of skin 02/11/2013     Acute cholecystitis 09/25/2014     Cholecystitis 09/25/2014     Dupuytren's disease 04/22/2015     Hammertoe 04/22/2015     Gastroesophageal reflux disease with esophagitis 10/28/2016     Esophageal stricture 10/28/2016     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) 12/18/2016     Dehydration 01/24/2017     Nausea 01/24/2017     Resolved Ambulatory Problems     Diagnosis Date Noted     ASCUS on Pap smear 11/28/2008     ASCUS on Pap smear 05/16/2009     Past Medical History:   Diagnosis Date     Abnormal glandular Papanicolaou smear of cervix 8/1993     Acid reflux disease      Arthritis      Duran's esophageal ulceration      Cancer (H)      Dupuytren's disease      Obese      Rosacea      Signet ring cell carcinoma (H)        Social History     Social History     Marital status:      Spouse name: N/A     Number of children: N/A     Years of education: N/A     Occupational History      Home     Social History Main Topics     Smoking status: Never Smoker     Smokeless tobacco: Never Used     Alcohol use Yes      Comment: occasional.     Drug use: No     Sexual activity: No     Other Topics Concern     Parent/Sibling W/ Cabg, Mi Or Angioplasty Before 65f 55m? No     Social History Narrative     Family History   Problem Relation Age of Onset     Genitourinary Problems Mother      Kidney disease.     Hypertension Mother      Lipids Mother      Obesity Mother      CANCER Father      Lung cancer than went to bone and brain.     Coronary Artery Disease Father      MI     DIABETES Father      Breast Cancer Maternal Grandmother      Current Outpatient Prescriptions   Medication     alum & mag hydroxide-simethicone (MYLANTA/MAALOX) 200-200-20 MG/5ML SUSP suspension     Pyridoxine HCl (VITAMIN B6 PO)     prochlorperazine (COMPAZINE) 25 MG Suppository     Ascorbic Acid (VITAMIN C PO)     LORazepam (ATIVAN) 0.5 MG tablet     ondansetron (ZOFRAN ODT) 8 MG ODT tab     lidocaine-prilocaine (EMLA) cream     triamcinolone (NASACORT AQ) 55 MCG/ACT Inhaler     multivitamin, therapeutic with minerals (MULTI-VITAMIN) TABS tablet     Carboxymethylcellulose Sodium (THERATEARS OP)     acetaminophen (TYLENOL) 325 MG tablet     omeprazole (PRILOSEC) 40  MG capsule     omega 3 1000 MG CAPS     Cholecalciferol (VITAMIN D) 2000 UNITS tablet     fexofenadine (ALLEGRA) 60 MG tablet     No current facility-administered medications for this visit.         Allergies   Allergen Reactions     Cats Other (See Comments)     sneezing     Dogs Other (See Comments)     Seasonal Allergies Other (See Comments)     Sneezing and sinus drainage     Nkda [No Known Drug Allergies]      Review Of Systems  Skin: negative  Eyes: negative  Ears/Nose/Throat: negative  Respiratory: No shortness of breath, dyspnea on exertion, cough, or hemoptysis  Cardiovascular: negative  Gastrointestinal: negative  Genitourinary: negative  Musculoskeletal: negative  Neurologic: as above  Psychiatric: negative  Hematologic/Lymphatic/Immunologic: negative  Endocrine: negative      O:  Exam:  Constitutional: healthy, alert and no distress  Head: Normocephalic. No masses, lesions, tenderness or abnormalities  Neck: Neck supple. No adenopathy. Thyroid symmetric, normal size,, Carotids without bruits.  ENT: ENT exam normal, no neck nodes or sinus tenderness  Cardiovascular: negative, PMI normal. No lifts, heaves, or thrills. RRR. No murmurs, clicks gallops or rub  Respiratory: negative, Percussion normal. Good diaphragmatic excursion. Lungs clear  Musculoskeletal: extremities normal- no gross deformities noted, gait normal and normal muscle tone  Skin: no suspicious lesions or rashes  Neurologic: Gait normal. Reflexes normal and symmetric.  Sensation is mildly diminished in the bilateral lower extremities plantar surface of the feet only.  Slightly worse than left.    A: Peripheral neuropathy (chemotherapy-induced)  P: Acupuncture risks and benefits as well as realistic expectation were reviewed with the patient.  She wishes to proceed.  I would recommend seeing her once per week ×4 weeks and then reassess at that point.      Pre-Procedure:  Patient's Name and Date of Birth verified:  YES  Verified By:  mona    (initials)  Diagnosis:  Data Unavailable  Interval History:  na  Complications and/or Adverse Effects of Last Acupuncture Treatment: na   Site Marking and Verification:  Verification of site selection (based on symptoms and condition:  YES  Verified by: mona (initials)  Patient identification verified by provider: YES  Verified by: mona (initials)  Pause for the Cause:  YES  Verified by: mona (initials)   Procedure Note:  Acupuncture Treatment Number: 1  Acupuncture Points Selected: AUR;sm,pz,cingulate gyrus BODY:Bafeng,Baxi,FourGates.    Electrical Stim: No  Frequency 0 HZ    Number of needles:  21    Re-insertion/Manipulation of needles after initial 15 minutes: YES  Time:  30   Minutes   Post-Procedure:  Complication/Adverse Effects: 0      Management:  0      Signature:  Amilcar Zambrano MD

## 2017-04-27 NOTE — MR AVS SNAPSHOT
After Visit Summary   4/27/2017    Bren Rendon    MRN: [id number]           Patient Information     Date Of Birth          1950        Visit Information        Provider Department      4/27/2017 9:00 AM Amilcar Zambrano MD [facility]        Today's Diagnoses     Peripheral polyneuropathy (H)    -  1       Follow-ups after your visit        Your next 10 appointments already scheduled     May 04, 2017  8:30 AM CDT   Return Visit with Amilcar Zambrano MD   [facility] [place] ([facility])    [address]  [address]  [place] [address]   [phone]            May 16, 2017  9:00 AM CDT   Return Visit with Aline El MD   [facility] ([facility])    [facility]  [address]  [place] [address]   [phone]            Jun 01, 2017  9:15 AM CDT   Return Visit with Amilcar Zambrano MD   [facility] ([facility])    [address]  [address]  [place] [address]   [phone]            Quinten 15, 2017  9:15 AM CDT   Return Visit with Amilcar Zambrano MD   [facility] ([facility])    [address]  [address]  [place] [address]   [phone]              Future tests that were ordered for you today     Open Future Orders        Priority Expected Expires Ordered    MR Abdomen MRCP w/o & w Contrast Routine 5/3/2017 5/2/2018 5/3/2017            Who to contact     If you have questions or need follow up information about today's clinic visit or your schedule please contact [facility] directly at [phone].  Normal or non-critical lab and imaging results will be communicated to you by MyChart, letter or phone within 4 business days after the clinic has received the results. If you do not hear from us within 7  "days, please contact the clinic through [email or web address] or phone. If you have a critical or abnormal lab result, we will notify you by phone as soon as possible.  Submit refill requests through [email or web address] or call your pharmacy and they will forward the refill request to us. Please allow 3 business days for your refill to be completed.          Additional Information About Your Visit        BrandBoardshar[employer]urated Information     [email or web address] gives you secure access to your electronic health record. If you see a primary care provider, you can also send messages to your care team and make appointments. If you have questions, please call your primary care clinic.  If you do not have a primary care provider, please call [phone] and they will assist you.        Care EveryWhere ID     This is your Care EveryWhere ID. This could be used by other organizations to access your [place] medical records  [id number]        Your Vitals Were     Pulse Respirations Height BMI (Body Mass Index)          113 16 1.562 m (5' 1.5\") 31.73 kg/m2         Blood Pressure from Last 3 Encounters:   05/02/17 127/73   04/27/17 119/79   04/20/17 144/54    Weight from Last 3 Encounters:   05/02/17 76 kg (167 lb 9.6 oz)   04/27/17 77.4 kg (170 lb 11.2 oz)   04/13/17 76.9 kg (169 lb 8 oz)              Today, you had the following     No orders found for display       Primary Care Provider Office Phone # Fax #    Dillan Amos [phone]       [facility] [address] [address]        Thank you!     Thank you for choosing [place] [facility]  for your care. Our goal is always to provide you with excellent care. Hearing back from our patients is one way we can continue to improve our services. Please take a few minutes to complete the written survey that you may receive in the mail after your visit with us. Thank you!             Your Updated Medication List - Protect others around you: Learn how to " safely use, store and throw away your medicines at www.disposemymeds.org.          This list is accurate as of: 4/27/17 11:59 PM.  Always use your most recent med list.                   Brand Name Dispense Instructions for use    ALLEGRA 60 MG tablet   Generic drug:  fexofenadine      Take 60 mg by mouth daily       alum & mag hydroxide-simethicone 200-200-20 MG/5ML Susp suspension    MYLANTA/MAALOX     Take 30 mLs by mouth every 4 hours as needed for indigestion       lidocaine-prilocaine cream    EMLA    30 g    Apply 30 g topically as needed for moderate pain Apply to port site 1 hour before access.       LORazepam 0.5 MG tablet    ATIVAN    30 tablet    Take 1 tablet (0.5 mg) by mouth every 4 hours as needed (Anxiety, Nausea/Vomiting or Sleep)       Multi-vitamin Tabs tablet      Take 1 tablet by mouth daily       NASACORT AQ 55 MCG/ACT Inhaler   Generic drug:  triamcinolone      Spray 2 sprays into both nostrils daily       omega 3 1000 MG Caps      Take 1 g by mouth daily       omeprazole 40 MG capsule    priLOSEC    90 capsule    Take 1 capsule (40 mg) by mouth daily Take 30-60 minutes before a meal.       ondansetron 8 MG ODT tab    ZOFRAN ODT    60 tablet    Take 1 tablet (8 mg) by mouth every 8 hours as needed for nausea       prochlorperazine 25 MG Suppository    COMPAZINE    60 suppository    Place 1 suppository (25 mg) rectally every 12 hours as needed for nausea       THERATEARS OP      Apply 2 drops to eye daily       TYLENOL 325 MG tablet   Generic drug:  acetaminophen      Take 325 mg by mouth every 6 hours as needed for mild pain       VITAMIN B6 PO      Take 100 mg by mouth 2 times daily Reported on 3/21/2017       VITAMIN C PO      Take 250 mg by mouth daily       vitamin D 2000 UNITS tablet      Take 2,000 Units by mouth daily

## 2017-04-27 NOTE — NURSING NOTE
"Pain Clinic Visit    Chief Complaint   Patient presents with     Acupuncture     Neuropahty; hands       Injury: neuropathy; referred from cancer doctor; chemo treatment has caused neuropathy    Location of Pain: right after chemo treatments it settles in her feet but goes away; hands - the numbness will pop up indiscriminately, no specific reason  Duration of Pain: pain with cold - hands  Rating of Pain: 3  Pain is better with: warming up her hands  Pain is worse with: cold  Treatment so far consists of: nothing  Ever received acupuncture before? no      Initial /79  Pulse 113  Resp 16  Ht 1.562 m (5' 1.5\")  Wt 77.4 kg (170 lb 11.2 oz)  BMI 31.73 kg/m2 Estimated body mass index is 31.73 kg/(m^2) as calculated from the following:    Height as of this encounter: 1.562 m (5' 1.5\").    Weight as of this encounter: 77.4 kg (170 lb 11.2 oz).  Medication Reconciliation: complete  "

## 2017-04-28 NOTE — TELEPHONE ENCOUNTER
Received a call from Pt stating that Dr. El recently referred to Dr. 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 Pt states that during her visit with Dr. Zambrano he mentioned that he could also perform acupuncture for her nausea but wondering if this in contraindicated due to the proximity of the needles to her GE tumor.    Told Pt that I will check with Dr. El when she returns to the clinic on Monday and get back to her with her response at that time. Pt verbalized understanding and agreed with the plan.

## 2017-05-02 NOTE — MR AVS SNAPSHOT
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 Also, tell your doctor about any allergies you may have. Wear comfortable clothes and leave your valuables at home.  Adults: No eating or drinking for 8 hours before the exam. You may take medicine with a small sip of water.  Children: - Children 6+ years: No food or drink for 6 hours before exam. - Children 1-5 years: No food or drink for 4 hours before exam. - Infants, breast-fed: may have breast milk up to 2 hours before exam. - Infants, formula: may have bottle until 4 hours before exam.  Please call the Imaging Department at your exam site with any questions.            May 04, 2017  8:30 AM CDT   Return Visit with Amilcar Zambrano MD   [facility] [place] ([facility])    [address]  [place] [facility] [place] [address]   [phone]            May 16, 2017  8:00 AM CDT   Level 6 with ROOM 7 [facility] ([facility])    [facility]  [address]  [facility] [place] [address]   [phone]            May 16, 2017  9:00 AM CDT   Return Visit with Aline El MD   [facility] ([facility])    [facility]  [address]  [facility] [place] [address]   [phone]            May 18, 2017 12:00 PM CDT   Level O with ROOM 8 [facility] ([facility])    n Medical [facility]  [address]  [facility] [place] [address]   [phone]            Jun 01, 2017  9:15 AM CDT   Return Visit with Amilcar Zambrano MD   [facility] [place] ([facility])    [address]  [address]  [place] [address]   [phone]            Quinten 15, 2017  9:15 AM CDT   Return Visit with Amilcar Zambrano MD   [facility] ([facility])    [address]  [address]  [facility] [place] [address]   [phone]              Future tests that were ordered for you today     Open Future Orders        Priority Expected Expires Ordered    US Abdomen Limited Routine  5/2/2018 5/2/2017            Who to contact     If you have questions or need follow up information about today's clinic visit or your schedule please contact [facility] directly at [phone].  Normal or non-critical lab and imaging results will be communicated to you by [employer], letter or phone within 4 business days after the clinic has received the results. If you do not hear from us within 7 days, please contact the clinic through [employer] or phone. If you have a critical or abnormal lab result, we will notify you by phone as soon as possible.  Submit refill requests through Dotted Block or call your pharmacy and they will forward the refill request to us. Please allow 3 business days for your refill to be completed.          Additional Information About Your Visit        [employer] Information     Dotted Block gives you secure access to your electronic health record. If you see a primary care provider, you can also send messages to your care team and make appointments. If you have questions, please call your primary care  clinic.  If you do not have a primary care provider, please call [phone] and they will assist you.        Care EveryWhere ID     This is your Care EveryWhere ID. This could be used by other organizations to access your [place] medical records  [id number]        Your Vitals Were     Pulse Temperature Respirations BMI (Body Mass Index)          86 98.2  F (36.8  C) 16 31.15 kg/m2         Blood Pressure from Last 3 Encounters:   05/02/17 127/73   04/27/17 119/79   04/20/17 144/54    Weight from Last 3 Encounters:   05/02/17 76 kg (167 lb 9.6 oz)   04/27/17 77.4 kg (170 lb 11.2 oz)   04/13/17 76.9 kg (169 lb 8 oz)              We Performed the Following     CBC with platelets differential     Comprehensive metabolic panel     Treatment Conditions        Primary Care Provider Office Phone # Fax #    Dillan Amos [phone]       [facility] [address] [address]        Thank you!     Thank you for choosing [facility]  for your care. Our goal is always to provide you with excellent care. Hearing back from our patients is one way we can continue to improve our services. Please take a few minutes to complete the written survey that you may receive in the mail after your visit with us. Thank you!             Your Updated Medication List - Protect others around you: Learn how to safely use, store and throw away your medicines at www.disposemymeds.org.          This list is accurate as of: 5/2/17  1:05 PM.  Always use your most recent med list.                   Brand Name Dispense Instructions for use    ALLEGRA 60 MG tablet   Generic drug:  fexofenadine      Take 60 mg by mouth daily       alum & mag hydroxide-simethicone 200-200-20 MG/5ML Susp suspension    MYLANTA/MAALOX     Take 30 mLs by mouth every 4 hours as needed for indigestion       lidocaine-prilocaine cream    EMLA    30 g    Apply 30 g topically as needed for moderate pain Apply to port  site 1 hour before access.       LORazepam 0.5 MG tablet    ATIVAN    30 tablet    Take 1 tablet (0.5 mg) by mouth every 4 hours as needed (Anxiety, Nausea/Vomiting or Sleep)       Multi-vitamin Tabs tablet      Take 1 tablet by mouth daily       NASACORT AQ 55 MCG/ACT Inhaler   Generic drug:  triamcinolone      Spray 2 sprays into both nostrils daily       omega 3 1000 MG Caps      Take 1 g by mouth daily       omeprazole 40 MG capsule    priLOSEC    90 capsule    Take 1 capsule (40 mg) by mouth daily Take 30-60 minutes before a meal.       ondansetron 8 MG ODT tab    ZOFRAN ODT    60 tablet    Take 1 tablet (8 mg) by mouth every 8 hours as needed for nausea       prochlorperazine 25 MG Suppository    COMPAZINE    60 suppository    Place 1 suppository (25 mg) rectally every 12 hours as needed for nausea       THERATEARS OP      Apply 2 drops to eye daily       TYLENOL 325 MG tablet   Generic drug:  acetaminophen      Take 325 mg by mouth every 6 hours as needed for mild pain       VITAMIN B6 PO      Take 100 mg by mouth 2 times daily Reported on 3/21/2017       VITAMIN C PO      Take 250 mg by mouth daily       vitamin D 2000 UNITS tablet      Take 2,000 Units by mouth daily

## 2017-05-02 NOTE — PROGRESS NOTES
Infusion Nursing Note:  Bren MC Betsydarshana presents today for FOLFOX - HELD   Patient seen by provider today: No   present during visit today: Not Applicable.    Note: CBC parameters met but LFTs markedly elevated from prior visit. TORB given by Dr. El to hold chemo, give 1L NS. Scans being scheduled.    Intravenous Access:  Implanted Port.    Treatment Conditions:  Results reviewed, labs MET treatment parameters, but TORB given to hold chemo based on LFTs.       Post Infusion Assessment:  Patient tolerated infusion without incident.    Discharge Plan:   Patient discharged in stable condition accompanied by: friend.    Abel Bianchi RN

## 2017-05-03 PROBLEM — R74.8 ELEVATED LIVER ENZYMES: Status: ACTIVE | Noted: 2017-01-01

## 2017-05-03 NOTE — TELEPHONE ENCOUNTER
Spoke to Pt while in Infusion for her FOLFOX treatment which ended up being held due to pts elevated LFTs. Informed Pt that Dr. El is not aware of any contraindications for acupuncture for her nausea and stated that it was fine to proceed with this.     Also informed Pt and her  that Dr. El recommends a Abdominal ultrasound later today for her elevated LFTs and that we will call them tomorrow with the results and her plan of care at that time.      Reviewed bowel regimen information with pt who c/o of ongoing constipation. Instructed pt to try 1-2 tablets of senna-s twice daily, to increase fluids and fiber in her diet and to call me if this is not effective. If pt is unable to produce a BM x 2 days instructed her to first try OTC milk of magnesia, then dulcolax laxative tablets per instructions on the box and to call if not effective. Pt verbalized understanding and no further que at this time.

## 2017-05-03 NOTE — TELEPHONE ENCOUNTER
Then dulcolax laxative tablets per instructions on the box and to call if not effective. Pt verbalized understanding and no further que at this time.

## 2017-05-03 NOTE — PROGRESS NOTES
Called pt regarding the results of her Abdominal ultrasound and Dr. El's recommendations for an MRCP for further evaluation of her elevated LFTs and bilirubin. Told pt that we will schedule her for this ASAP and call her with these results and the plan at that time.     Pt c/o of darkened urine wondering whether or not she should drink more fluids. Informed Pt that this could be a side effect from her elevated LFTs and bilirubin and to call us back if she notices any yelllowing of the eyes or skin or abdominal pain which could be signs of an increase in her levels which would need addressed sooner. Informed pt that we will be in touch regarding this appt and to call back with any other que/concerns. Pt verbalized understanding.

## 2017-05-04 NOTE — PROGRESS NOTES
Called Pt regarding her MRCP results and Dr. Negron's recommendation for an ERCP/EUS to evaluate the newly found pancreatic mass, to decompress the biliary track and for possible biopsy of the portocaval LN seen on her recent PET scan. Told Pt that we are holding her chemotherapy for now until this can be addressed and that we will be contacting the [facility] today to review her scans and schedule her for the procedures based on urgency. Informed pt that we will be in contact with her based on the findings of these procedures and discuss when to f/u with Dr. NEGRON at that time to discuss her care plan. Instructed pt to go to the ER if she develops abdominal pain or yellowing of the skin and she verbalized understanding. Told pt to give me a call back with any questions or concerns and she agreed with the plan.

## 2017-05-04 NOTE — MR AVS SNAPSHOT
After Visit Summary   5/4/2017    Bren Rendon    MRN: [id number]           Patient Information     Date Of Birth          1950        Visit Information        Provider Department      5/4/2017 8:30 AM Amilcar Zambrano MD [facility]        Today's Diagnoses     Peripheral polyneuropathy (H)    -  1       Follow-ups after your visit        Your next 10 appointments already scheduled     Jun 01, 2017  9:15 AM CDT   Return Visit with Amilcar Zambrano MD   [facility] ([facility])    [address]  [address]  [facility] [place] [address]   [phone]            Quinten 15, 2017  9:15 AM CDT   Return Visit with Amilcar Zambrano MD   [facility] ([facility])    [address]  [address]  [facility] [place] [address]   [phone]              Who to contact     If you have questions or need follow up information about today's clinic visit or your schedule please contact [facility] directly at [phone].  Normal or non-critical lab and imaging results will be communicated to you by Solar Road[employer]hart, letter or phone within 4 business days after the clinic has received the results. If you do not hear from us within 7 days, please contact the clinic through [employer] or phone. If you have a critical or abnormal lab result, we will notify you by phone as soon as possible.  Submit refill requests through Kiwup or call your pharmacy and they will forward the refill request to us. Please allow 3 business days for your refill to be completed.          Additional Information About Your Visit        MyChart Information     Kiwup gives you secure access to your electronic health record. If you see a primary care provider, you can also send messages to your care team and make appointments. If you have questions, please call your primary care clinic.  If  "you do not have a primary care provider, please call [phone] and they will assist you.        Care EveryWhere ID     This is your Care EveryWhere ID. This could be used by other organizations to access your [place] medical records  [id number]        Your Vitals Were     Pulse Height BMI (Body Mass Index)             106 1.562 m (5' 1.5\") 31.6 kg/m2          Blood Pressure from Last 3 Encounters:   05/10/17 137/83   05/06/17 131/82   05/04/17 134/87    Weight from Last 3 Encounters:   05/10/17 75.8 kg (167 lb 1.7 oz)   05/06/17 76 kg (167 lb 8.8 oz)   05/04/17 77.1 kg (170 lb)              We Performed the Following     ACUPUNCT W/O ELEC STIMUL ADDL 15M     ACUPUNCTURE, 1+ NEEDLES, W/O ELECTRICAL STIM; INIT 15 MIN PERSONAL CONTACT        Primary Care Provider Office Phone # Fax #    Dillan Amos [phone]       [facility] [address] [address]        Thank you!     Thank you for choosing [place] [facility]  for your care. Our goal is always to provide you with excellent care. Hearing back from our patients is one way we can continue to improve our services. Please take a few minutes to complete the written survey that you may receive in the mail after your visit with us. Thank you!             Your Updated Medication List - Protect others around you: Learn how to safely use, store and throw away your medicines at www.disposemymeds.org.          This list is accurate as of: 5/4/17 11:59 PM.  Always use your most recent med list.                   Brand Name Dispense Instructions for use    ALLEGRA 60 MG tablet   Generic drug:  fexofenadine      Take 60 mg by mouth daily       alum & mag hydroxide-simethicone 200-200-20 MG/5ML Susp suspension    MYLANTA/MAALOX     Take 30 mLs by mouth every 4 hours as needed for indigestion       lidocaine-prilocaine cream    EMLA    30 g    Apply 30 g topically as needed for moderate pain Apply " to port site 1 hour before access.       LORazepam 0.5 MG tablet    ATIVAN    30 tablet    Take 1 tablet (0.5 mg) by mouth every 4 hours as needed (Anxiety, Nausea/Vomiting or Sleep)       Multi-vitamin Tabs tablet      Take 1 tablet by mouth daily       NASACORT AQ 55 MCG/ACT Inhaler   Generic drug:  triamcinolone      Spray 2 sprays into both nostrils daily       omega 3 1000 MG Caps      Take 1 g by mouth daily       omeprazole 40 MG capsule    priLOSEC    90 capsule    Take 1 capsule (40 mg) by mouth daily Take 30-60 minutes before a meal.       ondansetron 8 MG ODT tab    ZOFRAN ODT    60 tablet    Take 1 tablet (8 mg) by mouth every 8 hours as needed for nausea       THERATEARS OP      Apply 2 drops to eye daily       TYLENOL 325 MG tablet   Generic drug:  acetaminophen      Take 325 mg by mouth every 6 hours as needed for mild pain       VITAMIN B6 PO      Take 100 mg by mouth 2 times daily Reported on 3/21/2017       VITAMIN C PO      Take 250 mg by mouth daily       vitamin D 2000 UNITS tablet      Take 2,000 Units by mouth daily

## 2017-05-04 NOTE — NURSING NOTE
"Pain Clinic Visit    Chief Complaint   Patient presents with     Acupuncture     Neuropathy; hands       After the last acupuncture session patient states: things have been good, no numbness for 6 days. Still has the nausea and food tasting issues. She did not have her Chemo on Tuesday because her liver count was really high. She had imaging this morning of the bio, and [facility] on Tuesday of the liver. She has had a lot of nasal drainage, especially when it is overcast or rainy. It can get to the point of vomiting. When it is brennan out it is better.     Rating of Pain: No Pain (0)    Delilah Lozano Clinical Medical Assistant       Initial /87  Pulse 106  Ht 1.562 m (5' 1.5\")  Wt 77.1 kg (170 lb)  BMI 31.6 kg/m2 Estimated body mass index is 31.6 kg/(m^2) as calculated from the following:    Height as of this encounter: 1.562 m (5' 1.5\").    Weight as of this encounter: 77.1 kg (170 lb).  Medication Reconciliation: complete  "

## 2017-05-05 PROBLEM — K83.1 OBSTRUCTIVE JAUNDICE (H): Status: ACTIVE | Noted: 2017-01-01

## 2017-05-05 PROBLEM — Z98.890 S/P ERCP: Status: ACTIVE | Noted: 2017-01-01

## 2017-05-05 NOTE — IP AVS SNAPSHOT
[address] B[address] [address]    Phone:  [phone]    Fax:  [phone]                                       After Visit Summary   5/5/2017    Bren Rendon    MRN: [id number]           After Visit Summary Signature Page     I have received my discharge instructions, and my questions have been answered. I have discussed any challenges I see with this plan with the nurse or doctor.    ..........................................................................................................................................  Patient/Patient Representative Signature      ..........................................................................................................................................  Patient Representative Print Name and Relationship to Patient    ..................................................               ................................................  Date                                            Time    ..........................................................................................................................................  Reviewed by Signature/Title    ...................................................              ..............................................  Date                                                            Time

## 2017-05-05 NOTE — PROGRESS NOTES
Per MD request to schedule her for procedure with Dr. Mantilla for ERCP and EUS today as his last case.   Called Bren to see if she can come in for procedure today. She is amenable and will expect a call from our scheduling to arrange date and time.   She has not eaten or drank anything this morning. She has taken her morning medications. She is not on a blood thinner, no daily ASA, and is not diabetic.   Advised to stay NPO, verbalized understanding.     Betty LOPEZ RN Coordinator  Dr. Jennings, Dr. Narvaez & Dr. Olivares  Pancreas~Biliary  [phone] #4

## 2017-05-05 NOTE — PROGRESS NOTES
Message received that patient's case needs to be bumped up to 1:50 pm procedure time with an arrival time of 11:50 am as pathology is needed for biopsies.    Control desk notified of time change.  Bren informed of new procedure time of 1:50 pm with an arrival time of 11:50 am.  Verbalized understanding.    Karen Sanchez LPN  Advanced Endoscopy~ Panc/Bili  Dr. Jennings, Dr. Mantilla & Dr. Narvaez  [phone]

## 2017-05-05 NOTE — ANESTHESIA CARE TRANSFER NOTE
Patient: Bren Rendon    Procedure(s):   Endoscopic Retrograde Cholagiopancreatogram with failed cannulation - Wound Class: II-Clean Contaminated  Diagnostic  Endoscopic Ultrasound with fine neelde biopsy.  - Wound Class: II-Clean Contaminated    Diagnosis: Pancreatic Mass   Diagnosis Additional Information: No value filed.    Anesthesia Type:   General, ETT     Note:  Airway :Face Mask  Patient transferred to:PACU  Comments: Spont resp, VSS, report to RN      Vitals: (Last set prior to Anesthesia Care Transfer)    CRNA VITALS  5/5/2017 1559 - 5/5/2017 1631      5/5/2017             Resp Rate (set): 10                Electronically Signed By: ZAID Barnhart CRNA  May 5, 2017  4:31 PM

## 2017-05-05 NOTE — ANESTHESIA PREPROCEDURE EVALUATION
Anesthesia Evaluation     . Pt has had prior anesthetic. Type: General and MAC    No history of anesthetic complications          ROS/MED HX    ENT/Pulmonary:  - neg pulmonary ROS     Neurologic: Comment: Chemotherapy-related peripheral neuropathy.     (+)neuropathy     Cardiovascular:  - neg cardiovascular ROS   (+) ----. : . . . :. . Previous cardiac testing date:results:date: results:ECG reviewed date:12/01/2016 results:Sinus rhythm with an occasional PAC. Normal EKG. date: results:          METS/Exercise Tolerance:  >4 METS   Hematologic: Comments: Anemia and thrombocytopenia in relation to chemotherapy.  Hgb 10.2 on 05/02.  Platelets 120 on 05/02.      (+) Anemia, Other Hematologic Disorder-      Musculoskeletal:  - neg musculoskeletal ROS       GI/Hepatic: Comment: Recent diagnosis of gastroesophageal junction malignancy.  Patient is currently undergoing chemotherapy.     (+) GERD Asymptomatic on medication,       Renal/Genitourinary:  - ROS Renal section negative       Endo:     (+) Obesity, .      Psychiatric: Comment: On Ativan 0.5 mg q4h prn anxiety, nausea.     (+) psychiatric history anxiety      Infectious Disease:  - neg infectious disease ROS       Malignancy:   (+) Malignancy History of GI  GI CA  Active status post Chemo,         Other:    - neg other ROS                 Physical Exam  Normal systems: pulmonary and dental    Airway   Mallampati: I  TM distance: >3 FB  Neck ROM: full    Dental     Cardiovascular   Rhythm and rate: regular and normal      Pulmonary                        Lab / Radiology Results:   Reviewed current labs when avail, see EMR for details.      BMP:  Recent Labs   Lab Test  05/02/17   0800   NA  138   POTASSIUM  3.7   CHLORIDE  105   CO2  21   BUN  8   CR  0.65   GLC  145*   JARED  8.8       LFTs:   Recent Labs   Lab Test  05/02/17   0800   PROTTOTAL  6.3*   ALBUMIN  2.9*   BILITOTAL  1.8*   ALKPHOS  250*   AST  344*   ALT  312*       CBC:   Recent Labs   Lab Test   05/02/17   0800   WBC  2.6*   HGB  10.2*   PLT  120*       Coags:  Recent Labs   Lab Test  12/15/16   1016   INR  1.02       Blood Bank:  No results found for: ABO, RH, AS    Studies:  See EMR for current studies, reviewed when available.      Anesthesia Plan      History & Physical Review  History and physical reviewed and following examination; no interval change.    ASA Status:  3 .    NPO Status:  > 4 hours (Water at 0730.  No solid food after midnight. )    Plan for General and ETT with Intravenous induction. Maintenance will be Balanced.    PONV prophylaxis:  Ondansetron (or other 5HT-3)       Postoperative Care  Postoperative pain management:  Multi-modal analgesia.      Consents  Anesthetic plan, risks, benefits and alternatives discussed with:  Patient.  Use of blood products discussed: No .   .      Ervin Rivera MD  Anesthesiologist  12:13 PM  May 5, 2017                        .

## 2017-05-05 NOTE — IP AVS SNAPSHOT
MRN:[id number]                      After Visit Summary   5/5/2017    Bren Rendon    MRN: [id number]           Thank you!     Thank you for choosing [facility] for your care. Our goal is always to provide you with excellent care. Hearing back from our patients is one way we can continue to improve our services. Please take a few minutes to complete the written survey that you may receive in the mail after you visit with us. Thank you!        Patient Information     Date Of Birth          1950        Designated Caregiver       Most Recent Value    Caregiver    Will someone help with your care after discharge? yes    Name of designated caregiver Naren Rendon    Phone number of caregiver [phone]    Caregiver address [address], [place] [address]      About your hospital stay     You were admitted on:  May 5, 2017 You last received care in the:  [facility] [address] [facility]    You were discharged on:  May 6, 2017        Reason for your hospital stay       You were admitted for monitoring after an EUS procedure due to high risk for developing an infection.                  Who to Call     For medical emergencies, please call 911.  For non-urgent questions about your medical care, please call your primary care provider or clinic, [phone]  For questions related to your surgery, please call your surgery clinic        Attending Provider     Provider Specialty    Guru Randal Mantilla MD Gastroenterology    Desmond, Cory Esposito MD Oncology       Primary Care Provider Office Phone # Fax #    Dillan Amos [phone]       [facility] [address] [address]         When to contact your care team       Due to the GI procedure you are at a high risk for developing an abdominal infection. Please call the [facility] at [phone] for signs of infection such as  temperature above 100.4, increased abdominal pain, nausea or vomiting. Please also call if you develop shortness of breath, chest pain, headaches, vision changes, bleeding, diarrhea, or pain.                  After Care Instructions     Activity       Your activity upon discharge: activity as tolerated            Diet       Follow this diet upon discharge: Eat soft, bland foods today and add more food into your diet as long as you are tolerating foods well.            Discharge Instructions       Resume pre procedure diet            Discharge Instructions       Restart home medications.                  Follow-up Appointments     Adult [facility] Follow-up and recommended labs and tests       You will need to follow-up with GI and possibly reattempt procedure. The GI team will set up an appointment for you and contact you with the details.    Appointments on [place] and/or [facility] (with [facility] or [facility] provider or service). Call [phone] if you haven't heard regarding these appointments within 7 days of discharge.                  Your next 10 appointments already scheduled     Jun 01, 2017  9:15 AM CDT   Return Visit with Amilcar Zambrano MD   [facility]    [address]  [address]  [place] [address]   [phone]            [place] 15, 2017  9:15 AM CDT   Return Visit with Amilcar Zambrano MD   [facility]    [address]  [address]  [place] [address]   [phone]              Pending Results     No orders found for last 3 day(s).            Statement of Approval     Ordered          05/06/17 1132  I have reviewed and agree with all the recommendations and orders detailed in this document.  EFFECTIVE NOW     Approved and electronically signed by:  Mercedes Sheehan PA-C             Admission Information     Date & Time Provider Department Dept. Phone     "5/5/2017 Cory Logan MD [address] BMT [facility] [phone]      Your Vitals Were     Blood Pressure Pulse Temperature Respirations Height Weight    135/69 (BP Location: Left arm) 98 97.3  F (36.3  C) (Axillary) 18 1.6 m (5' 3\") 76 kg (167 lb 8.8 oz)    Pulse Oximetry BMI (Body Mass Index)                98% 29.68 kg/m2          [facility] [employer] Information     [employer] gives you secure access to your electronic health record. If you see a primary care provider, you can also send messages to your care team and make appointments. If you have questions, please call your primary care clinic.  If you do not have a primary care provider, please call [phone] and they will assist you.        Care EveryWhere ID     This is your Care EveryWhere ID. This could be used by other organizations to access your [facility] medical records  [id number]           Review of your medicines      START taking        Dose / Directions    levofloxacin 500 MG tablet   Commonly known as:  LEVAQUIN   Used for:  Cholangitis        Dose:  500 mg   Take 1 tablet (500 mg) by mouth daily for 10 days   Quantity:  10 tablet   Refills:  0         CONTINUE these medicines which have NOT CHANGED        Dose / Directions    ALLEGRA 60 MG tablet   Generic drug:  fexofenadine        Dose:  60 mg   Take 60 mg by mouth daily   Refills:  0       alum & mag hydroxide-simethicone 200-200-20 MG/5ML Susp suspension   Commonly known as:  MYLANTA/MAALOX        Dose:  30 mL   Take 30 mLs by mouth every 4 hours as needed for indigestion   Refills:  0       lidocaine-prilocaine cream   Commonly known as:  EMLA   Used for:  GE junction carcinoma (H), 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   Quantity:  30 g   Refills:  1       LORazepam 0.5 MG tablet   Commonly known as:  ATIVAN   Used for:  GE junction carcinoma (H), Malignant neoplasm of cardia " of stomach (H)        Dose:  0.5 mg   Take 1 tablet (0.5 mg) by mouth every 4 hours as needed (Anxiety, Nausea/Vomiting or Sleep)   Quantity:  30 tablet   Refills:  3       Multi-vitamin Tabs tablet        Dose:  1 tablet   Take 1 tablet by mouth daily   Refills:  0       NASACORT AQ 55 MCG/ACT Inhaler   Generic drug:  triamcinolone        Dose:  2 spray   Spray 2 sprays into both nostrils daily   Refills:  0       omega 3 1000 MG Caps        Dose:  1 g   Take 1 g by mouth daily   Refills:  0       omeprazole 40 MG capsule   Commonly known as:  priLOSEC   Used for:  Gastroesophageal reflux disease with esophagitis        Dose:  40 mg   Take 1 capsule (40 mg) by mouth daily Take 30-60 minutes before a meal.   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), 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)        Dose:  2 drop   Apply 2 drops to eye daily   Refills:  0       TYLENOL 325 MG tablet   Used for:  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), 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)   Generic drug:  acetaminophen        Dose:  325 mg   Take 325 mg by mouth every 6 hours as needed for mild pain   Refills:  0       VITAMIN B6 PO   Indication:  Disease of the Peripheral Nerves        Dose:  100 mg   Take 100 mg by mouth 2 times daily Reported on 3/21/2017   Refills:  0       VITAMIN C PO        Dose:  250 mg   Take 250 mg by mouth daily   Refills:  0        vitamin D 2000 UNITS tablet        Dose:  2000 Units   Take 2,000 Units by mouth daily   Refills:  0            Where to get your medicines      These medications were sent to [facility] - [place], [place] - [address]  [address] [place] [address]     Phone:  [phone]     levofloxacin 500 MG tablet                Protect others around you: Learn how to safely use, store and throw away your medicines at www.disposemymeds.org.             Medication List: This is a list of all your medications and when to take them. Check marks below indicate your daily home schedule. Keep this list as a reference.      Medications           Morning Afternoon Evening Bedtime As Needed    ALLEGRA 60 MG tablet   Take 60 mg by mouth daily   Generic drug:  fexofenadine                                alum & mag hydroxide-simethicone 200-200-20 MG/5ML Susp suspension   Commonly known as:  MYLANTA/MAALOX   Take 30 mLs by mouth every 4 hours as needed for indigestion                                levofloxacin 500 MG tablet   Commonly known as:  LEVAQUIN   Take 1 tablet (500 mg) by mouth daily for 10 days                                lidocaine-prilocaine cream   Commonly known as:  EMLA   Apply 30 g topically as needed for moderate pain Apply to port site 1 hour before access.                                LORazepam 0.5 MG tablet   Commonly known as:  ATIVAN   Take 1 tablet (0.5 mg) by mouth every 4 hours as needed (Anxiety, Nausea/Vomiting or Sleep)   Last time this was given:  0.5 mg on 5/5/2017 11:26 PM                                Multi-vitamin Tabs tablet   Take 1 tablet by mouth daily   Last time this was given:  1 tablet on 5/6/2017  7:58 AM                                NASACORT AQ 55 MCG/ACT Inhaler   Spray 2 sprays into both nostrils daily   Generic drug:  triamcinolone                                omega 3 1000 MG Caps   Take 1 g by mouth daily                                 omeprazole 40 MG capsule   Commonly known as:  priLOSEC   Take 1 capsule (40 mg) by mouth daily Take 30-60 minutes before a meal.   Last time this was given:  40 mg on 5/6/2017  7:55 AM                                ondansetron 8 MG ODT tab   Commonly known as:  ZOFRAN ODT   Take 1 tablet (8 mg) by mouth every 8 hours as needed for nausea                                prochlorperazine 25 MG Suppository   Commonly known as:  COMPAZINE   Place 1 suppository (25 mg) rectally every 12 hours as needed for nausea                                THERATEARS OP   Apply 2 drops to eye daily                                TYLENOL 325 MG tablet   Take 325 mg by mouth every 6 hours as needed for mild pain   Generic drug:  acetaminophen                                VITAMIN B6 PO   Take 100 mg by mouth 2 times daily Reported on 3/21/2017                                VITAMIN C PO   Take 250 mg by mouth daily   Last time this was given:  250 mg on 5/6/2017  7:55 AM                                vitamin D 2000 UNITS tablet   Take 2,000 Units by mouth daily   Last time this was given:  2,000 Units on 5/6/2017  7:55 AM

## 2017-05-05 NOTE — BRIEF OP NOTE
[facility] Brief Operative Note    Pre-operative diagnosis: Pancreatic Mass    Post-operative diagnosis * No post-op diagnosis entered *   Procedure: Procedure(s):   Endoscopic Retrograde Cholagiopancreatogram with failed cannulation - Wound Class: II-Clean Contaminated  Diagnostic  Endoscopic Ultrasound with fine neelde biopsy.  - Wound Class: II-Clean Contaminated   Surgeon: Guru Jose Rafael MD   Assistants(s): Dr Narvaez   Estimated blood loss: None    Specimens: None   Findings: EUS  A 15 mm pancreatic head mass compressing distal bile duct was seen  EUS FNB was performed using a 22 G Acquire needle with 5 passes preliminary cytology suspicious for malignancy  ERCP  Failed biliary or pancreatic cannulation by multiple providers (Dr Mantilla and Dr Narvaez)  No free air under the diaphragm or in the retroperitoneum    Recommendations  To check amylase, lipase and LFTs in am  Will recommend IV broad spectrum antibiotics in hospital  Will repeat ERCP in 5 days (will wait for inflammation to settle)

## 2017-05-05 NOTE — PROGRESS NOTES
Patient admitted to:  room 5407  Admitted from: PACU   Arrived by: litter  Reason for admission: failed ERCP  Patient accompanied by: self  Belongings: in room with patient  Teaching: admission teaching completed per unit routine

## 2017-05-05 NOTE — PROGRESS NOTES
Advanced Endoscopy Internal Procedure Intake:    Referring provider: Dr Mireles [facility], Phone [phone]     Referred to: [facility] Advanced Endoscopy providers     Referral Received: 5/4/2017 1430    Records received: In EPIC    MD review date:   5/4/2017 Sent to Dr. Mantilla                          Requested procedure: ERCP with possible EUS    Evaluation for: Elevated LFT and possible pancreas head mass, possible neoplasm     Clinical History (per RN review of records provided):   4/26/2017. Team Conference: There was a consensus recommendation for the following actions    -Esophagectomy not likely to be of benefit to patient especially in light of PET positive aylin-caval LN. Recommend continued surveillance. Could consider hepatobiliary consult to discuss feasibility of aylin-caval LN removal/biopsy. Uncertain if this area was in the radiation field.     5/4/2017: MRCP   - IMPRESSION:  1. Obstructing mass in the head of the pancreas measuring 1.0 cm.  Pancreatic, biliary or ampullary neoplasm is suspected. Due to the  arterial enhancement, a pancreatic islet cell tumor is possible.  Suggest followup ERCP with possible endoscopic ultrasound for further  evaluation and possible biliary decompression.  2. No enlarged lymph nodes. Abdominal organs where imaged are  otherwise within normal limits.    Recommendations/Orders:       Per Dr. Mantilla: she is scheduled for ERCP/EUS for today, 5/5/2017. Se is aware and amenable to plan. Scheduling to set up date and time to and to call patient.

## 2017-05-05 NOTE — PROGRESS NOTES
Bren is scheduled for an EUS/ERCP today with Dr. Mantilla at 3:30 pm with an arrival time of 1:30 pm.  Instructions to continue to be NPO until after the procedure, a  is needed to pick her up post-op and someone to monitor her for 24 hours post-op discussed. Verbalized understanding.  Location and address for the procedure given to Bren.     Writer's contact information given for any additional questions/concerns.    Karen Sanchez LPN  Advanced Endoscopy~ Panc/Bili  Dr. Jennings, Dr. Mantilla & Dr. Narvaez  [phone]

## 2017-05-06 NOTE — H&P
[facility] History and Physical    Bren Rendon MRN# [id number]   Age: 66 year old YOB: 1950     Date of Admission:  5/5/2017    Home clinic:   Primary care provider: Dillan Amos          Assessment and Plan:    66 year old female with GE junction tumor, had concurrent chemo and radiation, currently on FOLFOX, recently discovered to have obstructive jaundice due to pancreatic lesion, underwent EUS with biopsy of pancreatic lesion and attempted biliary cannulation that failed, admitted for high risk of cholangitis, pancreatitis.     Obstructive jaundice, s/p biliary cannulation that failed, high risk of cholangitis, pancreatitis, admitted for antibiotics and care   -broad spectrum antibiotics to cover for cholangitis, per GI, CLD and LFTs, Amylase, lipase in am.  -will use empiric zosyn.  Currently she is asymptomatic other than mild discomfort in epigastrium.  Blood cultures if she spikes.   IVF hydration.     Onc - T3N+ GE junction tumor, currently on FOLFOX. On hold due to pancreatic lesion and obstructive jaundice. Counts okay.     CLD, IVF hydration.  Mechanical VTE prophylaxis.  Full code.              Chief Complaint:   S/p  EUS with biopsy of pancreatic lesion and attempted biliary cannulation that failed, admitted for high risk of cholangitis, pancreatitis.       History is obtained from the patient and electronic health record    Patient is a pleasant 66 year old female with GE junction tumor and treatment as described below, was recently found to have obstructive jaundice due to pancreatic head lesion. She underwent EUS with biopsy and attempted cannulation of biliary duct for stenting, but failed. It was thought to be due to edema and inflammation, and plan is to repeat it in 5 days again.  She is at high risk of cholangitis and pancreatitis and GI recommended admission for IV antibiotics, fluids and care.   She has mild epigastric discomfort and ongoing nausea for weeks to  months. Denies any other symptoms on full ROS.          Cancer Treatment History:   Bren Rendon is a 66-year-old female, otherwise healthy, presented with 2-3 months duration of dysphagia with 20-pound weight loss. Eventually had upper endoscopy workup in early 11/2016, identified large fungating submucosal mass within the lower third of the esophagus in the gastric GE junction and in the cardia.  This mass was partially obstructing and circumferential.      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 Her 2 is negative by FISH and IHC.     Further work up with CT, EUS, PET found to have extensive lower esophogeal/gastric cancer with near obstruction lower esophageal /GE junction tumor, cT3N+ (aorta caval LN + on biopsy), with ascites.      She was seen by Dr. Levy (12/14/2016) and deemed not a surgical candidate. She saw Rad-Onc and felt due to he local symptoms, RT is beneficial. Concurrent RT/wkly carbo/taxol is offered from end of Dec 2016 to Feb 2017. She then went on to have FOLFOX since early March 2017.  Restaging PET in April 2017 indicated significant response. tx is continued.          Past Medical History:     Past Medical History:   Diagnosis Date     Abnormal glandular Papanicolaou smear of cervix 8/1993    Abn. Pap     Acid reflux disease      Arthritis     knees     Duran's esophageal ulceration      Cancer (H)      Dupuytren's disease      Obese      Rosacea      Signet ring cell carcinoma (H)             Past Surgical History:      Past Surgical History:   Procedure Laterality Date     ARTHRODESIS FOOT Right 4/5/2016    Procedure: ARTHRODESIS FOOT;  Surgeon: Jackson Bowens DPM;  Location: [facility] OR     ARTHRODESIS TOE(S) Left 5/19/2015    Procedure: ARTHRODESIS TOE(S);  Surgeon: Jackson Bowens DPM;  Location: [facility]     ARTHROSCOPY KNEE  10/5/2011     Procedure:ARTHROSCOPY KNEE; Right Knee Arthroscopy & Medial and Lateral Menisectomy & Chondroplasty of Three Compartments and Cortisone Injection; Surgeon:TAMELA VASQUEZ; Location:[facility] OR     ENDOSCOPIC ULTRASOUND UPPER GASTROINTESTINAL TRACT (GI) N/A 12/5/2016    Procedure: ENDOSCOPIC ULTRASOUND, ESOPHAGOSCOPY / UPPER GASTROINTESTINAL TRACT (GI);  Surgeon: Guru Randal Mantilla MD;  Location: [facility]     ESOPHAGOSCOPY, GASTROSCOPY, DUODENOSCOPY (EGD), COMBINED N/A 11/4/2016    Procedure: COMBINED ESOPHAGOSCOPY, GASTROSCOPY, DUODENOSCOPY (EGD), BIOPSY SINGLE OR MULTIPLE;  Surgeon: Dillan Dumont MD;  Location: [facility]     LAPAROSCOPIC CHOLECYSTECTOMY N/A 9/25/2014    Procedure: LAPAROSCOPIC CHOLECYSTECTOMY;  Surgeon: Arnaldo López MD;  Location: [facility]     REMOVE HARDWARE FOOT Left 4/5/2016    Procedure: REMOVE HARDWARE FOOT;  Surgeon: Jackson Bowens DPM;  Location: [facility]     REPAIR HAMMER TOE Left 5/19/2015    Procedure: REPAIR HAMMER TOE;  Surgeon: Jackson Bowens DPM;  Location: [facility]     SURGICAL HISTORY OF -   4/4/2002    Gastroscopy     TUBAL LIGATION  2/1988    Laparoscopic Tubal occlusion by cautery            Social History:     Social History   Substance Use Topics     Smoking status: Never Smoker     Smokeless tobacco: Never Used     Alcohol use Yes      Comment: occasional.            Family History:     Family History   Problem Relation Age of Onset     Genitourinary Problems Mother      Kidney disease.     Hypertension Mother      Lipids Mother      Obesity Mother      CANCER Father      Lung cancer than went to bone and brain.     Coronary Artery Disease Father      MI     DIABETES Father      Breast Cancer Maternal Grandmother      Family history          Immunizations:     Immunization History   Administered Date(s) Administered     Influenza (IIV3) 10/07/1999, 11/06/2003, 11/24/2008, 09/29/2010, 10/12/2011, 09/04/2015     Pneumococcal 23 valent 11/09/2011     TD  (ADULT, 7+) 04/11/1996, 08/07/1997, 11/12/2007     Zoster vaccine, live 11/09/2011            Allergies:     Allergies   Allergen Reactions     Cats Other (See Comments)     sneezing     Dogs Other (See Comments)     Seasonal Allergies Other (See Comments)     Sneezing and sinus drainage     Nkda [No Known Drug Allergies]             Medications:       No current facility-administered medications on file prior to encounter.   Current Outpatient Prescriptions on File Prior to Encounter:  alum & mag hydroxide-simethicone (MYLANTA/MAALOX) 200-200-20 MG/5ML SUSP suspension Take 30 mLs by mouth every 4 hours as needed for indigestion   Pyridoxine HCl (VITAMIN B6 PO) Take 100 mg by mouth 2 times daily Reported on 3/21/2017   Ascorbic Acid (VITAMIN C PO) Take 250 mg by mouth daily   LORazepam (ATIVAN) 0.5 MG tablet Take 1 tablet (0.5 mg) by mouth every 4 hours as needed (Anxiety, Nausea/Vomiting or Sleep)   ondansetron (ZOFRAN ODT) 8 MG ODT tab Take 1 tablet (8 mg) by mouth every 8 hours as needed for nausea   lidocaine-prilocaine (EMLA) cream Apply 30 g topically as needed for moderate pain Apply to port site 1 hour before access.   triamcinolone (NASACORT AQ) 55 MCG/ACT Inhaler Spray 2 sprays into both nostrils daily   Carboxymethylcellulose Sodium (THERATEARS OP) Apply 2 drops to eye daily    omeprazole (PRILOSEC) 40 MG capsule Take 1 capsule (40 mg) by mouth daily Take 30-60 minutes before a meal.   omega 3 1000 MG CAPS Take 1 g by mouth daily   Cholecalciferol (VITAMIN D) 2000 UNITS tablet Take 2,000 Units by mouth daily   fexofenadine (ALLEGRA) 60 MG tablet Take 60 mg by mouth daily    prochlorperazine (COMPAZINE) 25 MG Suppository Place 1 suppository (25 mg) rectally every 12 hours as needed for nausea   multivitamin, therapeutic with minerals (MULTI-VITAMIN) TABS tablet Take 1 tablet by mouth daily   acetaminophen (TYLENOL) 325 MG tablet Take 325 mg by mouth every 6 hours as needed for mild pain              Review  of Systems:   The Review of Systems is negative other than noted in the HPI         Physical Exam:   Temp: 98.2  F (36.8  C) Temp src: Oral BP: 126/78 Pulse: 110 Heart Rate: 106 Resp: 16 SpO2: 99 % O2 Device: None (Room air)    Constitutional: Awake, alert, cooperative, no apparent distress, and appears stated age.  Eyes: Lids and lashes normal, pupils equal, round and reactive to light, extra ocular muscles intact, sclera clear, conjunctiva normal.  ENT: Normocephalic, without obvious abnormality, atraumatic.  Neck: Supple, symmetrical, trachea midline, no adenopathy.  Lungs: No increased work of breathing, good air exchange, clear to auscultation bilaterally, no crackles or wheezing.  Cardiovascular: Regular rate and rhythm, normal S1 and S2, no S3 or S4, and no murmur noted.  Chest / Breast: Port site is clean.  Abdomen: No scars, normal bowel sounds, soft, non-distended, mild epigastric tenderness, no guarding, no masses palpated, no hepatosplenomegally.  Genitourinary: deferred.  Musculoskeletal: No redness, warmth, or swelling of the joints. No edema.   Neurologic: Awake, alert, oriented to name, place and time.  No gross focal deficits.   Skin: No rashes, erythema, pallor, petechia or purpura.         Data:     Lab Results   Component Value Date    WBC 2.3 (L) 05/05/2017    WBC 2.6 (L) 05/02/2017    WBC 3.0 (L) 04/18/2017    HGB 9.7 (L) 05/05/2017    HGB 10.2 (L) 05/02/2017    HGB 9.3 (L) 04/18/2017    HCT 29.0 (L) 05/05/2017    HCT 30.2 (L) 05/02/2017    HCT 27.9 (L) 04/18/2017     (L) 05/05/2017     (L) 05/02/2017     (L) 04/18/2017     05/05/2017     05/02/2017     04/18/2017    POTASSIUM 3.5 05/05/2017    POTASSIUM 3.7 05/02/2017    POTASSIUM 3.9 04/18/2017    CHLORIDE 105 05/05/2017    CHLORIDE 105 05/02/2017    CHLORIDE 107 04/18/2017    CO2 23 05/05/2017    CO2 21 05/02/2017    CO2 25 04/18/2017    BUN 9 05/05/2017    BUN 8 05/02/2017    BUN 8 04/18/2017    CR  0.60 05/05/2017    CR 0.65 05/02/2017    CR 0.60 04/18/2017    GLC 97 05/05/2017     (H) 05/02/2017     (H) 04/18/2017    TROPONIN <0.04 10/18/2006    TROPI  09/25/2014     <0.015  The 99th percentile for upper reference range is 0.045 ug/L.  Troponin values in   the range of 0.045 - 0.120 ug/L may be associated with risks of adverse   clinical events.   Effective 7/30/2014, the reference range for this assay has changed to reflect   new instrumentation/methodology.       (H) 05/05/2017     (H) 05/02/2017    AST 15 04/18/2017     (H) 05/05/2017     (H) 05/02/2017    ALT 20 04/18/2017    ALKPHOS 278 (H) 05/05/2017    ALKPHOS 250 (H) 05/02/2017    ALKPHOS 63 04/18/2017    BILITOTAL 3.3 (H) 05/05/2017    BILITOTAL 1.8 (H) 05/02/2017    BILITOTAL 0.3 04/18/2017    INR 1.02 12/15/2016    INR 1.10 12/05/2016     -  -     Attestation:  -    Phi Correa MD

## 2017-05-06 NOTE — PLAN OF CARE
Problem: Goal Outcome Summary  Goal: Goal Outcome Summary  Outcome: No Change  Afeb. VSS. Reporting sore throat - 1 lozenge given with relief. Pt also having intermittent nausea. Emesis x1 (spit up). 5 mg compazine, 0.5mg of Ativan given with reported relief. Up independent to bathroom. LR @ 75. On clear liquid diet - tolerated well. Continue to monitor.     Problem: Chemotherapy Effects (Adult)  Goal: Signs and Symptoms of Listed Potential Problems Will be Absent or Manageable (Chemotherapy Effects)  Signs and symptoms of listed potential problems will be absent or manageable by discharge/transition of care (reference Chemotherapy Effects (Adult) CPG).     05/05/17 2000   Chemotherapy Effects   Problems Assessed (Chemotherapy Effects) all   Problems Present (Chemotherapy Effects) nausea and vomiting;constipation

## 2017-05-06 NOTE — PROGRESS NOTES
Pt discharged to: Home  Via: , [address], drive  Accompanied by: Staff to exit,  to home.  Belongings: With patient  Teaching: Per unit protocol  Clinic appointment: Follow up next week with GI team  Report called/faxed: n/a  Local housing: n/a

## 2017-05-06 NOTE — DISCHARGE SUMMARY
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 She underwent EUS with biopsy and attempted cannulation of biliary duct for stenting, but failed. It was thought to be due to edema and inflammation, and plan is to repeat it in 5 days again.  She is at high risk of cholangitis and pancreatitis and GI recommended admission for IV antibiotics, fluids and care.   She has mild epigastric discomfort and ongoing nausea for weeks to months. Denies any other symptoms on full ROS.     Hospital Course   Bren Rendon was admitted on 5/5/2017.  The following problems were addressed during her hospitalization:    #Obstructive jaundice  #s/p EUS with failed biliary cannulation  Due to high degree of manipulation during procedure, she is at high risk of cholangitis, pancreatitis. GI recommended admit for antibiotics and observation overnight. On Zosyn overnight to cover for cholangitis. Afebrile. LFTs stable this morning. Continues to be asymptomatic other than mild discomfort in epigastrium. Will discharge on Levaquin for 10 days. GI may decide to discontinue or extend abx as appropriate. Will f/u with GI next week. Patient instructed to return if she develops worsening abdominal pain, temp, n/v.       #Poorly differentiated signet ring carcinoma. T3N+ Lower esophagus, GE junction, gastric cardia. S/p concurrent XRT with  weekly carbo/Taxol. Currently on FOLFOX. On hold due to pancreatic lesion and obstructive jaundice. Counts okay.    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  Patient will follow up with GI next week.    Mercedes Sheehan PA-C  Hematology/Oncology  Pager: [phone]    Code Status   Full Code    Primary Care Physician   Dillan Amos    Vital Signs with Ranges  Temp:  [97.3  F (36.3  C)-98.2  F (36.8  C)] 97.3  F (36.3  C)  Pulse:  [96-99] 98  Heart Rate:  [101-109] 106  Resp:  [16-18] 18  BP: (106-139)/(69-85) 135/69  SpO2:  [97 %-100 %] 98 %  167 lbs 8.79 oz    Physical Exam   Constitutional: Pleasant and cooperative female. Awake, alert, no apparent distress.  HEENT: NC/AT, EOMI, sclera clear  Respiratory: No increased work of breathing, clear to auscultation bilaterally  Cardiovascular: RRR, normal S1 and S2  GI: Normal bowel sounds, soft, non-distended and non-tender  Skin: No bruising, bleeding, rashes, or lesions  Neuropsychiatric: Calm, appropriate affect    Discharge Disposition   Discharged to home  Condition at discharge: Stable    Consultations This Hospital Stay   None    Discharge Orders     Discharge Instructions   Resume pre procedure diet     Discharge Instructions   Restart home medications.     Reason for your hospital stay   You were admitted for monitoring after an EUS procedure due to high risk for developing an infection.     Adult [facility] Follow-up and recommended labs and tests   You will need to follow-up with GI and possibly reattempt procedure. The GI team will set up an appointment for you and contact you with the details.    Appointments on [facility]/or [facility] (with [facility] or [facility] provider or service). Call [phone] if you haven't heard regarding these appointments within 7 days of discharge.     Activity   Your activity upon discharge: activity as tolerated     When to contact your care team   Due to the GI  procedure you are at a high risk for developing an abdominal infection. Please call the [facility] at [phone] for signs of infection such as temperature above 100.4, increased abdominal pain, nausea or vomiting. Please also call if you develop shortness of breath, chest pain, headaches, vision changes, bleeding, diarrhea, or pain.     Full Code     Diet   Follow this diet upon discharge: Eat soft, bland foods today and add more food into your diet as long as you are tolerating foods well.       Discharge Medications   Current Discharge Medication List      START taking these medications    Details   levofloxacin (LEVAQUIN) 500 MG tablet Take 1 tablet (500 mg) by mouth daily for 10 days  Qty: 10 tablet, Refills: 0    Associated Diagnoses: Cholangitis         CONTINUE these medications which have NOT CHANGED    Details   alum & mag hydroxide-simethicone (MYLANTA/MAALOX) 200-200-20 MG/5ML SUSP suspension Take 30 mLs by mouth every 4 hours as needed for indigestion      Pyridoxine HCl (VITAMIN B6 PO) Take 100 mg by mouth 2 times daily Reported on 3/21/2017      Ascorbic Acid (VITAMIN C PO) Take 250 mg by mouth daily      LORazepam (ATIVAN) 0.5 MG tablet Take 1 tablet (0.5 mg) by mouth every 4 hours as needed (Anxiety, Nausea/Vomiting or Sleep)  Qty: 30 tablet, Refills: 3    Associated Diagnoses: GE junction carcinoma (H); 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  Qty: 90 capsule, Refills: 3    Associated Diagnoses: Gastroesophageal reflux disease with esophagitis      omega 3 1000 MG CAPS Take 1 g by mouth daily      Cholecalciferol (VITAMIN D) 2000 UNITS tablet Take 2,000 Units by mouth daily      fexofenadine (ALLEGRA) 60 MG tablet Take 60 mg by mouth daily       prochlorperazine (COMPAZINE) 25 MG Suppository Place 1 suppository (25 mg) rectally every 12 hours as needed for nausea  Qty: 60 suppository, Refills: 0    Associated Diagnoses: Chemotherapy-induced nausea      multivitamin, therapeutic with minerals (MULTI-VITAMIN) TABS tablet Take 1 tablet by mouth daily  Refills: 0      acetaminophen (TYLENOL) 325 MG tablet Take 325 mg by mouth every 6 hours as needed for mild pain    Associated Diagnoses: 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); 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)           Allergies   Allergies   Allergen Reactions     Cats Other (See Comments)     sneezing     Dogs Other (See Comments)     Seasonal Allergies Other (See Comments)     Sneezing and sinus drainage     Nkda [No Known Drug Allergies]        Data   Most Recent 3 CBC's:  Recent Labs   Lab Test  05/06/17   0235  05/05/17   1230  05/02/17   0800   WBC  2.2*  2.3*  2.6*   HGB  8.7*  9.7*  10.2*   MCV  99  99  99   PLT  103*  110*  120*      Most Recent 3 BMP's:  Recent Labs   Lab Test  05/06/17   0235  05/05/17   1230  05/02/17   0800   NA  138  137  138   POTASSIUM  3.4  3.5  3.7   CHLORIDE  104  105  105   CO2  22  23  21   BUN  10  9  8   CR  0.59  0.60  0.65   ANIONGAP  11  9  12   JARED  8.1*  8.7  8.8   GLC  92  97  145*     Most Recent 2  LFT's:  Recent Labs   Lab Test  05/06/17   0235  05/05/17   1230   AST  226*  257*   ALT  261*  296*   ALKPHOS  263*  278*   BILITOTAL  3.3*  3.3*     Most Recent INR's and Anticoagulation Dosing History:  Anticoagulation Dose History     Recent Dosing and Labs Latest Ref Rng & Units 12/5/2016 12/15/2016 5/6/2017    INR 0.86 - 1.14 1.10 1.02 1.13

## 2017-05-06 NOTE — ANESTHESIA POSTPROCEDURE EVALUATION
Patient: Bren Rendon    Procedure(s):   Endoscopic Retrograde Cholagiopancreatogram with failed cannulation - Wound Class: II-Clean Contaminated  Diagnostic  Endoscopic Ultrasound with fine neelde biopsy.  - Wound Class: II-Clean Contaminated    Diagnosis:Pancreatic Mass   Diagnosis Additional Information: No value filed.    Anesthesia Type:  General, ETT    Note:  Anesthesia Post Evaluation    Patient location during evaluation: PACU and Bedside  Patient participation: Able to fully participate in evaluation  Level of consciousness: awake  Pain management: adequate  Airway patency: patent  Cardiovascular status: acceptable  Respiratory status: acceptable  Hydration status: acceptable  PONV: none     Anesthetic complications: None          Last vitals:  Vitals:    05/05/17 1745 05/05/17 1800 05/05/17 1815   BP: 106/73 116/72 126/78   Pulse:      Resp: 16 16 16   Temp:      SpO2: 97% 97% 99%         Electronically Signed By: Montana Perez MD  May 5, 2017  10:22 PM

## 2017-05-06 NOTE — PLAN OF CARE
Problem: Goal Outcome Summary  Goal: Goal Outcome Summary  Outcome: No Change  AVSS. Pt feels fine, is ready for discharge. Discharge teaching completed. Will follow up with GI team next week. Pt had 2 soft BMs.     Problem: Chemotherapy Effects (Adult)  Goal: Signs and Symptoms of Listed Potential Problems Will be Absent or Manageable (Chemotherapy Effects)  Signs and symptoms of listed potential problems will be absent or manageable by discharge/transition of care (reference Chemotherapy Effects (Adult) CPG).   Outcome: Adequate for Discharge Date Met:  05/06/17 05/06/17 1433   Chemotherapy Effects   Problems Assessed (Chemotherapy Effects) all   Problems Present (Chemotherapy Effects) none

## 2017-05-08 NOTE — TELEPHONE ENCOUNTER
"Hospital/TCU/ED for chronic condition Discharge Protocol    \"Hi, my name is Erendira Mercer, a registered nurse, and I am calling from [facility].  I am calling to follow up and see how things are going for you after your recent emergency visit/hospital/TCU stay.\"    Tell me how you are doing now that you are home?\" Good      Discharge Instructions    \"Let's review your discharge instructions.  What is/are the follow-up recommendations?  Pt. Response: Follow up GI and finish antibiotics    \"Has an appointment with your primary care provider been scheduled?\"   Follow up with GI    \"When you see the provider, I would recommend that you bring your medications with you.\"    Medications    \"Tell me what changed about your medicines when you discharged?\"    Changes to chronic meds?    0-1    \"What questions do you have about your medications?\"    None     New diagnoses of heart failure, COPD, diabetes, or MI?    No              Medication reconciliation completed? Yes  Was MTM referral placed (*Make sure to put transitions as reason for referral)?   No    Call Summary    \"What questions or concerns do you have about your recent visit and your follow-up care?\"     none    \"If you have questions or things don't continue to improve, we encourage you contact us through the main clinic number (give number).  Even if the clinic is not open, triage nurses are available 24/7 to help you.     We would like you to know that our clinic has extended hours (provide information).  We also have urgent care (provide details on closest location and hours/contact info)\"      \"Thank you for your time and take care!\"    Erendira SANFORD Rn                 "

## 2017-05-10 NOTE — IP AVS SNAPSHOT
MRN:[id number]                      After Visit Summary   5/10/2017    Bren Rendon    MRN: [id number]           Thank you!     Thank you for choosing [facility] for your care. Our goal is always to provide you with excellent care. Hearing back from our patients is one way we can continue to improve our services. Please take a few minutes to complete the written survey that you may receive in the mail after you visit with us. Thank you!        Patient Information     Date Of Birth          1950        About your hospital stay     You were admitted on:  May 10, 2017 You last received care in the:  Same Day Surgery [facility]    You were discharged on:  May 10, 2017       Who to Call     For medical emergencies, please call 911.  For non-urgent questions about your medical care, please call your primary care provider or clinic, [phone]  For questions related to your surgery, please call your surgery clinic        Attending Provider     Provider Enrique Roberts MD Gastroenterology       Primary Care Provider Office Phone # Fax #    Dillan Jackson Amos [phone]       [facility] [address] [address]        After Care Instructions     Discharge Instructions       Resume pre procedure diet            Discharge Instructions       Restart home medications.                  Your next 10 appointments already scheduled     Jun 01, 2017  9:15 AM CDT   Return Visit with Amilcar Zambrano MD   [facility])    [address] [id number] [phone]            Quinten 15, 2017  9:15 AM CDT   Return Visit with Amilcar Zambrano MD   [facility] [place] [facility]    [address] [id number] [phone]              Further instructions from your care team       [place]  Same-Day Surgery   Adult Discharge Orders & Instructions     For 24 hours after surgery    1. Get plenty of rest.  A responsible adult must stay with you for at least 24 hours after you leave the hospital.   2. Do not drive or use heavy equipment.  If you have weakness or tingling, don't drive or use heavy equipment until this feeling goes away.  3. Do not drink alcohol.  4. Avoid strenuous or risky activities.  Ask for help when climbing stairs.   5. You may feel lightheaded.  IF so, sit for a few minutes before standing.  Have someone help you get up.   6. If you have nausea (feel sick to your stomach): Drink only clear liquids such as apple juice, ginger ale, broth or 7-Up.  Rest may also help.  Be sure to drink enough fluids.  Move to a regular diet as you feel able.  7. You may have a slight fever. Call the doctor if your fever is over 100 F (37.7 C) (taken under the tongue) or lasts longer than 24 hours.  8. You may have a dry mouth, a sore throat, muscle aches or trouble sleeping.  These should go away after 24 hours.  9. Do not make important or legal decisions.   Call your doctor for any of the followin.  Signs of infection (fever, growing tenderness at the surgery site, a large amount of drainage or bleeding, severe pain, foul-smelling drainage, redness, swelling).    2. It has been over 8 to 10 hours since surgery and you are still not able to urinate (pass water).    3.  Headache for over 24 hours.    4.  Numbness, tingling or weakness the day after surgery (if you had spinal anesthesia).  To contact a doctor, call _Michele/Nadeen  @ _______________________________________       Pending Results     No orders found from 2017 to 2017.            Admission Information     Date & Time Provider Department Dept. Phone    5/10/2017 Enrique Senior MD Same Day Surgery [facility] [place] [phone]      Your Vitals Were     Blood Pressure Temperature Respirations Height Weight Pulse  "Oximetry    132/76 97.6  F (36.4  C) (Oral) 16 1.6 m (5' 3\") 75.8 kg (167 lb 1.7 oz) 99%    BMI (Body Mass Index)                   29.6 kg/m2           [employer] Information     [employer] gives you secure access to your electronic health record. If you see a primary care provider, you can also send messages to your care team and make appointments. If you have questions, please call your primary care clinic.  If you do not have a primary care provider, please call [phone] and they will assist you.        Care EveryWhere ID     This is your Care EveryWhere ID. This could be used by other organizations to access your [facility] medical records  [id number]           Review of your medicines      START taking        Dose / Directions    azithromycin 500 MG tablet   Commonly known as:  ZITHROMAX   Used for:  S/P ERCP        Take one tablet a day   Quantity:  7 tablet   Refills:  0         CONTINUE these medicines which have NOT CHANGED        Dose / Directions    ALLEGRA 60 MG tablet   Generic drug:  fexofenadine        Dose:  60 mg   Take 60 mg by mouth daily   Refills:  0       alum & mag hydroxide-simethicone 200-200-20 MG/5ML Susp suspension   Commonly known as:  MYLANTA/MAALOX        Dose:  30 mL   Take 30 mLs by mouth every 4 hours as needed for indigestion   Refills:  0       lidocaine-prilocaine cream   Commonly known as:  EMLA   Used for:  GE junction carcinoma (H), 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)        Dose:  0.5 mg   Take 1 tablet (0.5 mg) by mouth every 4 hours as needed (Anxiety, Nausea/Vomiting or Sleep)   Quantity:  30 tablet   Refills:  3       Multi-vitamin Tabs tablet        Dose:  1 tablet   Take 1 tablet by " mouth daily   Refills:  0       NASACORT AQ 55 MCG/ACT Inhaler   Generic drug:  triamcinolone        Dose:  2 spray   Spray 2 sprays into both nostrils daily   Refills:  0       omega 3 1000 MG Caps        Dose:  1 g   Take 1 g by mouth daily   Refills:  0       omeprazole 40 MG capsule   Commonly known as:  priLOSEC   Used for:  Gastroesophageal reflux disease with esophagitis        Dose:  40 mg   Take 1 capsule (40 mg) by mouth daily Take 30-60 minutes before a meal.   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)   Generic drug:  acetaminophen        Dose:  325 mg   Take 325 mg by mouth every 6 hours as needed for mild pain   Refills:  0       VITAMIN B6 PO   Indication:  Disease of the Peripheral Nerves        Dose:  100 mg   Take 100 mg by mouth 2 times daily Reported on 3/21/2017   Refills:  0       VITAMIN C PO        Dose:  250 mg   Take 250 mg by mouth daily   Refills:  0       vitamin D 2000 UNITS tablet        Dose:  2000 Units   Take 2,000 Units by mouth daily   Refills:  0         STOP taking     levofloxacin 500 MG tablet   Commonly known as:  LEVAQUIN                Where to get your medicines      These medications were sent to [facility] - [place], [place] - [address]  [address] [address]     Phone:  [phone]     azithromycin 500 MG tablet                Protect others around you: Learn how to safely  use, store and throw away your medicines at www.disposemymeds.org.             Medication List: This is a list of all your medications and when to take them. Check marks below indicate your daily home schedule. Keep this list as a reference.      Medications           Morning Afternoon Evening Bedtime As Needed    ALLEGRA 60 MG tablet   Take 60 mg by mouth daily   Generic drug:  fexofenadine                                alum & mag hydroxide-simethicone 200-200-20 MG/5ML Susp suspension   Commonly known as:  MYLANTA/MAALOX   Take 30 mLs by mouth every 4 hours as needed for indigestion                                azithromycin 500 MG tablet   Commonly known as:  ZITHROMAX   Take one tablet a day                                lidocaine-prilocaine cream   Commonly known as:  EMLA   Apply 30 g topically as needed for moderate pain Apply to port site 1 hour before access.                                LORazepam 0.5 MG tablet   Commonly known as:  ATIVAN   Take 1 tablet (0.5 mg) by mouth every 4 hours as needed (Anxiety, Nausea/Vomiting or Sleep)                                Multi-vitamin Tabs tablet   Take 1 tablet by mouth daily                                NASACORT AQ 55 MCG/ACT Inhaler   Spray 2 sprays into both nostrils daily   Generic drug:  triamcinolone                                omega 3 1000 MG Caps   Take 1 g by mouth daily                                omeprazole 40 MG capsule   Commonly known as:  priLOSEC   Take 1 capsule (40 mg) by mouth daily Take 30-60 minutes before a meal.                                ondansetron 8 MG ODT tab   Commonly known as:  ZOFRAN ODT   Take 1 tablet (8 mg) by mouth every 8 hours as needed for nausea                                THERATEARS OP   Apply 2 drops to eye daily                                TYLENOL 325 MG tablet   Take 325 mg by mouth every 6 hours as needed for mild pain   Generic drug:  acetaminophen                                VITAMIN B6 PO    Take 100 mg by mouth 2 times daily Reported on 3/21/2017                                VITAMIN C PO   Take 250 mg by mouth daily                                vitamin D 2000 UNITS tablet   Take 2,000 Units by mouth daily

## 2017-05-10 NOTE — IP AVS SNAPSHOT
Same Day Surgery [address] [address]    Phone:  [phone]                                       After Visit Summary   5/10/2017    Bren Rendon    MRN: [id number]           After Visit Summary Signature Page     I have received my discharge instructions, and my questions have been answered. I have discussed any challenges I see with this plan with the nurse or doctor.    ..........................................................................................................................................  Patient/Patient Representative Signature      ..........................................................................................................................................  Patient Representative Print Name and Relationship to Patient    ..................................................               ................................................  Date                                            Time    ..........................................................................................................................................  Reviewed by Signature/Title    ...................................................              ..............................................  Date                                                            Time

## 2017-05-10 NOTE — OR NURSING
Dr. Santillan saw labs and spoke with patient and . When Dr. Mccauley returned call, I told him I had spoken with Dr. Santillan.

## 2017-05-10 NOTE — BRIEF OP NOTE
[facility], [place]    Brief Operative Note    Pre-operative diagnosis: Biliary Obstruction   Post-operative diagnosis * No post-op diagnosis entered *  Procedure: Procedure(s):  Endoscopic Ultrasound, Endoscopic Retrograde Cholangiopancreatogram With Rendezvous, Biliary Sphncterotomy and Biliary Stent - Wound Class: II-Clean Contaminated   - Wound Class: II-Clean Contaminated  Surgeon: Surgeon(s) and Role:  Panel 1:     * Enrique Senior MD - Primary     * Dee Dee Santillan MD - Assisting    Panel 2:     * Guru Randal Mantilla MD - Primary  Anesthesia: General   Estimated blood loss: None  Drains: None  Specimens: * No specimens in log *  Findings:   Atempted recannulation with omni n21 and novagold: unsuccessful. EUS guided biliary access was obtained with difficulty as it was technically challenging. However the guidewire was passed through the bile duct into the duodenum. Duodenoscope was then advanced to the papilla and attempted to cannulate alongside the guidewire. Cannulated the bile duct: significant distal biliary stricture: 4 cm with upstream dilation. Treated with sphincterotomy and placement of uncovered metal stent 10mm x 8 cm.     Recommendation: Observe for same day surgery. Check post 2 hour lab.     Complications: None.  Implants: None.

## 2017-05-10 NOTE — ANESTHESIA CARE TRANSFER NOTE
Patient: Bren Rendon    Procedure(s):  Endoscopic Ultrasound, Endoscopic Retrograde Cholangiopancreatogram With Rendezvous, Biliary Sphncterotomy and Biliary Stent - Wound Class: II-Clean Contaminated   - Wound Class: II-Clean Contaminated    Diagnosis: Biliary Obstruction   Diagnosis Additional Information: No value filed.    Anesthesia Type:   General, ETT     Note:  Airway :Face Mask  Patient transferred to:PACU        Vitals: (Last set prior to Anesthesia Care Transfer)    CRNA VITALS  5/10/2017 1408 - 5/10/2017 1443      5/10/2017             Pulse: 117    SpO2: 100 %    Resp Rate (observed): (!)  1    Resp Rate (set): 10                Electronically Signed By: ZAID Espinosa CRNA  May 10, 2017  2:43 PM

## 2017-05-10 NOTE — BRIEF OP NOTE
[facility], [place]  Gastroenterology Brief Operative Note    Pre-operative diagnosis: Obstructive jaundice   Post-operative diagnosis Same   Procedure: EUS and ERCP   Surgeon: Dr Senior   Assistants(s): Dr. Guru Jose Rafael MD   Anesthesia: General endotracheal anesthesia   Estimated blood loss: Minimal    Total IV fluids: (See anesthesia record)   Blood transfusion: No transfusion was given during surgery   Total urine output: (See anesthesia record)   Drains: None           Findings: EUS   Biliary access was performed with a 22 G needle and a Novagold wire  ERCP  After rendezvous access biliary cannulation was performed alongside the rendezvous wire  A 6 mm biliary sphincterotomy was performed  A 4-5 cm extremely tight distal biliary stricture was identfied  A 10 mm by 8 cm Zilver stent was placed across the stricture with adequate drainage   Complications: None.   Condition: Stable   Comments:      Recommendations:         See dictated procedure report for full details (found in chart review under 'Procedures')    - Observe in Same Day for possible discharge  - Check amylase and lipase 2 hours post-procedure  - Discharge home if minimal or no pain and no significant elevation in pancreatic enzymes  - Will recommend oral azithromycin for 7 days  - Further intervention depending on symptoms and response to today's therapy     Guru Jose Rafael MD  [phone]

## 2017-05-10 NOTE — ANESTHESIA POSTPROCEDURE EVALUATION
Patient: Bren Rendon    Procedure(s):  Endoscopic Ultrasound, Endoscopic Retrograde Cholangiopancreatogram With Rendezvous, Biliary Sphncterotomy and Biliary Stent - Wound Class: II-Clean Contaminated   - Wound Class: II-Clean Contaminated    Diagnosis:Biliary Obstruction   Diagnosis Additional Information: No value filed.    Anesthesia Type:  General, ETT    Note:  Anesthesia Post Evaluation    Patient location during evaluation: PACU  Patient participation: Able to fully participate in evaluation  Level of consciousness: awake  Pain management: adequate  Airway patency: patent  Cardiovascular status: acceptable  Respiratory status: acceptable  Hydration status: acceptable  PONV: none     Anesthetic complications: None          Last vitals:  Vitals:    05/10/17 0852   BP: 129/83   Resp: 16   Temp: 36.6  C (97.9  F)   SpO2: 97%         Electronically Signed By: Elder Modi MD  May 10, 2017  2:48 PM

## 2017-05-10 NOTE — OR NURSING
Dr. Mantilla paged regarding lab results. Lipase=80. Amylase=94. Pt is pain free, though light-headed and dizzy. Pt continues to be a little light-headed and wobbly, but is able to walk with SBA. She said (at the time of discharge) that it is getting better.

## 2017-05-10 NOTE — ANESTHESIA PREPROCEDURE EVALUATION
Anesthesia Evaluation     . Pt has had prior anesthetic. Type: General and MAC    No history of anesthetic complications          ROS/MED HX    ENT/Pulmonary:  - neg pulmonary ROS     Neurologic: Comment: Chemotherapy-related peripheral neuropathy.     (+)neuropathy     Cardiovascular:  - neg cardiovascular ROS   (+) ----. : . . . :. . Previous cardiac testing date:results:date: results:ECG reviewed date:12/01/2016 results:Sinus rhythm with an occasional PAC. Normal EKG. date: results:          METS/Exercise Tolerance:  >4 METS   Hematologic: Comments: Anemia and thrombocytopenia in relation to chemotherapy.  Hgb 10.2 on 05/02.  Platelets 120 on 05/02.      (+) Anemia, Other Hematologic Disorder-      Musculoskeletal:  - neg musculoskeletal ROS       GI/Hepatic: Comment: Recent diagnosis of gastroesophageal junction malignancy.  Patient is currently undergoing chemotherapy.     (+) GERD Asymptomatic on medication,       Renal/Genitourinary:  - ROS Renal section negative       Endo:     (+) Obesity, .      Psychiatric: Comment: On Ativan 0.5 mg q4h prn anxiety, nausea.     (+) psychiatric history anxiety      Infectious Disease:  - neg infectious disease ROS       Malignancy:   (+) Malignancy History of GI  GI CA  Active status post Chemo,         Other:    - neg other ROS                 Physical Exam  Normal systems: dental    Airway   Mallampati: I  TM distance: >3 FB  Neck ROM: full    Dental     Cardiovascular   Rhythm and rate: regular      Pulmonary    breath sounds clear to auscultation                    Anesthesia Plan      History & Physical Review  History and physical reviewed and following examination; no interval change.    ASA Status:  3 .    NPO Status:  > 2 hours    Plan for General and ETT with Intravenous induction. Maintenance will be Balanced.    PONV prophylaxis:  Ondansetron (or other 5HT-3)  Additional equipment: 2nd IV (has port)      Postoperative Care  Postoperative pain management:  IV  analgesics.      Consents  Anesthetic plan, risks, benefits and alternatives discussed with:  Patient..                          .

## 2017-05-10 NOTE — DISCHARGE INSTRUCTIONS
[facility]   Adult Discharge Orders & Instructions     For 24 hours after surgery    1. Get plenty of rest.  A responsible adult must stay with you for at least 24 hours after you leave the hospital.   2. Do not drive or use heavy equipment.  If you have weakness or tingling, don't drive or use heavy equipment until this feeling goes away.  3. Do not drink alcohol.  4. Avoid strenuous or risky activities.  Ask for help when climbing stairs.   5. You may feel lightheaded.  IF so, sit for a few minutes before standing.  Have someone help you get up.   6. If you have nausea (feel sick to your stomach): Drink only clear liquids such as apple juice, ginger ale, broth or 7-Up.  Rest may also help.  Be sure to drink enough fluids.  Move to a regular diet as you feel able.  7. You may have a slight fever. Call the doctor if your fever is over 100 F (37.7 C) (taken under the tongue) or lasts longer than 24 hours.  8. You may have a dry mouth, a sore throat, muscle aches or trouble sleeping.  These should go away after 24 hours.  9. Do not make important or legal decisions.   Call your doctor for any of the followin.  Signs of infection (fever, growing tenderness at the surgery site, a large amount of drainage or bleeding, severe pain, foul-smelling drainage, redness, swelling).    2. It has been over 8 to 10 hours since surgery and you are still not able to urinate (pass water).    3.  Headache for over 24 hours.    4.  Numbness, tingling or weakness the day after surgery (if you had spinal anesthesia).  To contact a doctor, call Dr. Guru Mantilla @ [phone] or   Dr Senior's office at [phone]--Gastroenterology  OR call [phone] and ask to speak to resident on call for Gastroenterology

## 2017-05-11 NOTE — PROGRESS NOTES
S: Bren returns today with no symptoms in her hands or feet at the present time or really since the time of her 1st acupuncture treatment.  No new concerns today.  O:Alert NAD  A: Peripheral neuropathy  P: Acupuncture per note  Pre-Procedure:  Patient's Name and Date of Birth verified:  YES  Verified By:  mona (initials)  Diagnosis:  Data Unavailable  Interval History:  1w    Complications and/or Adverse Effects of Last Acupuncture Treatment: 0     Site Marking and Verification:  Verification of site selection (based on symptoms and condition:    mona  Verified by: mona (initials)  Patient identification verified by provider: YES  Verified by: mona   (initials)  Pause for the Cause:  YES  Verified by: mona   (initials)   Procedure Note:  Acupuncture Treatment Number: 2  Acupuncture Points Selected: AUR:SM,PZ,cingulate gyrus.BODY:Bafeng,Baxi,Four Campbell    Electrical Stim: No  Frequency 0   HZ    Number of needles:  23    Re-insertion/Manipulation of needles after initial 15 minutes: YES  Time:  30   Minutes   Post-Procedure:  Complication/Adverse Effects: 0      Management:  0      Signature:  Amilcar Zambrano MD

## 2017-05-11 NOTE — PROGRESS NOTES
"Care Coordination Telephone Call  GI Service and Surgical Oncology    Called patient to discuss hives.  Bren wanted to let us know that after each of the procedures that she had this past week she developed \"some hives and itching\" that went away in about a day.  Since we are not certain what may have caused this I have instructed her to be sure to notify anesthesia should she need another procedure about this and she voiced understanding.    I have asked the patient to call with any additional questions or concerns and have provided my contact information.      Dawn ERNANDEZN, HNBC, STAR-T  RN Care Coordinator  Surgical Oncology and GI service  Ph: [phone]  FAX: [phone]          "

## 2017-05-18 NOTE — PROGRESS NOTES
Infusion Nursing Note:  Bren Rendon presents today for port labs.    Patient seen by provider today: Yes: Dr. El.   present during visit today: Not Applicable.    Note: N/A.    Intravenous Access:  Labs drawn without difficulty.  Implanted Port.    Treatment Conditions:  Not Applicable.      Post Infusion Assessment:  Port flush done per protocol.    Discharge Plan:   Patient discharged in stable condition accompanied by: self.  Pt has a copy of her appt schedule.    Samantha Omalley RN

## 2017-05-18 NOTE — PROGRESS NOTES
Called Pt regarding her lab results and Dr. El's recommendations to start FOLFIRI instead of FOLFOX next Tuesday and will see her back 2 weeks later with cycle 2 of treatment. Told Pt that I will print out and review the information on irinotecan with her prior to her infusion next week and that we will be sending over a prescription to her pharmacy [facility] for immodium as needed to take for the likely side effect of diarrhea. Pt asked if she should stop taking her stool softeners at this time due to this side effect and I told her to continue these up until the day of her infusion. Told Pt that we will give her an updated schedule at her appt on Tuesday and she verbalized understanding.

## 2017-05-18 NOTE — PROGRESS NOTES
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  Eventually had upper endoscopy workup in early 11/2016, identified large fungating submucosal mass within the lower third of the esophagus in the gastric GE junction and in the cardia.  This mass was partially obstructing and circumferential.      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 Her 2 is negative by FISH and IHC.    Further work up with CT, EUS, PET  found to have extensive lower esophogeal/gastric cancer with near obstruction lower esophageal /GE junction tumor, cT3N+ (aorta caval LN + on biopsy), with ascites.     She was seen by Dr. Levy (12/14/2016) and deemed not a surgical candidate. She saw Rad-Onc and felt due to he local symptoms, RT is beneficial. Concurrent RT/wkly carbo/taxol is offered from end of Dec 2016 to Feb 2017. She then went on to have FOLFOX since early March 2017.  Restaging PET in April 2017  indicated significant response. tx is continued.     She has elevation of bili in early May, found to have biliary stricture with pancreatic head mass biopsy proven mets from upper GI tract.   She had ERCP with biliary stent early May and systemic chemo is changed to FOLFIRI.     5/2017 MRCP was done due to elevated bili, found  PAST MEDICAL HISTORY:     1.  Reflux disease.     2.  History of cholecystitis.     3.  Rosacea.    4.  Degenerative disk disease.    5.  Obesity.   6.  Dupuytren's disease.      MEDICATIONS:  Reviewed in Epic system.      SOCIAL HISTORY:  She is retired.  She lives in [place].  She is a very active 66-year-old female.  Denies smoking.  She  "drinks 1-2 glasses of white wine every day.      FAMILY HISTORY:  Positive for dad  from lung cancer who smoked 4 packs per day.  Maternal grandmother  from breast cancer.  No family history of GI cancer.       REVIEW OF SYSTEMS:   +Nausea   She feels weaker again with ERCP.   Start to feel tingling sensation on finger tips and toes.     PHYSICAL EXAMINATION:   GENERAL APPEARANCE:  A middle-aged woman, looks like her stated age, very pleasant, not in acute distress.     VITAL SIGNS: Blood pressure 111/83, pulse 110, temperature 98.5  F (36.9  C), temperature source Tympanic, resp. rate 16, height 1.6 m (5' 2.99\"), weight 75 kg (165 lb 6.4 oz), SpO2 100 %, not currently breastfeeding.  HEENT: The patient is normocephalic, atraumatic. Pupils are equally react to light.  Sclerae are anicteric.  Moist oral mucosa.  Negative pharynx.  No oral thrush.   NECK:  Supple.  No jugular venous distention.  Thyroid is not palpable.   LYMPH NODES:  Superficial lymphadenopathy is not appreciable in the bilateral cervical, supraclavicular, axillary or inguinal areas.   CARDIOVASCULAR:  S1, S2 regular with no murmurs or gallops.  No carotid or abdominal bruits.   PULMONARY:  Lungs are clear to auscultation and percussion bilaterally.  There is no wheezing or rhonchi.   GASTROINTESTINAL:  Abdomen is soft, nontender.  No hepatosplenomegaly.  No signs of ascites.  No mass appreciable.   MUSCULOSKELETAL/EXTREMITIES:  No edema.  No cyanotic changes.  No signs of joint deformity.  No lymphedema.   NEUROLOGIC:  Cranial nerves II-XII are grossly intact.  Sensation intact.  Muscle strength and muscle tone symmetrical 5/5 throughout.   BACK:  No spinal or paraspinal tenderness.  No CVA tenderness.   SKIN:  No petechiae.  No rash.  No signs of cellulitis.      CURRENT LABORATORY DATA:   Cbc diff is fine. Bili down to 1.1 from peak 6.9    2017 EUS pancreatic head biopsy: Metastatic adenocarcinoma, in favor of upper " gastrointestinal origin.     Current Image  5/2017 ERCP found bilary stricture which was dilated and metal stent was placed.     5 /2017 MRCP  1. Obstructing mass in the head of the pancreas measuring 1.0 cm.  Pancreatic, biliary or ampullary neoplasm is suspected. Due to the arterial enhancement, a pancreatic islet cell tumor is possible.    4/2017 PET  1. A tiny left superior mediastinal node measures less than 1 cm and demonstrates an SUV max 2.7 which may be reactive in nature.  2. residual FDG activity in the region of the GE junction mass when compared to prior study. Currently this measures an SUV max 4.7, previously 16.2, indicating a significant interval improvement.  3. Residual FDG activity is noted involving the gastric cardia when compared to prior exam but has significantly improved as described above.     OLD DATA REVIEW REVIEWED WITH SUMMARY:   Baseline cbc diff/CMP are nl, CEA 8.7 in 11/2016    Pathology 12/5/2016 Lymph node, aortacaval, endoscopic ultrasound-guided fine needle aspiration with shark core needle:   - Positive for malignancy.   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 Her2- by FISH/IHC.      EUS 12/5/2016, scope can't pass through GE junction-  A large, fungating and submucosal mass was found at the lower third of the esophagus at 34 cm from the incisors, which was nearly two-thirds circumferential  and is partially obstructing the esophagus and was seen extending to the gastric cardia.  Ascites was found.  4 abnormal lymph nodes were visualized in the lower paraesophageal mediastinum (level 8L). This will likely represent N2 on the evans staging.    PET 11/30/2016  1. Hypermetabolic uptake SUV 16.6 in the proximal to mid stomach compatible with the patient's recently diagnosed gastric cancer.No Esophageal activity.    2. There is an elongated but slender gastrohepatic node just medial to the proximal stomach. Less than 1 cm in transverse dimension. Suspected to be hyper metabolic but SUV difficult to measure  separately from adjacent stomach.  3. Hypermetabolic retroperitoneal node, aorto caval location just posterior to transverse duodenum with SUV max 4.2 compatible with metastasis.  4. Hypermetabolic hepatic flexure and distal ascending colon without CT abnormality is suspected to be physiologic.  5. Nonenlarged subcentimeter hypermetabolic node in the right side of the neck at the level of the tongue is nonspecific.    CT chest, abdomen and pelvis 11/05/2016 - esophageal GE junction within normal limits.  Mild fatty infiltrate of the liver, pancreatic atrophy.         ASSESSMENT AND PLAN:    1. 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  She is found to have extensive lower esophogeal/gastric cancer with near obstruction lower esophageal /GE junction tumor, cT3N+ (aorta caval LN + on biopsy), with ascites. Her 2 - by FISH and IHc.     She was seen by Dr. Levy and deemed not a surgical candidate.     She saw Rad-Onc, concurrent RT/wkly carbo/taxol was offered till early Feb.  She then went on to have palliative FOLFOX.   She had VGPR by PET in 4/2017. Then She had elevation of bili in early May, found to have biliary stricture with pancreatic head mass biopsy proven mets from upper GI tract.   She had ERCP with biliary stent early May and systemic chemo is changed to FOLFIRI. Due to cytopenia from FOLFOX - omit bolus 5 FU and dose reduce 5 FU IVCI to 2 g/m2.      I told them she will not be a surgical case at this point. tx is palliative in nature.       2. Nausea with dysphagia.   Advice ODT zofran, Ativan at bed time.   Will see if acupuncture could help.     3. Cytopenia from FOLFOX - omit bolus 5 FU and dose reduce 5 FU IVCI to 2 g/m2.     Neutropenic precaution is advised.     4. Emotional stress. Recommend psychologist support.     5. hyperbilirubinemia new in early May 2017. S/p ERCP with biliary stent. Need on going f/u.

## 2017-05-18 NOTE — MR AVS SNAPSHOT
After Visit Summary   5/18/2017    Bren Rendon    MRN: [id number]           Patient Information     Date Of Birth          1950        Visit Information        Provider Department      5/18/2017 8:00 AM Aline El MD [facility]        Today's Diagnoses     GE junction carcinoma (H)    -  1    Elevated liver enzymes        Hyperbilirubinemia          Care Instructions    Dr. El recommends labs today and we will call you with the results and plan of care at that time.    When you are in need of a refill, please call your pharmacy and they will send us a request.  Copy of appointments, and after visit summary (AVS) given to patient.  If you have any questions please call Fabiana Greenberg RN, BSN, OCN Oncology Hematology  [facility] [phone]. For questions after business hours, or on holidays/weekends, please call our after hours Nurse Triage line [phone]. Thank you.       Labs today, wait to bili to come down then change chemo likely next wk.         Follow-ups after your visit        Your next 10 appointments already scheduled     May 23, 2017  8:00 AM CDT   Level 6 with ROOM 5 [facility] ([facility])    [facility]  [address]  [place] [address]   [phone]            May 23, 2017  8:45 AM CDT   Return Visit with Aline El MD   [facility] ([facility])    [facility]  [address]  [place] [address]   [phone]            May 25, 2017 12:00 PM CDT   Level O with ROOM 8 [facility] ([facility])    [facility]  [address]  [place] [address]   [phone]            Jun 01, 2017  9:15 AM CDT   Return Visit with Amilcar Zambrano MD   [facility] [place] ([facility])    [address]  "[facility]  [address]  [facility] [address] [phone]            Quinten 15, 2017  9:15 AM CDT   Return Visit with Amilcar Zambrano MD   [facility] [place] ([facility])    [address]  [address]  [facility] [place] [address]   [phone]              Who to contact     If you have questions or need follow up information about today's clinic visit or your schedule please contact [facility] directly at [phone].  Normal or non-critical lab and imaging results will be communicated to you by [place] [employer], letter or phone within 4 business days after the clinic has received the results. If you do not hear from us within 7 days, please contact the clinic through [employer] or phone. If you have a critical or abnormal lab result, we will notify you by phone as soon as possible.  Submit refill requests through [employer] or call your pharmacy and they will forward the refill request to us. Please allow 3 business days for your refill to be completed.          Additional Information About Your Visit        [employer] Information     [employer] gives you secure access to your electronic health record. If you see a primary care provider, you can also send messages to your care team and make appointments. If you have questions, please call your primary care clinic.  If you do not have a primary care provider, please call [phone] and they will assist you.        Care EveryWhere ID     This is your Care EveryWhere ID. This could be used by other organizations to access your [facility] medical records  [id number]        Your Vitals Were     Pulse Temperature Respirations Height Pulse Oximetry Breastfeeding?    110 98.5  F (36.9  C) (Tympanic) 16 1.6 m (5' 2.99\") 100% No    BMI (Body Mass Index)                   29.31 kg/m2            Blood Pressure from Last 3 Encounters:   05/18/17 111/83   05/10/17 137/83   05/06/17 131/82    Weight from Last 3 Encounters:   05/18/17 75 kg (165 lb 6.4 " oz)   05/10/17 75.8 kg (167 lb 1.7 oz)   05/06/17 76 kg (167 lb 8.8 oz)              We Performed the Following     CBC with platelets differential     Comprehensive metabolic panel        Primary Care Provider Office Phone # Fax #    Dillan Amos [phone]       [facility] [address] [facility] [address]        Thank you!     Thank you for choosing [facility]  for your care. Our goal is always to provide you with excellent care. Hearing back from our patients is one way we can continue to improve our services. Please take a few minutes to complete the written survey that you may receive in the mail after your visit with us. Thank you!             Your Updated Medication List - Protect others around you: Learn how to safely use, store and throw away your medicines at www.disposemymeds.org.          This list is accurate as of: 5/18/17  9:02 AM.  Always use your most recent med list.                   Brand Name Dispense Instructions for use    ALLEGRA 60 MG tablet   Generic drug:  fexofenadine      Take 60 mg by mouth daily       alum & mag hydroxide-simethicone 200-200-20 MG/5ML Susp suspension    MYLANTA/MAALOX     Take 30 mLs by mouth every 4 hours as needed for indigestion       lidocaine-prilocaine cream    EMLA    30 g    Apply 30 g topically as needed for moderate pain Apply to port site 1 hour before access.       LORazepam 0.5 MG tablet    ATIVAN    30 tablet    Take 1 tablet (0.5 mg) by mouth every 4 hours as needed (Anxiety, Nausea/Vomiting or Sleep)       Multi-vitamin Tabs tablet      Take 1 tablet by mouth daily       NASACORT AQ 55 MCG/ACT Inhaler   Generic drug:  triamcinolone      Spray 2 sprays into both nostrils daily       omega 3 1000 MG Caps      Take 1 g by mouth daily       omeprazole 40 MG capsule    priLOSEC    90 capsule    Take 1 capsule (40 mg) by mouth daily Take 30-60 minutes before a meal.       ondansetron 8 MG ODT tab    ZOFRAN  ODT    60 tablet    Take 1 tablet (8 mg) by mouth every 8 hours as needed for nausea       THERATEARS OP      Apply 2 drops to eye daily       TYLENOL 325 MG tablet   Generic drug:  acetaminophen      Take 325 mg by mouth every 6 hours as needed for mild pain       VITAMIN B6 PO      Take 100 mg by mouth 2 times daily Reported on 3/21/2017       VITAMIN C PO      Take 250 mg by mouth daily       vitamin D 2000 UNITS tablet      Take 2,000 Units by mouth daily

## 2017-05-18 NOTE — PATIENT INSTRUCTIONS
Dr. El recommends labs today and we will call you with the results and plan of care at that time.    When you are in need of a refill, please call your pharmacy and they will send us a request.  Copy of appointments, and after visit summary (AVS) given to patient.  If you have any questions please call Fabiana Greenberg RN, BSN, OCN Oncology Hematology  [facility] [phone]. For questions after business hours, or on holidays/weekends, please call our after hours Nurse Triage line [phone]. Thank you.       Labs today, wait to bili to come down then change chemo likely next wk.   Chemo next week Tuesday and switch to Folfiri. F/u with cycle 2

## 2017-05-18 NOTE — NURSING NOTE
"Oncology Rooming Note    May 18, 2017 8:08 AM   Bren Rendon is a 66 year old female who presents for:    Chief Complaint   Patient presents with     Oncology Clinic Visit     Recheck Esophageal CA, f/u surgery     Initial Vitals: /83 (BP Location: Right arm, Patient Position: Chair, Cuff Size: Adult Regular)  Pulse 110  Temp 98.5  F (36.9  C) (Tympanic)  Resp 16  Ht 1.6 m (5' 2.99\")  Wt 75 kg (165 lb 6.4 oz)  SpO2 100%  Breastfeeding? No  BMI 29.31 kg/m2 Estimated body mass index is 29.31 kg/(m^2) as calculated from the following:    Height as of this encounter: 1.6 m (5' 2.99\").    Weight as of this encounter: 75 kg (165 lb 6.4 oz). Body surface area is 1.83 meters squared.  Moderate Pain (4) Comment: Right side   No LMP recorded. Patient is postmenopausal.  Allergies reviewed: Yes  Medications reviewed: Yes    Medications: MEDICATION REFILLS NEEDED TODAY. Provider was notified.  Pharmacy name entered into [employer]:      [facility] - [place], [place] - [address] [address]    Clinical concerns:  Recheck Esophageal CA, f/u surgery     7  minutes for nursing intake (face to face time)     Vanesa Hickey CMA              "

## 2017-05-18 NOTE — MR AVS SNAPSHOT
After Visit Summary   5/18/2017    Bren Rendon    MRN: [id number]           Patient Information     Date Of Birth          1950        Visit Information        Provider Department      5/18/2017 9:00 AM ROOM 10 [facility]        Today's Diagnoses     GE junction carcinoma (H)    -  1       Follow-ups after your visit        Your next 10 appointments already scheduled     May 23, 2017  8:00 AM CDT   Level 6 with ROOM 5 [facility] ([facility])    [facility]  [address]  [facility] [address]   [phone]            May 23, 2017  8:45 AM CDT   Return Visit with Aline El MD   [facility] ([facility])    [facility]  [address]  [facility] [address]   [phone]            May 25, 2017 12:00 PM CDT   Level O with ROOM 8 [facility] ([facility])    [facility]  [address]  [facility] [address]   [phone]            Jun 01, 2017  9:15 AM CDT   Return Visit with Amilcar Zambrano MD   [facility] [place] ([facility])    [address]  [address]  [facility] [address]   [phone]            Quinten 15, 2017  9:15 AM CDT   Return Visit with Amilcar Zambrano MD   [facility] [place] ([facility])    [address]  [address]  [facility] [address]   [phone]              Who to contact     If you have questions or need follow up information about today's clinic visit or your schedule please contact [facility] directly at [phone].  Normal or non-critical lab and imaging results will be communicated to you by MyChart, letter or phone within 4 business days after the clinic has received the results. If you do not hear from us within 7 days, please contact the clinic through [employer]  or phone. If you have a critical or abnormal lab result, we will notify you by phone as soon as possible.  Submit refill requests through [employer] or call your pharmacy and they will forward the refill request to us. Please allow 3 business days for your refill to be completed.          Additional Information About Your Visit        [email or web address] Information     [employer] gives you secure access to your electronic health record. If you see a primary care provider, you can also send messages to your care team and make appointments. If you have questions, please call your primary care clinic.  If you do not have a primary care provider, please call [phone] and they will assist you.        Care EveryWhere ID     This is your Care EveryWhere ID. This could be used by other organizations to access your [facility] medical records  [id number]         Blood Pressure from Last 3 Encounters:   05/18/17 111/83   05/10/17 137/83   05/06/17 131/82    Weight from Last 3 Encounters:   05/18/17 75 kg (165 lb 6.4 oz)   05/10/17 75.8 kg (167 lb 1.7 oz)   05/06/17 76 kg (167 lb 8.8 oz)              We Performed the Following     Central Venous Catheter: implanted port (Port-A-Cath, Power Port)        Primary Care Provider Office Phone # Fax #    Dillan Amos [phone]       [facility] [address] [facility] [address] [facility] [address]        Thank you!     Thank you for choosing [facility]  for your care. Our goal is always to provide you with excellent care. Hearing back from our patients is one way we can continue to improve our services. Please take a few minutes to complete the written survey that you may receive in the mail after your visit with us. Thank you!             Your Updated Medication List - Protect others around you: Learn how to safely use, store and throw away your medicines at www.disposemymeds.org.          This list is accurate as of: 5/18/17  9:18 AM.  Always use your most  recent med list.                   Brand Name Dispense Instructions for use    ALLEGRA 60 MG tablet   Generic drug:  fexofenadine      Take 60 mg by mouth daily       alum & mag hydroxide-simethicone 200-200-20 MG/5ML Susp suspension    MYLANTA/MAALOX     Take 30 mLs by mouth every 4 hours as needed for indigestion       lidocaine-prilocaine cream    EMLA    30 g    Apply 30 g topically as needed for moderate pain Apply to port site 1 hour before access.       LORazepam 0.5 MG tablet    ATIVAN    30 tablet    Take 1 tablet (0.5 mg) by mouth every 4 hours as needed (Anxiety, Nausea/Vomiting or Sleep)       Multi-vitamin Tabs tablet      Take 1 tablet by mouth daily       NASACORT AQ 55 MCG/ACT Inhaler   Generic drug:  triamcinolone      Spray 2 sprays into both nostrils daily       omega 3 1000 MG Caps      Take 1 g by mouth daily       omeprazole 40 MG capsule    priLOSEC    90 capsule    Take 1 capsule (40 mg) by mouth daily Take 30-60 minutes before a meal.       ondansetron 8 MG ODT tab    ZOFRAN ODT    60 tablet    Take 1 tablet (8 mg) by mouth every 8 hours as needed for nausea       THERATEARS OP      Apply 2 drops to eye daily       TYLENOL 325 MG tablet   Generic drug:  acetaminophen      Take 325 mg by mouth every 6 hours as needed for mild pain       VITAMIN B6 PO      Take 100 mg by mouth 2 times daily Reported on 3/21/2017       VITAMIN C PO      Take 250 mg by mouth daily       vitamin D 2000 UNITS tablet      Take 2,000 Units by mouth daily

## 2017-05-23 NOTE — MR AVS SNAPSHOT
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      Imodium (loperamide) is over the counter.  This is to treat cramps and/or diarrhea if needed.  Take as directed starting with 1st diarrhea stool.   Call Dr. El's office if you have persistent diarrhea and/or cramps.        Follow-ups after your visit        Your next 10 appointments already scheduled     May 25, 2017 12:00 PM CDT   Level O with ROOM 8 [facility] ([facility])    [facility]  [address]  [facility] [address]   [phone]            Jun 01, 2017  9:15 AM CDT   Return Visit with Amilcar Zambrano MD   [facility] [place] ([facility])    [address]  [address]  [place] [address]   [phone]            Jun 06, 2017  8:00 AM CDT   Level 4 with ROOM 5 [facility] ([facility])    [facility]  [address]  [facility] [address]   [phone]            Jun 06, 2017  9:00 AM CDT   Return Visit with Aline El MD   [facility] ([facility])    [facility]  [address]  [facility] [address]   [phone]            Jun 08, 2017 12:00 PM CDT   Level O with ROOM 8 [facility] ([facility])    [facility]  [address]  [facility] [address]   [phone]            Quinten 15, 2017  9:15 AM CDT    Return Visit with Amilcar Zambrano MD   [facility] [place] ([facility])    [address]  [address]  [facility] [address] [phone]              Who to contact     If you have questions or need follow up information about today's clinic visit or your schedule please contact [facility] directly at [phone].  Normal or non-critical lab and imaging results will be communicated to you by [employer], letter or phone within 4 business days after the clinic has received the results. If you do not hear from us within 7 days, please contact the clinic through [employer] or phone. If you have a critical or abnormal lab result, we will notify you by phone as soon as possible.  Submit refill requests through ShoorK or call your pharmacy and they will forward the refill request to us. Please allow 3 business days for your refill to be completed.          Additional Information About Your Visit        [employer] Information     Sho[employer] gives you secure access to your electronic health record. If you see a primary care provider, you can also send messages to your care team and make appointments. If you have questions, please call your primary care clinic.  If you do not have a primary care provider, please call [phone] and they will assist you.        Care EveryWhere ID     This is your Care EveryWhere ID. This could be used by other organizations to access your [place] medical records  [id number]        Your Vitals Were     Pulse Temperature BMI (Body Mass Index)             106 97.3  F (36.3  C) (Oral) 29.41 kg/m2          Blood Pressure from Last 3 Encounters:   05/23/17 122/70   05/18/17 111/83   05/10/17 137/83    Weight from Last 3 Encounters:   05/23/17 75.3 kg (166 lb)   05/18/17 75 kg (165 lb 6.4 oz)   05/10/17 75.8 kg (167 lb 1.7 oz)              We Performed the Following     CBC with platelets differential     Hepatic panel        Primary Care  Provider Office Phone # Fax #    Dillan Amos [phone]       [facility] [address] [facility] [address]        Thank you!     Thank you for choosing [facility]  for your care. Our goal is always to provide you with excellent care. Hearing back from our patients is one way we can continue to improve our services. Please take a few minutes to complete the written survey that you may receive in the mail after your visit with us. Thank you!             Your Updated Medication List - Protect others around you: Learn how to safely use, store and throw away your medicines at www.disposemymeds.org.          This list is accurate as of: 5/23/17  1:01 PM.  Always use your most recent med list.                   Brand Name Dispense Instructions for use    ALLEGRA 60 MG tablet   Generic drug:  fexofenadine      Take 60 mg by mouth daily       alum & mag hydroxide-simethicone 200-200-20 MG/5ML Susp suspension    MYLANTA/MAALOX     Take 30 mLs by mouth every 4 hours as needed for indigestion       lidocaine-prilocaine cream    EMLA    30 g    Apply 30 g topically as needed for moderate pain Apply to port site 1 hour before access.       loperamide 2 MG capsule    IMODIUM    30 capsule    Start with 2 caps (4 mg), then take one cap (2 mg) after each diarrheal stool as needed. Do not use more than 8 caps (16 mg) per day.       LORazepam 0.5 MG tablet    ATIVAN    30 tablet    Take 1 tablet (0.5 mg) by mouth every 4 hours as needed (Anxiety, Nausea/Vomiting or Sleep)       Multi-vitamin Tabs tablet      Take 1 tablet by mouth daily       NASACORT AQ 55 MCG/ACT Inhaler   Generic drug:  triamcinolone      Spray 2 sprays into both nostrils daily       omega 3 1000 MG Caps      Take 1 g by mouth daily       omeprazole 40 MG capsule    priLOSEC    90 capsule    Take 1 capsule (40 mg) by mouth daily Take 30-60 minutes before a meal.       ondansetron 8 MG ODT tab    ZOFRAN ODT     60 tablet    Take 1 tablet (8 mg) by mouth every 8 hours as needed for nausea       THERATEARS OP      Apply 2 drops to eye daily       TYLENOL 325 MG tablet   Generic drug:  acetaminophen      Take 325 mg by mouth every 6 hours as needed for mild pain       VITAMIN B6 PO      Take 100 mg by mouth 2 times daily Reported on 3/21/2017       VITAMIN C PO      Take 250 mg by mouth daily       vitamin D 2000 UNITS tablet      Take 2,000 Units by mouth daily

## 2017-05-23 NOTE — PATIENT INSTRUCTIONS
Do not take Senna (stool softener/laxative) until Weds afternoon and then only if you haven't had a BM in the morning.      Imodium (loperamide) is over the counter.  This is to treat cramps and/or diarrhea if needed.  Take as directed starting with 1st diarrhea stool.   Call Dr. El's office if you have persistent diarrhea and/or cramps.

## 2017-05-23 NOTE — PROGRESS NOTES
"Infusion Nursing Note:  Bren Rendon presents today for FOLFIRI.    Patient seen by provider today: No   present during visit today: Not Applicable.    Note: N/A.    Intravenous Access:  Labs drawn without difficulty.  Implanted Port.    Treatment Conditions:  Lab Results   Component Value Date    HGB 9.3 05/23/2017     Lab Results   Component Value Date    WBC 8.7 05/23/2017      Lab Results   Component Value Date    ANEU 6.4 05/23/2017     Lab Results   Component Value Date     05/23/2017      Lab Results   Component Value Date     05/18/2017                   Lab Results   Component Value Date    POTASSIUM 3.6 05/18/2017           Lab Results   Component Value Date    MAG 1.9 01/17/2017            Lab Results   Component Value Date    CR 0.54 05/18/2017                   Lab Results   Component Value Date    JARED 8.4 05/18/2017                Lab Results   Component Value Date    BILITOTAL 0.9 05/23/2017           Lab Results   Component Value Date    ALBUMIN 2.1 05/23/2017                    Lab Results   Component Value Date    ALT 30 05/23/2017           Lab Results   Component Value Date    AST 37 05/23/2017     Results reviewed, labs MET treatment parameters, ok to proceed with treatment.      Post Infusion Assessment:  Pt c/o cramping approx 1 hour into the infusion of irinotecan.  Stopped that med and administered atropine IV. Pt reports relief from the symptoms after 30 minutes.  Restarted chemo.     Pt tolerated remainder of chemo without difficulty.    Prior to discharge: Port is secured in place with tegaderm and flushed with 10cc NS with positive blood return noted.  Continuous home infusion CADD pump connected.    All connectors secured in place and clamps taped open.    Pump started, \"running\" noted on display (CADD): YES.  Patient instructed to call our clinic or [facility] with any questions or concerns at home.  Patient verbalized understanding.    Patient set " up for pump disconnect at our clinic on Thurs 5/25 for pump disconnect.        Discharged in stable condition accompanied by .  Samantha Omalley RN

## 2017-05-25 NOTE — PROGRESS NOTES
Infusion Nursing Notes:  Bren MC Betsydarshana presents today for pump DC.    Patient seen by provider today: No   present during visit today: Not Applicable.    Note: N/A.    Intravenous Access:  Implanted Port.    Treatment Conditions:  Not Applicable.      Post Infusion Assessment:  Patient tolerated infusion without incident.    Discharge Plan:   Patient discharged in stable condition accompanied by: .    Abel Bianchi RN

## 2017-05-25 NOTE — MR AVS SNAPSHOT
After Visit Summary   5/25/2017    Bren Rendon    MRN: [id number]           Patient Information     Date Of Birth          1950        Visit Information        Provider Department      5/25/2017 11:00 AM ROOM 8 [facility]        Today's Diagnoses     GE junction carcinoma (H)    -  1       Follow-ups after your visit        Your next 10 appointments already scheduled     Jun 01, 2017  9:15 AM CDT   Return Visit with Amilcar Zambrano MD   [facility] [place] ([facility])    [address]  [address]  [facility] [address]   [phone]            Jun 06, 2017  8:00 AM CDT   Level 4 with ROOM 5 [facility] ([facility])    [facility]  [address]  [facility] [address]   [phone]            Jun 06, 2017  9:00 AM CDT   Return Visit with Aline El MD   [facility] ([facility])    [facility]  [address]  [facility] [address]   [phone]            Jun 08, 2017 12:00 PM CDT   Level O with ROOM 8 [facility] ([facility])    [facility]  [address]  [facility] [address]   [phone]            Quinten 15, 2017  9:15 AM CDT   Return Visit with Amilcar Zambrano MD   [facility] [place] ([facility])    [address]  [address]  [facility] [address]   [phone]              Who to contact     If you have questions or need follow up information about today's clinic visit or your schedule please contact [facility] directly at [phone].  Normal or non-critical lab and imaging results will be communicated to you by MyChart, letter or phone within 4 business days after the clinic has received the results. If you do not hear from us within 7 days, please contact the clinic through [employer]  or phone. If you have a critical or abnormal lab result, we will notify you by phone as soon as possible.  Submit refill requests through ComptTIA or call your pharmacy and they will forward the refill request to us. Please allow 3 business days for your refill to be completed.          Additional Information About Your Visit        [employer] Information     Com[employer] gives you secure access to your electronic health record. If you see a primary care provider, you can also send messages to your care team and make appointments. If you have questions, please call your primary care clinic.  If you do not have a primary care provider, please call [phone] and they will assist you.        Care EveryWhere ID     This is your Care EveryWhere ID. This could be used by other organizations to access your [place] medical records  [id number]         Blood Pressure from Last 3 Encounters:   05/23/17 134/66   05/18/17 111/83   05/10/17 137/83    Weight from Last 3 Encounters:   05/23/17 75.3 kg (166 lb)   05/18/17 75 kg (165 lb 6.4 oz)   05/10/17 75.8 kg (167 lb 1.7 oz)              Today, you had the following     No orders found for display       Primary Care Provider Office Phone # Fax #    Dillan Amos [phone]       [facility] [address] [facility] [address]        Thank you!     Thank you for choosing [facility]  for your care. Our goal is always to provide you with excellent care. Hearing back from our patients is one way we can continue to improve our services. Please take a few minutes to complete the written survey that you may receive in the mail after your visit with us. Thank you!             Your Updated Medication List - Protect others around you: Learn how to safely use, store and throw away your medicines at www.disposemymeds.org.          This list is accurate as of: 5/25/17  3:05 PM.  Always use your most recent med list.                   Brand Name  Dispense Instructions for use    ALLEGRA 60 MG tablet   Generic drug:  fexofenadine      Take 60 mg by mouth daily       alum & mag hydroxide-simethicone 200-200-20 MG/5ML Susp suspension    MYLANTA/MAALOX     Take 30 mLs by mouth every 4 hours as needed for indigestion       lidocaine-prilocaine cream    EMLA    30 g    Apply 30 g topically as needed for moderate pain Apply to port site 1 hour before access.       loperamide 2 MG capsule    IMODIUM    30 capsule    Start with 2 caps (4 mg), then take one cap (2 mg) after each diarrheal stool as needed. Do not use more than 8 caps (16 mg) per day.       LORazepam 0.5 MG tablet    ATIVAN    30 tablet    Take 1 tablet (0.5 mg) by mouth every 4 hours as needed (Anxiety, Nausea/Vomiting or Sleep)       Multi-vitamin Tabs tablet      Take 1 tablet by mouth daily       NASACORT AQ 55 MCG/ACT Inhaler   Generic drug:  triamcinolone      Spray 2 sprays into both nostrils daily       omega 3 1000 MG Caps      Take 1 g by mouth daily       omeprazole 40 MG capsule    priLOSEC    90 capsule    Take 1 capsule (40 mg) by mouth daily Take 30-60 minutes before a meal.       ondansetron 8 MG ODT tab    ZOFRAN ODT    60 tablet    Take 1 tablet (8 mg) by mouth every 8 hours as needed for nausea       THERATEARS OP      Apply 2 drops to eye daily       TYLENOL 325 MG tablet   Generic drug:  acetaminophen      Take 325 mg by mouth every 6 hours as needed for mild pain       VITAMIN B6 PO      Take 100 mg by mouth 2 times daily Reported on 3/21/2017       VITAMIN C PO      Take 250 mg by mouth daily       vitamin D 2000 UNITS tablet      Take 2,000 Units by mouth daily

## 2017-05-26 NOTE — TELEPHONE ENCOUNTER
"Called Pt for a status check post switching from Folfox to 5FU and Irinotecan which was initiated on 5/23/17. Pt reports being \"sick to her stomach\" since starting tx on Tuesday reporting nausea and intermittent dry heaves since then despite taking her antiemetics. Pt reports taking her dissolveable zofran in the evening and ativan in the morning   "

## 2017-05-26 NOTE — TELEPHONE ENCOUNTER
But reports ongoing nausea despite these measures. Pt reports being able to eat and drink just fine stating that in a normal day she's been eating and keeping down cereal with fruit in the morning, soup and sandwich for lunch and a light dinner of fruit and cottage cheese for supper stating that she is unable to tolerate meat at this time which seems to come up after eating. Pt also reports using a straw for fluids to increase her consumption and drinking 1 ensure/boost per day.     Encouraged pt to continue using the zofran every 8 hours as needed and recommended that she try this first thing in the morning rather than the ativan which she stated didn't seem to be effective. Told her that we would send over a new prescription for the antiemetic compazine to her pharmacy [place] [facility] to alternate btw the zofran if needed for persistent nausea.     Instructed pt to give us a call with any other que/conerns and confirmed that the pt also had the after hours on-call RN number to call if concerns arise during that time. Pt verbalized understanding and agreed with the plan.

## 2017-06-02 NOTE — PROGRESS NOTES
"Telephone Triage:    Pt is a 66 year old female, first chemotherapy infusion of Folfiri 05.23.17.    Diagnosis: Esophageal cancer    Primary care provider is: Dillan Amos    Oncology provider is:  Dr. KIMBERLY El    Chief complaint: hemorrhoids.  Any other side effects noted from treatment?  Nausea, Vomiting: denies  Mucositis: denies  Diarrhea/Constipation: reports being constipated \"really bad\" last weekend and on Tuesday had \"a very hard bowel movement\" at which time patient noted blood on toilet paper when wiping rectum and in toilet basin.  Reports it was 1 episode and has not occurred since.  Patient is now taking senna to prevent constipation. Reports having a history of hemorrhoids with occasional bleeding noted. Patient states, \"it doesn't happen very often, but I thought you guys should know\".    Assessment: Please see above.    Recommendations: Advised patient to continue to monitor for s/s of bleeding and if further episodes occur, to please update our office or be seen in urgent care.  Also, advised patient to utilize PRN medications as needed, eat nutritious meals, drink plenty of fluids, and keep scheduled appointments. If symptoms or other concerns develop after hours, encouraged patient to call our after hours number: [phone].  Patient instructed to call with any questions or concerns.  Patient states understanding and is in agreement with this plan.      Patient instructed to call with any questions or concerns.  Patient states understanding and is in agreement with this plan.    Approximately 5 minutes spent on telephone with patient reviewing assessment and symptom(s) and providing symptom management.    Jinny Van RN, BSN, OCN  Oncology Hematology   Breast Cancer Navigator  [facility]  [email or web address]  [phone]    "

## 2017-06-06 NOTE — PROGRESS NOTES
Infusion Nursing Note:  Bren Rendon presents today for IV Folfiri and an MD appointment.    Patient seen by provider today: Yes: Dr. El.   present during visit today: Not Applicable.    Note: Labs drawn via her port per [place] protocol. Labs WNL's. Premeds and chemotherapy infused without complications via her port. Pt. Had IV Emend added to her regimen this time to help with the nausea that she has been experiencing after her chemotherapy. 5 FU pump hooked up at 1430. Pt. To return at 1230 on Thursday for a pump DC.    Intravenous Access:  Labs drawn without difficulty.  Implanted Port.    Treatment Conditions:  Lab Results   Component Value Date    HGB 9.1 06/06/2017     Lab Results   Component Value Date    WBC 3.2 06/06/2017      Lab Results   Component Value Date    ANEU 1.8 06/06/2017     Lab Results   Component Value Date     06/06/2017      Lab Results   Component Value Date     05/18/2017                   Lab Results   Component Value Date    POTASSIUM 3.6 05/18/2017           Lab Results   Component Value Date    MAG 1.9 01/17/2017            Lab Results   Component Value Date    CR 0.54 05/18/2017                   Lab Results   Component Value Date    JARED 8.4 05/18/2017                Lab Results   Component Value Date    BILITOTAL 0.6 06/06/2017           Lab Results   Component Value Date    ALBUMIN 2.1 05/23/2017                    Lab Results   Component Value Date    ALT 30 05/23/2017           Lab Results   Component Value Date    AST 37 05/23/2017     Results reviewed, labs MET treatment parameters, ok to proceed with treatment.      Post Infusion Assessment:  Patient tolerated infusion without incident.  Blood return noted pre and post infusion.  Site patent and intact, free from redness, edema or discomfort.  No evidence of extravasations.  Prior to discharge: Port is secured in place with tegaderm and flushed with 10cc NS with positive blood return noted.   "Continuous home infusion CADD pump connected.    All connectors secured in place and clamps taped open.    Pump started, \"running\" noted on display (CADD): YES.  Patient instructed to call our clinic or [facility] with any questions or concerns at home.  Patient verbalized understanding.    Patient set up for pump disconnect at our clinic on Thursday at 1230.            Discharge Plan:   Patient and/or family verbalized understanding of discharge instructions and all questions answered.  Patient discharged in stable condition accompanied by: friend.  Departure Mode: Ambulatory.    Latoya Patel RN                        "

## 2017-06-06 NOTE — MR AVS SNAPSHOT
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    When you are in need of a refill, please call your pharmacy and they will send us a request.  Copy of appointments, and after visit summary (AVS) given to patient.  If you have any questions please call Fabiana Greenberg RN, BSN, OCN Oncology Hematology  [facility] [phone]. For questions after business hours, or on holidays/weekends, please call our after hours Nurse Triage line [phone]. Thank you.       Chemo today.   F/u in 2 wks with chemo          Follow-ups after your visit        Your next 10 appointments already scheduled     Jun 08, 2017 12:00 PM CDT   Level O with ROOM 8 [facility] ([facility])    [facility]  [address]  [facility] [address]   [phone]            Jun 20, 2017  8:00 AM CDT   Level 4 with ROOM 4 [facility] ([facility])    [facility]  [address]  [facility] [address]   [phone]            Jun 20, 2017  8:45 AM CDT   Return Visit with Aline El MD   [facility] ([facility])    [facility]  [address]  [facility] [address]   [phone]            Jun 22, 2017 12:00 PM CDT   Level O with ROOM 2 [facility] ([facility])    [facility]  [address]  [place] [address]   [phone]  "             Who to contact     If you have questions or need follow up information about today's clinic visit or your schedule please contact [facility] directly at [phone].  Normal or non-critical lab and imaging results will be communicated to you by MyChart, letter or phone within 4 business days after the clinic has received the results. If you do not hear from us within 7 days, please contact the clinic through [email or web address] or phone. If you have a critical or abnormal lab result, we will notify you by phone as soon as possible.  Submit refill requests through [employer] or call your pharmacy and they will forward the refill request to us. Please allow 3 business days for your refill to be completed.          Additional Information About Your Visit        [employer] Information     [employer] gives you secure access to your electronic health record. If you see a primary care provider, you can also send messages to your care team and make appointments. If you have questions, please call your primary care clinic.  If you do not have a primary care provider, please call [phone] and they will assist you.        Care EveryWhere ID     This is your Care EveryWhere ID. This could be used by other organizations to access your [place] medical records  [id number]        Your Vitals Were     Pulse Temperature Respirations Height Pulse Oximetry Breastfeeding?    110 98.5  F (36.9  C) (Tympanic) 18 1.6 m (5' 2.99\") 98% No    BMI (Body Mass Index)                   28.31 kg/m2            Blood Pressure from Last 3 Encounters:   06/06/17 101/61   05/23/17 134/66   05/18/17 111/83    Weight from Last 3 Encounters:   06/06/17 72.5 kg (159 lb 12.8 oz)   06/06/17 72.4 kg (159 lb 9.6 oz)   05/23/17 75.3 kg (166 lb)              Today, you had the following     No orders found for display       Primary Care Provider Office Phone # Fax #    Dillan Amos [phone]       [facility] " [address] [facility] [address]        Thank you!     Thank you for choosing [facility]  for your care. Our goal is always to provide you with excellent care. Hearing back from our patients is one way we can continue to improve our services. Please take a few minutes to complete the written survey that you may receive in the mail after your visit with us. Thank you!             Your Updated Medication List - Protect others around you: Learn how to safely use, store and throw away your medicines at www.disposemymeds.org.          This list is accurate as of: 6/6/17  9:45 AM.  Always use your most recent med list.                   Brand Name Dispense Instructions for use    ALLEGRA 60 MG tablet   Generic drug:  fexofenadine      Take 60 mg by mouth daily       alum & mag hydroxide-simethicone 200-200-20 MG/5ML Susp suspension    MYLANTA/MAALOX     Take 30 mLs by mouth every 4 hours as needed for indigestion       lidocaine-prilocaine cream    EMLA    30 g    Apply 30 g topically as needed for moderate pain Apply to port site 1 hour before access.       loperamide 2 MG capsule    IMODIUM    30 capsule    Start with 2 caps (4 mg), then take one cap (2 mg) after each diarrheal stool as needed. Do not use more than 8 caps (16 mg) per day.       LORazepam 0.5 MG tablet    ATIVAN    30 tablet    Take 1 tablet (0.5 mg) by mouth every 4 hours as needed (Anxiety, Nausea/Vomiting or Sleep)       Multi-vitamin Tabs tablet      Take 1 tablet by mouth daily       NASACORT AQ 55 MCG/ACT Inhaler   Generic drug:  triamcinolone      Spray 2 sprays into both nostrils daily       omega 3 1000 MG Caps      Take 1 g by mouth daily       omeprazole 40 MG capsule    priLOSEC    90 capsule    Take 1 capsule (40 mg) by mouth daily Take 30-60 minutes before a meal.       ondansetron 8 MG ODT tab    ZOFRAN ODT    60 tablet    Take 1 tablet (8 mg) by mouth every 8 hours as needed for nausea       prochlorperazine 10 MG tablet     COMPAZINE    30 tablet    Take 0.5 tablets (5 mg) by mouth every 6 hours as needed for nausea or vomiting       THERATEARS OP      Apply 2 drops to eye daily       TYLENOL 325 MG tablet   Generic drug:  acetaminophen      Take 325 mg by mouth every 6 hours as needed for mild pain       VITAMIN B6 PO      Take 100 mg by mouth 2 times daily Reported on 3/21/2017       VITAMIN C PO      Take 250 mg by mouth daily       vitamin D 2000 UNITS tablet      Take 2,000 Units by mouth daily

## 2017-06-06 NOTE — PATIENT INSTRUCTIONS
We would like to see you back in 2 weeks with your next chemotherapy infusion.    When you are in need of a refill, please call your pharmacy and they will send us a request.  Copy of appointments, and after visit summary (AVS) given to patient.  If you have any questions please call Fabiana Greenberg RN, BSN, OCN Oncology Hematology  [facility] [phone]. For questions after business hours, or on holidays/weekends, please call our after hours Nurse Triage line [phone]. Thank you.       Chemo today.   F/u in 2 wks with chemo

## 2017-06-06 NOTE — NURSING NOTE
"Oncology Rooming Note    June 6, 2017 8:41 AM   Bren Rendon is a 66 year old female who presents for:    Chief Complaint   Patient presents with     Oncology Clinic Visit     3 week recheck Esophageal CA, Labs & Chemo today     Initial Vitals: /61 (BP Location: Right arm, Patient Position: Chair, Cuff Size: Adult Regular)  Pulse 110  Temp 98.5  F (36.9  C) (Tympanic)  Resp 18  Ht 1.6 m (5' 2.99\")  Wt 72.5 kg (159 lb 12.8 oz)  SpO2 98%  Breastfeeding? No  BMI 28.31 kg/m2 Estimated body mass index is 28.31 kg/(m^2) as calculated from the following:    Height as of this encounter: 1.6 m (5' 2.99\").    Weight as of this encounter: 72.5 kg (159 lb 12.8 oz). Body surface area is 1.8 meters squared.  No Pain (0) Comment: Data Unavailable   No LMP recorded. Patient is postmenopausal.  Allergies reviewed: Yes  Medications reviewed: Yes    Medications: Medication refills not needed today.   Pharmacy name entered into [employer] [facility]:      [facility] - [place], [place] - [address] [facility]    Clinical concerns 3 week recheck Esophageal CA, Labs & Chemo today. Patient was sick vomiting for 4 days after last treatment. Appetite is down and water intake is down. Drinking 1 bottle of Boost a day. Weight loss.   7  minutes for nursing intake (face to face time)     Vanesa Hickey [facility]              "

## 2017-06-06 NOTE — PROGRESS NOTES
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  Eventually had upper endoscopy workup in early 11/2016, identified large fungating submucosal mass within the lower third of the esophagus in the gastric GE junction and in the cardia.  This mass was partially obstructing and circumferential.      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 Her 2 is negative by FISH and IHC.    Further work up with CT, EUS, PET  found to have extensive lower esophogeal/gastric cancer with near obstruction lower esophageal /GE junction tumor, cT3N+ (aorta caval LN + on biopsy), with ascites.     She was seen by Dr. Levy (12/14/2016) and deemed not a surgical candidate. She saw Rad-Onc and felt due to he local symptoms, RT is beneficial. Concurrent RT/wkly carbo/taxol is offered from end of Dec 2016 to Feb 2017. She then went on to have FOLFOX since early March 2017.  Restaging PET in April 2017  indicated significant response. tx is continued.     She has elevation of bili in early May, found to have biliary stricture with pancreatic head mass biopsy proven mets from upper GI tract.   She had ERCP with biliary stent early May and systemic chemo is changed to FOLFIRI. C1D1 5/23/2107.     5/2017 MRCP was done due to elevated bili, found  PAST MEDICAL HISTORY:     1.  Reflux disease.     2.  History of cholecystitis.     3.  Rosacea.    4.  Degenerative disk disease.    5.  Obesity.   6.  Dupuytren's disease.      MEDICATIONS:  Reviewed in Epic system.      SOCIAL HISTORY:  She is retired.  She lives in [place].  She is a very active 66-year-old female.  Denies  "smoking.  She drinks 1-2 glasses of white wine every day.      FAMILY HISTORY:  Positive for dad  from lung cancer who smoked 4 packs per day.  Maternal grandmother  from breast cancer.  No family history of GI cancer.       REVIEW OF SYSTEMS:   She feels nausea for 1 wk post C1 FOLFIRI    PHYSICAL EXAMINATION:   GENERAL APPEARANCE:  A middle-aged woman, looks like her stated age, very pleasant, not in acute distress.     VITAL SIGNS: Blood pressure 101/61, pulse 110, temperature 98.5  F (36.9  C), temperature source Tympanic, resp. rate 18, height 1.6 m (5' 2.99\"), weight 72.5 kg (159 lb 12.8 oz), SpO2 98 %, not currently breastfeeding.  HEENT: The patient is normocephalic, atraumatic. Pupils are equally react to light.  Sclerae are anicteric.  Moist oral mucosa.  Negative pharynx.  No oral thrush.   NECK:  Supple.  No jugular venous distention.  Thyroid is not palpable.   LYMPH NODES:  Superficial lymphadenopathy is not appreciable in the bilateral cervical, supraclavicular, axillary or inguinal areas.   CARDIOVASCULAR:  S1, S2 regular with no murmurs or gallops.  No carotid or abdominal bruits.   PULMONARY:  Lungs are clear to auscultation and percussion bilaterally.  There is no wheezing or rhonchi.   GASTROINTESTINAL:  Abdomen is soft, nontender.  No hepatosplenomegaly.  No signs of ascites.  No mass appreciable.   MUSCULOSKELETAL/EXTREMITIES:  No edema.  No cyanotic changes.  No signs of joint deformity.  No lymphedema.   NEUROLOGIC:  Cranial nerves II-XII are grossly intact.  Sensation intact.  Muscle strength and muscle tone symmetrical 5/5 throughout.   BACK:  No spinal or paraspinal tenderness.  No CVA tenderness.   SKIN:  No petechiae.  No rash.  No signs of cellulitis.      CURRENT LABORATORY DATA:   Cbc diff is fine. Bili down to 1.1 from peak 6.9    2017 EUS pancreatic head biopsy: Metastatic adenocarcinoma, in favor of upper gastrointestinal origin.     Current Image  2017 ERCP " found bilary stricture which was dilated and metal stent was placed.     5 /2017 MRCP  1. Obstructing mass in the head of the pancreas measuring 1.0 cm.  Pancreatic, biliary or ampullary neoplasm is suspected. Due to the arterial enhancement, a pancreatic islet cell tumor is possible.    4/2017 PET  1. A tiny left superior mediastinal node measures less than 1 cm and demonstrates an SUV max 2.7 which may be reactive in nature.  2. residual FDG activity in the region of the GE junction mass when compared to prior study. Currently this measures an SUV max 4.7, previously 16.2, indicating a significant interval improvement.  3. Residual FDG activity is noted involving the gastric cardia when compared to prior exam but has significantly improved as described above.     OLD DATA REVIEW REVIEWED WITH SUMMARY:   Baseline cbc diff/CMP are nl, CEA 8.7 in 11/2016    Pathology 12/5/2016 Lymph node, aortacaval, endoscopic ultrasound-guided fine needle aspiration with shark core needle:   - Positive for malignancy.   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 Her2- by FISH/IHC.      EUS 12/5/2016, scope can't pass through GE junction-  A large, fungating and submucosal mass was found at the lower third of the esophagus at 34 cm from the incisors, which was nearly two-thirds circumferential  and is partially obstructing the esophagus and was seen extending to the gastric cardia.  Ascites was found.  4 abnormal lymph nodes were visualized in the lower paraesophageal mediastinum (level 8L). This will likely represent N2 on the evans staging.    PET 11/30/2016  1. Hypermetabolic uptake SUV 16.6 in the proximal to mid stomach compatible with the patient's recently diagnosed gastric cancer.No Esophageal activity.   2. There is an elongated but slender gastrohepatic node  just medial to the proximal stomach. Less than 1 cm in transverse dimension. Suspected to be hyper metabolic but SUV difficult to measure  separately from adjacent stomach.  3. Hypermetabolic retroperitoneal node, aorto caval location just posterior to transverse duodenum with SUV max 4.2 compatible with metastasis.  4. Hypermetabolic hepatic flexure and distal ascending colon without CT abnormality is suspected to be physiologic.  5. Nonenlarged subcentimeter hypermetabolic node in the right side of the neck at the level of the tongue is nonspecific.    CT chest, abdomen and pelvis 11/05/2016 - esophageal GE junction within normal limits.  Mild fatty infiltrate of the liver, pancreatic atrophy.         ASSESSMENT AND PLAN:    1. 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  She is found to have extensive lower esophogeal/gastric cancer with near obstruction lower esophageal /GE junction tumor, cT3N+ (aorta caval LN + on biopsy), with ascites. Her 2 - by FISH and IHc.     She was seen by Dr. Levy and deemed not a surgical candidate.     She saw Rad-Onc, concurrent RT/wkly carbo/taxol was offered till early Feb.  She then went on to have palliative FOLFOX.   She had VGPR by PET in 4/2017. Then She had elevation of bili in early May, found to have biliary stricture with pancreatic head mass biopsy proven mets from upper GI tract.   She had ERCP with biliary stent early May and systemic chemo is changed to FOLFIRI. Due to cytopenia from FOLFOX - omit bolus 5 FU and dose reduce 5 FU IVCI to 2 g/m2.      I told them she will not be a surgical case at this point. tx is palliative in nature.       2. Nausea with dysphagia.   Advice ODT zofran, Ativan, and to compazine. Add IV emend.     3. Cytopenia from FOLFOX - omit bolus 5 FU and dose reduce 5 FU IVCI to 2 g/m2.    Neutropenic precaution is advised.     4. Emotional stress. She is referred to  psychologist. She did not go. Psycho support is provided.

## 2017-06-06 NOTE — MR AVS SNAPSHOT
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  [address]  [facility] [address]   [phone]            Jun 20, 2017  8:00 AM CDT   Level 4 with ROOM 4 [facility] ([facility])    [facility]  [address]  [facility] [address]   [phone]            Jun 20, 2017  8:45 AM CDT   Return Visit with Aline El MD   [facility] ([facility])    [facility]  [address]  [facility] [address]   [phone]            Jun 22, 2017 12:00 PM CDT   Level O with ROOM 2 [facility] ([facility])    Critical access hospital Ctr [facility]  [address]  [facility] [address]   [phone]              Who to contact     If you have questions or need follow up information about today's clinic visit or your schedule please contact [facility] directly at [phone].  Normal or non-critical lab and imaging results will be communicated to you by MyChart, letter or phone within 4 business days after the clinic has received the results. If you do not hear from us within 7 days, please contact the clinic through MyChart or phone. If you have a critical or abnormal lab result, we will notify you by phone as soon as possible.  Submit refill requests through Track or call your pharmacy and  they will forward the refill request to us. Please allow 3 business days for your refill to be completed.          Additional Information About Your Visit        [employer] Information     [employer] gives you secure access to your electronic health record. If you see a primary care provider, you can also send messages to your care team and make appointments. If you have questions, please call your primary care clinic.  If you do not have a primary care provider, please call [phone] and they will assist you.        Care EveryWhere ID     This is your Care EveryWhere ID. This could be used by other organizations to access your [place] medical records  [id number]        Your Vitals Were     Pulse BMI (Body Mass Index)                84 28.28 kg/m2           Blood Pressure from Last 3 Encounters:   06/06/17 101/61   06/06/17 129/72   05/23/17 134/66    Weight from Last 3 Encounters:   06/06/17 72.5 kg (159 lb 12.8 oz)   06/06/17 72.4 kg (159 lb 9.6 oz)   05/23/17 75.3 kg (166 lb)              We Performed the Following     Bilirubin  total     CBC with platelets differential        Primary Care Provider Office Phone # Fax #    Dillan Amos [phone]       [facility] [address] [id number]        Thank you!     Thank you for choosing [facility]  for your care. Our goal is always to provide you with excellent care. Hearing back from our patients is one way we can continue to improve our services. Please take a few minutes to complete the written survey that you may receive in the mail after your visit with us. Thank you!             Your Updated Medication List - Protect others around you: Learn how to safely use, store and throw away your medicines at www.disposemymeds.org.          This list is accurate as of: 6/6/17  3:57 PM.  Always use your most recent med list.                   Brand Name Dispense Instructions for use    ALLEGRA 60 MG tablet    Generic drug:  fexofenadine      Take 60 mg by mouth daily       alum & mag hydroxide-simethicone 200-200-20 MG/5ML Susp suspension    MYLANTA/MAALOX     Take 30 mLs by mouth every 4 hours as needed for indigestion       lidocaine-prilocaine cream    EMLA    30 g    Apply 30 g topically as needed for moderate pain Apply to port site 1 hour before access.       loperamide 2 MG capsule    IMODIUM    30 capsule    Start with 2 caps (4 mg), then take one cap (2 mg) after each diarrheal stool as needed. Do not use more than 8 caps (16 mg) per day.       LORazepam 0.5 MG tablet    ATIVAN    30 tablet    Take 1 tablet (0.5 mg) by mouth every 4 hours as needed (Anxiety, Nausea/Vomiting or Sleep)       Multi-vitamin Tabs tablet      Take 1 tablet by mouth daily       NASACORT AQ 55 MCG/ACT Inhaler   Generic drug:  triamcinolone      Spray 2 sprays into both nostrils daily       omega 3 1000 MG Caps      Take 1 g by mouth daily       omeprazole 40 MG capsule    priLOSEC    90 capsule    Take 1 capsule (40 mg) by mouth daily Take 30-60 minutes before a meal.       ondansetron 8 MG ODT tab    ZOFRAN ODT    60 tablet    Take 1 tablet (8 mg) by mouth every 8 hours as needed for nausea       prochlorperazine 10 MG tablet    COMPAZINE    30 tablet    Take 0.5 tablets (5 mg) by mouth every 6 hours as needed for nausea or vomiting       THERATEARS OP      Apply 2 drops to eye daily       TYLENOL 325 MG tablet   Generic drug:  acetaminophen      Take 325 mg by mouth every 6 hours as needed for mild pain       VITAMIN B6 PO      Take 100 mg by mouth 2 times daily Reported on 3/21/2017       VITAMIN C PO      Take 250 mg by mouth daily       vitamin D 2000 UNITS tablet      Take 2,000 Units by mouth daily

## 2017-06-08 NOTE — MR AVS SNAPSHOT
After Visit Summary   6/8/2017    Bren Rendon    MRN: [id number]           Patient Information     Date Of Birth          1950        Visit Information        Provider Department      6/8/2017 12:30 PM ROOM 8 [facility]        Today's Diagnoses     GE junction carcinoma (H)    -  1       Follow-ups after your visit        Your next 10 appointments already scheduled     Jun 08, 2017 12:30 PM CDT   Level O with ROOM 8 [facility] ([facility])    [facility]  [address]  [facility] [address]   [phone]            Jun 20, 2017  8:00 AM CDT   Level 4 with ROOM 4 [facility] ([facility])    [facility]  [address]  [facility] [address]   [phone]            Jun 20, 2017  8:45 AM CDT   Return Visit with Aline El MD   [facility] ([facility])    [facility]  [address]  [facility] [address]   [phone]            Jun 22, 2017 12:00 PM CDT   Level O with ROOM 2 [facility] ([facility])    [facility]  [address]  [facility] [address]   [phone]              Who to contact     If you have questions or need follow up information about today's clinic visit or your schedule please contact [facility] directly at [phone].  Normal or non-critical lab and imaging results will be communicated to you by MyChart, letter or phone within 4 business days after the clinic has received the results. If you do not hear from us within 7 days, please contact the clinic through [employer] or phone. If you have a critical or abnormal lab result, we will notify you by phone as soon as possible.  Submit refill requests through [employer] or call your pharmacy and they will forward the refill request to us. Please allow 3 business  days for your refill to be completed.          Additional Information About Your Visit        MyChart Information     [employer] gives you secure access to your electronic health record. If you see a primary care provider, you can also send messages to your care team and make appointments. If you have questions, please call your primary care clinic.  If you do not have a primary care provider, please call [phone] and they will assist you.        Care EveryWhere ID     This is your Care EveryWhere ID. This could be used by other organizations to access your [place] medical records  [id number]        Your Vitals Were     Pulse Temperature                104 98  F (36.7  C) (Oral)           Blood Pressure from Last 3 Encounters:   06/08/17 119/72   06/06/17 101/61   06/06/17 129/72    Weight from Last 3 Encounters:   06/06/17 72.5 kg (159 lb 12.8 oz)   06/06/17 72.4 kg (159 lb 9.6 oz)   05/23/17 75.3 kg (166 lb)              Today, you had the following     No orders found for display       Primary Care Provider Office Phone # Fax #    Dillan Jackson Amos [phone]       [facility] [address] [address]        Thank you!     Thank you for choosing [facility]  for your care. Our goal is always to provide you with excellent care. Hearing back from our patients is one way we can continue to improve our services. Please take a few minutes to complete the written survey that you may receive in the mail after your visit with us. Thank you!             Your Updated Medication List - Protect others around you: Learn how to safely use, store and throw away your medicines at www.disposemymeds.org.          This list is accurate as of: 6/8/17 12:26 PM.  Always use your most recent med list.                   Brand Name Dispense Instructions for use    ALLEGRA 60 MG tablet   Generic drug:  fexofenadine      Take 60 mg by mouth daily       alum & mag  hydroxide-simethicone 200-200-20 MG/5ML Susp suspension    MYLANTA/MAALOX     Take 30 mLs by mouth every 4 hours as needed for indigestion       lidocaine-prilocaine cream    EMLA    30 g    Apply 30 g topically as needed for moderate pain Apply to port site 1 hour before access.       loperamide 2 MG capsule    IMODIUM    30 capsule    Start with 2 caps (4 mg), then take one cap (2 mg) after each diarrheal stool as needed. Do not use more than 8 caps (16 mg) per day.       LORazepam 0.5 MG tablet    ATIVAN    30 tablet    Take 1 tablet (0.5 mg) by mouth every 4 hours as needed (Anxiety, Nausea/Vomiting or Sleep)       Multi-vitamin Tabs tablet      Take 1 tablet by mouth daily       NASACORT AQ 55 MCG/ACT Inhaler   Generic drug:  triamcinolone      Spray 2 sprays into both nostrils daily       omega 3 1000 MG Caps      Take 1 g by mouth daily       omeprazole 40 MG capsule    priLOSEC    90 capsule    Take 1 capsule (40 mg) by mouth daily Take 30-60 minutes before a meal.       ondansetron 8 MG ODT tab    ZOFRAN ODT    60 tablet    Take 1 tablet (8 mg) by mouth every 8 hours as needed for nausea       prochlorperazine 10 MG tablet    COMPAZINE    30 tablet    Take 0.5 tablets (5 mg) by mouth every 6 hours as needed for nausea or vomiting       THERATEARS OP      Apply 2 drops to eye daily       TYLENOL 325 MG tablet   Generic drug:  acetaminophen      Take 325 mg by mouth every 6 hours as needed for mild pain       VITAMIN B6 PO      Take 100 mg by mouth 2 times daily Reported on 3/21/2017       VITAMIN C PO      Take 250 mg by mouth daily       vitamin D 2000 UNITS tablet      Take 2,000 Units by mouth daily

## 2017-06-09 NOTE — TELEPHONE ENCOUNTER
"Called Pt after receiving a VM stating that she had a question regarding stiffness in her hands and feet.     Pt is 66 year old female on chemotherapy treatment with 5FU and irinotecan initiated on 5/23/17 for her GE cancer and switched from FOLFOX. Pt reports experiencing peripheral neuropathy of the hands and feet while on FOLFOX for which she was receiving acupuncture with good results and now reports similar symptoms but describes it as \"stiffness\" primarily affecting her hands that she notices when playing cards and beading. Pt wondering if this is related to the neuropathy and if she should resume acupuncture for this.     Informed Pt that although neither 5 FU or Irinoitecan are known to cause peripheral Neuropathy she could very well be experiencing residual effect from the Oxaliplatin that she recently discontinued and informed her that this could last for months if not longer before improving. Instructed pt to try the acupuncture again is it seemed to alleviate her symptoms before and to let us know if this does not help or if her symptoms seem to be getting worse and not better in the meantime.     Pt verbalized understanding and agreed with the plan.   "

## 2017-06-13 NOTE — TELEPHONE ENCOUNTER
Reason for Contact: Patient was contacted by phone due to reporting concerns about unintended weight loss or current weight on the Oncology Distress Screening tool.     Action: Spoke with patient for 15 minutes regarding weight loss. She has been trying to drink Ensure and Boost, but is not fond of the taste. In addition, she is having a hard time tolerating meat. She is inquiring about how much protein should be getting. Spoke about foods that are high in protein that are not meat. Discussed ways to add these things into eating patterns. She did see a RD in January and is hoping to look over some of that information again.     Plan:   Patient to reach out in the future if she'd like a 1-1 appointment or has further questions/concerns.     Nini Burkett RDN, LD  Clinical Dietitian  [phone]

## 2017-06-20 PROBLEM — D70.1 CHEMOTHERAPY-INDUCED NEUTROPENIA (H): Status: ACTIVE | Noted: 2017-01-01

## 2017-06-20 PROBLEM — T45.1X5A CHEMOTHERAPY-INDUCED NEUTROPENIA (H): Status: ACTIVE | Noted: 2017-01-01

## 2017-06-20 NOTE — PROGRESS NOTES
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  Eventually had upper endoscopy workup in early 11/2016, identified large fungating submucosal mass within the lower third of the esophagus in the gastric GE junction and in the cardia.  This mass was partially obstructing and circumferential.      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 Her 2 is negative by FISH and IHC.    Further work up with CT, EUS, PET  found to have extensive lower esophogeal/gastric cancer with near obstruction lower esophageal /GE junction tumor, cT3N+ (aorta caval LN + on biopsy), with ascites.     She was seen by Dr. Levy (12/14/2016) and deemed not a surgical candidate. She saw Rad-Onc and felt due to he local symptoms, RT is beneficial. Concurrent RT/wkly carbo/taxol is offered from end of Dec 2016 to Feb 2017. She then went on to have FOLFOX since early March 2017.  Restaging PET in April 2017  indicated significant response. tx is continued.     She has elevation of bili in early May, found to have biliary stricture with pancreatic head mass biopsy proven mets from upper GI tract.   She had ERCP with biliary stent early May and systemic chemo is changed to FOLFIRI. C1D1 5/23/2107.     5/2017 MRCP was done due to elevated bili, found  PAST MEDICAL HISTORY:     1.  Reflux disease.     2.  History of cholecystitis.     3.  Rosacea.    4.  Degenerative disk disease.    5.  Obesity.   6.  Dupuytren's disease.      MEDICATIONS:  Reviewed in Epic system.      SOCIAL HISTORY:  She is retired.  She lives in [place].  She is a very active 66-year-old female.  Denies  "smoking.  She drinks 1-2 glasses of white wine every day.      FAMILY HISTORY:  Positive for dad  from lung cancer who smoked 4 packs per day.  Maternal grandmother  from breast cancer.  No family history of GI cancer.       REVIEW OF SYSTEMS:   She feels nausea for 1 wk post C1 FOLFIRI    PHYSICAL EXAMINATION:   GENERAL APPEARANCE:  A middle-aged woman, looks like her stated age, very pleasant, not in acute distress.     VITAL SIGNS: Blood pressure 95/61, pulse 79, temperature 97.8  F (36.6  C), temperature source Tympanic, resp. rate 16, height 1.6 m (5' 2.99\"), weight 69.7 kg (153 lb 11.2 oz), SpO2 100 %, not currently breastfeeding.  HEENT: The patient is normocephalic, atraumatic. Pupils are equally react to light.  Sclerae are anicteric.  Moist oral mucosa.  Negative pharynx.  No oral thrush.   NECK:  Supple.  No jugular venous distention.  Thyroid is not palpable.   LYMPH NODES:  Superficial lymphadenopathy is not appreciable in the bilateral cervical, supraclavicular, axillary or inguinal areas.   CARDIOVASCULAR:  S1, S2 regular with no murmurs or gallops.  No carotid or abdominal bruits.   PULMONARY:  Lungs are clear to auscultation and percussion bilaterally.  There is no wheezing or rhonchi.   GASTROINTESTINAL:  Abdomen is soft, nontender.  No hepatosplenomegaly.  No signs of ascites.  No mass appreciable.   MUSCULOSKELETAL/EXTREMITIES:  No edema.  No cyanotic changes.  No signs of joint deformity.  No lymphedema.   NEUROLOGIC:  Cranial nerves II-XII are grossly intact.  Sensation intact.  Muscle strength and muscle tone symmetrical 5/5 throughout.   BACK:  No spinal or paraspinal tenderness.  No CVA tenderness.   SKIN:  No petechiae.  No rash.  No signs of cellulitis.      CURRENT LABORATORY DATA:   Cbc diff is fine. Bili down to 1.1 from peak 6.9    2017 EUS pancreatic head biopsy: Metastatic adenocarcinoma, in favor of upper gastrointestinal origin.     Current Image  2017 ERCP " found bilary stricture which was dilated and metal stent was placed.     5 /2017 MRCP  1. Obstructing mass in the head of the pancreas measuring 1.0 cm.  Pancreatic, biliary or ampullary neoplasm is suspected. Due to the arterial enhancement, a pancreatic islet cell tumor is possible.    4/2017 PET  1. A tiny left superior mediastinal node measures less than 1 cm and demonstrates an SUV max 2.7 which may be reactive in nature.  2. residual FDG activity in the region of the GE junction mass when compared to prior study. Currently this measures an SUV max 4.7, previously 16.2, indicating a significant interval improvement.  3. Residual FDG activity is noted involving the gastric cardia when compared to prior exam but has significantly improved as described above.     OLD DATA REVIEW REVIEWED WITH SUMMARY:   Baseline cbc diff/CMP are nl, CEA 8.7 in 11/2016    Pathology 12/5/2016 Lymph node, aortacaval, endoscopic ultrasound-guided fine needle aspiration with shark core needle:   - Positive for malignancy.   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 Her2- by FISH/IHC.      EUS 12/5/2016, scope can't pass through GE junction-  A large, fungating and submucosal mass was found at the lower third of the esophagus at 34 cm from the incisors, which was nearly two-thirds circumferential  and is partially obstructing the esophagus and was seen extending to the gastric cardia.  Ascites was found.  4 abnormal lymph nodes were visualized in the lower paraesophageal mediastinum (level 8L). This will likely represent N2 on the evans staging.    PET 11/30/2016  1. Hypermetabolic uptake SUV 16.6 in the proximal to mid stomach compatible with the patient's recently diagnosed gastric cancer.No Esophageal activity.   2. There is an elongated but slender gastrohepatic node  just medial to the proximal stomach. Less than 1 cm in transverse dimension. Suspected to be hyper metabolic but SUV difficult to measure  separately from adjacent stomach.  3. Hypermetabolic retroperitoneal node, aorto caval location just posterior to transverse duodenum with SUV max 4.2 compatible with metastasis.  4. Hypermetabolic hepatic flexure and distal ascending colon without CT abnormality is suspected to be physiologic.  5. Nonenlarged subcentimeter hypermetabolic node in the right side of the neck at the level of the tongue is nonspecific.    CT chest, abdomen and pelvis 11/05/2016 - esophageal GE junction within normal limits.  Mild fatty infiltrate of the liver, pancreatic atrophy.         ASSESSMENT AND PLAN:    1. 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  She is found to have extensive lower esophogeal/gastric cancer with near obstruction lower esophageal /GE junction tumor, cT3N+ (aorta caval LN + on biopsy), with ascites. Her 2 - by FISH and IHc.     She was seen by Dr. Levy and deemed not a surgical candidate.     She saw Rad-Onc, concurrent RT/wkly carbo/taxol was offered till early Feb.  She then went on to have palliative FOLFOX.   She had VGPR by PET in 4/2017. Then She had elevation of bili in early May, found to have biliary stricture with pancreatic head mass biopsy proven mets from upper GI tract.   She had ERCP with biliary stent early May and systemic chemo is changed to FOLFIRI. Due to cytopenia from FOLFOX - omit bolus 5 FU and dose reduce 5 FU IVCI to 2 g/m2.      I told them she will not be a surgical case at this point. tx is palliative in nature.       2. Nausea with dysphagia.   Advice ODT zofran, Ativan, and to compazine. Added IV emend. Also advice to try acupuncture.     3. Cytopenia from FOLFOX - omit bolus 5 FU and dose reduce 5 FU IVCI to 2 g/m2., and further to 1.8 g/m2.   Neutropenic precaution  is advised. Will give her 3 days of neupogen.

## 2017-06-20 NOTE — NURSING NOTE
"Oncology Rooming Note    June 20, 2017 8:34 AM   Bren Rendon is a 66 year old female who presents for:    Chief Complaint   Patient presents with     Oncology Clinic Visit     2 week recheck Esophageal CA, Labs & Chemo today     Initial Vitals: BP 95/61 (BP Location: Right arm, Patient Position: Sitting, Cuff Size: Adult Regular)  Pulse 79  Temp 97.8  F (36.6  C) (Tympanic)  Resp 16  Ht 1.6 m (5' 2.99\")  Wt 69.7 kg (153 lb 11.2 oz)  SpO2 100%  Breastfeeding? No  BMI 27.23 kg/m2 Estimated body mass index is 27.23 kg/(m^2) as calculated from the following:    Height as of this encounter: 1.6 m (5' 2.99\").    Weight as of this encounter: 69.7 kg (153 lb 11.2 oz). Body surface area is 1.76 meters squared.  No Pain (0) Comment: Data Unavailable   No LMP recorded. Patient is postmenopausal.  Allergies reviewed: Yes  Medications reviewed: Yes    Medications: Medication refills not needed today.  Pharmacy name entered into [employer]:      [facility] - [place], [place] - [address] [facility]    Clinical concerns:  2 week recheck Esophageal CA, Labs & Chemo today.     7  minutes for nursing intake (face to face time)     Vanesa Hickey CMA              "

## 2017-06-20 NOTE — PATIENT INSTRUCTIONS
We would like to see you back in 2 weeks with chemotherapy.   When you are in need of a refill, please call your pharmacy and they will send us a request.  Copy of appointments, and after visit summary (AVS) given to patient.  If you have any questions during business hours (M-F 8 AM- 4PM), please call Jinny Van RN, BSN, OCN Oncology Hematology /Breast Cancer Navigator at [facility] [phone].   For questions after business hours, or on holidays/weekends, please call our after hours Nurse Triage line [phone]. Thank you.  .        Diff pending for chemo.   F/u in 2 wks with chemo.

## 2017-06-20 NOTE — MR AVS SNAPSHOT
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   When you are in need of a refill, please call your pharmacy and they will send us a request.  Copy of appointments, and after visit summary (AVS) given to patient.  If you have any questions during business hours (M-F 8 AM- 4PM), please call Jinny Van RN, BSN, OCN Oncology Hematology /Breast Cancer Navigator at [facility] [phone].   For questions after business hours, or on holidays/weekends, please call our after hours Nurse Triage line [phone]. Thank you.  .        Diff pending for chemo.   F/u in 2 wks with chemo.           Follow-ups after your visit        Your next 10 appointments already scheduled     Jun 21, 2017 10:30 AM CDT   Level O with ROOM 5 [facility] ([facility])    [facility]  [address]  [facility] [address]   [phone]            Jun 22, 2017 11:30 AM CDT   Level O with ROOM 5 [facility] ([facility])    [facility]  [address]  [facility] [address]   [phone]            Jun 27, 2017  9:00 AM CDT   Level 4 with ROOM 10 [facility] ([facility])    [facility]  [address]  [facility] [address]   [phone]            Jun 29, 2017 12:00 PM CDT   Level O with ROOM 5 [facility] ([facility])    [facility]  [address]  [facility] [address]   [phone]            Jul 11, 2017  9:00 AM CDT   Level 4 with ROOM 5 [facility] ([facility])    [facility]  [address]  [facility] [address]   [phone]            Jul 11, 2017  9:30 AM CDT   Return Visit with Aline El MD   [facility] ([facility])    [facility]  [address]  [facility] [address]   [phone]            Jul 13, 2017 12:00 PM CDT   Level O with ROOM 7 [facility] ([facility])    [facility]  [address]  [facility] [address]   [phone]              Who to contact     If you have questions or need follow up information about today's clinic visit or your schedule please contact [facility] directly at [phone].  Normal or non-critical lab and imaging results will be communicated to you by MyChart, letter or phone within 4 business days after the clinic has received the results. If you do not hear from us within 7 days, please contact the clinic through Tinteohart or phone. If you have a critical or abnormal lab result, we will notify you by phone as soon as possible.  Submit refill requests through BriteHub or call your pharmacy and they will forward the refill request to us. Please allow 3 business days for your refill to be completed.          Additional Information About Your Visit        Tinteohart Information     BriteHub gives you secure access to your electronic health record. If you see a primary care provider, you can also send messages to your care team and make appointments. If you have questions, please call your primary care clinic.  If you do not have a primary care provider, please call [phone] and they will assist you.        Care EveryWhere ID     This is your Care EveryWhere ID. This could be used by other organizations to access your  "[place] medical records  [id number]        Your Vitals Were     Pulse Temperature Respirations Height Pulse Oximetry Breastfeeding?    79 97.8  F (36.6  C) (Tympanic) 16 1.6 m (5' 2.99\") 100% No    BMI (Body Mass Index)                   27.23 kg/m2            Blood Pressure from Last 3 Encounters:   06/20/17 95/61   06/08/17 119/72   06/06/17 101/61    Weight from Last 3 Encounters:   06/20/17 69.7 kg (153 lb 11.2 oz)   06/06/17 72.5 kg (159 lb 12.8 oz)   06/06/17 72.4 kg (159 lb 9.6 oz)              Today, you had the following     No orders found for display       Primary Care Provider Office Phone # Fax #    Dillan Amos [phone]       [facility] [address] [facility] [address]        Thank you!     Thank you for choosing [facility]  for your care. Our goal is always to provide you with excellent care. Hearing back from our patients is one way we can continue to improve our services. Please take a few minutes to complete the written survey that you may receive in the mail after your visit with us. Thank you!             Your Updated Medication List - Protect others around you: Learn how to safely use, store and throw away your medicines at www.disposemymeds.org.          This list is accurate as of: 6/20/17 11:18 AM.  Always use your most recent med list.                   Brand Name Dispense Instructions for use    ALLEGRA 60 MG tablet   Generic drug:  fexofenadine      Take 60 mg by mouth daily       alum & mag hydroxide-simethicone 200-200-20 MG/5ML Susp suspension    MYLANTA/MAALOX     Take 30 mLs by mouth every 4 hours as needed for indigestion       lidocaine-prilocaine cream    EMLA    30 g    Apply 30 g topically as needed for moderate pain Apply to port site 1 hour before access.       loperamide 2 MG capsule    IMODIUM    30 capsule    Start with 2 caps (4 mg), then take one cap (2 mg) after each diarrheal stool as needed. Do not use more than " 8 caps (16 mg) per day.       LORazepam 0.5 MG tablet    ATIVAN    30 tablet    Take 1 tablet (0.5 mg) by mouth every 4 hours as needed (Anxiety, Nausea/Vomiting or Sleep)       Multi-vitamin Tabs tablet      Take 1 tablet by mouth daily       NASACORT AQ 55 MCG/ACT Inhaler   Generic drug:  triamcinolone      Spray 2 sprays into both nostrils daily       omega 3 1000 MG Caps      Take 1 g by mouth daily       omeprazole 40 MG capsule    priLOSEC    90 capsule    Take 1 capsule (40 mg) by mouth daily Take 30-60 minutes before a meal.       ondansetron 8 MG ODT tab    ZOFRAN ODT    60 tablet    Take 1 tablet (8 mg) by mouth every 8 hours as needed for nausea       prochlorperazine 10 MG tablet    COMPAZINE    30 tablet    Take 0.5 tablets (5 mg) by mouth every 6 hours as needed for nausea or vomiting       THERATEARS OP      Apply 2 drops to eye daily       TYLENOL 325 MG tablet   Generic drug:  acetaminophen      Take 325 mg by mouth every 6 hours as needed for mild pain       UNABLE TO FIND      Take 1 Bottle by mouth daily MUSCLE MILK       VITAMIN B6 PO      Take 100 mg by mouth 2 times daily Reported on 3/21/2017       VITAMIN C PO      Take 250 mg by mouth daily       vitamin D 2000 UNITS tablet      Take 2,000 Units by mouth daily

## 2017-06-20 NOTE — PROGRESS NOTES
Infusion Nursing Note:  Bren Rendon presents today for C8D1 FOLFIRI (which was held).  1L of IVF's were given.   Patient seen by provider today: Yes: Dr. El   present during visit today: Not Applicable.    Note: ANC = 0.9 today. Dr. El ordered for chemo to be held and pt to receive Neupogen 300 mcg SQ for 3 days. Rescheduled for chemo next Tuesday.    Intravenous Access:  Implanted Port.    Treatment Conditions:  Lab Results   Component Value Date    HGB 8.6 06/20/2017     Lab Results   Component Value Date    WBC 1.9 06/20/2017      Lab Results   Component Value Date    ANEU 0.9 06/20/2017     Lab Results   Component Value Date     06/20/2017      Results reviewed, labs did NOT meet treatment parameters.      Post Infusion Assessment:  Patient tolerated infusion without incident.  Patient tolerated injection without incident.  Blood return noted pre and post infusion.  Site patent and intact, free from redness, edema or discomfort.  No evidence of extravasations.  Access discontinued per protocol.    Discharge Plan:   Patient discharged in stable condition accompanied by: .  Departure Mode: Ambulatory.    Nadja Krause RN

## 2017-06-20 NOTE — MR AVS SNAPSHOT
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)        Chemotherapy-induced neutropenia (H)        Dehydration           Follow-ups after your visit        Your next 10 appointments already scheduled     Jun 21, 2017 10:30 AM CDT   Level O with ROOM 5 [facility] ([facility])    [facility]  [address]  [facility] [address]   [phone]            Jun 22, 2017 11:30 AM CDT   Level O with ROOM 5 [facility] ([facility])    [facility]  [address]  [facility] [address]   [phone]            Jun 27, 2017  9:00 AM CDT   Level 4 with ROOM 10 [facility] ([facility])    [facility]  [address]  [facility] [address]   [phone]            Jun 29, 2017 12:00 PM CDT   Level O with ROOM 5 [facility] ([facility])    [facility]  [address]  [facility] [address]   [phone]            Jul 11, 2017  9:00 AM CDT   Level 4 with ROOM 5 [facility] ([facility])    [facility]  [address]  [facility] [address]   [phone]            Jul 11, 2017  9:30 AM CDT   Return Visit with Aline El MD   [facility] ([facility])    [facility]  [address]  [facility] [address]   [phone]            Jul 13, 2017 12:00 PM CDT   Level O with ROOM 7 [facility] ([facility])    [facility] [place]  [place]  [address] [facility] [address]  [facility] [address] [phone]              Who to contact     If you have questions or need follow up information about today's clinic visit or your schedule please contact [facility] directly at [phone].  Normal or non-critical lab and imaging results will be communicated to you by MyChart, letter or phone within 4 business days after the clinic has received the results. If you do not hear from us within 7 days, please contact the clinic through Easy Vinohart or phone. If you have a critical or abnormal lab result, we will notify you by phone as soon as possible.  Submit refill requests through "[employer] or call your pharmacy and they will forward the refill request to us. Please allow 3 business days for your refill to be completed.          Additional Information About Your Visit        MyChart Information     "[employer] gives you secure access to your electronic health record. If you see a primary care provider, you can also send messages to your care team and make appointments. If you have questions, please call your primary care clinic.  If you do not have a primary care provider, please call [phone] and they will assist you.        Care EveryWhere ID     This is your Care EveryWhere ID. This could be used by other organizations to access your [place] medical records  [id number]        Your Vitals Were     Pulse                   84            Blood Pressure from Last 3 Encounters:   06/20/17 95/61   06/20/17 130/68   06/08/17 119/72    Weight from Last 3 Encounters:   06/20/17 69.7 kg (153 lb 11.2 oz)   06/06/17 72.5 kg (159 lb 12.8 oz)   06/06/17 72.4 kg (159 lb 9.6 oz)              We Performed the Following     Bilirubin  total     CBC with platelets differential        Primary Care Provider Office Phone # Fax #    Dillan Amos [phone]       [facility] [address] [facility] [address]        Thank you!      Thank you for choosing [facility]  for your care. Our goal is always to provide you with excellent care. Hearing back from our patients is one way we can continue to improve our services. Please take a few minutes to complete the written survey that you may receive in the mail after your visit with us. Thank you!             Your Updated Medication List - Protect others around you: Learn how to safely use, store and throw away your medicines at www.disposemymeds.org.          This list is accurate as of: 6/20/17  2:58 PM.  Always use your most recent med list.                   Brand Name Dispense Instructions for use    ALLEGRA 60 MG tablet   Generic drug:  fexofenadine      Take 60 mg by mouth daily       alum & mag hydroxide-simethicone 200-200-20 MG/5ML Susp suspension    MYLANTA/MAALOX     Take 30 mLs by mouth every 4 hours as needed for indigestion       lidocaine-prilocaine cream    EMLA    30 g    Apply 30 g topically as needed for moderate pain Apply to port site 1 hour before access.       loperamide 2 MG capsule    IMODIUM    30 capsule    Start with 2 caps (4 mg), then take one cap (2 mg) after each diarrheal stool as needed. Do not use more than 8 caps (16 mg) per day.       LORazepam 0.5 MG tablet    ATIVAN    30 tablet    Take 1 tablet (0.5 mg) by mouth every 4 hours as needed (Anxiety, Nausea/Vomiting or Sleep)       Multi-vitamin Tabs tablet      Take 1 tablet by mouth daily       NASACORT AQ 55 MCG/ACT Inhaler   Generic drug:  triamcinolone      Spray 2 sprays into both nostrils daily       omega 3 1000 MG Caps      Take 1 g by mouth daily       omeprazole 40 MG capsule    priLOSEC    90 capsule    Take 1 capsule (40 mg) by mouth daily Take 30-60 minutes before a meal.       ondansetron 8 MG ODT tab    ZOFRAN ODT    60 tablet    Take 1 tablet (8 mg) by mouth every 8 hours as needed for nausea       prochlorperazine 10 MG tablet    COMPAZINE    30 tablet    Take 0.5 tablets (5 mg) by  mouth every 6 hours as needed for nausea or vomiting       THERATEARS OP      Apply 2 drops to eye daily       TYLENOL 325 MG tablet   Generic drug:  acetaminophen      Take 325 mg by mouth every 6 hours as needed for mild pain       UNABLE TO FIND      Take 1 Bottle by mouth daily MUSCLE MILK       VITAMIN B6 PO      Take 100 mg by mouth 2 times daily Reported on 3/21/2017       VITAMIN C PO      Take 250 mg by mouth daily       vitamin D 2000 UNITS tablet      Take 2,000 Units by mouth daily

## 2017-06-21 PROBLEM — I95.1 ORTHOSTATIC HYPOTENSION: Status: ACTIVE | Noted: 2017-01-01

## 2017-06-21 NOTE — MR AVS SNAPSHOT
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)           Follow-ups after your visit        Your next 10 appointments already scheduled     Jun 22, 2017 11:30 AM CDT   Level O with ROOM 5 [facility] ([facility])    [facility]  [address]  [facility] [address]   [phone]            Jun 27, 2017  9:00 AM CDT   Level 4 with ROOM 10 [facility] ([facility])    [facility]  [address]  [facility] [address]   [phone]            Jun 29, 2017 12:00 PM CDT   Level O with ROOM 5 [facility] ([facility])    [facility]  [address]  [facility] [address]   [phone]            Jul 11, 2017  9:00 AM CDT   Level 4 with ROOM 5 [facility] ([facility])    [facility]  [address]  [facility] [address]   [phone]            Jul 11, 2017  9:30 AM CDT   Return Visit with Aline El MD   [facility] ([facility])    [facility]  [address]  [facility] [address]   [phone]            Jul 13, 2017 12:00 PM CDT   Level O with ROOM 7 [facility] ([facility])    [facility]  [address]  [facility] [address]   [phone]              Who to contact     If you have questions or need follow up information about today's clinic visit or your  schedule please contact [facility] directly at [phone].  Normal or non-critical lab and imaging results will be communicated to you by MyChart, letter or phone within 4 business days after the clinic has received the results. If you do not hear from us within 7 days, please contact the clinic through Yodo1hart or phone. If you have a critical or abnormal lab result, we will notify you by phone as soon as possible.  Submit refill requests through Updater or call your pharmacy and they will forward the refill request to us. Please allow 3 business days for your refill to be completed.          Additional Information About Your Visit        Yodo[employer] Information     [employer] gives you secure access to your electronic health record. If you see a primary care provider, you can also send messages to your care team and make appointments. If you have questions, please call your primary care clinic.  If you do not have a primary care provider, please call [phone] and they will assist you.        Care EveryWhere ID     This is your Care EveryWhere ID. This could be used by other organizations to access your [place] medical records  [id number]        Your Vitals Were     Pulse Temperature                91 98.9  F (37.2  C) (Oral)           Blood Pressure from Last 3 Encounters:   06/21/17 112/59   06/20/17 95/61   06/20/17 130/68    Weight from Last 3 Encounters:   06/20/17 69.7 kg (153 lb 11.2 oz)   06/06/17 72.5 kg (159 lb 12.8 oz)   06/06/17 72.4 kg (159 lb 9.6 oz)              We Performed the Following     Basic metabolic panel     Lactic acid whole blood        Primary Care Provider Office Phone # Fax #    Dillan Amos [phone]       [facility] [address] [facility] [address]        Equal Access to Services     DUNCAN VITALE : Ronald Wilson, chayo mcpherson, mamadou kelley. So [facility]  [phone].    ATENCIÓN: Si carine blevins, tiene a lucero disposición servicios gratuitos de asistencia lingüística. Peter gomes [phone].    We comply with applicable federal civil rights laws and [place] laws. We do not discriminate on the basis of race, color, national origin, age, disability sex, sexual orientation or gender identity.            Thank you!     Thank you for choosing [facility]  for your care. Our goal is always to provide you with excellent care. Hearing back from our patients is one way we can continue to improve our services. Please take a few minutes to complete the written survey that you may receive in the mail after your visit with us. Thank you!             Your Updated Medication List - Protect others around you: Learn how to safely use, store and throw away your medicines at www.disposemymeds.org.          This list is accurate as of: 6/21/17  2:39 PM.  Always use your most recent med list.                   Brand Name Dispense Instructions for use Diagnosis    ALLEGRA 60 MG tablet   Generic drug:  fexofenadine      Take 60 mg by mouth daily        alum & mag hydroxide-simethicone 200-200-20 MG/5ML Susp suspension    MYLANTA/MAALOX     Take 30 mLs by mouth every 4 hours as needed for indigestion        lidocaine-prilocaine cream    EMLA    30 g    Apply 30 g topically as needed for moderate pain Apply to port site 1 hour before access.    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 Do not use more than 8 caps (16 mg) per day.    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

## 2017-06-21 NOTE — PROGRESS NOTES
Infusion Nursing Note:  Bren Rendon presents today for Neupogen, IVF's.    Patient seen by provider today: No   present during visit today: Not Applicable.    Note: pt was found to have orthostatic hypotension. spoke on the phone with Dr. El regarding this and she agreed that pt should receive 1L of IVF's. Additional labs were also ordered and drawn.    Intravenous Access:  Implanted Port.    Treatment Conditions:  See note.      Post Infusion Assessment:  Patient tolerated infusion without incident.  Patient tolerated injection without incident.  Blood return noted pre and post infusion.  Site patent and intact, free from redness, edema or discomfort.  No evidence of extravasations.  Access discontinued per protocol.    Discharge Plan:   Patient discharged in stable condition accompanied by: .  Departure Mode: Ambulatory.  Pt returns tomorrow for next dose.    Nadja Krause RN

## 2017-06-22 NOTE — MR AVS SNAPSHOT
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)           Follow-ups after your visit        Your next 10 appointments already scheduled     Jun 27, 2017  9:00 AM CDT   Level 4 with ROOM 10 [facility] Cancer Infusion ([facility])    [facility]  [address]  [facility] [place] [address]   [phone]            Jun 29, 2017 12:00 PM CDT   Level O with ROOM 5 [facility] Infusion ([facility])    [facility]  [address]  [facility] [place] [address]   [phone]            Jul 11, 2017  9:00 AM CDT   Level 4 with ROOM 5 [facility]fusion ([facility])    [facility]  [address]  [facility] [place] [address]   [phone]            Jul 11, 2017  9:30 AM CDT   Return Visit with Aline El MD   [facility] ([facility])    [facility]  [address]  [facility] [place] [address]   [phone]            Jul 13, 2017 12:00 PM CDT   Level O with ROOM 7 [facility]fusion ([facility])    [facility]  [address]  [facility] [place] [address]   [phone]              Who to contact     If you have questions or need follow up information about today's clinic visit or your schedule please contact [facility] INFUSION directly at [phone].  Normal or non-critical lab and imaging results will be communicated to you by MyChart, letter or phone within 4 business days after the clinic has  received the results. If you do not hear from us within 7 days, please contact the clinic through Mogi or phone. If you have a critical or abnormal lab result, we will notify you by phone as soon as possible.  Submit refill requests through Mogi or call your pharmacy and they will forward the refill request to us. Please allow 3 business days for your refill to be completed.          Additional Information About Your Visit        [employer] Navigation Information     Mogi gives you secure access to your electronic health record. If you see a primary care provider, you can also send messages to your care team and make appointments. If you have questions, please call your primary care clinic.  If you do not have a primary care provider, please call [phone] and they will assist you.        Care EveryWhere ID     This is your Care EveryWhere ID. This could be used by other organizations to access your [place] medical records  [id number]        Your Vitals Were     Pulse BMI (Body Mass Index)                88 28.07 kg/m2           Blood Pressure from Last 3 Encounters:   06/22/17 105/50   06/21/17 112/59   06/20/17 95/61    Weight from Last 3 Encounters:   06/22/17 71.8 kg (158 lb 6.4 oz)   06/20/17 69.7 kg (153 lb 11.2 oz)   06/06/17 72.5 kg (159 lb 12.8 oz)              Today, you had the following     No orders found for display       Primary Care Provider Office Phone # Fax #    Dillan Amos [phone]       [facility] [address] [address]        Equal Access to Services     DUNCAN VITALE : Hadii koko negroo Sochrisali, waaxda luqadaha, qaybta kaalmada adeegyafabiana, mamadou babb. So [facility] [phone].    ATENCIÓN: Si habla español, tiene a lucero disposición servicios gratuitos de asistencia lingüística. Llame al [phone].    We comply with applicable federal civil rights laws and [place] laws. We do not discriminate on the basis  of race, color, national origin, age, disability sex, sexual orientation or gender identity.            Thank you!     Thank you for choosing [facility]  for your care. Our goal is always to provide you with excellent care. Hearing back from our patients is one way we can continue to improve our services. Please take a few minutes to complete the written survey that you may receive in the mail after your visit with us. Thank you!             Your Updated Medication List - Protect others around you: Learn how to safely use, store and throw away your medicines at www.disposemymeds.org.          This list is accurate as of: 6/22/17  4:10 PM.  Always use your most recent med list.                   Brand Name Dispense Instructions for use Diagnosis    ALLEGRA 60 MG tablet   Generic drug:  fexofenadine      Take 60 mg by mouth daily        alum & mag hydroxide-simethicone 200-200-20 MG/5ML Susp suspension    MYLANTA/MAALOX     Take 30 mLs by mouth every 4 hours as needed for indigestion        lidocaine-prilocaine cream    EMLA    30 g    Apply 30 g topically as needed for moderate pain Apply to port site 1 hour before access.    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 Do not use more than 8 caps (16 mg) per day.    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

## 2017-06-22 NOTE — PROGRESS NOTES
Infusion Nursing Note:  Bren Rendon presents today for Neupogen, IVF's.    Patient seen by provider today: No   present during visit today: Not Applicable.    Note: pt was orthostatic again today. 1L of NS given.    Intravenous Access:  Implanted Port.    Treatment Conditions:  Not Applicable.      Post Infusion Assessment:  Patient tolerated infusion without incident.  Patient tolerated injection without incident.  Blood return noted pre and post infusion.  Site patent and intact, free from redness, edema or discomfort.  No evidence of extravasations.  Access discontinued per protocol.    Discharge Plan:   Patient discharged in stable condition accompanied by: .  Departure Mode: Ambulatory.    Nadja Krause RN

## 2017-06-27 NOTE — PROGRESS NOTES
"Infusion Nursing Note:  Bren Rendon presents today for FOLFIRI.    Patient seen by provider today: No   present during visit today: Not Applicable.    Note: N/A.    Intravenous Access:  Labs drawn without difficulty.  Implanted Port.    Treatment Conditions:  Lab Results   Component Value Date    HGB 8.7 06/27/2017     Lab Results   Component Value Date    WBC 7.1 06/27/2017      Lab Results   Component Value Date    ANEU 4.9 06/27/2017     Lab Results   Component Value Date     06/27/2017      Results reviewed, labs MET treatment parameters, ok to proceed with treatment.    Post Infusion Assessment:  Patient tolerated infusion without incident.  Prior to discharge: Port is secured in place with tegaderm and flushed with 10cc NS with positive blood return noted.  Continuous home infusion CADD pump.  All connectors secured in place and clamps taped open.    Pump started, \"running\" noted on display (CADD): YES.  Patient instructed to call our clinic or [facility] with any questions or concerns at home.  Patient verbalized understanding.    Patient set up for pump disconnect at our clinic on Thurs 6/28 at 1100.        Discharge Plan:   Patient discharged in stable condition accompanied by: friend.  Departure Mode: Ambulatory.  Pt is scheduled for next chemo an MD follow up on 7/11.      Samantha Omalley RN                        "

## 2017-06-29 NOTE — PROGRESS NOTES
Infusion Nursing Note:  Bren Rendon presents today for 5FU pump d/c.    Patient seen by provider today: No   present during visit today: Not Applicable.    Note: N/A.    Intravenous Access:  Implanted Port.    Treatment Conditions:  Not Applicable.      Post Infusion Assessment:  Blood return noted.  Site patent and intact, free from redness, edema or discomfort.  No evidence of extravasations.  Access discontinued per protocol.  Pt denied any dizziness and is able to eat and drink, no diarrhea.    Discharge Plan:   Patient discharged in stable condition accompanied by: friend.  Departure Mode: Ambulatory.    Nadja Krause RN

## 2017-06-29 NOTE — MR AVS SNAPSHOT
After Visit Summary   6/29/2017    Bren Rendon    MRN: [id number]           Patient Information     Date Of Birth          1950        Visit Information        Provider Department      6/29/2017 12:00 PM ROOM 5 [facility] Cancer Infusion        Today's Diagnoses     GE junction carcinoma (H)    -  1       Follow-ups after your visit        Your next 10 appointments already scheduled     Jul 11, 2017  9:00 AM CDT   Level 4 with ROOM 5 [facility] Infusion ([facility])    [facility]  [address]  [facility] [address]   [phone]            Jul 11, 2017  9:30 AM CDT   Return Visit with Aline El MD   [facility] ([facility])    [facility]  [address]  [facility] [address]   [phone]            Jul 13, 2017 12:00 PM CDT   Level O with ROOM 7 [facility] Infusion ([facility])    [facility]  [address]  [facility] [address]   [phone]              Who to contact     If you have questions or need follow up information about today's clinic visit or your schedule please contact [facility] directly at [phone].  Normal or non-critical lab and imaging results will be communicated to you by [employer], letter or phone within 4 business days after the clinic has received the results. If you do not hear from us within 7 days, please contact the clinic through [employer] or phone. If you have a critical or abnormal lab result, we will notify you by phone as soon as possible.  Submit refill requests through [employer] or call your pharmacy and they will forward the refill request to us. Please allow 3 business days for your refill to be completed.          Additional Information About Your Visit        [employer]on[employer] Information     [employer] gives you secure access to your electronic health record. If you see a primary care  provider, you can also send messages to your care team and make appointments. If you have questions, please call your primary care clinic.  If you do not have a primary care provider, please call [phone] and they will assist you.        Care EveryWhere ID     This is your Care EveryWhere ID. This could be used by other organizations to access your [place] medical records  [id number]        Your Vitals Were     Pulse Temperature                92 97.5  F (36.4  C) (Oral)           Blood Pressure from Last 3 Encounters:   06/29/17 121/74   06/27/17 122/70   06/22/17 105/50    Weight from Last 3 Encounters:   06/27/17 73 kg (161 lb)   06/22/17 71.8 kg (158 lb 6.4 oz)   06/20/17 69.7 kg (153 lb 11.2 oz)              Today, you had the following     No orders found for display       Primary Care Provider Office Phone # Fax #    Dillan Jackson Amos [phone]       [facility] [address] [address]        Equal Access to Services     DUNCAN VITALE : Hadii aad ku hadasho Soevangelina, waaxda luqadaha, qaybta kaalmada adeshawn, mamadou mesa . So [facility] [phone].    ATENCIÓN: Si habla español, tiene a lucero disposición servicios gratuitos de asistencia lingüística. Peter al [phone].    We comply with applicable federal civil rights laws and [place] laws. We do not discriminate on the basis of race, color, national origin, age, disability sex, sexual orientation or gender identity.            Thank you!     Thank you for choosing [facility]  for your care. Our goal is always to provide you with excellent care. Hearing back from our patients is one way we can continue to improve our services. Please take a few minutes to complete the written survey that you may receive in the mail after your visit with us. Thank you!             Your Updated Medication List - Protect others around you: Learn how to safely use, store and throw away  your medicines at www.disposemymeds.org.          This list is accurate as of: 6/29/17  4:24 PM.  Always use your most recent med list.                   Brand Name Dispense Instructions for use Diagnosis    ALLEGRA 60 MG tablet   Generic drug:  fexofenadine      Take 60 mg by mouth daily        alum & mag hydroxide-simethicone 200-200-20 MG/5ML Susp suspension    MYLANTA/MAALOX     Take 30 mLs by mouth every 4 hours as needed for indigestion        lidocaine-prilocaine cream    EMLA    30 g    Apply 30 g topically as needed for moderate pain Apply to port site 1 hour before access.    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 Do not use more than 8 caps (16 mg) per day.    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

## 2017-07-05 NOTE — TELEPHONE ENCOUNTER
"Pt called to report a dry cough that started after her last chemotherapy on 6/27 and increased over the weekend to include some phlegm in her throat and a \"ticking noise\" so pt started to take Robitussin and Dayquil. Pt called to make sure this is ok. Pt reports no other associated symptoms or SOB, fever or chills. Pt reports not actually having any sputum but just coughs to clear her throat. Reports she is now just taking Dayquil and it is better.     Told pt I will discuss with with Dr. El to see if any further testing is warranted and get back to her today.  "

## 2017-07-05 NOTE — TELEPHONE ENCOUNTER
Discussed with Dr. El, no further testing at this time. Called and updated pt Dr. El will plan on seeing her next week and if anything new arises before then to call me back. Pt verbalized understanding.

## 2017-07-11 PROBLEM — I89.0 LYMPHEDEMA: Status: ACTIVE | Noted: 2017-01-01

## 2017-07-11 NOTE — PATIENT INSTRUCTIONS
Dr. El would like you to refer you to a Lymphedema specialist and for you to continue with the chemotherapy as scheduled today. Plan for a PET scan next week. We would like to see you back for a follow up appointment with your next chemotherapy. Prescriptions were sent to    [address]  When you are in need of a refill, please call your pharmacy and they will send us a request.  Copy of appointments, and after visit summary (AVS) given to patient.  If you have any questions please call Lianne Dawson RN, BSN Oncology Hematology  [facility] [phone]. For questions after business hours, or on holidays/weekends, please call our after hours Nurse Triage line [phone]. Thank you.           Chemo today.   Lymphedema referral.   PET next wk, f/u with next chemo.

## 2017-07-11 NOTE — NURSING NOTE
"Oncology Rooming Note    July 11, 2017 9:44 AM   Bren Rendon is a 66 year old female who presents for:    Chief Complaint   Patient presents with     Oncology Clinic Visit     3 week recheck Esophageal CA, Labs & Chemo today     Initial Vitals: BP 96/64 (BP Location: Right arm, Patient Position: Sitting, Cuff Size: Adult Regular)  Pulse 111  Temp 98.7  F (37.1  C) (Tympanic)  Resp 18  Ht 1.6 m (5' 2.99\")  Wt 72.3 kg (159 lb 4.8 oz)  SpO2 99%  Breastfeeding? No  BMI 28.23 kg/m2 Estimated body mass index is 28.23 kg/(m^2) as calculated from the following:    Height as of this encounter: 1.6 m (5' 2.99\").    Weight as of this encounter: 72.3 kg (159 lb 4.8 oz). Body surface area is 1.79 meters squared.  No Pain (0) Comment: Data Unavailable   No LMP recorded. Patient is postmenopausal.  Allergies reviewed: Yes  Medications reviewed: Yes    Medications: MEDICATION REFILLS NEEDED TODAY. Provider was notified.  Pharmacy name entered into [facility]:      [facility] [address] [facility]    1. Clinical concerns: 3 week recheck Esophageal CA, Labs & Chemo today.     2. Please refill Emla Cream per patient.     3. Patient requesting information on her current treatment again.     4. Dry Cough since July 4th. Using Robitussin and Day Quil.     5 .Can I take Maalox to help settle stomach ?    6. Too much dairy makes me crampy and poops all day.     7. The thought of food at Dinner time makes me vomit. Thinks this may be a Psychological reaction.               16 minutes for nursing intake (face to face time)     Vanesa Hickey CMA              "

## 2017-07-11 NOTE — MR AVS SNAPSHOT
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 El would like you to refer you to a Lymphedema specialist and for you to continue with the chemotherapy as scheduled today. Plan for a PET scan next week. We would like to see you back for a follow up appointment with your next chemotherapy. Prescriptions were sent to    [address]  When you are in need of a refill, please call your pharmacy and they will send us a request.  Copy of appointments, and after visit summary (AVS) given to patient.  If you have any questions please call Lianne Dawson RN, BSN Oncology Hematology  [facility] [phone]. For questions after business hours, or on holidays/weekends, please call our after hours Nurse Triage line [phone]. Thank you.           Chemo today.   Lymphedema referral.   PET next wk, f/u with next chemo.           Follow-ups after your visit        Additional Services     LYMPHEDEMA THERAPY REFERRAL                 Your next 10 appointments already scheduled     Jul 13, 2017 12:00 PM CDT   Level O with ROOM 7 [facility] Infusion ([facility])    [facility]  [address]  [place] [address] [phone]            Jul 19, 2017  1:15 PM CDT   PE NPET ONCOLOGY (EYES TO THIGHS) with WYPETCT1   [facility] Pet CT ([facility])    [address]  [place] [id number] [phone]           Tell your doctor:   If  there is any chance you may be pregnant or if you are breastfeeding.   If you have problems lying in small spaces (claustrophobia). If you do, your doctor may give you medicine to help you relax. If you have diabetes:   Have your exam early in the morning. Your blood glucose will go up as the day goes by.   Your glucose level must be 180 or less at the start of the exam. Please take any medicines you need to ensure this blood glucose level. 24 hours before your scan: Don t do any heavy exercise. (No jogging, aerobics or other workouts.) Exercise will make your pictures less accurate. 6 hours before your scan:   Stop all food and liquids (except water).   Do not chew gum or suck on mints.   If you need to take medicine with food, you may take it with a few crackers.  Please call your Imaging Department at your exam site with any questions.            Jul 25, 2017  8:00 AM CDT   Level 4 with ROOM 10 [facility] Infusion ([facility])    [facility]  [address]  [facility] [address]   [phone]            Jul 25, 2017  8:45 AM CDT   Return Visit with Aline El MD   [facility] ([facility])    [facility]  [address]  [facility] [address]   [phone]            Jul 27, 2017 12:00 PM CDT   Level O with ROOM 8 [facility] Infusion ([facility])    [facility]  [address]  [facility] [address]   [phone]              Future tests that were ordered for you today     Open Future Orders        Priority Expected Expires Ordered    PET Oncology (Eyes to Thighs) Routine 7/17/2017 7/10/2018 7/11/2017            Who to contact     If you have questions or need follow up information about today's clinic visit or your schedule please contact [facility] directly at [phone].  Normal or non-critical lab and imaging results will be  "communicated to you by [employer], letter or phone within 4 business days after the clinic has received the results. If you do not hear from us within 7 days, please contact the clinic through 4vets or phone. If you have a critical or abnormal lab result, we will notify you by phone as soon as possible.  Submit refill requests through 4vets or call your pharmacy and they will forward the refill request to us. Please allow 3 business days for your refill to be completed.          Additional Information About Your Visit        4vets Information     4vets gives you secure access to your electronic health record. If you see a primary care provider, you can also send messages to your care team and make appointments. If you have questions, please call your primary care clinic.  If you do not have a primary care provider, please call [phone] and they will assist you.        Care EveryWhere ID     This is your Care EveryWhere ID. This could be used by other organizations to access your [place] medical records  [id number]        Your Vitals Were     Pulse Temperature Respirations Height Pulse Oximetry Breastfeeding?    111 98.7  F (37.1  C) (Tympanic) 18 1.6 m (5' 2.99\") 99% No    BMI (Body Mass Index)                   28.23 kg/m2            Blood Pressure from Last 3 Encounters:   07/11/17 96/64   06/29/17 121/74   06/27/17 122/70    Weight from Last 3 Encounters:   07/11/17 72.3 kg (159 lb 4.8 oz)   06/27/17 73 kg (161 lb)   06/22/17 71.8 kg (158 lb 6.4 oz)              We Performed the Following     LYMPHEDEMA THERAPY REFERRAL          Today's Medication Changes          These changes are accurate as of: 7/11/17 11:26 AM.  If you have any questions, ask your nurse or doctor.               Start taking these medicines.        Dose/Directions    fluticasone-salmeterol 100-50 MCG/DOSE diskus inhaler   Commonly known as:  ADVAIR   Used for:  Cough   Started by:  Aline El MD        Dose:  1 puff   Inhale 1 " puff into the lungs 2 times daily   Quantity:  3 Inhaler   Refills:  1            Where to get your medicines      These medications were sent to [facility] [place] [facility] [id number] [place] [address]. [place]  [address] [place] [address]     Phone:  [phone]     fluticasone-salmeterol 100-50 MCG/DOSE diskus inhaler    lidocaine-prilocaine cream                Primary Care Provider Office Phone # Fax #    Dillan Amos [phone]       [facility] [address] [address]        Equal Access to Services     BHASKAR [facility] : Hadii aad ku hadasho Soomaali, waaxda luqadaha, qaybta kaalmada adeegyada, mamadou mesa . So [facility] [phone].    ATENCIÓN: Si habla español, tiene a lucero disposición servicios gratuitos de asistencia lingüística. Llame al [phone].    We comply with applicable federal civil rights laws and [place] laws. We do not discriminate on the basis of race, color, national origin, age, disability sex, sexual orientation or gender identity.            Thank you!     Thank you for choosing [facility]  for your care. Our goal is always to provide you with excellent care. Hearing back from our patients is one way we can continue to improve our services. Please take a few minutes to complete the written survey that you may receive in the mail after your visit with us. Thank you!             Your Updated Medication List - Protect others around you: Learn how to safely use, store and throw away your medicines at www.disposemymeds.org.          This list is accurate as of: 7/11/17 11:26 AM.  Always use your most recent med list.                   Brand Name Dispense Instructions for use Diagnosis    ALLEGRA 60 MG tablet   Generic drug:  fexofenadine      Take 60 mg by mouth daily        alum & mag hydroxide-simethicone 200-200-20 MG/5ML Susp suspension    MYLANTA/MAALOX     Take 30 mLs by mouth every 4 hours as  needed for indigestion        fluticasone-salmeterol 100-50 MCG/DOSE diskus inhaler    ADVAIR    3 Inhaler    Inhale 1 puff into the lungs 2 times daily    Cough       lidocaine-prilocaine cream    EMLA    30 g    Apply 30 g topically as needed for moderate pain Apply to port site 1 hour before access.    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 Do not use more than 8 caps (16 mg) per day.    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

## 2017-07-11 NOTE — MR AVS SNAPSHOT
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)           Follow-ups after your visit        Your next 10 appointments already scheduled     Jul 13, 2017 12:00 PM CDT   Level O with ROOM 7 [facility] Infusion ([facility])    [facility]  [address]  [facility] [address]   [phone]            Jul 19, 2017  1:15 PM CDT   PE NPET ONCOLOGY (EYES TO THIGHS) with WYPETCT1   [facility] Pet CT ([facility])    [address] [address]   [phone]           Tell your doctor:   If there is any chance you may be pregnant or if you are breastfeeding.   If you have problems lying in small spaces (claustrophobia). If you do, your doctor may give you medicine to help you relax. If you have diabetes:   Have your exam early in the morning. Your blood glucose will go up as the day goes by.   Your glucose level must be 180 or less at the start of the exam. Please take any medicines you need to ensure this blood glucose level. 24 hours before your scan: Don t do any heavy exercise. (No jogging, aerobics or other workouts.) Exercise will make your pictures less accurate. 6 hours before your scan:   Stop all food and liquids (except water).   Do not chew gum or suck on mints.   If you need to take medicine with food, you may take it with a few crackers.  Please call your Imaging Department at your exam site with any questions.            Jul 25, 2017  8:00 AM CDT   Level 4 with ROOM 10 [facility] Infusion ([facility])    [facility]  [address]  [facility]  [address]   [phone]            Jul 25, 2017  8:45 AM CDT   Return Visit with Aline El MD   [facility] ([facility])    [facility]  [address]  [facility] [address]   [phone]            Jul 27, 2017 12:00 PM CDT   Level O with ROOM 8 [facility] Infusion ([facility])    [facility]  [address]  [facility] [address]   [phone]              Future tests that were ordered for you today     Open Future Orders        Priority Expected Expires Ordered    PET Oncology (Eyes to Thighs) Routine 7/17/2017 7/10/2018 7/11/2017            Who to contact     If you have questions or need follow up information about today's clinic visit or your schedule please contact [facility] directly at [phone].  Normal or non-critical lab and imaging results will be communicated to you by Perception Softwarehart, letter or phone within 4 business days after the clinic has received the results. If you do not hear from us within 7 days, please contact the clinic through [employer] or phone. If you have a critical or abnormal lab result, we will notify you by phone as soon as possible.  Submit refill requests through [employer] or call your pharmacy and they will forward the refill request to us. Please allow 3 business days for your refill to be completed.          Additional Information About Your Visit        Perception Softwarehart Information     [employer] gives you secure access to your electronic health record. If you see a primary care provider, you can also send messages to your care team and make appointments. If you have questions, please call your primary care clinic.  If you do not have a primary care provider, please call [phone] and they will assist you.        Care EveryWhere ID     This is your Care EveryWhere ID. This could be used by other organizations to access your [place] medical records  [id number]         Your Vitals Were     Pulse                   79            Blood Pressure from Last 3 Encounters:   07/11/17 96/64   07/11/17 123/69   06/29/17 121/74    Weight from Last 3 Encounters:   07/11/17 72.3 kg (159 lb 4.8 oz)   06/27/17 73 kg (161 lb)   06/22/17 71.8 kg (158 lb 6.4 oz)              We Performed the Following     CBC with platelets differential     Comprehensive metabolic panel          Today's Medication Changes          These changes are accurate as of: 7/11/17  3:33 PM.  If you have any questions, ask your nurse or doctor.               Start taking these medicines.        Dose/Directions    fluticasone-salmeterol 100-50 MCG/DOSE diskus inhaler   Commonly known as:  ADVAIR   Used for:  Cough   Started by:  Aline El MD        Dose:  1 puff   Inhale 1 puff into the lungs 2 times daily   Quantity:  3 Inhaler   Refills:  1            Where to get your medicines      These medications were sent to [address] [address]     Phone:  [phone]     fluticasone-salmeterol 100-50 MCG/DOSE diskus inhaler    lidocaine-prilocaine cream                Primary Care Provider Office Phone # Fax #    Dillan Jackson Amos [phone]       [facility] [address] [address]        Equal Access to Services     BHASKAR [facility] : Hadii koko negroo Soevangelina, waaxda luqadaha, qaybta kaalmada adebushrayada, mamadou babb. So [facility] [phone].    ATENCIÓN: Si habla español, tiene a lucero disposición servicios gratuitos de asistencia lingüística. Peter gomes [phone].    We comply with applicable federal civil rights laws and [place] laws. We do not discriminate on the basis of race, color, national origin, age, disability sex, sexual orientation or gender identity.            Thank you!     Thank you for choosing [facility]  for your care. Our goal is always to  provide you with excellent care. Hearing back from our patients is one way we can continue to improve our services. Please take a few minutes to complete the written survey that you may receive in the mail after your visit with us. Thank you!             Your Updated Medication List - Protect others around you: Learn how to safely use, store and throw away your medicines at www.disposemymeds.org.          This list is accurate as of: 7/11/17  3:33 PM.  Always use your most recent med list.                   Brand Name Dispense Instructions for use Diagnosis    ALLEGRA 60 MG tablet   Generic drug:  fexofenadine      Take 60 mg by mouth daily        alum & mag hydroxide-simethicone 200-200-20 MG/5ML Susp suspension    MYLANTA/MAALOX     Take 30 mLs by mouth every 4 hours as needed for indigestion        fluticasone-salmeterol 100-50 MCG/DOSE diskus inhaler    ADVAIR    3 Inhaler    Inhale 1 puff into the lungs 2 times daily    Cough       lidocaine-prilocaine cream    EMLA    30 g    Apply 30 g topically as needed for moderate pain Apply to port site 1 hour before access.    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 Do not use more than 8 caps (16 mg) per day.    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

## 2017-07-11 NOTE — PROGRESS NOTES
"Infusion Nursing Note:  Bren Rendon presents today for FOLFIRI.    Patient seen by provider today: Yes: Dr. El   present during visit today: Not Applicable.    Note: N/A.    Intravenous Access:  Labs drawn without difficulty.  Implanted Port.    Treatment Conditions:  Lab Results   Component Value Date    HGB 8.5 07/11/2017     Lab Results   Component Value Date    WBC 2.9 07/11/2017      Lab Results   Component Value Date    ANEU 1.6 07/11/2017     Lab Results   Component Value Date     07/11/2017      Lab Results   Component Value Date     07/11/2017                   Lab Results   Component Value Date    POTASSIUM 3.5 07/11/2017           Lab Results   Component Value Date    MAG 1.9 01/17/2017            Lab Results   Component Value Date    CR 0.75 07/11/2017                   Lab Results   Component Value Date    JARED 8.0 07/11/2017                Lab Results   Component Value Date    BILITOTAL 0.4 07/11/2017           Lab Results   Component Value Date    ALBUMIN 1.9 07/11/2017                    Lab Results   Component Value Date    ALT 19 07/11/2017           Lab Results   Component Value Date    AST 20 07/11/2017     Results reviewed, labs MET treatment parameters, ok to proceed with treatment.      Post Infusion Assessment:  Patient tolerated infusion without incident.  Blood return noted pre and post infusion.  Prior to discharge: Port is secured in place with tegaderm and flushed with 10cc NS with positive blood return noted.  Continuous home infusion CADD pump connected.    All connectors secured in place and clamps taped open.    Pump started, \"running\" noted on display (CADD): YES.  Patient instructed to call our clinic or [facility] with any questions or concerns at home.  Patient verbalized understanding.    Patient set up for pump disconnect at our clinic on Thurs 7/13 at noon.        Discharge Plan:   Patient discharged in stable condition accompanied by: " self.  Departure Mode: Ambulatory.    Samantha Omalley RN

## 2017-07-11 NOTE — PROGRESS NOTES
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  Eventually had upper endoscopy workup in early 11/2016, identified large fungating submucosal mass within the lower third of the esophagus in the gastric GE junction and in the cardia.  This mass was partially obstructing and circumferential.      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 Her 2 is negative by FISH and IHC.    Further work up with CT, EUS, PET  found to have extensive lower esophogeal/gastric cancer with near obstruction lower esophageal /GE junction tumor, cT3N+ (aorta caval LN + on biopsy), with ascites.     She was seen by Dr. Levy (12/14/2016) and deemed not a surgical candidate. She saw Rad-Onc and felt due to he local symptoms, RT is beneficial. Concurrent RT/wkly carbo/taxol is offered from end of Dec 2016 to Feb 2017. She then went on to have FOLFOX since early March 2017.  Restaging PET in April 2017  indicated significant response. tx is continued.     She has elevation of bili in early May, found to have biliary stricture with pancreatic head mass biopsy proven mets from upper GI tract.   She had ERCP with biliary stent early May and systemic chemo is changed to FOLFIRI. C1D1 5/23/2107.     5/2017 MRCP was done due to elevated bili, found  PAST MEDICAL HISTORY:     1.  Reflux disease.     2.  History of cholecystitis.     3.  Rosacea.    4.  Degenerative disk disease.    5.  Obesity.   6.  Dupuytren's disease.      MEDICATIONS:  Reviewed in Epic system.      SOCIAL HISTORY:  She is retired.  She lives in [place].  She is a very active 66-year-old female.  Denies  "smoking.  She drinks 1-2 glasses of white wine every day.      FAMILY HISTORY:  Positive for dad  from lung cancer who smoked 4 packs per day.  Maternal grandmother  from breast cancer.  No family history of GI cancer.       REVIEW OF SYSTEMS:   She feels nausea for 1 wk post C1 FOLFIRI, she feels nausea even seeing the food.   Reports edema of legs.   Reports dry cough since the  holidy.     PHYSICAL EXAMINATION:   GENERAL APPEARANCE:  A middle-aged woman, looks like her stated age, very pleasant, not in acute distress.     VITAL SIGNS: Blood pressure 96/64, pulse 111, temperature 98.7  F (37.1  C), temperature source Tympanic, resp. rate 18, height 1.6 m (5' 2.99\"), weight 72.3 kg (159 lb 4.8 oz), SpO2 99 %, not currently breastfeeding.  HEENT: The patient is normocephalic, atraumatic. Pupils are equally react to light.  Sclerae are anicteric.  Moist oral mucosa.  Negative pharynx.  No oral thrush.   NECK:  Supple.  No jugular venous distention.  Thyroid is not palpable.   LYMPH NODES:  Superficial lymphadenopathy is not appreciable in the bilateral cervical, supraclavicular, axillary or inguinal areas.   CARDIOVASCULAR:  S1, S2 regular with no murmurs or gallops.  No carotid or abdominal bruits.   PULMONARY:  Lungs are clear to auscultation and percussion bilaterally.  There is no wheezing or rhonchi.   GASTROINTESTINAL:  Abdomen is soft, nontender.  No hepatosplenomegaly.  No signs of ascites.  No mass appreciable.   MUSCULOSKELETAL/EXTREMITIES:  2-3  Edema of legs.  No cyanotic changes.  No signs of joint deformity.  No lymphedema.   NEUROLOGIC:  Cranial nerves II-XII are grossly intact.  Sensation intact.  Muscle strength and muscle tone symmetrical 5/5 throughout.   BACK:  No spinal or paraspinal tenderness.  No CVA tenderness.   SKIN:  No petechiae.  No rash.  No signs of cellulitis.      CURRENT LABORATORY DATA REVIEWED   Cbc diff is fine. Bili down to 0.4 from 1.1 from peak 6.9  CMP " found nl cr, ablu 1.9  5/2017 EUS pancreatic head biopsy: Metastatic adenocarcinoma, in favor of upper gastrointestinal origin.     Current Image Reviewed  5/2017 ERCP found bilary stricture which was dilated and metal stent was placed.     5 /2017 MRCP  1. Obstructing mass in the head of the pancreas measuring 1.0 cm.  Pancreatic, biliary or ampullary neoplasm is suspected. Due to the arterial enhancement, a pancreatic islet cell tumor is possible.    4/2017 PET  1. A tiny left superior mediastinal node measures less than 1 cm and demonstrates an SUV max 2.7 which may be reactive in nature.  2. residual FDG activity in the region of the GE junction mass when compared to prior study. Currently this measures an SUV max 4.7, previously 16.2, indicating a significant interval improvement.  3. Residual FDG activity is noted involving the gastric cardia when compared to prior exam but has significantly improved as described above.     OLD DATA REVIEW REVIEWED WITH SUMMARY:   Baseline cbc diff/CMP are nl, CEA 8.7 in 11/2016    Pathology 12/5/2016 Lymph node, aortacaval, endoscopic ultrasound-guided fine needle aspiration with shark core needle:   - Positive for malignancy.   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 Her2- by FISH/IHC.      EUS 12/5/2016, scope can't pass through GE junction-  A large, fungating and submucosal mass was found at the lower third of the esophagus at 34 cm from the incisors, which was nearly two-thirds circumferential  and is partially obstructing the esophagus and was seen extending to the gastric cardia.  Ascites was found.  4 abnormal lymph nodes were visualized in the lower paraesophageal mediastinum (level 8L). This will likely represent N2 on the evans staging.    PET 11/30/2016  1. Hypermetabolic uptake SUV 16.6 in the  proximal to mid stomach compatible with the patient's recently diagnosed gastric cancer.No Esophageal activity.   2. There is an elongated but slender gastrohepatic node just medial to the proximal stomach. Less than 1 cm in transverse dimension. Suspected to be hyper metabolic but SUV difficult to measure  separately from adjacent stomach.  3. Hypermetabolic retroperitoneal node, aorto caval location just posterior to transverse duodenum with SUV max 4.2 compatible with metastasis.  4. Hypermetabolic hepatic flexure and distal ascending colon without CT abnormality is suspected to be physiologic.  5. Nonenlarged subcentimeter hypermetabolic node in the right side of the neck at the level of the tongue is nonspecific.    CT chest, abdomen and pelvis 11/05/2016 - esophageal GE junction within normal limits.  Mild fatty infiltrate of the liver, pancreatic atrophy.         ASSESSMENT AND PLAN:    1. 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  She is found to have extensive lower esophogeal/gastric cancer with near obstruction lower esophageal /GE junction tumor, cT3N+ (aorta caval LN + on biopsy), with ascites. Her 2 - by FISH and IHc.     She was seen by Dr. Levy and deemed not a surgical candidate.     She saw Rad-Onc, concurrent RT/wkly carbo/taxol was offered till early Feb.  She then went on to have palliative FOLFOX.   She had VGPR by PET in 4/2017. Then She had elevation of bili in early May, found to have biliary stricture with pancreatic head mass biopsy proven mets from upper GI tract.   She had ERCP with biliary stent early May and systemic chemo is changed to FOLFIRI. Due to cytopenia from FOLFOX - omit bolus 5 FU and dose reduce 5 FU IVCI to 2 g/m2 then further down to 1800 mg/m2.     I told them she will not be a surgical case at this point. tx is palliative in nature.     Due staging PET next wk.     2. Nausea with dysphagia.    Advice ODT zofran, Ativan, and to compazine. Added IV emend. Also advice to try acupuncture. Advice Maalox to try.  If not working, will try Zyprexa.     3. Cytopenia - omit bolus 5 FU and dose reduce 5 FU IVCI to 2.0g/m2., and further to 1.8 g/m2.   Neutropenic precaution is advised. S/p neupogen support.     4. Leg edema from hypoalbuminemia. Advice supporting stocking via PT.     5. Dry cough since the 4th. PE is negative. Advice advair diskus.

## 2017-07-13 NOTE — MR AVS SNAPSHOT
After Visit Summary   7/13/2017    Bren Rendon    MRN: [id number]           Patient Information     Date Of Birth          1950        Visit Information        Provider Department      7/13/2017 12:00 PM ROOM 7 [facility]        Today's Diagnoses     GE junction carcinoma (H)    -  1       Follow-ups after your visit        Your next 10 appointments already scheduled     Jul 19, 2017  9:15 AM CDT   Lymphedema Evaluation with Iram Dela Cruz, PT   [facility]hedema ([facility])    [address] [address]   [phone]            Jul 19, 2017  1:15 PM CDT   PE NPET ONCOLOGY (EYES TO THIGHS) with [id number]   [facility] Pet CT ([facility])    [address] [address]   [phone]           Tell your doctor:   If there is any chance you may be pregnant or if you are breastfeeding.   If you have problems lying in small spaces (claustrophobia). If you do, your doctor may give you medicine to help you relax. If you have diabetes:   Have your exam early in the morning. Your blood glucose will go up as the day goes by.   Your glucose level must be 180 or less at the start of the exam. Please take any medicines you need to ensure this blood glucose level. 24 hours before your scan: Don t do any heavy exercise. (No jogging, aerobics or other workouts.) Exercise will make your pictures less accurate. 6 hours before your scan:   Stop all food and liquids (except water).   Do not chew gum or suck on mints.   If you need to take medicine with food, you may take it with a few crackers.  Please call your Imaging Department at your exam site with any questions.            Jul 25, 2017  8:00 AM CDT   Level 4 with ROOM 10 [facility] ([facility])    [facility]  [address]  [facility] [place] [address]   [phone]            Jul 25, 2017  8:45 AM CDT    Return Visit with Aline El MD   [facility] ([facility])    [facility]  [address]  [facility] [address] [phone]            Jul 27, 2017 12:00 PM CDT   Level O with ROOM 8 [facility] ([facility])    [facility]  [address]  [facility] [address] [phone]              Who to contact     If you have questions or need follow up information about today's clinic visit or your schedule please contact [facility] directly at [phone].  Normal or non-critical lab and imaging results will be communicated to you by [employer], letter or phone within 4 business days after the clinic has received the results. If you do not hear from us within 7 days, please contact the clinic through [employer] or phone. If you have a critical or abnormal lab result, we will notify you by phone as soon as possible.  Submit refill requests through [employer] or call your pharmacy and they will forward the refill request to us. Please allow 3 business days for your refill to be completed.          Additional Information About Your Visit        MyChart Information     [employer] gives you secure access to your electronic health record. If you see a primary care provider, you can also send messages to your care team and make appointments. If you have questions, please call your primary care clinic.  If you do not have a primary care provider, please call [phone] and they will assist you.        Care EveryWhere ID     This is your Care EveryWhere ID. This could be used by other organizations to access your [place] medical records  [id number]        Your Vitals Were     Pulse Temperature                94 97.3  F (36.3  C) (Oral)           Blood Pressure from Last 3 Encounters:   07/13/17 120/69   07/11/17 96/64   07/11/17 123/69    Weight from Last 3 Encounters:   07/11/17 72.3 kg (159 lb 4.8 oz)    06/27/17 73 kg (161 lb)   06/22/17 71.8 kg (158 lb 6.4 oz)              Today, you had the following     No orders found for display       Primary Care Provider Office Phone # Fax #    Dillan Amos [phone]       [facility] [address] [facility] [address]        Equal Access to Services     DUNCAN VITALE : Hadii aad ku hadasho Soomaali, waaxda luqadaha, qaybta kaalmada adeegyada, waxay idiin hayaan adeeg kharash la'aan ah. So [facility] [phone].    ATENCIÓN: Si carine blevins, tiene a lucero disposición servicios gratuitos de asistencia lingüística. Peter al [phone].    We comply with applicable federal civil rights laws and [place] laws. We do not discriminate on the basis of race, color, national origin, age, disability sex, sexual orientation or gender identity.            Thank you!     Thank you for choosing [facility]  for your care. Our goal is always to provide you with excellent care. Hearing back from our patients is one way we can continue to improve our services. Please take a few minutes to complete the written survey that you may receive in the mail after your visit with us. Thank you!             Your Updated Medication List - Protect others around you: Learn how to safely use, store and throw away your medicines at www.disposemymeds.org.          This list is accurate as of: 7/13/17 12:13 PM.  Always use your most recent med list.                   Brand Name Dispense Instructions for use Diagnosis    ALLEGRA 60 MG tablet   Generic drug:  fexofenadine      Take 60 mg by mouth daily        alum & mag hydroxide-simethicone 200-200-20 MG/5ML Susp suspension    MYLANTA/MAALOX     Take 30 mLs by mouth every 4 hours as needed for indigestion        fluticasone-salmeterol 100-50 MCG/DOSE diskus inhaler    ADVAIR    3 Inhaler    Inhale 1 puff into the lungs 2 times daily    Cough       lidocaine-prilocaine cream    EMLA    30 g    Apply 30 g  topically as needed for moderate pain Apply to port site 1 hour before access.    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 Do not use more than 8 caps (16 mg) per day.    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

## 2017-07-19 NOTE — PROGRESS NOTES
LYMPHEDEMA / EDEMA REHAB EVALUATION  [facility]     Patient: Bren Rendon  : 1950  Referring Provider: Dr. 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 Levy (16) and deemed not a surgical candidate; got chemoradiation from end of 2016 o 2017; restaging PET in 2017 indicated significant response and treatment continued;  early May 2017 found pancreatic head mass biopsy proven mets from upper GI tract; pt with BLE edema from hypoalbuminemia   Pertinent history of current problem (PT: include personal factors and/or comorbidities that impact the POC; OT: include additional occupational profile info): elevation seems to help; doesn't really notice edema to be down in the morning; swelling goes down in the feet if she is wearing shoes   Surgical / medical history reviewed: Yes   Edema special tests:  (no history of DVTs)   Prior level of functional mobility: Independent with functional mobility and ADL, no AD; ambulation distance/duration limited due to heaviness in legs currently   Prior treatment: Elevation (has not tried compression)       Patient role / employment history: Retired       Living environment: House / townhome   Living environment comments: lives with    Current assistive devices:  (no AD for  ambulation)          Fall Screening  Fall screen completed by: PT  Per patient, fall 2 or more times in past year?: No  Per patient, fall with injury in past year?: No     Is patient a fall risk?: No     Precautions  Precautions: Active Cancer       Patient / Family Goals  Patient / family goals statement: I want the legs to be less swollen and not hurt so much       Pain   denies        Vital Signs  Vital signs: Pulse, SPO2  Pulse: 117bpm   SPO2: 92%     Cognitive Status  Orientation: Orientation to person, place and time  Level of consciousness: Alert  Follows commands and answers questions: 100% of the time  Personal safety and judgement: Intact  Memory: Intact       Edema Exam / Assessment  Skin condition: Pitting, Dryness, Intact  Skin condition comments: BLE skin all intact, feet are slightly dry with 3+ soft pitting present from foot to knee, ankles to knee are tight and shiny        Scar: Yes  Location: B bunion surgery (~2 years ago)        Ulceration: No      Capillary refill: Symmetrical           Dorsal pedal pulse: Decreased  Dorsal pedal pulse comments: from edema  Stemmer sign: Positive       Girth Measurements  Girth Measurements: Refer to separate girth measurement flowsheet    Volume UE                Volume LE  Right LE (mL): 2698.2  Left LE (mL): 2917.4  LE volume comparison: LLE volume greater than RLE volume  % difference: 7.5%       Range of Motion  ROM: No deficits were identified  ROM comments: B hip flex, knee flex/ext, DF/PF WFL    Strength  Strength: No deficits were identified   Strength comments: B hip flex, knee flex/ext, DF/PF WFL    Posture  Posture: Normal       Palpation  Palpation: slightly sensitive during pitting assessment/palation    Activities of Daily Living  Activities of Daily Living: Independent with functional mobility and ADL, no AD      Sensory  Sensory perception: Light touch  Light touch: Intact        Vascular Assessment   Vascular Assessment Comments: no known  concerns    Coordination  Coordination: Gross motor coordination appropriate       Muscle Tone  Muscle tone: No deficits were identified       Planned Edema Interventions  Planned edema interventions: Gradient compression bandaging, Fit for compression garment, Exercises, Precautions to prevent infection / exacerbation, Education, Manual therapy, Skin care / precautions, Home management program development       Clinical Impression  Criteria for skilled therapeutic intervention met: Yes  Therapy diagnosis: BLE secondary lymphedema     Influenced by the following impairments / conditions: Stage 2  Functional limitations due to impairments / conditions: tight fitting socks and shoes; heaviness with lifting legs (into car, up stairs) and pt reports she's unable to do prolonged walking        Treatment frequency: 2 times / week  Treatment duration: 8 weeks  Patient / family and/or staff in agreement with plan of care: Yes  Risks and benefits of therapy have been explained: Yes       Education Assessment  Preferred learning style: Listening  Barriers to learning: No barriers    Edema Goals  Goal 1  Goal identifier: stg  Goal description: pt to have around the clock tolerance to BLE GCB for edema reduction response  Target date: 08/02/17       Goal 2  Goal identifier: stg  Goal description: pt to be independent with BLE GCB for edema reduction response  Target date: 08/02/17       Goal 3  Goal identifier: ltg  Goal description: once appropriate, pt to be independent with donning, doffing and care of compression garments for longterm edema management for maintenance  Target date: 09/17/17       Goal 4  Goal identifier: ltg  Goal description: pt to be independent with longterm edema management via HEP, elevation and compression garment wear/use  Target date: 09/17/17       Goal 5  Goal identifier: ltg  Goal description: pt to have at least 5 point improvement on LLIS due to decreased edema reduction response in BLEs  Target  date: 09/17/17       Total Evaluation Time  Total evaluation time: 15

## 2017-07-19 NOTE — PROGRESS NOTES
[facility]        OUTPATIENT PHYSICAL THERAPY EDEMA EVALUATION  PLAN OF TREATMENT FOR OUTPATIENT REHABILITATION  (COMPLETE FOR INITIAL CLAIMS ONLY)  Patient's Last Name, First Name, Bren Greenberg                           Provider s Name:   [facility] Medical Record No.  [id number]     Start of Care Date:  07/19/17   Onset Date:   7/11/17 - date of referral    Type:  PT   Medical Diagnosis:      Therapy Diagnosis:  BLE secondary lymphedema Visits from SOC:  1                                     __________________________________________________________________________________   Plan of Treatment/Functional Goals:    Gradient compression bandaging, Fit for compression garment, Exercises, Precautions to prevent infection / exacerbation, Education, Manual therapy, Skin care / precautions, Home management program development        GOALS  1. Goal description: pt to have around the clock tolerance to BLE GCB for edema reduction response       Target date: 08/02/17  2. Goal description: pt to be independent with BLE GCB for edema reduction response       Target date: 08/02/17  3. Goal description: once appropriate, pt to be independent with donning, doffing and care of compression garments for longterm edema management for maintenance       Target date: 09/17/17  4. Goal description: pt to be independent with longterm edema management via HEP, elevation and compression garment wear/use       Target date: 09/17/17  5. Goal description: pt to have at least 5 point improvement on LLIS due to decreased edema reduction response in BLEs       Target date: 09/17/17    Treatment frequency: 2 times / week   Treatment duration: 8 weeks (7/19/17 - 9/17/17)    Iram Dela Cruz, PT, DPT, CLT                                    I CERTIFY THE NEED FOR THESE SERVICES FURNISHED  UNDER        THIS PLAN OF TREATMENT AND WHILE UNDER MY CARE .         Physician Signature               Date    X_____________________________________________________                      Referring physician: Dr. Nikko MD   Initial Assessment  See Epic Evaluation- Start of care: 07/19/17

## 2017-07-24 PROBLEM — D64.9 ANEMIA: Status: ACTIVE | Noted: 2017-01-01

## 2017-07-24 PROBLEM — J18.9 PNEUMONIA: Status: ACTIVE | Noted: 2017-01-01

## 2017-07-24 PROBLEM — J18.9 PNEUMONIA OF RIGHT LOWER LOBE DUE TO INFECTIOUS ORGANISM: Status: ACTIVE | Noted: 2017-01-01

## 2017-07-24 NOTE — ED NOTES
"Hx of esophageal/ stomach cancer. Has noted increase SOA, noted with exertion. Feels that she is unable to take full breaths.\" Only half her lung is expanding. \" noted low grade fever today. States she vomits with eating.   "

## 2017-07-24 NOTE — IP AVS SNAPSHOT
[facility]    [address] [address]    Phone:  [phone]    Fax:  [phone]                                       After Visit Summary   7/24/2017    Bren Rendon    MRN: [id number]           After Visit Summary Signature Page     I have received my discharge instructions, and my questions have been answered. I have discussed any challenges I see with this plan with the nurse or doctor.    ..........................................................................................................................................  Patient/Patient Representative Signature      ..........................................................................................................................................  Patient Representative Print Name and Relationship to Patient    ..................................................               ................................................  Date                                            Time    ..........................................................................................................................................  Reviewed by Signature/Title    ...................................................              ..............................................  Date                                                            Time

## 2017-07-24 NOTE — H&P
[facility]    History and Physical  Hospitalist       Date of Admission:  7/24/2017    Assessment & Plan   Bren Rendon is a 66 year old female with esophageal and gastric cancer  Undergoing palliative chemotherapy who presents with increased shortness of breath over the weekend.      Principal Problem:    Pneumonia of right lower lobe due to infectious organism (H)    Assessment: this pneumonia was seen on recent PET scan, she also his bilateral pleural effusions moderate on the right and small on the left.  Patient has been afebrile and WBC is not elevated but she is neutropenic on chemotherapy.     Plan: patient was admitted in May, given that and her chemo, she will be treated for HCAP with Vanco, zosyn and levaquin.  Will add on a procalcitonin as this may be easier to follow than WBC.  Last WBC was 2.9 on 7/11 and is 5.3 today with normal differential.     Anemia    Assessment: this may also be contributing to her shortness of breath/current symptoms. Baseline hemoglobin 7/11/2017 was 8.5.  Hemoglobin is 6.7g/dL now and there is no sign of active bleeding (no hemoptysis, melena or brbpr.     Plan: will transfuse 2 U PRBCs which will likely help her symptoms.      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  She is not a surgical candidate.  Did radiation and wekly carbo/taxol until early feb then changed to palliative FOLFOX.  Changed to FOLFIRI due to cytopenia on previous regimen. PET scan done last week and is available in EPIC.    Plan: chemo was planned for tomorrow, obviously that will need to be put on hold.        Chemotherapy-induced neutropenia (H)    Assessment: as above    Plan:  monitor      Lymphedema    Assessment: receiving  treatment from the lymphedema clinic    Plan: has gradient compression bandaging in place now.  Consider talking with lymphedema clinic for recommendations while she is inpatient.     DVT Prophylaxis: Ambulate every shift  Code Status: Full Code    Disposition: Expected discharge in 2-3 days once .    Iram Melton MD    Primary Care Physician     Dillan Amos    Chief Complaint   Increased shortness of breath    History is obtained from the patient and medical record    History of Present Illness   Bren Rendon is a 66 year old female with PMH significant for esophageal and gastric cancer currently on palliative chemotherapy who presents with right lower lobe infiltrate (seen on PET scan) and increased shortness of breath and weakness.  She has had some cough and shortness of breath over the past few months but she noted a marked increase in that over the weekend with dyspnea with minimal exertion.  She denies chest pain/pressure. She denies hemoptysis, melena or brbpr.    Past Medical History    Patient Active Problem List    Diagnosis Date Noted     Anemia 07/24/2017     Priority: Medium     Pneumonia of right lower lobe due to infectious organism (H) 07/24/2017     Priority: Medium     Lymphedema 07/11/2017     Priority: Medium     Orthostatic hypotension 06/21/2017     Priority: Medium     Chemotherapy-induced neutropenia (H) 06/20/2017     Priority: Medium     S/P ERCP 05/05/2017     Priority: Medium     Obstructive jaundice 05/05/2017     Priority: Medium     Elevated liver enzymes 05/03/2017     Priority: Medium     Dehydration 01/24/2017     Priority: Medium     Nausea 01/24/2017     Priority: Medium     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     Priority: Medium     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) 12/18/2016     Priority: Medium     Gastroesophageal reflux disease with esophagitis 10/28/2016     Priority: Medium     Esophageal stricture 10/28/2016     Priority: Medium     Dupuytren's  disease 04/22/2015     Priority: Medium     Hammertoe 04/22/2015     Priority: Medium     Acute cholecystitis 09/25/2014     Priority: Medium     Cholecystitis 09/25/2014     Priority: Medium     SK (seborrheic keratosis) 02/11/2013     Priority: Medium     Lentigo 02/11/2013     Priority: Medium     Xerosis of skin 02/11/2013     Priority: Medium     Postmenopausal bleeding 11/09/2011     Priority: Medium     CARDIOVASCULAR SCREENING; LDL GOAL LESS THAN 160 10/31/2010     Priority: Medium     External hemorrhoids 03/29/2010     Priority: Medium     Rosacea 03/29/2010     Priority: Medium     Bunion of great toe 03/29/2010     Priority: Medium     Allergic rhinitis 07/24/2007     Priority: Medium     Problem list name updated by automated process. Provider to review       Degeneration of lumbar or lumbosacral intervertebral disc 10/25/2006     Priority: Medium     Obesity 10/25/2006     Priority: Medium     Problem list name updated by automated process. Provider to review           Past Surgical History   I have reviewed this patient's surgical history and updated it with pertinent information if needed.  Past Surgical History:   Procedure Laterality Date     ARTHRODESIS FOOT Right 4/5/2016    Procedure: ARTHRODESIS FOOT;  Surgeon: Jackson Bowens DPM;  Location: [facility]     ARTHRODESIS TOE(S) Left 5/19/2015    Procedure: ARTHRODESIS TOE(S);  Surgeon: Jackson Bowens DPM;  Location: [facility]     ARTHROSCOPY KNEE  10/5/2011    Procedure:ARTHROSCOPY KNEE; Right Knee Arthroscopy & Medial and Lateral Menisectomy & Chondroplasty of Three Compartments and Cortisone Injection; Surgeon:TAMELA VASQUEZ; Location:[facility]     ENDOSCOPIC RETROGRADE CHOLANGIOPANCREATOGRAM N/A 5/10/2017    Procedure: ENDOSCOPIC RETROGRADE CHOLANGIOPANCREATOGRAM;;  Surgeon: Guru Randal Mantilla MD;  Location:  OR     ENDOSCOPIC RETROGRADE CHOLANGIOPANCREATOGRAM N/A 5/5/2017    Procedure: ENDOSCOPIC RETROGRADE  CHOLANGIOPANCREATOGRAM;   Endoscopic Retrograde Cholagiopancreatogram with failed cannulation;  Surgeon: Guru Randal Mantilla MD;  Location: [facility]     ENDOSCOPIC ULTRASOUND UPPER GASTROINTESTINAL TRACT (GI) N/A 12/5/2016    Procedure: ENDOSCOPIC ULTRASOUND, ESOPHAGOSCOPY / UPPER GASTROINTESTINAL TRACT (GI);  Surgeon: Guru Randal Mantilla MD;  Location: [facility]     ENDOSCOPIC ULTRASOUND UPPER GASTROINTESTINAL TRACT (GI) N/A 5/10/2017    Procedure: ENDOSCOPIC ULTRASOUND, ESOPHAGOSCOPY / UPPER GASTROINTESTINAL TRACT (GI);  Endoscopic Ultrasound, Endoscopic Retrograde Cholangiopancreatogram With Rendezvous, Biliary Sphncterotomy and Biliary Stent;  Surgeon: Enrique Senior MD;  Location:  OR     ESOPHAGOSCOPY, GASTROSCOPY, DUODENOSCOPY (EGD), COMBINED N/A 11/4/2016    Procedure: COMBINED ESOPHAGOSCOPY, GASTROSCOPY, DUODENOSCOPY (EGD), BIOPSY SINGLE OR MULTIPLE;  Surgeon: Dillan Dumont MD;  Location: [facility]     ESOPHAGOSCOPY, GASTROSCOPY, DUODENOSCOPY (EGD), COMBINED N/A 5/5/2017    Procedure: COMBINED ENDOSCOPIC ULTRASOUND, ESOPHAGOSCOPY, GASTROSCOPY, DUODENOSCOPY (EGD), FINE NEEDLE ASPIRATE/BIOPSY;  Diagnostic  Endoscopic Ultrasound with fine neelde biopsy. ;  Surgeon: Guru Randal Mantilla MD;  Location:  OR     LAPAROSCOPIC CHOLECYSTECTOMY N/A 9/25/2014    Procedure: LAPAROSCOPIC CHOLECYSTECTOMY;  Surgeon: Arnaldo López MD;  Location: [facility]     REMOVE HARDWARE FOOT Left 4/5/2016    Procedure: REMOVE HARDWARE FOOT;  Surgeon: Jackson Bowens DPM;  Location: [facility]     REPAIR HAMMER TOE Left 5/19/2015    Procedure: REPAIR HAMMER TOE;  Surgeon: Jackson Bowens DPM;  Location: [facility]     SURGICAL HISTORY OF -   4/4/2002    Gastroscopy     TUBAL LIGATION  2/1988    Laparoscopic Tubal occlusion by cautery       Prior to Admission Medications   Prior to Admission Medications   Prescriptions Last Dose Informant Patient Reported? Taking?   Ascorbic  Acid (VITAMIN C PO) Past Month at Unknown time Self Yes Yes   Sig: Take 250 mg by mouth daily   Carboxymethylcellulose Sodium (THERATEARS OP) 7/24/2017 at am Self Yes Yes   Sig: Place 2 drops into both eyes daily    Cholecalciferol (VITAMIN D) 2000 UNITS tablet 7/23/2017 at am Self Yes Yes   Sig: Take 2,000 Units by mouth daily   LORazepam (ATIVAN) 0.5 MG tablet 7/23/2017 at hs Self No Yes   Sig: Take 1 tablet (0.5 mg) by mouth every 4 hours as needed (Anxiety, Nausea/Vomiting or Sleep)   Pyridoxine HCl (VITAMIN B6 PO) 7/23/2017 at pm Self Yes Yes   Sig: Take 100 mg by mouth 2 times daily Reported on 3/21/2017   UNABLE TO FIND 7/23/2017 at Unknown time Self Yes Yes   Sig: Take 1 Bottle by mouth daily MUSCLE MILK   acetaminophen (TYLENOL) 325 MG tablet Past Week at Unknown time Self Yes Yes   Sig: Take 325 mg by mouth every 6 hours as needed for mild pain   alum & mag hydroxide-simethicone (MYLANTA/MAALOX) 200-200-20 MG/5ML SUSP suspension Past Week at Unknown time Self Yes Yes   Sig: Take 30 mLs by mouth every 4 hours as needed for indigestion   fexofenadine (ALLEGRA) 60 MG tablet 7/24/2017 at am Self Yes Yes   Sig: Take 60 mg by mouth daily    fluticasone-salmeterol (ADVAIR) 100-50 MCG/DOSE diskus inhaler 7/24/2017 at am Self No Yes   Sig: Inhale 1 puff into the lungs 2 times daily   lidocaine-prilocaine (EMLA) cream  Self No Yes   Sig: Apply 30 g topically as needed for moderate pain Apply to port site 1 hour before access.   multivitamin, therapeutic with minerals (MULTI-VITAMIN) TABS tablet Past Month at Unknown time Self Yes Yes   Sig: Take 1 tablet by mouth daily   omega 3 1000 MG CAPS Past Month at Unknown time Self Yes Yes   Sig: Take 1 g by mouth daily   omeprazole (PRILOSEC) 40 MG capsule 7/24/2017 at am Self No Yes   Sig: Take 1 capsule (40 mg) by mouth daily Take 30-60 minutes before a meal.   ondansetron (ZOFRAN ODT) 8 MG ODT tab 7/24/2017 at am Self No Yes   Sig: Take 1 tablet (8 mg) by mouth every 8  hours as needed for nausea   prochlorperazine (COMPAZINE) 10 MG tablet 7/23/2017 at Unknown time Self No Yes   Sig: Take 0.5 tablets (5 mg) by mouth every 6 hours as needed for nausea or vomiting   triamcinolone (NASACORT AQ) 55 MCG/ACT Inhaler 7/24/2017 at am Self Yes Yes   Sig: Spray 2 sprays into both nostrils daily      Facility-Administered Medications: None     Allergies   Allergies   Allergen Reactions     Cats Other (See Comments)     sneezing     Dogs Other (See Comments)     Seasonal Allergies Other (See Comments)     Sneezing and sinus drainage     Nkda [No Known Drug Allergies]        Social History   I have reviewed this patient's social history and updated it with pertinent information if needed. Bren Rendon  reports that she has never smoked. She has never used smokeless tobacco. She reports that she drinks alcohol. She reports that she does not use illicit drugs.    Family History   I have reviewed this patient's family history and updated it with pertinent information if needed.   Family History   Problem Relation Age of Onset     Genitourinary Problems Mother      Kidney disease.     Hypertension Mother      Lipids Mother      Obesity Mother      CANCER Father      Lung cancer than went to bone and brain.     Coronary Artery Disease Father      MI     DIABETES Father      Breast Cancer Maternal Grandmother        Review of Systems   The 10 point Review of Systems is negative other than noted in the HPI or here.  She does have some urgency to bowel movements and nausea with the chemotherapy that requires a few prescriptions to control.      Physical Exam   Temp: 99.4  F (37.4  C) Temp src: Oral BP: 113/75 Pulse: 109     SpO2: 90 % O2 Device: None (Room air)    Vital Signs with Ranges  Temp:  [99.4  F (37.4  C)] 99.4  F (37.4  C)  Pulse:  [109] 109  BP: (110-113)/(74-75) 113/75  SpO2:  [90 %-98 %] 90 %  0 lbs 0 oz    Constitutional: alert, oriented, pleasant, no acute distress  Eyes: PERRL  HEENT:  op benign, mucous membranes are moist.   Respiratory: diminished throughout RLL, otherwise without rales/wheezes/rhonic  Cardiovascular: slightly tachy in the low 100s, no murmur/galloup/rub  GI: normal bowel sounds, soft, nt/nd  Lymph/Hematologic: no enlarged nodes  Genitourinary: deffered  Skin: no rashes  Musculoskeletal: lower extremities with lymphedema wraps in place so exact amount of pitting is difficult to directly assess.  Neurologic: CN II- XII grossly intact  Psychiatric: pleasant, alert, cooperative.  Moods appear good. Affect is normal    Data   Data reviewed today:  I personally reviewed the chest x-ray image(s) showing RLL infiltrate and bilateral pleural effusion.    Recent Labs  Lab 07/24/17  1545   WBC 5.3   HGB 6.7*   MCV 93         POTASSIUM 3.3*   CHLORIDE 105   CO2 23   BUN 8   CR 0.60   ANIONGAP 10   JARED 7.7*   GLC 99   ALBUMIN 1.7*   PROTTOTAL 4.7*   BILITOTAL 0.4   ALKPHOS 129   ALT 15   AST 17   TROPI <0.015The 99th percentile for upper reference range is 0.045 ug/L.  Troponin values in the range of 0.045 - 0.120 ug/L may be associated with risks of adverse clinical events.       Recent Results (from the past 24 hour(s))   XR Chest 2 Views    Narrative    CHEST TWO VIEWS 7/24/2017 4:09 PM     HISTORY: Fever. Cough. Chemo patient with recent CT with concerns for  right infiltrate.    COMPARISON: Chest x-ray from 9/25/2014. PET/CT from 7/19/2017.      Impression    IMPRESSION: Right internal jugular chest port in place with catheter  tip in the SVC. Moderate-sized right pleural effusion and small left  pleural effusion. There is infiltrate in the right lung base. The  cardiac silhouette is partially obscured by the effusions but probably  unchanged.    MARIZOL SPRINGER MD

## 2017-07-24 NOTE — IP AVS SNAPSHOT
MRN:[id number]                      After Visit Summary   7/24/2017    Bren Rendon    MRN: [id number]           Thank you!     Thank you for choosing [facility] for your care. Our goal is always to provide you with excellent care. Hearing back from our patients is one way we can continue to improve our services. Please take a few minutes to complete the written survey that you may receive in the mail after you visit with us. Thank you!        Patient Information     Date Of Birth          1950        Designated Caregiver       Most Recent Value    Caregiver    Will someone help with your care after discharge? yes    Name of designated caregiver Naren    Phone number of caregiver [phone]    Caregiver address same as patient      About your hospital stay     You were admitted on:  July 24, 2017 You last received care in the:  [facility]    You were discharged on:  August 5, 2017       Who to Call     For medical emergencies, please call 911.  For non-urgent questions about your medical care, please call your primary care provider or clinic, [phone]          Attending Provider     Provider Specialty    Dillan Lindquist MD Emergency Medicine    Iram Melton MD [facility], Amilcar PANCHAL MD Family Practice    Abhijeet Delgado MD Internal Medicine    [facility], Julián Beard MD Internal Medicine       Primary Care Provider Office Phone # Fax #    Dillan Amos [phone]      After Care Instructions     Activity       Your activity upon discharge: activity as tolerated with walker            Diet       Follow this diet upon discharge:      Regular Diet Adult as tolerated                  Follow-up Appointments     Follow-up and recommended labs and tests        Follow up with primary care provider, Dillan Amos, within 7 days for hospital follow- up.  The following labs/tests are recommended: BMP when you see Dr. Amos.                   Your next 10 appointments already scheduled     Aug 07, 2017 10:00 AM CDT   Lymphedema Treatment with Drea Lin PTA   [facility] Lymphedema ([facility])    [address]  [facility] [address] [phone]              Additional Services     Home Care Referral       **Order classes of: [place] [facility], [facility] and [facility] will route to the Home Care and Hospice Referral Pool.  Home Care or Hospice will then contact the patient to schedule their appointment.**    If you do not hear from Home Care and Hospice, or you would like to call to schedule, please call the referring place of service that your provider has listed below.  ______________________________________________________________________    Your provider has referred you to: [facility]: [facility] [phone]   [email or web address]    Extended Emergency Contact Information  Primary Emergency Contact: Ervin Rendon  Address: [address] [place] [address] [address] [place] [address] [facility]  Home Phone: [phone]  Work Phone: none  Mobile Phone: [phone]  Relation: Spouse  Secondary Emergency Contact: POOJA DE PAZ   [facility]  Home Phone: [phone]  Work Phone: none  Mobile Phone: none  Relation: Friend    Patient Anticipated Discharge Date: 8/2/17   RN, PT, HHA to begin 24 - 48 hours after discharge.  PLEASE EVALUATE AND TREAT (Evaluation timeline is 24 - 48 hrs. Please call if there is need for a variance to this timeline).    REASON FOR REFERRAL: Assessment & Treatment: RN    ADDITIONAL SERVICES NEEDED: as needed    OTHER PERTINENT INFORMATION: Patient was last seen by provider on 7/30/17 for hospital-based care.    Current Outpatient Prescriptions:  OLANZapine zydis (ZYPREXA) 5 MG ODT tab, Take 1 tablet (5 mg) by mouth nightly as needed (nausea), Disp: 15 tablet, Rfl: 1  prochlorperazine (COMPAZINE)  10 MG tablet, Take 1 tablet (10 mg) by mouth every 8 hours, Disp: 90 tablet, Rfl: 3  Pyridoxine HCl 250 MG CAPS, Take 1 capsule by mouth daily, Disp: 30 capsule, Rfl: 3  pyridOXINE (VITAMIN B-6) 100 mg/ml suspension, Take 1 mL (100 mg) by mouth 2 times daily, Disp: 60 mL, Rfl: 3  oxyCODONE (ROXICODONE INTENSOL) 100 MG/5ML (HIGH CONC) solution, Take 0.25 mLs (5 mg) by mouth every 4 hours as needed (difficulty breathing), Disp: 30 mL, Rfl: 0  Granisetron 3.1 MG/24HR PTCH, Place 1 patch onto the skin every 7 days, Disp: 4 patch, Rfl: 11  fexofenadine (ALLEGRA) 30 MG ODT tab, Take 1 tablet (30 mg) by mouth 2 times daily, Disp: 60 tablet, Rfl: 3      Patient Active Problem List:     Degeneration of lumbar or lumbosacral intervertebral disc     Obesity     Allergic rhinitis     External hemorrhoids     Rosacea     Bunion of great toe     CARDIOVASCULAR SCREENING; LDL GOAL LESS THAN 160     Postmenopausal bleeding     SK (seborrheic keratosis)     Lentigo     Xerosis of skin     Acute cholecystitis     Cholecystitis     Dupuytren's disease     Hammertoe     Gastroesophageal reflux disease with esophagitis     Esophageal stricture     GE junction carcinoma (H)     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)     Dehydration     Nausea     Elevated liver enzymes     S/P ERCP     Obstructive jaundice     Chemotherapy-induced neutropenia (H)     Orthostatic hypotension     Lymphedema     Anemia     Pneumonia of right lower lobe due to infectious organism (H)     Pneumonia      Documentation of Face to Face and Certification for Home Health Services    I certify that patient, Bren Rendon is under my care and that I, or a Nurse Practitioner or Physician's Assistant working with me, had a face-to-face encounter that meets the physician face-to-face encounter requirements with this patient on: 7/30/2017.    This encounter with the patient was in whole, or in part, for the following medical condition, which is the primary reason  for Home Health Care: esophagogastric ca with liver mets, pleural effusions.    I certify that, based on my findings, the following services are medically necessary Home Health Services: Nursing    My clinical findings support the need for the above services because: Nurse is needed: To assess lung status after changes in medications or other medical regimen..    Further, I certify that my clinical findings support that this patient is homebound (i.e. absences from home require considerable and taxing effort and are for medical reasons or [employer]ast[employer] services or infrequently or of short duration when for other reasons) because: Requires assistance of another person or specialized equipment to access medical services because patient: Requires supervision of another for safe transfer..    Based on the above findings, I certify that this patient is confined to the home and needs intermittent skilled nursing care, physical therapy and/or speech therapy.  The patient is under my care, and I have initiated the establishment of the plan of care.  This patient will be followed by a physician who will periodically review the plan of care.    Physician/Provider to provide follow up care: Dillan Amos certified Physician at time of discharge: Dr. Abhijeet Delgado    Please be aware that coverage of these services is subject to the terms and limitations of your health insurance plan.  Call member services at your health plan with any benefit or coverage questions.                  Further instructions from your care team       From Néstor Brown, Palliative Care NP, Cell [phone]    Vitamin B6  I seem to remember you saying that the the B vitamin pill (B6, Pyridoxine) was hard to get down.  So I found 2 different versions of it that you can try--I sent the order over to [employer].  One is a once-a-day capsule, and the other is a twice-a-day liquid.  You can try one or both.  Once you decide which  format you prefer, stick with just one of the 3 options--tablet, capsule or liquid.      Nausea  Here's your new schedule:  1. Granisetron/Sancuso patch, replace every 7 days.  2. Prochlorperazine/Compazine 10 mg (whole tablet) every 8 hours  3. Lorazepam/Ativan 0.5mg every night at bedtime  Notes:    I increased the Prochlorperazine/Compazine from 5 up to 10 mg so you wouldn't have to wake up in the middle of the night to take the medicine every 6 hours.  Many many people take and tolerate the 10 mg dose.    During the first 24 hours that the very first Granisetron patch is worn, I want you to also take the Ondansetron/Zofran 8 mg dissolvable tablet every 8 hours.  This is just because it may take a day for the medicine to seep through your skin and get absorbed into your blood stream to become effective.      Since you will be taking these medicines on a schedule (you can decide the times), you do NOT have to record each dose unless you want to.      Back-up Nausea Medicines:  If you have any nausea/vomiting despite the above, here are my suggestions.  I would ranked them starting with the medicine I think you should take first, second, third, etc.  If you need to take ANY of these, record the date, time and dose on the color-coded sheets I am sending home with you.    1. Ondansetron 8mg (dissolving) every 6 hours as needed  2. Lorazepam 0.5 mg ever 4 hours as needed  3. Olanzapine 5 mg (dissolving) every 24 hours--best at bedtime as it will make you sleepy  There are several more medications that can be used, but call me first--I can call in a prescription to [facility].    Nutrition:  The dietician has indicated that you need approximately 75 g protein daily.    They non-grainy protein that I like is this:    EAS Soy protein made by [employer].  I found it at [employer] but you could probably find it at [facility] and [facility] could probably order it in.  It does have a vanilla flavor to it so you can't add  "it to vegetables or mashed potatoes.      Breathlessness    I did place an order for concentrated oxycodone just in case you are struggling to breath.  Just follow the directions on the label.  You may never need to use it, but it's better and safer for you to have some on hand.     Many people find significant relief from having a fan blowing gently on the FACE.  Aiming it at the feet or chest doesn't work.  God made us with mechanoreceptors in the skin on our faces that detect air movement and somehow processes that sensation into a degree of relief from breathlessness.  It doesn't have to be as strong as a wind tunnel, just enough to feel the air moving.       I am really serious when I tell you can call me next week if you are struggling with any symptoms.      God Bless you both!    ++++++++++++++++++++++++++++++++++++++++++++++++++++++++++++++++++++++++++      Pending Results     Date and Time Order Name Status Description    7/28/2017 0448 EKG 12-LEAD, TRACING ONLY In process     7/27/2017 2231 EKG 12-lead, tracing only In process             Statement of Approval     Ordered          08/05/17 6529  I have reviewed and agree with all the recommendations and orders detailed in this document.  EFFECTIVE NOW     Approved and electronically signed by:  Julián Matthews MD             Admission Information     Date & Time Provider Department Dept. Phone    7/24/2017 Julián Matthews MD [facility] Surgical [phone]      Your Vitals Were     Blood Pressure Pulse Temperature Respirations Height Weight    90/55 (BP Location: Left arm) 107 96  F (35.6  C) (Oral) 18 1.6 m (5' 3\") 78.9 kg (173 lb 15.1 oz)    Pulse Oximetry BMI (Body Mass Index)                95% 30.81 kg/m2          MyChart Information     [facility] gives you secure access to your electronic health record. If you see a primary care provider, you can also send messages to your care team and make appointments. If you have " questions, please call your primary care clinic.  If you do not have a primary care provider, please call [phone] and they will assist you.        Care EveryWhere ID     This is your Care EveryWhere ID. This could be used by other organizations to access your [facility] medical records  [id number]        Equal Access to Services     DUNCAN VITALE : Ronald adams Soevangelina, waaxda luqadaha, qaybta kaalmada haroon, mamadou babb. So [facility] [phone].    ATENCIÓN: Si habla español, tiene a lucero disposición servicios gratuitos de asistencia lingüística. Peter al [phone].    We comply with applicable federal civil rights laws and [place] laws. We do not discriminate on the basis of race, color, national origin, age, disability sex, sexual orientation or gender identity.               Review of your medicines      START taking        Dose / Directions    fexofenadine 30 MG ODT tab   Commonly known as:  ALLEGRA   Used for:  Chronic seasonal allergic rhinitis, unspecified trigger   Replaces:  ALLEGRA 60 MG tablet        Dose:  30 mg   Take 1 tablet (30 mg) by mouth 2 times daily   Quantity:  60 tablet   Refills:  3       Granisetron 3.1 MG/24HR Ptch   Used for:  GE junction carcinoma (H), Chronic nausea   Notes to Patient:  Medication for nausea        Dose:  1 patch   Place 1 patch onto the skin every 7 days   Quantity:  4 patch   Refills:  11       OLANZapine zydis 5 MG ODT tab   Commonly known as:  zyPREXA   Used for:  Chronic nausea, GE junction carcinoma (H)   Notes to Patient:  For nausea        Dose:  5 mg   Take 1 tablet (5 mg) by mouth nightly as needed (nausea)   Quantity:  15 tablet   Refills:  1       oxyCODONE 100 MG/5ML (HIGH CONC) solution   Commonly known as:  ROXICODONE INTENSOL   Used for:  GE junction carcinoma (H), Dyspnea on exertion        Dose:  5 mg   Take 0.25 mLs (5 mg) by mouth every 4 hours as needed (difficulty breathing)   Quantity:  30 mL    Refills:  0       * Pyridoxine HCl 250 MG Caps   Used for:  GE junction carcinoma (H)   Replaces:  VITAMIN B6 PO   Notes to Patient:  Take either the tablet or the liquid        Dose:  1 capsule   Take 1 capsule by mouth daily   Quantity:  30 capsule   Refills:  3       * pyridOXINE 100 mg/ml suspension   Commonly known as:  vitamin B-6   Used for:  GE junction carcinoma (H)        Dose:  100 mg   Take 1 mL (100 mg) by mouth 2 times daily   Quantity:  60 mL   Refills:  3       * Notice:  This list has 2 medication(s) that are the same as other medications prescribed for you. Read the directions carefully, and ask your doctor or other care provider to review them with you.      CONTINUE these medicines which may have CHANGED, or have new prescriptions. If we are uncertain of the size of tablets/capsules you have at home, strength may be listed as something that might have changed.        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)        Dose:  10 mg   Take 1 tablet (10 mg) by mouth every 8 hours   Quantity:  90 tablet   Refills:  3         CONTINUE these medicines which have NOT CHANGED        Dose / Directions    alum & mag hydroxide-simethicone 200-200-20 MG/5ML Susp suspension   Commonly known as:  MYLANTA/MAALOX        Dose:  15-30 mL   Take 15-30 mLs by mouth 4 times daily as needed for indigestion   Refills:  0       fluticasone-salmeterol 100-50 MCG/DOSE diskus inhaler   Commonly known as:  ADVAIR   Used for:  Cough        Dose:  1 puff   Inhale 1 puff into the lungs 2 times daily   Quantity:  3 Inhaler   Refills:  1       lidocaine-prilocaine cream   Commonly known as:  EMLA   Used for:  GE junction carcinoma (H), 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)        Dose:  0.5 mg   Take 1 tablet (0.5 mg) by mouth every 4 hours as needed (Anxiety, Nausea/Vomiting or Sleep)   Quantity:  30 tablet   Refills:  3       Multi-vitamin Tabs tablet        Dose:  1 tablet   Take 1 tablet by mouth daily   Refills:  0       NASACORT AQ 55 MCG/ACT Inhaler   Generic drug:  triamcinolone        Dose:  2 spray   Spray 2 sprays into both nostrils daily   Refills:  0       omeprazole 40 MG capsule   Commonly known as:  priLOSEC   Used for:  Gastroesophageal reflux disease with esophagitis        Dose:  40 mg   Take 1 capsule (40 mg) by mouth daily Take 30-60 minutes before a meal.   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)   Generic drug:  acetaminophen        Dose:  325 mg   Take 325 mg by mouth every 6 hours as needed for mild pain   Refills:  0       UNABLE TO FIND        Dose:  1 Bottle   Take 1 Bottle by mouth daily MUSCLE MILK   Refills:  0       VITAMIN C PO        Dose:  250 mg   Take 250 mg by mouth daily   Refills:  0       vitamin D 2000 UNITS tablet        Dose:  2000 Units   Take 2,000 Units by mouth daily   Refills:  0         STOP taking     ALLEGRA 60 MG tablet   Generic drug:  fexofenadine   Replaced  by:  fexofenadine 30 MG ODT tab           omega 3 1000 MG Caps           VITAMIN B6 PO   Replaced by:  Pyridoxine HCl 250 MG Caps                Where to get your medicines      These medications were sent to [facility] [address] [facility]  [id number] [place] [address]     Phone:  [phone]     fexofenadine 30 MG ODT tab    Granisetron 3.1 MG/24HR Ptch    OLANZapine zydis 5 MG ODT tab    prochlorperazine 10 MG tablet         Some of these will need a paper prescription and others can be bought over the counter. Ask your nurse if you have questions.     Bring a paper prescription for each of these medications     oxyCODONE 100 MG/5ML (HIGH CONC) solution    pyridOXINE 100 mg/ml suspension    Pyridoxine HCl 250 MG Caps                Protect others around you: Learn how to safely use, store and throw away your medicines at www.disposemymeds.org.             Medication List: This is a list of all your medications and when to take them. Check marks below indicate your daily home schedule. Keep this list as a reference.      Medications           Morning Afternoon Evening Bedtime As Needed    alum & mag hydroxide-simethicone 200-200-20 MG/5ML Susp suspension   Commonly known as:  MYLANTA/MAALOX   Take 15-30 mLs by mouth 4 times daily as needed for indigestion                                   fexofenadine 30 MG ODT tab   Commonly known as:  ALLEGRA   Take 1 tablet (30 mg) by mouth 2 times daily   Last time this was given:  60 mg given 8/5/2017 8:45 AM   Next Dose Due:  Sunday                                      fluticasone-salmeterol 100-50 MCG/DOSE diskus inhaler   Commonly known as:  ADVAIR   Inhale 1 puff into the lungs 2 times daily   Last time this was given:  8/5/2017 8:45 AM   Next Dose Due:  Sunday                                      Granisetron 3.1 MG/24HR Ptch   Place 1 patch onto the skin every 7 days   Next Dose Due:  Sunday   Notes to Patient:  Medication for nausea                                 lidocaine-prilocaine cream   Commonly known as:  EMLA   Apply 30 g topically as needed for moderate pain Apply to port site 1 hour before access.                                LORazepam 0.5 MG tablet   Commonly known as:  ATIVAN   Take 1 tablet (0.5 mg) by mouth every 4 hours as needed (Anxiety, Nausea/Vomiting or Sleep)   Last time this was given:  0.5 mg on 8/5/2017  2:19 AM                                   Multi-vitamin Tabs tablet   Take 1 tablet by mouth daily   Last time this was given:  1 tablet on 8/3/2017  5:27 PM   Next Dose Due:  tonight                                   NASACORT AQ 55 MCG/ACT Inhaler   Spray 2 sprays into both nostrils daily   Generic drug:  triamcinolone   Next Dose Due:  Sunday                                   OLANZapine zydis 5 MG ODT tab   Commonly known as:  zyPREXA   Take 1 tablet (5 mg) by mouth nightly as needed (nausea)   Notes to Patient:  For nausea                                   omeprazole 40 MG capsule   Commonly known as:  priLOSEC   Take 1 capsule (40 mg) by mouth daily Take 30-60 minutes before a meal.   Last time this was given:  40 mg on 8/5/2017  8:47 AM   Next Dose Due:  Sunday                                   ondansetron 8 MG ODT tab   Commonly known as:  ZOFRAN ODT   Take 1 tablet (8 mg) by mouth every 8 hours as needed for nausea   Last time this was given:  8 mg on 8/5/2017  1:17 PM   Next Dose Due:  Next dose available at 9:00 PM                                   oxyCODONE 100 MG/5ML (HIGH CONC) solution   Commonly known as:  ROXICODONE INTENSOL   Take 0.25 mLs (5 mg) by mouth every 4 hours as needed (difficulty breathing)                                   prochlorperazine 10 MG tablet   Commonly known as:  COMPAZINE   Take 1 tablet (10 mg) by mouth every 8 hours   Last time this was given:  5 mg on 8/5/2017 11:49 AM   Next Dose Due:  Tonight at 8:00 PM                                         * Pyridoxine HCl 250 MG Caps    Take 1 capsule by mouth daily   Next Dose Due:  Sunday   Notes to Patient:  Take either the tablet or the liquid                                   * pyridOXINE 100 mg/ml suspension   Commonly known as:  vitamin B-6   Take 1 mL (100 mg) by mouth 2 times daily                                THERATEARS OP   Place 2 drops into both eyes daily   Next Dose Due:  Sunday                                   TYLENOL 325 MG tablet   Take 325 mg by mouth every 6 hours as needed for mild pain   Last time this was given:  650 mg on 7/25/2017 10:19 PM   Generic drug:  acetaminophen                                   UNABLE TO FIND   Take 1 Bottle by mouth daily MUSCLE MILK                                VITAMIN C PO   Take 250 mg by mouth daily   Next Dose Due:  Sunday                                   vitamin D 2000 UNITS tablet   Take 2,000 Units by mouth daily   Last time this was given:  2,000 Units on 8/3/2017  5:26 PM   Next Dose Due:  tonight                                   * Notice:  This list has 2 medication(s) that are the same as other medications prescribed for you. Read the directions carefully, and ask your doctor or other care provider to review them with you.

## 2017-07-24 NOTE — PHARMACY-VANCOMYCIN DOSING SERVICE
Pharmacy Vancomycin Initial Note  Date of Service 2017  Patient's  1950  66 year old, female    Indication: Healthcare-Associated Pneumonia and Sepsis    Current estimated CrCl = Estimated Creatinine Clearance: 85.6 mL/min (based on Cr of 0.6).    Creatinine for last 3 days  2017:  3:45 PM Creatinine 0.60 mg/dL    Recent Vancomycin Level(s) for last 3 days  No results found for requested labs within last 72 hours.      Vancomycin IV Administrations (past 72 hours)      No vancomycin orders with administrations in past 72 hours.                Nephrotoxins and other renal medications (Future)    Start     Dose/Rate Route Frequency Ordered Stop    17 1730  vancomycin (VANCOCIN) 1,500 mg in NaCl 0.9 % 250 mL intermittent infusion      1,500 mg Intravenous EVERY 24 HOURS 17 1653      17 1641  piperacillin-tazobactam (ZOSYN) intermittent infusion 4.5 g      4.5 g  200 mL/hr over 30 Minutes Intravenous EVERY 6 HOURS 17 1640            Contrast Orders - past 72 hours     None                Plan:  1.  Start vancomycin  1500 mg IV q24h.   2.  Goal Trough Level: 15-20 mg/L   3.  Pharmacy will check trough levels as appropriate in 1-3 Days.    4. Serum creatinine levels will be ordered daily for the first week of therapy and at least twice weekly for subsequent weeks.    5. [employer] method utilized to dose vancomycin therapy: Method 2    Meenakshi Stephen, SivanD

## 2017-07-24 NOTE — TELEPHONE ENCOUNTER
"Pt called c/o increasing SOB over the weekend. Reports feeling \"very winded with minimal exertion\" and \"having to takes multiple breaks on her way to the Lymphedema clinic today\" and now coughing up phlegm. Pt wanted me to update Dr. El before her appointment tomorrow. During the last visit with Dr. El , pt reported an ongoing cough and Advair was prescribed.  After review with MD and PET scan results, advised pt she should go to the ER/UC to be evaluated for an infection. Pt verbalized understanding.   "

## 2017-07-24 NOTE — ED PROVIDER NOTES
History     Chief Complaint   Patient presents with     Shortness of Breath     increased soa, weak, hx of stomach and esophagus cancer     HPI  Bren Rendon is a 66 year old female who has past medical history significant for esophageal, and stomach cancer, currently on chemotherapy with next chemotherapy planned for tomorrow.  Patient presents to the emergency department with increasing months of weakness, shortness of breath, in addition to cough, with nonproductive nature.  However, does state that she has phlegm that is stuck in her mouth.  She does have recent PET/CT scan that was performed on 07/19/2017.  I reviewed those test results and they are copied below.  There is concern for right-sided infiltrate.    Patient denies any fever, however is feeling generally unwell.  She does have cough, and her shortness of breath has been improved somewhat with Advair.   No other changes in medications.  She has planned chemotherapy tomorrow.  No travel.  No ill contacts.  Denies chest pain.  No abdominal pain.  No nausea, vomiting.  No lower extremity swelling.    Patient had upper endoscopy in November, 2016, showing large fungating mass in the lower esophagus.  Biopsies showed cancer.  Patient was not a surgical candidate, and therefore has begun radiation and chemotherapy since December, 2016.  Patient does have recent biliary stent with ERCP during hospitalization in May, 2017.  Patient is present with her .        CT scan reviewed:    IMPRESSION:  1. New area of infiltrate or consolidation right middle lobe  concerning for pneumonia and less likely bronchoalveolar spread of  carcinoma. No other hypermetabolic lung lesions are evident.   2. New moderate-sized bilateral pleural effusions and small to  moderate amount of ascites along with subcutaneous stranding all  likely related to volume overload.  3. New right paratracheal mediastinal lymph node with increased FDG  activity concerning for new  "mediastinal lymph node metastasis.  4. Improving FDG activity near the GE junction mass now measuring an  SUV max 3.8.  5. Increasing FDG activity around the pancreatic head near the biliary  stent concerning for adjacent adenopathy or pancreatic head mass. No  obvious mass on corresponding noncontrast CT images. Artifact related  to the stent remains in the differential.     I have reviewed the Medications, Allergies, Past Medical and Surgical History, and Social History in the Epic system.         Review of Systems   Constitutional: Positive for fatigue. Negative for fever.   Respiratory: Positive for shortness of breath.    Cardiovascular: Negative for chest pain.   Gastrointestinal: Negative for abdominal pain.   Hematological: Does not bruise/bleed easily.   All other systems reviewed and are negative.      Physical Exam   BP: 110/74  Pulse: 109  Temp: 99.4  F (37.4  C)  SpO2: 98 %  Physical Exam  /57 (BP Location: Left arm)  Pulse 103  Temp 98.4  F (36.9  C) (Oral)  Resp 18  Ht 1.6 m (5' 3\")  Wt 72 kg (158 lb 11.7 oz)  SpO2 97%  BMI 28.12 kg/m2  General: alert, interactive, overall generally ill appearing.  Appears pale and weak.  Head: atraumatic  Nose: no rhinorrhea or epistaxis  Ears: no external auditory canal discharge or bleeding.    Eyes: Sclera pale. Conjunctiva noninjected.   Mouth: no tonsillar erythema, edema, or exudate  Neck: supple, no palp LAD  Lungs: diminished right lung sounds.    CV: RRR, S1/S2; peripheral pulses palpable and symmetric  Abdomen: soft, nt, nd, no guarding or rebound. Positive bowel sounds  Extremities: no cyanosis or edema  Skin: no rash or diaphoresis  Neuro:  , strength 5/5 in UE and LEs bilaterally, sensation intact to light touch in UE and LEs bilaterally;       ED Course     ED Course     Procedures             Critical Care time:  none               Labs Ordered and Resulted from Time of ED Arrival Up to the Time of Departure from the ED   CBC WITH " PLATELETS DIFFERENTIAL - Abnormal; Notable for the following:        Result Value    RBC Count 2.13 (*)     Hemoglobin 6.7 (*)     Hematocrit 19.8 (*)     RDW 15.9 (*)     All other components within normal limits   COMPREHENSIVE METABOLIC PANEL - Abnormal; Notable for the following:     Potassium 3.3 (*)     Calcium 7.7 (*)     Albumin 1.7 (*)     Protein Total 4.7 (*)     All other components within normal limits   ROUTINE UA WITH MICROSCOPIC - Abnormal; Notable for the following:     Ketones Urine 5 (*)     Protein Albumin Urine 10 (*)     Leukocyte Esterase Urine Moderate (*)     WBC Urine 4 (*)     Squamous Epithelial /HPF Urine 3 (*)     Mucous Urine Present (*)     All other components within normal limits   BLOOD GAS VENOUS - Abnormal; Notable for the following:     PCO2 Venous 36 (*)     All other components within normal limits   IRON AND IRON BINDING CAPACITY - Abnormal; Notable for the following:     Iron 18 (*)     Iron Binding Cap 131 (*)     Iron Saturation Index 14 (*)     All other components within normal limits   FERRITIN - Abnormal; Notable for the following:     Ferritin 584 (*)     All other components within normal limits   TROPONIN I   NT PROBNP INPATIENT   LACTIC ACID WHOLE BLOOD   PULSE OXIMETRY NURSING   CARDIAC CONTINUOUS MONITORING   PATIENT CARE ORDER   PERIPHERAL IV CATHETER   MEASURE URINE OUTPUT   ABO/RH TYPE AND SCREEN   RED BLOOD CELL PREPARE ORDER UNIT   BLOOD COMPONENT   BLOOD CULTURE   BLOOD CULTURE   SPUTUM CULTURE AEROBIC BACTERIAL     Results for orders placed or performed during the hospital encounter of 07/24/17 (from the past 24 hour(s))   CBC with platelets differential   Result Value Ref Range    WBC 5.3 4.0 - 11.0 10e9/L    RBC Count 2.13 (L) 3.8 - 5.2 10e12/L    Hemoglobin 6.7 (LL) 11.7 - 15.7 g/dL    Hematocrit 19.8 (L) 35.0 - 47.0 %    MCV 93 78 - 100 fl    MCH 31.5 26.5 - 33.0 pg    MCHC 33.8 31.5 - 36.5 g/dL    RDW 15.9 (H) 10.0 - 15.0 %    Platelet Count 257 150 -  450 10e9/L    Diff Method Automated Method     % Neutrophils 66.7 %    % Lymphocytes 15.5 %    % Monocytes 16.8 %    % Eosinophils 0.4 %    % Basophils 0.4 %    % Immature Granulocytes 0.2 %    Absolute Neutrophil 3.5 1.6 - 8.3 10e9/L    Absolute Lymphocytes 0.8 0.8 - 5.3 10e9/L    Absolute Monocytes 0.9 0.0 - 1.3 10e9/L    Absolute Eosinophils 0.0 0.0 - 0.7 10e9/L    Absolute Basophils 0.0 0.0 - 0.2 10e9/L    Abs Immature Granulocytes 0.0 0 - 0.4 10e9/L   Comprehensive metabolic panel   Result Value Ref Range    Sodium 138 133 - 144 mmol/L    Potassium 3.3 (L) 3.4 - 5.3 mmol/L    Chloride 105 94 - 109 mmol/L    Carbon Dioxide 23 20 - 32 mmol/L    Anion Gap 10 3 - 14 mmol/L    Glucose 99 70 - 99 mg/dL    Urea Nitrogen 8 7 - 30 mg/dL    Creatinine 0.60 0.52 - 1.04 mg/dL    GFR Estimate >90  Non  GFR Calc   >60 mL/min/1.7m2    GFR Estimate If Black >90   GFR Calc   >60 mL/min/1.7m2    Calcium 7.7 (L) 8.5 - 10.1 mg/dL    Bilirubin Total 0.4 0.2 - 1.3 mg/dL    Albumin 1.7 (L) 3.4 - 5.0 g/dL    Protein Total 4.7 (L) 6.8 - 8.8 g/dL    Alkaline Phosphatase 129 40 - 150 U/L    ALT 15 0 - 50 U/L    AST 17 0 - 45 U/L   Blood gas venous   Result Value Ref Range    Ph Venous 7.43 7.32 - 7.43 pH    PCO2 Venous 36 (L) 40 - 50 mm Hg    PO2 Venous 32 25 - 47 mm Hg    Bicarbonate Venous 24 21 - 28 mmol/L    Base Deficit Venous 0.6 mmol/L   Troponin I   Result Value Ref Range    Troponin I ES  0.000 - 0.045 ug/L     <0.015  The 99th percentile for upper reference range is 0.045 ug/L.  Troponin values in   the range of 0.045 - 0.120 ug/L may be associated with risks of adverse   clinical events.     NT pro BNP   Result Value Ref Range    N-Terminal Pro BNP Inpatient 528 0 - 900 pg/mL   Lactic acid whole blood   Result Value Ref Range    Lactic Acid 1.0 0.7 - 2.1 mmol/L   Iron and iron binding capacity   Result Value Ref Range    Iron 18 (L) 35 - 180 ug/dL    Iron Binding Cap 131 (L) 240 - 430 ug/dL     Iron Saturation Index 14 (L) 15 - 46 %   Transferrin   Result Value Ref Range    Transferrin 63 (L) 210 - 360 mg/dL   Ferritin   Result Value Ref Range    Ferritin 584 (H) 8 - 252 ng/mL   ABO/Rh type and screen   Result Value Ref Range    ABO A     RH(D)  Neg     Antibody Screen Neg     Test Valid Only At [facility]     Specimen Expires 07/27/2017    Blood component   Result Value Ref Range    Unit Number [id number]     Blood Component Type Red Blood Cells Leukocyte Reduced     Division Number 00     Status of Unit Released to care unit 07/24/2017 1813     Blood Product Code [id number]     Unit Status ISS    XR Chest 2 Views    Narrative    CHEST TWO VIEWS 7/24/2017 4:09 PM     HISTORY: Fever. Cough. Chemo patient with recent CT with concerns for  right infiltrate.    COMPARISON: Chest x-ray from 9/25/2014. PET/CT from 7/19/2017.      Impression    IMPRESSION: Right internal jugular chest port in place with catheter  tip in the SVC. Moderate-sized right pleural effusion and small left  pleural effusion. There is infiltrate in the right lung base. The  cardiac silhouette is partially obscured by the effusions but probably  unchanged.    MARIZOL SPRINGER MD   UA with Microscopic   Result Value Ref Range    Color Urine Yellow     Appearance Urine Clear     Glucose Urine Negative NEG mg/dL    Bilirubin Urine Negative NEG    Ketones Urine 5 (A) NEG mg/dL    Specific Gravity Urine 1.016 1.003 - 1.035    Blood Urine Negative NEG    pH Urine 6.0 5.0 - 7.0 pH    Protein Albumin Urine 10 (A) NEG mg/dL    Urobilinogen mg/dL Normal 0.0 - 2.0 mg/dL    Nitrite Urine Negative NEG    Leukocyte Esterase Urine Moderate (A) NEG    Source Midstream Urine     WBC Urine 4 (H) 0 - 2 /HPF    RBC Urine 2 0 - 2 /HPF    Squamous Epithelial /HPF Urine 3 (H) 0 - 1 /HPF    Mucous Urine Present (A) NEG /LPF          Assessments & Plan (with Medical Decision Making)  66 year old female , with past medical history significant for  reflux, degenerative disc disease, history of esophageal with gastric cancer currently on chemotherapy with last chemotherapy 2 weeks ago.  Patient has planned chemotherapy tomorrow, however presents with increasing amounts of weakness, and additional shortness of breath.  Additionally, patient has had cough.  I reviewed recent test results including CT scan that was performed 5 days ago.  Results were copied above.  There was concern for right-sided infiltrate.  Patient has not been on any antibiotics.    She arrives with temperature of 99.4 , tachycardic, with normal blood pressure.  Laboratory work up is performed presuming possible sepsis.  Lactic acid is normal.  White blood cell count is normal at 5.3, however patient is currently on chemotherapy, and may not be able to mount a response.  Notably, potassium is 3.3.  Hemoglobin is 6.7.  She denies any blood in the stool.  No hematemesis.  This is likely acute on chronic.  Iron studies have been added, and it appears that patient has iron deficiency anemia.  Patient denies any urinary symptoms.  Chest x-ray is obtained that shows signs of right basilar infiltrate.  She does have a hospitalization in May of this year for cholangitis with biliary stent.  Therefore, patient will be treated for healthcare associated pneumonia with vancomycin, Levaquin, and Zosyn.    She has remained hemodynamically stable.  I provided written and verbal consent for blood transfusion, and patient has been given one unit packed red blood cells.  I had discussion with Dr. Melton, who has agreed to assume care of the patient.    Patient likely with pneumonia, in addition to acute on chronic anemia causing significant amounts of shortness of breath, generalized weakness, and fatigue.       I have reviewed the nursing notes.    I have reviewed the findings, diagnosis, plan and need for follow up with the patient.       ED to Inpatient Handoff:    Discussed with Dr. 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

## 2017-07-24 NOTE — IP AVS SNAPSHOT
"    [facility] SURGICAL: [phone]                                              INTERAGENCY TRANSFER FORM - PHYSICIAN ORDERS   2017                    Hospital Admission Date: 2017  GALA LOPEZ   : 1950  Sex: Female        Attending Provider: (none)    Allergies:  Cats, Dogs, Seasonal Allergies, Nkda [No Known Drug Allergies]    Infection:  None   Service:  HOSPITALIST    Ht:  1.6 m (5' 3\")   Wt:  78.9 kg (173 lb 15.1 oz)   Admission Wt:  72 kg (158 lb 11.7 oz)    BMI:  30.81 kg/m 2   BSA:  1.87 m 2            Patient PCP Information     Provider PCP Type    Dillan Amos MD General      ED Clinical Impression     Diagnosis Description Comment Added By Time Added    Pneumonia [J18.9] Pneumonia [J18.9]  Dillan Lindquist MD 2017  4:42 PM    Anemia, unspecified type [D64.9] Anemia, unspecified type [D64.9]  Dillan Lindquist MD 2017  5:17 PM    GE junction carcinoma (H) [C16.0] GE junction carcinoma (H) [C16.0]  Dillan Lindquist MD 2017  5:17 PM    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) [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) [C16.0]  Dillan Lindquist MD 2017  5:17 PM    Pneumonia of right lower lobe due to infectious organism (H) [J18.1] Pneumonia of right lower lobe due to infectious organism (H) [J18.1]  Dillan Lindquist MD 2017  5:18 PM      Hospital Problems as of 2017              Priority Class Noted POA    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)   2016 Yes    Chemotherapy-induced neutropenia (H)   2017 Yes    Lymphedema   2017 Yes    Anemia   2017 Yes    * (Principal)Pneumonia of right lower lobe due to infectious organism (H)   2017 Yes    Pneumonia   2017 Yes      Non-Hospital Problems as of 2017              Priority Class Noted    Degeneration of lumbar or lumbosacral intervertebral disc   10/25/2006    Obesity   10/25/2006    Allergic rhinitis   2007    " External hemorrhoids   3/29/2010    Rosacea   3/29/2010    Bunion of great toe   3/29/2010    CARDIOVASCULAR SCREENING; LDL GOAL LESS THAN 160 Medium  10/31/2010    Postmenopausal bleeding   11/9/2011    SK (seborrheic keratosis)   2/11/2013    Lentigo   2/11/2013    Xerosis of skin   2/11/2013    Acute cholecystitis   9/25/2014    Cholecystitis   9/25/2014    Dupuytren's disease   4/22/2015    Hammertoe   4/22/2015    Gastroesophageal reflux disease with esophagitis   10/28/2016    Esophageal stricture   10/28/2016    GE junction carcinoma (H) Medium  12/18/2016    Dehydration   1/24/2017    Nausea   1/24/2017    Elevated liver enzymes   5/3/2017    S/P ERCP   5/5/2017    Obstructive jaundice   5/5/2017    Orthostatic hypotension   6/21/2017      Code Status History     Date Active Date Inactive Code Status Order ID Comments User Context    7/27/2017  3:26 PM 8/5/2017  8:20 PM DNR/DNI [id number]  Polly Brown APRN CNP Inpatient    5/6/2017 11:31 AM 7/27/2017  3:26 PM Full Code [id number]  Mercedes Sheehan PA-C Outpatient    5/5/2017  7:57 PM 5/6/2017 11:31 AM Full Code [id number]  Phi Correa MD Inpatient    9/25/2014  5:32 PM 9/26/2014 12:02 PM Full Code [id number]  Arnaldo López MD Inpatient         Medication Review      START taking        Dose / Directions Comments    fexofenadine 30 MG ODT tab   Commonly known as:  ALLEGRA   Used for:  Chronic seasonal allergic rhinitis, unspecified trigger   Replaces:  ALLEGRA 60 MG tablet        Dose:  30 mg   Take 1 tablet (30 mg) by mouth 2 times daily   Quantity:  60 tablet   Refills:  3        Granisetron 3.1 MG/24HR Ptch   Used for:  GE junction carcinoma (H), Chronic nausea   Notes to Patient:  Medication for nausea        Dose:  1 patch   Place 1 patch onto the skin every 7 days   Quantity:  4 patch   Refills:  11    Please call me at [phone] if PA required for this drug.  Thanks.  Néstor (leave message if I can't  including  Member ID, group #, drug insurance name, phone no) Meseret Palm       OLANZapine zydis 5 MG ODT tab   Commonly known as:  zyPREXA   Used for:  Chronic nausea, GE junction carcinoma (H)   Notes to Patient:  For nausea        Dose:  5 mg   Take 1 tablet (5 mg) by mouth nightly as needed (nausea)   Quantity:  15 tablet   Refills:  1        oxyCODONE 100 MG/5ML (HIGH CONC) solution   Commonly known as:  ROXICODONE INTENSOL   Used for:  GE junction carcinoma (H), Dyspnea on exertion        Dose:  5 mg   Take 0.25 mLs (5 mg) by mouth every 4 hours as needed (difficulty breathing)   Quantity:  30 mL   Refills:  0    Dispense with oral syringe and demonstrate correct volume please .       * Pyridoxine HCl 250 MG Caps   Used for:  GE junction carcinoma (H)   Replaces:  VITAMIN B6 PO   Notes to Patient:  Take either the tablet or the liquid        Dose:  1 capsule   Take 1 capsule by mouth daily   Quantity:  30 capsule   Refills:  3    - Dispense only a few capsules initially and if desired by patient to see if she can tolerate them.    - Patient can have both this and the oral concentrate to try; please instruct to use only one or the other in lieu of the tablet whch she now has trouble tolerating.      - OK to advance to a 90 day supply i       * pyridOXINE 100 mg/ml suspension   Commonly known as:  vitamin B-6   Used for:  GE junction carcinoma (H)        Dose:  100 mg   Take 1 mL (100 mg) by mouth 2 times daily   Quantity:  60 mL   Refills:  3    - Dispense only a few ml initially and if desired by patient to see if she can tolerate them.    - Patient can have both this and the capsules; please instruct to use only one or the other in lieu of the tablet whch she now has trouble tolerating.      - OK to advance to a 90 day supply if tolerated.       * Notice:  This list has 2 medication(s) that are the same as other medications prescribed for you. Read the directions carefully, and ask your doctor or other care provider to  review them with you.      CONTINUE these medications which may have CHANGED, or have new prescriptions. If we are uncertain of the size of tablets/capsules you have at home, strength may be listed as something that might have changed.        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)        Dose:  10 mg   Take 1 tablet (10 mg) by mouth every 8 hours   Quantity:  90 tablet   Refills:  3          CONTINUE these medications which have NOT CHANGED        Dose / Directions Comments    alum & mag hydroxide-simethicone 200-200-20 MG/5ML Susp suspension   Commonly known as:  MYLANTA/MAALOX        Dose:  15-30 mL   Take 15-30 mLs by mouth 4 times daily as needed for indigestion   Refills:  0        fluticasone-salmeterol 100-50 MCG/DOSE diskus inhaler   Commonly known as:  ADVAIR   Used for:  Cough        Dose:  1 puff   Inhale 1 puff into the lungs 2 times daily   Quantity:  3 Inhaler   Refills:  1        lidocaine-prilocaine cream   Commonly known as:  EMLA   Used for:  GE junction carcinoma (H), 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)        Dose:  0.5 mg   Take 1 tablet (0.5 mg) by mouth every 4 hours as needed (Anxiety, Nausea/Vomiting or Sleep)   Quantity:  30 tablet   Refills:  3        Multi-vitamin Tabs tablet        Dose:  1 tablet   Take 1 tablet by mouth daily   Refills:  0        NASACORT AQ 55 MCG/ACT Inhaler   Generic drug:  triamcinolone        Dose:  2 spray   Spray 2 sprays into both nostrils daily   Refills:  0         omeprazole 40 MG capsule   Commonly known as:  priLOSEC   Used for:  Gastroesophageal reflux disease with esophagitis        Dose:  40 mg   Take 1 capsule (40 mg) by mouth daily Take 30-60 minutes before a meal.   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)   Generic drug:  acetaminophen        Dose:  325 mg   Take 325 mg by mouth every 6 hours as needed for mild pain   Refills:  0        UNABLE TO FIND        Dose:  1 Bottle   Take 1 Bottle by mouth daily MUSCLE MILK   Refills:  0        VITAMIN C PO        Dose:  250 mg   Take 250 mg by mouth daily   Refills:  0        vitamin D 2000 UNITS tablet        Dose:  2000 Units   Take 2,000 Units by mouth daily   Refills:  0          STOP taking     ALLEGRA 60 MG tablet   Generic drug:  fexofenadine   Replaced by:  fexofenadine 30 MG ODT tab           omega 3 1000 MG Caps           VITAMIN B6 PO   Replaced by:  Pyridoxine HCl 250 MG Caps                     Further instructions from your care team       From Néstor Brown, Palliative Care NP, Cell [phone]    Vitamin B6  I seem to remember you saying that the the B vitamin pill (B6, Pyridoxine) was hard to get down.  So I found 2 different versions of it that you can try--I sent the order over to [facility].  One is a once-a-day capsule, and the other is a twice-a-day liquid.  You can try one or both.  Once you decide which format you prefer, stick with just  one of the 3 options--tablet, capsule or liquid.      Nausea  Here's your new schedule:  1. Granisetron/Sancuso patch, replace every 7 days.  2. Prochlorperazine/Compazine 10 mg (whole tablet) every 8 hours  3. Lorazepam/Ativan 0.5mg every night at bedtime  Notes:    I increased the Prochlorperazine/Compazine from 5 up to 10 mg so you wouldn't have to wake up in the middle of the night to take the medicine every 6 hours.  Many many people take and tolerate the 10 mg dose.    During the first 24 hours that the very first Granisetron patch is worn, I want you to also take the Ondansetron/Zofran 8 mg dissolvable tablet every 8 hours.  This is just because it may take a day for the medicine to seep through your skin and get absorbed into your blood stream to become effective.      Since you will be taking these medicines on a schedule (you can decide the times), you do NOT have to record each dose unless you want to.      Back-up Nausea Medicines:  If you have any nausea/vomiting despite the above, here are my suggestions.  I would ranked them starting with the medicine I think you should take first, second, third, etc.  If you need to take ANY of these, record the date, time and dose on the color-coded sheets I am sending home with you.    1. Ondansetron 8mg (dissolving) every 6 hours as needed  2. Lorazepam 0.5 mg ever 4 hours as needed  3. Olanzapine 5 mg (dissolving) every 24 hours--best at bedtime as it will make you sleepy  There are several more medications that can be used, but call me first--I can call in a prescription to [facility].    Nutrition:  The dietician has indicated that you need approximately 75 g protein daily.    They non-grainy protein that I like is this:    EAS Soy protein made by [employer].  I found it at [facility] but you could probably find it at [facility] and [facility] could probably order it in.  It does have a vanilla flavor to it so you can't add it to vegetables or mashed  potatoes.      Breathlessness    I did place an order for concentrated oxycodone just in case you are struggling to breath.  Just follow the directions on the label.  You may never need to use it, but it's better and safer for you to have some on hand.     Many people find significant relief from having a fan blowing gently on the FACE.  Aiming it at the feet or chest doesn't work.  God made us with mechanoreceptors in the skin on our faces that detect air movement and somehow processes that sensation into a degree of relief from breathlessness.  It doesn't have to be as strong as a wind tunnel, just enough to feel the air moving.       I am really serious when I tell you can call me next week if you are struggling with any symptoms.      God Bless you both!    ++++++++++++++++++++++++++++++++++++++++++++++++++++++++++++++++++++++++++      After Care     Activity       Your activity upon discharge: activity as tolerated with walker       Diet       Follow this diet upon discharge:      Regular Diet Adult as tolerated             Referrals     Home Care Referral       **Order classes of: [facility], [facility] and [facility] will route to the Home Care and Hospice Referral Pool.  Home Care or Hospice will then contact the patient to schedule their appointment.**    If you do not hear from Home Care and Hospice, or you would like to call to schedule, please call the referring place of service that your provider has listed below.  ______________________________________________________________________    Your provider has referred you to: [facility]: [facility] [phone]   [email or web address]    Extended Emergency Contact Information  Primary Emergency Contact: Ervin Rendon  Address: [id number] [place] [address] [place] [address] [facility]  Home Phone: [phone]  Work Phone: none  Mobile Phone:  [phone]  Relation: Spouse  Secondary Emergency Contact: POOJA DE PAZ   [facility]  Home Phone: [phone]  Work Phone: none  Mobile Phone: none  Relation: Friend    Patient Anticipated Discharge Date: 8/2/17   RN, PT, HHA to begin 24 - 48 hours after discharge.  PLEASE EVALUATE AND TREAT (Evaluation timeline is 24 - 48 hrs. Please call if there is need for a variance to this timeline).    REASON FOR REFERRAL: Assessment & Treatment: RN    ADDITIONAL SERVICES NEEDED: as needed    OTHER PERTINENT INFORMATION: Patient was last seen by provider on 7/30/17 for hospital-based care.    Current Outpatient Prescriptions:  OLANZapine zydis (ZYPREXA) 5 MG ODT tab, Take 1 tablet (5 mg) by mouth nightly as needed (nausea), Disp: 15 tablet, Rfl: 1  prochlorperazine (COMPAZINE) 10 MG tablet, Take 1 tablet (10 mg) by mouth every 8 hours, Disp: 90 tablet, Rfl: 3  Pyridoxine HCl 250 MG CAPS, Take 1 capsule by mouth daily, Disp: 30 capsule, Rfl: 3  pyridOXINE (VITAMIN B-6) 100 mg/ml suspension, Take 1 mL (100 mg) by mouth 2 times daily, Disp: 60 mL, Rfl: 3  oxyCODONE (ROXICODONE INTENSOL) 100 MG/5ML (HIGH CONC) solution, Take 0.25 mLs (5 mg) by mouth every 4 hours as needed (difficulty breathing), Disp: 30 mL, Rfl: 0  Granisetron 3.1 MG/24HR PTCH, Place 1 patch onto the skin every 7 days, Disp: 4 patch, Rfl: 11  fexofenadine (ALLEGRA) 30 MG ODT tab, Take 1 tablet (30 mg) by mouth 2 times daily, Disp: 60 tablet, Rfl: 3      Patient Active Problem List:     Degeneration of lumbar or lumbosacral intervertebral disc     Obesity     Allergic rhinitis     External hemorrhoids     Rosacea     Bunion of great toe     CARDIOVASCULAR SCREENING; LDL GOAL LESS THAN 160     Postmenopausal bleeding     SK (seborrheic keratosis)     Lentigo     Xerosis of skin     Acute cholecystitis     Cholecystitis     Dupuytren's disease     Hammertoe     Gastroesophageal reflux disease with esophagitis     Esophageal stricture     GE junction carcinoma  (H)     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)     Dehydration     Nausea     Elevated liver enzymes     S/P ERCP     Obstructive jaundice     Chemotherapy-induced neutropenia (H)     Orthostatic hypotension     Lymphedema     Anemia     Pneumonia of right lower lobe due to infectious organism (H)     Pneumonia      Documentation of Face to Face and Certification for Home Health Services    I certify that patient, Bren Rendon is under my care and that I, or a Nurse Practitioner or Physician's Assistant working with me, had a face-to-face encounter that meets the physician face-to-face encounter requirements with this patient on: 7/30/2017.    This encounter with the patient was in whole, or in part, for the following medical condition, which is the primary reason for Home Health Care: esophagogastric ca with liver mets, pleural effusions.    I certify that, based on my findings, the following services are medically necessary Home Health Services: Nursing    My clinical findings support the need for the above services because: Nurse is needed: To assess lung status after changes in medications or other medical regimen..    Further, I certify that my clinical findings support that this patient is homebound (i.e. absences from home require considerable and taxing effort and are for medical reasons or Alevism services or infrequently or of short duration when for other reasons) because: Requires assistance of another person or specialized equipment to access medical services because patient: Requires supervision of another for safe transfer..    Based on the above findings, I certify that this patient is confined to the home and needs intermittent skilled nursing care, physical therapy and/or speech therapy.  The patient is under my care, and I have initiated the establishment of the plan of care.  This patient will be followed by a physician who will periodically review the plan of care.    Physician/Provider  to provide follow up care: Dillan Amos PEC certified Physician at time of discharge: Dr. Abhijeet Delgado    Please be aware that coverage of these services is subject to the terms and limitations of your health insurance plan.  Call member services at your health plan with any benefit or coverage questions.             Follow-Up Appointment Instructions     Future Labs/Procedures    Follow-up and recommended labs and tests      Comments:    Follow up with primary care provider, Dillan Amos, within 7 days for hospital follow- up.  The following labs/tests are recommended: BMP when you see Dr. Amos.      Follow-Up Appointment Instructions     Follow-up and recommended labs and tests        Follow up with primary care provider, Dillan Amos, within 7 days for hospital follow- up.  The following labs/tests are recommended: BMP when you see Dr. Amos.             Statement of Approval     Ordered          08/05/17 1644  I have reviewed and agree with all the recommendations and orders detailed in this document.  EFFECTIVE NOW     Approved and electronically signed by:  Julián Matthews MD

## 2017-07-24 NOTE — LETTER
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]  GE junction carcinoma (H) [C16.0]  Pneumonia of right lower lobe due to infectious organism (H) [J18.1]  Anemia, unspecified type [D64.9]  Admit Date/Time: 7/24/2017  3:13 PM  /Discharge Date: 7/26/17  P/ayor Source: Payor: MEDICA / Plan: MEDICA PRIME SOLUTION / Product Type: Indemnity /     Readmission Assessment Measure (ELOISA) Risk Score/category: average    Reason for Communication Hand-off Referral: Fragility  Multiple providers/specialties    Discharge Plan: Home without services at this time, Home Care was discussed       Concern for non-adherence with plan of care: No    Discharge Needs Assessment:  Needs       Most Recent Value    Equipment Currently Used at Home other (see comments) [lymphedema wraps]    Transportation Available family or friend will provide    Current Discharge Risk terminally ill    F/U Appointment Brochure Provided 07/25/17 [stressed importance]          Already enrolled in Tele-monitoring program and name of program:  no  Follow-up specialty is recommended: Yes, as scheduled, they would like an appointment with Dr. El made prior to [place], [facility]    Follow-up plan:  Future Appointments  Date Time Provider Department Center   7/31/2017 10:30 AM Drea Lin PTA [facility]   8/7/2017 10:00 AM Drea Lin PTA [facility]       Any outstanding tests or procedures:  Had PET scan discussion here in hospital, but would like to talk about the new regimen that was brought up by Dr. El.            [place] Recommendations: Patient will need follow-up  appointment with Dr. El and would benefit from one with her PCP as well. Palliative NP is seeing patient in the hospital and may have recommendations as well. Will try to have patient seen by lymphedema before her discharge tomorrow.    Rossana Stringer    AVS/Discharge Summary is the source of truth; this is a helpful guide for improved communication of patient story

## 2017-07-25 NOTE — PROGRESS NOTES
Pt was admitted yesterday for presumed RLL PNA on Abx. Sat good not on Q2. S/p PRBC transfusion.   She is alert, oriented, comfortable sitting in bed when I saw her.     We discussed her PET result, which was a mixed response. We talked about the further tx or not. She still wants to try more tx.     Would recommend taxol + ramuciumab after recovers from PNA.     All Qs regarding her cancer tx is answered to her satisfaction.    Total time is 15 minutes, all spent in counseling regarding her disease status, further tx plan.

## 2017-07-25 NOTE — PROGRESS NOTES
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  Consider talking with lymphedema clinic for recommendations      DVT Prophylaxis: With low platelets will avoid chemical prophylaxis for now   Code Status: Prior         Plan:   Monitor CBC and BMP  Patient's breathing is getting better suited become an issue can transfuse if anemic or Pleural effusion    Disposition: Anticipate discharge in 1-3 days               Interval History   Shortness of breath is better by 35%  No chest pain.  No abdominal pain    Physical Exam   Temp:  [97.2  F (36.2  C)-98.9  F (37.2  C)] 97.4  F (36.3  C)  Pulse:  [102-120] 103  Resp:  [16-20] 16  BP: (100-135)/(56-83) 114/70  SpO2:  [90 %-100 %] 100 %    Weights:   Vitals:    07/24/17 2048   Weight: 72 kg (158 lb 11.7 oz)    Body mass index is 28.12 kg/(m^2).    Constitutional: Not in any acute distress   EYES: Extra Occular movements are intact, Conjunctiva is clear   NECK: no JVD  CVS: S1 S2 tachycardia is present    Respiratory: Please decreased breath sounds bilaterally without  crepitations or Wheezing  bilaterally  GI: Soft, non tender , Bowel Sounds are Positive    Skin: Warm and dry, No Rashes   CNS: Alert and Oriented x 3   Musculoskeletal: Lower Limbs without Pedal Edema            Data     Recent Labs  Lab 07/25/17  1540 07/25/17  0630 07/24/17  1545   WBC  --  3.8* 5.3   HGB  --  9.9* 6.7*   MCV  --  86 93   PLT  --  174 257   NA  --  137 138   POTASSIUM 3.9 2.9* 3.3*   CHLORIDE  --  105 105   CO2  --  23 23   BUN  --  8 8   CR  --  0.66 0.60   ANIONGAP  --  9 10   JARED  --  7.4* 7.7*   GLC  --  115* 99   ALBUMIN  --   --  1.7*   PROTTOTAL  --   --  4.7*   BILITOTAL  --   --  0.4   ALKPHOS  --   --  129   ALT  --   --  15   AST  --   --  17   TROPI  --   --  <0.015The 99th percentile for upper reference range is 0.045 ug/L.  Troponin values in the range of 0.045 - 0.120 ug/L may be associated with risks of adverse clinical events.         Recent Labs  Lab 07/25/17  0630 07/24/17  1545   * 99        Unresulted Labs Ordered in the Past 30 Days of this Admission     Date and Time Order Name Status Description    7/24/2017 1534 Blood culture Preliminary     7/24/2017 1534 Blood culture Preliminary            Imaging  No results found for this or any previous visit (from the past 24 hour(s)).     I reviewed all new labs and imaging results over the last 24 hours. I personally reviewed no images or EKG's today.    Medications     NaCl 125 mL/hr at 07/25/17 0616       sodium chloride (PF)  3 mL Intracatheter Q8H     piperacillin-tazobactam  4.5 g Intravenous Q6H     vancomycin (VANCOCIN) IV  1,500 mg Intravenous Q24H     carboxymethylcellulose  2 drop Both Eyes Daily     cholecalciferol  2,000 Units Oral Daily     fluticasone-vilanterol  1 puff Inhalation Daily     multivitamin, therapeutic with minerals  1 tablet Oral Daily     omeprazole  40 mg Oral Daily     fluticasone  2 spray Both Nostrils Daily     levofloxacin  750 mg Intravenous Q24H              Abhijeet Delgado  BRYSON.  Hospitalist - Internal Medicine  [facility]

## 2017-07-25 NOTE — ED NOTES
Pt placed call light on, stating increased SOA sats 96 % blood transfusion stopped informed Dr Lindquist

## 2017-07-25 NOTE — ED NOTES
Pt continues to tolerate blood transfusion without difficulties. No change to SOB. VSS. Pt is comfortable in bed at this time.

## 2017-07-25 NOTE — ED NOTES
Blood verified with Lyssa Pope RN explained to the pt adverse reactions, VS, pt understands, Lyssa SANCHEZ sitting with pt at bedside

## 2017-07-25 NOTE — CONSULTS
Care Transition Initial Assessment - RN  Reason For Consult: discharge planning, care coordination/care conference   Met with: Patient and Family.    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             Lives With: spouse  Living Arrangements: house  Quality Of Family Relationships: helpful, involved, supportive              Insurance Concerns: No Insurance issues identified          This writer met with pt and her , introduced self and role.       Rossana Stringer RN, Care Coordinator [phone]      Discharge Planner   Discharge Plans in progress: follow-up appointment with Dr. El, no other dc needs at this time  Barriers to discharge plan: none  Follow up plan: lymphedema and Dr. El       Entered by: Rossana Stringer 07/25/2017 4:14 PM

## 2017-07-25 NOTE — PROGRESS NOTES
Patient up in chair most of morning and to bed for nap this afternoon. Patients  verbalized concern that patient will stay up as long as company is present and then become very exhausted.Patient able to ambulate to B R with minimal assist. No use of walker. Appetite still minimal. Patient denies increased pain or discomfort.Bilateral lower extremities are wrapped per lymphedema nurse , and are in place. Legs edematous.eleavate while in bed. lS still diminished bilat.will follow plan of care.

## 2017-07-25 NOTE — PROGRESS NOTES
Spoke with Dr. Melton in regards to potassium of 3.3- will not replace at this time, BMP check in the am.   Katyh Melton RN BSN

## 2017-07-25 NOTE — PROGRESS NOTES
[facility] [place]MISSION NOTE    Patient admitted to room 2309 at approximately 2015 via cart from emergency room. Patient was accompanied by spouse and transport tech.     Verbal SBAR report received from Genoveva prior to patient arrival.     Patient ambulated to bed with stand-by assist. Patient alert and oriented X 3. The patient is not having any pain. 0-10 Pain Scale: 0. Admission vital signs: Blood pressure 125/78, pulse 102, temperature 98.4  F (36.9  C), temperature source Oral, resp. rate 20, SpO2 94 %, not currently breastfeeding. Patient was oriented to plan of care, call light, bed controls, tv, telephone, bathroom and visiting hours.     The following safety risks were identified during admission: isolation. Yellow risk band applied: YES.     Samantha Melton

## 2017-07-25 NOTE — PROGRESS NOTES
"SPIRITUAL HEALTH SERVICES  SPIRITUAL ASSESSMENT Progress Note  [facility] - Med/Surg    PRIMARY FOCUS:    Emotional/spiritual/[employer] distress    Support for coping    ILLNESS CIRCUMSTANCES:   Reviewed documentation. Reflective conversation shared with pt, Bren, , Naren and two adult sons which integrated elements of illness and family narratives.     Context of Serious Illness/Symptom(s) - Esophageal and gastric cancer, here with PN    Resources for Support - , sons, [employer] staff and family    DISTRESS:     Emotional/Existential/Relational Distress - Bren was very positive but also realistic that her cancer is \"terminal\" - she stated that today the plan had been to have her oncology appointment and get results from her PET scan - sons had planned to be present - \"now this came along and changed our plans\"    Spiritual/Mosque Distress - Bren and Narne both stated they are praying for healing - but at the same time are living in the reality of God's plan involving other elements of support and answered prayer    Social/Cultural/Economic Distress - Bren stated the biggest challenge is always having to have her \"barf bag\" with her and never knowing if things will settle - Naren stated that what settles today may not settle tomorrow - He is the primary meal preparer and tries to make sure that Bren gets enough protein.  Bren stated she doesn't like the consistency of Ensure but that her son has introduced her to Muscle Milk and she likes that better.    SPIRITUAL/Church COPING:     Judaism/Lali - [employer] - [address]    Spiritual Practice(s) - Prayer is \"a big part\" of Bren's lali; we shared prayer together today    Emotional/Existential/Relational Connections - Scottie reflected on how he is now  and has a child with another on the way.  He stated that he and his wife have gone back to [employer] after a time away.  Bren stated she was happy with those developments.  "     GOALS OF CARE:    Goals of Care - To continue with treatment; to continue with prayer    Meaning/Sense-Making - Bren finds support and meaning through her family and her deepa    PLAN: I will be available for follow up visits during Bren's LOS.    Montana Parra M.A., [facility]  Staff   [facility]  Office: [phone]  Cell: [phone]  Pager [phone]

## 2017-07-25 NOTE — PLAN OF CARE
Problem: Goal Outcome Summary  Goal: Goal Outcome Summary  Outcome: Improving  Pt's 2nd unit of blood infused w/o reaction. Pt's LS fine crackles in RLL. O2 sat's 95% on RA. Pt up to void using an assist of one to bedside commode; dyspneic with exertion. Pt had some SOB. Received a prn neb with some relief. Medicated with Tylenol and 0.5 mg oral ativan and reported improvement. Lymphedema wraps on which were applied yesterday in clinic. VSS. IV NS@ 125/hr.

## 2017-07-26 NOTE — PROGRESS NOTES
Physical Therapy Evaluation:       07/26/17 1100   General Information   Discipline PT   Onset of Edema ([id number])   Affected Body Part(s) Right LE;Left LE   Edema Etiology Chemo   Chemotherapy Comments current   Etiology Comments 11/2016 diagnosed with lower esophagea, gastric cardic poorly differentiated carcinoma; saw Dr. Levy (12/14/16) and deemed not a surgical candidate; got chemoradiation from end of December 2016 o February 2017; restaging PET in April 2017 indicated significant response and treatment continued;  early May 2017 found pancreatic head mass biopsy proven mets from upper GI tract; pt with BLE edema from hypoalbuminemia; current hospital admission due to SOB   Pertinent history of current problem (PT: include personal factors and/or comorbidities that impact the POC; OT: include additional occupational profile info) pt is currently seeing OP lymphedema clinic and getting gradient compression bandages to BLEs from foot to knee with good response, last seen at clinic on Monday 7/24 and at that time pt was getting near ready to order compression garments for longterm edema management   Edema Precautions Active Cancer   Pain   Patient currently in pain No   Edema Examination / Assessment   Skin Condition Pitting;Intact   Skin Condition Comments BLE skin all intact with 2+ pitting at dorsum and 1+ ankle to knee   Scar No   Ulcerations No   Capillary Refill Symmetrical   Dorsal Pedal Pulse Symmetrical   Stemmer Sign Negative   Girth Measurements   Girth Measurements (deferred as just taken on 7/24; RLE -26.5% and LLE -18.4% )   Volume LE   Total Volume R (mL) 1983.9   Total Volume L (mL) 2380.9   ROM   Range of Motion (WFL) (functional LE ROM)   Strength   Strength (WFL) (functional LE )   Sensation   Sensation (WFL) no deficits were identified  (BLE light touch grossly intact)   Assessment/Plan   Patient presents with Stage 2 Lymphedema   Assessment Pt presents to IP lymphedema services with 2+  pitting LE secondary lymphedema. Pt is currently seeing OP lymphedema therapy clinic since middle of July with good reduction response in LEs from GCB which will be continued as an inpatient with goal of pt to return to OP lymph clinic at discharge to resume services to fit into compression garment for maintenance   Clinical Presentation Stable/Uncomplicated   Clinical Presentation Rationale clinical judgement   Clinical Decision Making (Complexity) Moderate complexity   Planned Edema Interventions Gradient compression bandaging;Fit for compression garment;Exercises;Precautions to prevent infection/exacerbation;Education;Manual therapy   Treatment Frequency (every other day for GCB application)   Treatment Duration 1 week   Patient, Family and/or Staff in agreement with plan of care. Yes   Risks and benefits of treatment have been explained. Yes   Total Evaluation Time   Total Evaluation Time (Minutes) 5

## 2017-07-26 NOTE — PROGRESS NOTES
Clinic Care Coordination Contact  Care Team Conversations  D/I: Received CTS on this patient who is hospitalized for PNA. She continues to be hospitalized at this time.   P: No outreach today as patient continues to be hospitalized. RN Cc will monitor chart and f/u after DC.    Neftali TIRADO,RN- BC  Clinic Care Coordinator  [facility]  Phone: [phone]

## 2017-07-26 NOTE — PLAN OF CARE
Problem: Goal Outcome Summary  Goal: Goal Outcome Summary  Outcome: No Change  Patient VSS, room air. Dyspneic with exertion, repositions for ease of breathing. Cough produces thin saliva, no mucous; understands trying to collect sputum sample. Diminished lung sounds, some improvement after Nebs. Legs wrapped for lymphedema. Port infusing at tko between antibiotics. Has had decreased appetite, encouraged to drink water. Loose stools throughout the night. Was given one dose of Tylenol and Ativan during evening, patient able to sleep better in recliner chair. Standby to assist x1 during night. Patient is pleasant and trying to keep a positive outlook, has a lot of support from her  at home.

## 2017-07-26 NOTE — PLAN OF CARE
Problem: Goal Outcome Summary  Goal: Goal Outcome Summary  Outcome: Improving  K+ presently = 3.7  Vanco level @ 5 p.m. = 17.5  Patient up to BATHROOM with STAND BY ASSIST X1  Zofran SL X1 for nausea  Patient states she feels pretty good

## 2017-07-26 NOTE — PLAN OF CARE
Problem: Goal Outcome Summary  Goal: Goal Outcome Summary  Lymphedema Therapy:  Evaluation completed and treatment initiated. Pt is a current patient ar our OP lymphedema clinic since ~middle of July and being treated with gradient compression wraps to knee.  GCB were re-applied this date from foot to knee - pt reports comfort.  Pt will be seen while in hospital every other day to reapply LE wraps. Bandages to come off early if directly creating pain, change in sensation, intolerable or change in respiratory status. Pt to resume OP lymphedema clinic at discharge.

## 2017-07-26 NOTE — PROGRESS NOTES
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  Consider talking with lymphedema clinic for recommendations      DVT Prophylaxis: With low platelets will avoid chemical prophylaxis for now   Code Status: Prior         Plan:   Monitor CBC and BMP  If breathing does not improve - tap Pleural effusion  Discussed with patient and she is okay with Palliative consult   Disposition: Anticipate discharge in 1-3 days               Interval History   Shortness of breath is better by 35%. No more improvement than yesterday  No chest pain.  No abdominal pain    Physical Exam   Temp:  [96.6  F (35.9  C)-98.4  F (36.9  C)] 96.6  F (35.9  C)  Pulse:  [103-106] 105  Heart Rate:  [] 103  Resp:  [16-18] 16  BP: (113-149)/(64-96) 122/81  SpO2:  [95 %-100 %] 96 %    Weights:   Vitals:    07/24/17 2048   Weight: 72 kg (158 lb 11.7 oz)    Body mass index is 28.12 kg/(m^2).    Constitutional: Not in any acute distress   EYES: Extra Occular movements are intact, Conjunctiva is clear   NECK: no JVD  CVS: S1 S2 tachycardia is present     Respiratory: Please decreased breath sounds bilaterally Right > Left without crepitations or Wheezing  bilaterally  GI: Soft, non tender , Bowel Sounds are Positive    Skin: Warm and dry, No Rashes   CNS: Alert and Oriented x 3   Musculoskeletal: Lower Limbs without Pedal Edema            Data     Recent Labs  Lab 07/26/17  0605 07/25/17  1540 07/25/17  0630 07/24/17  1545   WBC 3.5*  --  3.8* 5.3   HGB 10.4*  --  9.9* 6.7*   MCV 86  --  86 93     --  174 257     --  137 138   POTASSIUM 3.3* 3.9 2.9* 3.3*   CHLORIDE 107  --  105 105   CO2 22  --  23 23   BUN 9  --  8 8   CR 0.87  --  0.66 0.60   ANIONGAP 9  --  9 10   JARED 7.6*  --  7.4* 7.7*   GLC 97  --  115* 99   ALBUMIN  --   --   --  1.7*   PROTTOTAL  --   --   --  4.7*   BILITOTAL  --   --   --  0.4   ALKPHOS  --   --   --  129   ALT  --   --   --  15   AST  --   --   --  17   TROPI  --   --   --  <0.015The 99th percentile for upper reference range is 0.045 ug/L.  Troponin values in the range of 0.045 - 0.120 ug/L may be associated with risks of adverse clinical events.         Recent Labs  Lab 07/26/17  0605 07/25/17  0630 07/24/17  1545   GLC 97 115* 99        Unresulted Labs Ordered in the Past 30 Days of this Admission     Date and Time Order Name Status Description    7/24/2017 1534 Blood culture Preliminary     7/24/2017 1534 Blood culture Preliminary            Medications     NaCl 125 mL/hr at 07/25/17 0616       sodium chloride (PF)  3 mL Intracatheter Q8H     piperacillin-tazobactam  4.5 g Intravenous Q6H     vancomycin (VANCOCIN) IV  1,500 mg Intravenous Q24H     carboxymethylcellulose  2 drop Both Eyes Daily     cholecalciferol  2,000 Units Oral Daily     fluticasone-vilanterol  1 puff Inhalation Daily     multivitamin, therapeutic with minerals  1 tablet Oral Daily     omeprazole  40 mg Oral Daily     fluticasone  2 spray Both Nostrils Daily     levofloxacin  750 mg Intravenous Q24H              Abhijeet MASSEY.  Hospitalist -  Internal Medicine  [facility]

## 2017-07-26 NOTE — PLAN OF CARE
Problem: Goal Outcome Summary  Goal: Goal Outcome Summary  Outcome: Improving  She has been eating about 25% of her meals. Nutritional consult was ordered. Patient likes boost but gets sick from ensure. Dietician is aware and can have boost from home tonight. We will have boost here tomorrow. LS clear/diminished. 02 sats 95% on room air. Patient had unnaboots removed and reapplied today by physical therapist.

## 2017-07-27 NOTE — PLAN OF CARE
"Problem: Pneumonia (Adult)  Goal: Signs and Symptoms of Listed Potential Problems Will be Absent or Manageable (Pneumonia)  Signs and symptoms of listed potential problems will be absent or manageable by discharge/transition of care (reference Pneumonia (Adult) CPG).   Patient alert and oriented.  She was up to the bathroom a couple of times this shift.  She reports having less shortness of breath with activity.  She states \"I feel like the shortness of breath is improving when I get up.\"  Unable to assess pulses in the feet as they are wrapped.  It is difficult to know the amount of edema in the legs as well because they are wrapped.  Labs drawn from her port this am and sent to lab.  Patient washed herself up.        "

## 2017-07-27 NOTE — PROGRESS NOTES
"CLINICAL NUTRITION SERVICES  -  ASSESSMENT NOTE     REASON FOR ASSESSMENT  Bren Rendon is a 66 year old female seen by Registered Dietitian for RN Consult - patient wants Boost supplement but we only have Ensure. Ensure makes her sick.      NUTRITION HISTORY  - Information obtained from the patient and her  when speaking with them yesterday. She reports that she can not tolerate meat, makes her sick just to talk about it. She uses vanilla or strawberry Boost at home. She can not tolerate Ensure. She is using Muscle Milk protein at home, she states she mixes it in hot water. She is able to eat bites of foods. She prefers soups like tomato or cream of mushroom. She is able to eat some fruit( no grapes, no applesauce), yogurt and ice cream.      CURRENT NUTRITION ORDERS  Diet Order:     Regular     Current Intake/Tolerance:  She is eating ~25% of her meals per the nursing flow sheet      PHYSICAL FINDINGS  Observed  Subcutaneous fat loss  Obtained from Chart/Interdisciplinary Team  Bitemporal wasting    ANTHROPOMETRICS  Height: 5' 3\"  Weight: 158 lbs 11.7 oz  Body mass index is 28.12 kg/(m^2).  Weight Status:  Overweight BMI 25-29.9  IBW: 115#s  % IBW: 138%  Weight History:   Wt Readings from Last 12 Encounters:   07/24/17 72 kg (158 lb 11.7 oz)   07/11/17 72.3 kg (159 lb 4.8 oz)   06/27/17 73 kg (161 lb)   06/22/17 71.8 kg (158 lb 6.4 oz)   06/20/17 69.7 kg (153 lb 11.2 oz)   06/06/17 72.5 kg (159 lb 12.8 oz)   06/06/17 72.4 kg (159 lb 9.6 oz)   05/23/17 75.3 kg (166 lb)   05/18/17 75 kg (165 lb 6.4 oz)   05/10/17 75.8 kg (167 lb 1.7 oz)   05/06/17 76 kg (167 lb 8.8 oz)   05/04/17 77.1 kg (170 lb)     Weight on 1/20/2017 was 190#s    LABS  Labs reviewed    MEDICATIONS  Medications reviewed    Dosing Weight 57 kg    ASSESSED NUTRITION NEEDS PER APPROVED PRACTICE GUIDELINES:  Estimated Energy Needs: 8474-0167 kcals (30-35 Kcal/Kg)  Justification: repletion, cancer treatments  Estimated Protein Needs: 68-86 " grams protein (1.2-1.5 g pro/Kg)  Justification: Repletion, cancer treatments  Estimated Fluid Needs: 1 mL/Kcal  Justification: maintenance    MALNUTRITION:  % Weight Loss:  > 10% in 6 months (severe malnutrition)  % Intake:  </= 50% for >/= 1 month (severe malnutrition)  Subcutaneous Fat Loss:  Orbital region   Muscle Loss:  Temporal region  Fluid Retention:  Not assessed    Malnutrition Diagnosis: Severe malnutrition  In Context of:  Chronic illness or disease    NUTRITION DIAGNOSIS:  Inadequate oral intake related to nausea, decreased appetite as evidenced by 30# weight loss in the last 6 months, the patient is eating 25% or less of her meal trays.      NUTRITION INTERVENTIONS  Recommendations / Nutrition Prescription  High calorie, high protein oral nutrition supplement -Vanilla Boost twice a day  Will purchase Vanilla Boost per patient request    .      Implementation  Nutrition education: No education needs assessed at this time  Composition of Meals and Snacks and Medical Food Supplement  .      Nutrition Goals  Patient to drink 75% of the high protein, high calorie oral nutrition supplement on her meal trays.  .      MONITORING AND EVALUATION:  Progress towards goals will be monitored and evaluated per protocol and Practice Guidelines, Food intake and Liquid meal replacement or supplement    Christel Aviles RD,LD  Clinical Dietitian

## 2017-07-27 NOTE — PROGRESS NOTES
Clinic Care Coordination Contact  Care Team Conversations  D/I: Received CTS on this patient who is hospitalized for PNA. Per chart review, she is still in the hospital with no eminent plan for DC.  P: RN CC will continue to monitor chart and follow up after discharge.    Neftali TIRADO,RN- BC  Clinic Care Coordinator  [facility]  Phone: [phone]

## 2017-07-27 NOTE — PLAN OF CARE
Problem: Pneumonia (Adult)  Goal: Signs and Symptoms of Listed Potential Problems Will be Absent or Manageable (Pneumonia)  Signs and symptoms of listed potential problems will be absent or manageable by discharge/transition of care (reference Pneumonia (Adult) CPG).   Outcome: Improving  VSS, afebrile. O2 97% on RA. Denies feeling SOB. States she feels much better today and is wondering when she will be able to go home. Fine crackles noted in bilateral bases. Encouraged to cough and deep breathe. HR remains 110-120.   Sepsis protocol triggered this AM at 1045. Lactic 0.8.   Continues to have poor appetite. Eating approximately 25% of meals. Zofran administered x1 for nausea.   IVF infusing through concepción-cath TKO. IV abx administered as scheduled.   Up in chair for the day. Able to make needs known. Will continue to monitor.

## 2017-07-27 NOTE — PROGRESS NOTES
"SPIRITUAL HEALTH SERVICES  SPIRITUAL ASSESSMENT Progress Note  [facility] [place] - Med/Surg    Provided short follow up visit with pt, Bren.  She stated she was \"doing better\" and that her  had been over yesterday to visit.  She stated that had been helpful and supportive for her.    Montana Parra M.A., [facility]  Staff   [facility]  Office: [phone]  Cell: [phone]  Pager [phone]      "

## 2017-07-27 NOTE — CONSULTS
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  In December she started combined chemoradiation using Carbo/taxol which was changed to FOLFOX in March.  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  She was admitted for weakness and breathlessness and is being treated for pneumonia.     Patient Active Problem List    Diagnosis Date Noted     Anemia 07/24/2017     Priority: Medium     Pneumonia of right lower lobe due to infectious organism (H) 07/24/2017     Priority: Medium     Pneumonia 07/24/2017     Priority: Medium     Lymphedema 07/11/2017     Priority: Medium     Orthostatic hypotension 06/21/2017     Priority: Medium     Chemotherapy-induced neutropenia (H) 06/20/2017     Priority: Medium     S/P ERCP 05/05/2017     Priority: Medium     Obstructive jaundice 05/05/2017     Priority: Medium     Elevated liver enzymes 05/03/2017     Priority: Medium     Dehydration 01/24/2017     Priority: Medium     Nausea 01/24/2017     Priority: Medium     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     Priority: Medium     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) 12/18/2016     Priority: Medium     Gastroesophageal reflux disease with esophagitis 10/28/2016     Priority: Medium     Esophageal stricture 10/28/2016     Priority: Medium     Dupuytren's disease 04/22/2015     Priority: Medium     Hammertoe 04/22/2015     Priority: Medium     Acute cholecystitis 09/25/2014     Priority: Medium     Cholecystitis 09/25/2014     Priority: Medium     SK (seborrheic keratosis) 02/11/2013      Priority: Medium     Lentigo 02/11/2013     Priority: Medium     Xerosis of skin 02/11/2013     Priority: Medium     Postmenopausal bleeding 11/09/2011     Priority: Medium     CARDIOVASCULAR SCREENING; LDL GOAL LESS THAN 160 10/31/2010     Priority: Medium     External hemorrhoids 03/29/2010     Priority: Medium     Rosacea 03/29/2010     Priority: Medium     Bunion of great toe 03/29/2010     Priority: Medium     Allergic rhinitis 07/24/2007     Priority: Medium     Problem list name updated by automated process. Provider to review       Degeneration of lumbar or lumbosacral intervertebral disc 10/25/2006     Priority: Medium     Obesity 10/25/2006     Priority: Medium     Problem list name updated by automated process. Provider to review         Past Medical History:   Diagnosis Date     Abnormal glandular Papanicolaou smear of cervix 8/1993    Abn. Pap     Acid reflux disease      Arthritis     knees     Duran's esophageal ulceration      Cancer (H)      Dupuytren's disease      Obese      Rosacea      Signet ring cell carcinoma (H)        Past Surgical History:   Procedure Laterality Date     ARTHRODESIS FOOT Right 4/5/2016    Procedure: ARTHRODESIS FOOT;  Surgeon: Jackson Bowens DPM;  Location: [facility]     ARTHRODESIS TOE(S) Left 5/19/2015    Procedure: ARTHRODESIS TOE(S);  Surgeon: Jackson Bowens DPM;  Location: [facility]     ARTHROSCOPY KNEE  10/5/2011    Procedure:ARTHROSCOPY KNEE; Right Knee Arthroscopy & Medial and Lateral Menisectomy & Chondroplasty of Three Compartments and Cortisone Injection; Surgeon:TAMELA VASQUEZ; Location:[facility]     ENDOSCOPIC RETROGRADE CHOLANGIOPANCREATOGRAM N/A 5/10/2017    Procedure: ENDOSCOPIC RETROGRADE CHOLANGIOPANCREATOGRAM;;  Surgeon: Guru Randal Mantilla MD;  Location:  OR     ENDOSCOPIC RETROGRADE CHOLANGIOPANCREATOGRAM N/A 5/5/2017    Procedure: ENDOSCOPIC RETROGRADE CHOLANGIOPANCREATOGRAM;   Endoscopic Retrograde Cholagiopancreatogram with  failed cannulation;  Surgeon: Guru Randal Mantilla MD;  [facility]     ENDOSCOPIC ULTRASOUND UPPER GASTROINTESTINAL TRACT (GI) N/A 12/5/2016    Procedure: ENDOSCOPIC ULTRASOUND, ESOPHAGOSCOPY / UPPER GASTROINTESTINAL TRACT (GI);  Surgeon: Guru Randal Mantilla MD;  [facility]     ENDOSCOPIC ULTRASOUND UPPER GASTROINTESTINAL TRACT (GI) N/A 5/10/2017    Procedure: ENDOSCOPIC ULTRASOUND, ESOPHAGOSCOPY / UPPER GASTROINTESTINAL TRACT (GI);  Endoscopic Ultrasound, Endoscopic Retrograde Cholangiopancreatogram With Rendezvous, Biliary Sphncterotomy and Biliary Stent;  Surgeon: Enrique Senior MD;  [facility]     ESOPHAGOSCOPY, GASTROSCOPY, DUODENOSCOPY (EGD), COMBINED N/A 11/4/2016    Procedure: COMBINED ESOPHAGOSCOPY, GASTROSCOPY, DUODENOSCOPY (EGD), BIOPSY SINGLE OR MULTIPLE;  Surgeon: Dillan Dumont MD;  [facility]: [facility]     ESOPHAGOSCOPY, GASTROSCOPY, DUODENOSCOPY (EGD), COMBINED N/A 5/5/2017    Procedure: COMBINED ENDOSCOPIC ULTRASOUND, ESOPHAGOSCOPY, GASTROSCOPY, DUODENOSCOPY (EGD), FINE NEEDLE ASPIRATE/BIOPSY;  Diagnostic  Endoscopic Ultrasound with fine neelde biopsy. ;  Surgeon: Guru Randal Mantilla MD;  [facility]     LAPAROSCOPIC CHOLECYSTECTOMY N/A 9/25/2014    Procedure: LAPAROSCOPIC CHOLECYSTECTOMY;  Surgeon: Arnaldo López MD;  Location: [facility]     REMOVE HARDWARE FOOT Left 4/5/2016    Procedure: REMOVE HARDWARE FOOT;  Surgeon: Jackson Bowens DPM;  [facility]: [facility]     REPAIR HAMMER TOE Left 5/19/2015    Procedure: REPAIR HAMMER TOE;  Surgeon: Jackson Bowens DPM;  Location: [facility]     SURGICAL HISTORY OF -   4/4/2002    Gastroscopy     TUBAL LIGATION  2/1988    Laparoscopic Tubal occlusion by cautery       Family History   Problem Relation Age of Onset     Genitourinary Problems Mother      Kidney disease.     Hypertension Mother      Lipids Mother      Obesity Mother      CANCER Father      Lung cancer than went to  bone and brain.     Coronary Artery Disease Father      MI     DIABETES Father      Breast Cancer Maternal Grandmother        Social History     Social History Narrative    2017: Bren met Naren BEFORE they were in grade school; she says it was the way she slid into base on the field that first attracted him to her when she was age 2 yrs..  They both later worked for an airline.  They have been  over 30 yrs with 2 adult sons, a grandbaby on the way.  She never smoked, does not drink.  They live in [place].  She has a strong deepa (Denominational) which is a source of comfort and strength, as is her many friendships.  She knows she is going to die from her cancer, doesn't think she will survive to January when her grandchild is expected.  Her 's number 1 concern for her is her quality of life regardless of cost.  She has planned her own , plans to be cremated with ashes spread at [place].  She has no current home care services.  She is anxious to get back home.     Néstor Brown CNP (Ann)    Palliative Care    [facility] cell:  [phone]        Spiritual History:  As above    Advance Care Planning:  Code Status:  DNR / DNI  Health Care Directive: Yes, If Yes, is there a copy in the EMR?   Yes  POLST:  No      Allergies   Allergen Reactions     Cats Other (See Comments)     sneezing     Dogs Other (See Comments)     Seasonal Allergies Other (See Comments)     Sneezing and sinus drainage     Nkda [No Known Drug Allergies]        Current Facility-Administered Medications   Medication Dose Route Frequency     ondansetron  8 mg Intravenous Q8H     prochlorperazine  5 mg Intravenous Q6H     LORazepam  0.5 mg Oral At Bedtime    Or     LORazepam  0.5 mg Intravenous At Bedtime     sodium chloride (PF)  3 mL Intracatheter Q8H     piperacillin-tazobactam  4.5 g Intravenous Q6H     vancomycin (VANCOCIN) IV  1,500 mg Intravenous Q24H     carboxymethylcellulose  2 drop Both Eyes Daily     cholecalciferol  " 2,000 Units Oral Daily     fluticasone-vilanterol  1 puff Inhalation Daily     multivitamin, therapeutic with minerals  1 tablet Oral Daily     omeprazole  40 mg Oral Daily     fluticasone  2 spray Both Nostrils Daily     levofloxacin  750 mg Intravenous Q24H       Current Facility-Administered Medications   Medication Dose Route Frequency     OLANZapine zydis  2.5 mg Oral Q12H PRN     metoclopramide  5 mg Intravenous Q6H PRN     dexamethasone  2 mg Intravenous Q6H PRN     potassium chloride  20-40 mEq Oral Q2H PRN     potassium chloride  20-40 mEq Oral or Feeding Tube Q2H PRN     potassium chloride  10 mEq Intravenous Q1H PRN     potassium chloride with lidocaine  10 mEq Intravenous Q1H PRN     potassium chloride  20 mEq Intravenous Q1H PRN     lidocaine (buffered or not buffered)  1 mL Other Q1H PRN     lidocaine 4%   Topical Q1H PRN     sodium chloride (PF)  3 mL Intracatheter Q1H PRN     naloxone  0.1-0.4 mg Intravenous Q2 Min PRN     acetaminophen  650 mg Oral Q4H PRN     oxyCODONE  5-10 mg Oral Q3H PRN     LORazepam  0.5-1 mg Intravenous Q4H PRN    Or     LORazepam  0.5-1 mg Oral Q4H PRN     albuterol  2.5 mg Nebulization Q4H PRN       Review of Systems:  Has daily dry heaves--isn't eating enough to actually vomit much of substance.  She takes Ativan, compazine and zofran for this symptom, and tries to stagger the meds throughout the day but, to the best I'm able to discern, does NOT take maximum dose of either of the latter 2 drugs.  Typically takes Ativan at HS and does sleep through the night; usually goes to bed at 10:30 or 11pm and awakens between 7:30 and 9am.  She requests her own meds and her  brings them to her.  She has had repeated problems with spitting up shortly after taking an ODT and hasn't known whether to repeat the dose or not.  She states the Compazine suppository worked well, but her doctors didn't want her to keep using it in case she \"scratched herself\"? (has had episodes of " "neutropenia but ANC never less  Than 0.5).  No pain, bowels loose and move at least daily.  Short of air with exertion.  Has episodic upper airway congestion that often triggers gagging which then can trigger the dry heaves.  No appetite--consumes little protein.  Can't tolerate meat.  Drinks Ensure.  Has altered sense of taste and smell and even the sight of food can trigger dry heaves.  Lymphedema treatments were initiated on 7/19 with already dramatic improvement.  Has no assistive devices at home but  plans to purchase a walker. Infrequent loose cough, no urinary incontinence. Minimal ambulation since here due to MORGAN.  Has lost over 70 lbs since October 2016.  Ears feel full; has h/o seasonal + dog allergies, Allegra held on admission.     Performance Assessment:    Palliative Performance Scale, v.2     40%  Extensive disease. Mainly in bed w/little ambulation. ADLs/activities mainly w/assistance. Normal or reduced intake. Full LOC or drowsy or confused.      Exam:  /64 (BP Location: Left arm)  Pulse 115  Temp 98.3  F (36.8  C) (Oral)  Resp 18  Ht 5' 3\" (1.6 m)  Wt 158 lb 11.7 oz (72 kg)  SpO2 97%  BMI 28.12 kg/m2  Wt Readings from Last 10 Encounters:   07/24/17 158 lb 11.7 oz (72 kg)   07/11/17 159 lb 4.8 oz (72.3 kg)   06/27/17 161 lb (73 kg)   06/22/17 158 lb 6.4 oz (71.8 kg)   06/20/17 153 lb 11.2 oz (69.7 kg)   06/06/17 159 lb 12.8 oz (72.5 kg)   06/06/17 159 lb 9.6 oz (72.4 kg)   05/23/17 166 lb (75.3 kg)   05/18/17 165 lb 6.4 oz (75 kg)   05/10/17 167 lb 1.7 oz (75.8 kg)   Pleasant, sitting up in chair with legs elevated in no apparent distress   Normocephalic with bitemporal wasting   Sclera anicteric, PERRL  OP pink, moist, no thrush, no PND  R ear w/o wax plug, TM intact w/+ light reflex, no bulging  CV RRR tachy  Lungs diminished b/l, clear, no cough, no use of accessory muscles at rest  Abd obese, NT, normoactive bowel sounds   Ext in b/l lymphedema wraps    Intake/Output Summary " (Last 24 hours) at 07/27/17 1509  Last data filed at 07/27/17 1251   Gross per 24 hour   Intake             1119 ml   Output                0 ml   Net             1119 ml     ROUTINE ICU LABS (Last four results)  CMP  Recent Labs  Lab 07/27/17  0545 07/26/17  1510 07/26/17  0605 07/25/17  1540 07/25/17  0630 07/24/17  1545   NA  --   --  138  --  137 138   POTASSIUM 3.5 3.7 3.3* 3.9 2.9* 3.3*   CHLORIDE  --   --  107  --  105 105   CO2  --   --  22  --  23 23   ANIONGAP  --   --  9  --  9 10   GLC  --   --  97  --  115* 99   BUN  --   --  9  --  8 8   CR 0.98  --  0.87  --  0.66 0.60   GFRESTIMATED 56*  --  64  --  89 >90Non  GFR Calc   GFRESTBLACK 68  --  78  --  >90African American GFR Calc >90African American GFR Calc   JARED  --   --  7.6*  --  7.4* 7.7*   PROTTOTAL  --   --   --   --   --  4.7*   ALBUMIN  --   --   --   --   --  1.7*   BILITOTAL  --   --   --   --   --  0.4   ALKPHOS  --   --   --   --   --  129   AST  --   --   --   --   --  17   ALT  --   --   --   --   --  15     CBC  Recent Labs  Lab 07/27/17  0545 07/26/17  0605 07/25/17  0630 07/24/17  1545   WBC 3.6* 3.5* 3.8* 5.3   RBC 3.37* 3.54* 3.36* 2.13*   HGB 10.1* 10.4* 9.9* 6.7*   HCT 29.1* 30.4* 28.8* 19.8*   MCV 86 86 86 93   MCH 30.0 29.4 29.5 31.5   MCHC 34.7 34.2 34.4 33.8   RDW 18.2* 18.7* 17.9* 15.9*    178 174 257     INRNo lab results found in last 7 days.  Arterial Blood GasNo lab results found in last 7 days.    7/19 Pet/CT  IMPRESSION:  1. New area of infiltrate or consolidation right middle lobe  concerning for pneumonia and less likely bronchoalveolar spread of  carcinoma. No other hypermetabolic lung lesions are evident.   2. New moderate-sized bilateral pleural effusions and small to  moderate amount of ascites along with subcutaneous stranding all  likely related to volume overload.  3. New right paratracheal mediastinal lymph node with increased FDG  activity concerning for new mediastinal lymph node  metastasis.  4. Improving FDG activity near the GE junction mass now measuring an  SUV max 3.8.  5. Increasing FDG activity around the pancreatic head near the biliary  stent concerning for adjacent adenopathy or pancreatic head mass. No  obvious mass on corresponding noncontrast CT images. Artifact related  to the stent remains in the differential.      AMANDA JOHN MD        Assessment/Plan:  1. Chronic nausea 2o esophageal/gastric cancer   >will schedule Zofran 8 mg at [id number]   >schedule compazine for 6-12-6-12   >schedule Ativan 10pm   >PRN Reglan, Ativan, Decadron, Olanzapine   >I've requested Granisetron 3.1 patch q7d in lieu of Ondansetron for post discharge use   >consider addition of pseudoephedrine for secretions, or Scop patch if secretions still trigger dry heaves tomorrow    2. Allergic rhinitis   >Allegra ODT 30 mg bid    3. Understanding of disease & Goals of care:   >realistic; knows disease not curable   >hopes to live till grandchild is delivered in January but does not expect it   >anxioius to return home; doesn't want home care services at this point in time;  is able and willing to provide all support/care needed   >DNR/DNI; needs POLST      Thank you for the opportunity to be of service to this patient and family.      1305 - 1625, 200 min, > 50% spent discussing  prognosis, goals of care, appropriate use of medications for symptom management, nonpharmacologic methods of symptom management and providing supportive counseling and coordinating care with  attending and nursing.    Néstor Brown CNP (Ann)  Palliative Care  [facility] cell:  [phone]

## 2017-07-27 NOTE — PHARMACY-VANCOMYCIN DOSING SERVICE
Pharmacy Vancomycin Note  Date of Service 2017  Patient's  1950   66 year old, female    Indication: Healthcare-Associated Pneumonia and Sepsis  Goal Trough Level: 15-20 mg/L  Day of Therapy: Day #3  Current Vancomycin regimen:  1500 mg IV q24h    Current estimated CrCl = Estimated Creatinine Clearance: 58.8 mL/min (based on Cr of 0.87).    Creatinine for last 3 days  2017:  3:45 PM Creatinine 0.60 mg/dL  2017:  6:30 AM Creatinine 0.66 mg/dL  2017:  6:05 AM Creatinine 0.87 mg/dL    Recent Vancomycin Levels (past 3 days)  2017:  5:35 PM Vancomycin Level 17.5 mg/L    Vancomycin IV Administrations (past 72 hours)                   vancomycin (VANCOCIN) 1,500 mg in NaCl 0.9 % 250 mL intermittent infusion (mg) 1,500 mg New Bag 17 1744     1,500 mg New Bag 17 1615     1,500 mg New Bag 17 1912                Nephrotoxins and other renal medications (Future)    Start     Dose/Rate Route Frequency Ordered Stop    17 1730  vancomycin (VANCOCIN) 1,500 mg in NaCl 0.9 % 250 mL intermittent infusion      1,500 mg Intravenous EVERY 24 HOURS 17 1653      17 1641  piperacillin-tazobactam (ZOSYN) intermittent infusion 4.5 g      4.5 g  200 mL/hr over 30 Minutes Intravenous EVERY 6 HOURS 17 1640               Contrast Orders - past 72 hours     None          Interpretation of levels and current regimen:  Trough level is  Therapeutic  @ 17.5 mg/l, however patient has only had 2 doses of Vancomycin.  This may continue to accumulate.  Has serum creatinine changed > 50% in last 72 hours: No    Urine output:  unable to determine    Renal Function: Baseline upon admission Scr was 0.6, today is 0.87 - slightly increased.    Plan:  1.  Continue Current Dose  2.  Pharmacy will check trough levels as appropriate in 1-3 Days.  Would recheck in 2 days should patient continue on Vancomycin.  3. Serum creatinine levels will be ordered daily for the first week of therapy  and at least twice weekly for subsequent weeks.      Meenakshi Stephen, PharmD        .

## 2017-07-28 NOTE — PHARMACY-VANCOMYCIN DOSING SERVICE
Pharmacy Vancomycin Note  Date of Service 2017  Patient's  1950   66 year old, female    Indication: Healthcare-Associated Pneumonia and Sepsis  Goal Trough Level: 15-20 mg/L  Day of Therapy: 5  Current Vancomycin regimen:  1500 mg IV q24h    Current estimated CrCl = Estimated Creatinine Clearance: 38.6 mL/min (based on Cr of 1.36).    Creatinine for last 3 days  2017:  6:05 AM Creatinine 0.87 mg/dL  2017:  5:45 AM Creatinine 0.98 mg/dL  2017:  6:50 AM Creatinine 1.36 mg/dL    Recent Vancomycin Levels (past 3 days)  2017:  5:35 PM Vancomycin Level 17.5 mg/L  2017:  4:30 PM Vancomycin Level 29.4 mg/L    Vancomycin IV Administrations (past 72 hours)                   vancomycin (VANCOCIN) 1,500 mg in NaCl 0.9 % 250 mL intermittent infusion (mg) 1,500 mg New Bag 17 1737     1,500 mg New Bag 17 1744                Nephrotoxins and other renal medications (Future)    Start     Dose/Rate Route Frequency Ordered Stop    17 1400  piperacillin-tazobactam (ZOSYN) infusion 3.375 g      3.375 g  100 mL/hr over 30 Minutes Intravenous EVERY 6 HOURS 17 1110      17 1730  vancomycin (VANCOCIN) 1,500 mg in NaCl 0.9 % 250 mL intermittent infusion      1,500 mg Intravenous EVERY 24 HOURS 17 1653               Contrast Orders - past 72 hours (72h ago through future)    Start     Dose/Rate Route Frequency Ordered Stop    17 0445  iopamidol (ISOVUE-370) solution 71 mL      71 mL Intravenous ONCE 17 0423 17 0534          Interpretation of levels and current regimen:  Trough level is  Supratherapeutic    Has serum creatinine changed > 50% in last 72 hours: Yes    Urine output:  diminished urine output    Renal Function: Worsening    Plan:  1.  Decrease Dose to 1500mg IV q48 hours. Monitor daily creatinine and repeat level prior to dose in 48 hours IF creatinine continues to worsen.  2.  Pharmacy will check trough levels as appropriate in 1-3  Days.    3. Serum creatinine levels will be ordered daily for the first week of therapy and at least twice weekly for subsequent weeks.      Samantha Rivera        .

## 2017-07-28 NOTE — PROGRESS NOTES
"Pt taken off BiPap this morning for a short time to take some sips of water and to take her medications. Pt states that her breathing is \"really good\" with the BiPap on, but when taken off, needs to be placed on 8 LPM via nasal cannula to maintain 02 sats >88%. Pt states that her breathing feels \"pretty bad\" without the Bipap on. Requested to forego breakfast this morning and go back on BiPap.  present at bedside. Call light within reach.   "

## 2017-07-28 NOTE — PROGRESS NOTES
[facility] Medicine Progress Note  Date of Service: 07/28/2017        Assessment & Plan   Bren Rendon is a 66 year old female with esophageal and gastric cancer  Undergoing palliative chemotherapy who presents with increased shortness of breath over the weekend    Principal Problem:    Acute hypoxic respiratory failure  Probably on the basis of bilateral pleural effusion larger on the right side and moderate left-sided  Patient was treated with BiPAP and is improving      Pneumonia -probably aspiration related - healthcare associated - of right lower lobe due to infectious organism (H)  On vancomycin and Zosyn and Levaquin which will be continued  We'll change to IV antibiotics to Levaquin and Augmentin    Bilateral Pleural Effusion  Right greater than Left  Had Thoracentesis on the right side with removal of 2.5 L of pleural fluid which was sent for regular test as well as cytology        Esophageal cancer as well as 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    Consider talking with lymphedema clinic for recommendations      DVT Prophylaxis: With low platelets will avoid chemical prophylaxis for now   Code Status: DNR/DNI         Plan:   Will continue BiPAP as needed and wean it off if possible  Monitor the patient with the 5 mm of pleural fluid removed on the right side  Check a chest x-ray in morning along with CBC and BMP  Antinausea medications have been recommended per palliative care  Disposition: Anticipate discharge is unknown at this time              Interval History    Patient had worsening of hypoxia and had to be transferred to the intensive care unit with BiPAP support  After  BiPAP support pH has increased to normal from 7.1 to 7.3  Shortness of breath is better is better now    Physical Exam   Temp:  [96.7  F (35.9  C)-98.3  F (36.8  C)] 97.7  F (36.5  C)  Pulse:  [120-124] 120  Heart Rate:  [] 109  Resp:  [12-30] 14  BP: ()/(54-91) 84/64  FiO2 (%):  [35 %-75 %] 35 %  SpO2:  [90 %-100 %] 97 %    Weights:   Vitals:    07/24/17 2048   Weight: 72 kg (158 lb 11.7 oz)    Body mass index is 28.12 kg/(m^2).    Constitutional: Not in any acute distress   EYES: Extra Occular movements are intact, Conjunctiva is clear   NECK: no JVD  CVS: S1 S2 tachycardia is present    Respiratory: Please decreased breath sounds bilaterally Right > Left without crepitations or Wheezing  bilaterally  GI: Soft, non tender , Bowel Sounds are Positive    Skin: Warm and dry, No Rashes   CNS: Alert and Oriented x 3   Musculoskeletal: Lower Limbs without Pedal Edema            Data     Recent Labs  Lab 07/28/17  0650 07/28/17  0244 07/27/17  0545 07/26/17  1510 07/26/17  0605  07/25/17  0630 07/24/17  1545   WBC  --  7.9 3.6*  --  3.5*  --  3.8* 5.3   HGB  --  11.2* 10.1*  --  10.4*  --  9.9* 6.7*   MCV  --  87 86  --  86  --  86 93   PLT  --  345 200  --  178  --  174 257   INR 1.36*  --   --   --   --   --   --   --      --   --   --  138  --  137 138   POTASSIUM 3.5  --  3.5 3.7 3.3*  < > 2.9* 3.3*   CHLORIDE 109  --   --   --  107  --  105 105   CO2 20  --   --   --  22  --  23 23   BUN 12  --   --   --  9  --  8 8   CR 1.36*  --  0.98  --  0.87  --  0.66 0.60   ANIONGAP 11  --   --   --  9  --  9 10   JARED 8.2*  --   --   --  7.6*  --  7.4* 7.7*   *  --   --   --  97  --  115* 99   ALBUMIN  --   --   --   --   --   --   --  1.7*   PROTTOTAL 4.3*  --   --   --   --   --   --  4.7*   BILITOTAL  --   --   --   --   --   --   --  0.4   ALKPHOS  --   --   --   --   --   --   --  129   ALT  --   --   --   --   --   --   --  15   AST  --   --   --   --   --   --   --  17   TROPI  --   --   --   --    --   --   --  <0.015The 99th percentile for upper reference range is 0.045 ug/L.  Troponin values in the range of 0.045 - 0.120 ug/L may be associated with risks of adverse clinical events.   < > = values in this interval not displayed.      Recent Labs  Lab 07/28/17  0650 07/26/17  0605 07/25/17  0630 07/24/17  1545   * 97 115* 99        Unresulted Labs Ordered in the Past 30 Days of this Admission     Date and Time Order Name Status Description    7/24/2017 1534 Blood culture Preliminary     7/24/2017 1534 Blood culture Preliminary            Medications        ondansetron  8 mg Intravenous Q8H     prochlorperazine  5 mg Intravenous Q6H     LORazepam  0.5 mg Oral At Bedtime    Or     LORazepam  0.5 mg Intravenous At Bedtime     fexofenadine  60 mg Oral Daily     sodium chloride (PF)  3 mL Intracatheter Q8H     piperacillin-tazobactam  4.5 g Intravenous Q6H     vancomycin (VANCOCIN) IV  1,500 mg Intravenous Q24H     carboxymethylcellulose  2 drop Both Eyes Daily     cholecalciferol  2,000 Units Oral Daily     fluticasone-vilanterol  1 puff Inhalation Daily     multivitamin, therapeutic with minerals  1 tablet Oral Daily     omeprazole  40 mg Oral Daily     fluticasone  2 spray Both Nostrils Daily     levofloxacin  750 mg Intravenous Q24H              Abhijeet MASSEY.  Hospitalist - Internal Medicine  [facility]

## 2017-07-28 NOTE — PROGRESS NOTES
[facility] TRANSPORT NOTE  Data:   Reason for Transport:  transfer to ICU    Bren Rendon was transported to ICU room 8 via cart at 0440.  Patient was accompanied by Registered Nurse. Equipment used for transport: Oxygen 15 liters per oxy mask, pulse oximeter and IV pump.    Action:  Report: received from Carol Ann LOPEZ RN    Response:  Patient's condition is critical. Patient placed on BiPAP with 75% oxygen.    Melissa Hurtado RN

## 2017-07-28 NOTE — PROGRESS NOTES
"7-27-17 2210 - patient complained of some chest discomfort.  Pointed at the middle of chest (epigastric area) and up.  She states she has this everyday, but she is able to stretch with her arms over her head and move and the discomfort subsides.  She reports using tums at times and it helps.  She reports that this started 7/27/17 after lunch and it just seems to be \"staying around.\"  She states she feels short of breath and just cannot get enough air.  Patient is calm and does not appear to be in distress - she is able to talk in complete sentences.  Please see flow sheet for vital sign documentation.  2230- Dr Espinosa was called.  Orders to apply heat or ice to the area.  GI cocktail and EKG now. 2333 Called Dr Espionsa and updated patient status.  She got nauseated and vomited after the GI cocktail.  EKG was reviewed by Dr Espinosa.  2336 - Dr Espinosa at the bedside to assess the patient. She was up to the bathroom and had a bowel movement and stated she felt a little better after the bowel movement and that the heat was helping.    7/28/17 0040 - patient complained worsening shortness of breath.  0043 - Nasal canula at 2 liters applied for comfort. RT notified and 0055 - albuterol nebulizer given.  Lung sounds remain diminished in the bases and clear otherwise.    7/28/17 0130 - patient complained of increased shortness of breath.  \"I feel like I just cannot get enough air in.\"  Unable to complete sentences without stopping in between.  Labored breathing and using abdominal muscles to breath as well. oxymask at 10 liters applied at that time (please see flow sheet for complete documentation about vitals and oxygen saturations.)  0140 - Dr Mark updated.  Orders place per Dr Mark.  Once time dose of morphine, EKG obtained as ordered, labs obtained, CXR obtained as well.  Dr Mark placed orders and requested to be called after the lab work was back.  0340 lab called to check on status of labs.  RT called " as well as the patient continues to have increased work of breathing.  0402 Dr Mark called and updated.  Some of the labs are back.  CXR and EKG are back.  He was updated on patient status. Continued increased work of breathing unable to complete sentences, Blood pressure 81/56, VBG pH 7.15.  Orders to transfer the patient to ICU and start BIPAP, and Dr Mark will be in to see patient in ICU.    RT informed.  ICU informed.  Patient transferred to ICU via cart - ICU RN, writer, RT, and nursing assistant.  She was transferred to room 1008 and report was given to DANIEL Dale.   Naren informed of her transfer to ICU and updated. Patient aware and agreed with family being informed.  Offered earlier to call the family, but she declined.

## 2017-07-28 NOTE — PLAN OF CARE
Problem: Goal Outcome Summary  Goal: Goal Outcome Summary  Outcome: Therapy, progress toward functional goals as expected  Pt tolerating bandages on B LE's, therapist to return to change bandages on Monday, therapist discussed with pt's spouse and nsg.

## 2017-07-28 NOTE — PROGRESS NOTES
"Called approximately 3am for elevated heart rate and increased dyspnea/o2 requirement.  No chest pain, pressure or heaviness.      Ordered STAT labs, chest x-ray and ECG and gave single dose of IV morphine 2mg x 1 with instructions to call as soon as lab results were back or if any worsening.    Labs showed elevated D-dimer, stable blood counts, but notable mixed respiratory and metabolic acidosis on VBG.  Did not check troponin in context of no chest pain, pressure or heaviness and normal ECG with other far more likely etiology - even if elevated, would be attributed to strain from tachycardia more than to ACS.      After starting BiPAP patient reports feeling notably improved -- says \"100% better\".  Denies dyspnea.  No palpitations or chest pain.  No respiratory distress.  Needing 12/5 with FiO2 being weaned down but already dos to 35%.  No other pain or concerns, says she now feels good after starting BiPAP.      Exam:  EXAM after starting BIPAP --   General: awake and alert, NAD, oriented x 3  Head: normocephalic  Neck: unremarkable, no lymphadenopathy   HEENT: oropharynx pink and moist    Heart: Regular rate and rhythm, no murmurs, rubs, or gallops  Lungs: somewhat decreased breath sounds bilaterally, but more so on the right.  Minimal to no crackles present, reasonable air movement, no other focal symptoms, breathing comfortably.    Abdomen: soft, non-tender, no masses or organomegaly  Extremities: no edema in lower extremities   Skin unremarkable.       ASSESSMENT/PLAN:   Acute hypoxic and hypercapnic respiratory failure with predominantly respiratory acidosis - appears due to worsening pleural effusion     Patient did not improve with morphine, and with VBG showing acidosis as above and continued respiratory distress/increased oxygen requirement patient was transferred to ICU and started on BiPAP.  After starting BiPAP her symptoms were markedly improved.  With elevated D-dimer will check CT to rule out PE " as she certainly has risk factors for this, but with clear worsening pleural effusion on chest x-ray this appears to be the primary etiology here so will hold off on starting heparin unless we see clear PE on chest CT.  Nothing in presentation or on chest x-ray that looks clearly like worsening pneumonia, continue current antibiotics for now.  Plan per nursing report is for thoracentesis today - will request that we attempt to do this as early as possible.      Tachycardia and low blood pressure   Confirmed sinus tach on ECG - had been as high as 140-150 at worst, now on BiPAP was down to 120 at time of my evaluation.  Appears that the tachycardia is mostly compensatory due to respiratory distress as above, although ruling out PE as above is appropriate.  Patient asymptomatic, but blood pressure has become soft in the 80's.  Not following weights or strict I/O's so far (ordered now), but doesn't appear overall volume up with no clear crackles or edema - with improved respiratory status as above and heart rate still in the 80's will therefore give a gentle bolus of  cc over the next hour and follow blood pressure.      50 minutes spent providing critical care overnight.          Amilcar Mark MD

## 2017-07-28 NOTE — PROGRESS NOTES
Pt dyspneic with any activity, discussed with MD, order to put in a catheter obtained. Catheter put in without any difficulty. Plan for thoracentesis today, Dr. Keller aware and reviewing pt chart. Pt sleeping in bed, call light within reach.

## 2017-07-28 NOTE — PROGRESS NOTES
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  Consider talking with lymphedema clinic for recommendations      DVT Prophylaxis: With low platelets will avoid chemical prophylaxis for now   Code Status: DNR/DNI         Plan:   Patient continues to have shortness of breath on less than minimal his incision therefore we tried to do thoracentesis under ultrasound guidance - tap Pleural effusion  Antinausea medications have been recommended  Disposition: Anticipate discharge is unknown at this time              Interval History   Shortness of breath is better but continues to bother her unless the normal exertion of going up to the bathroom which is quite close in the hospital No chest pain.  No abdominal pain    Physical Exam   Temp:  [97.3  F (36.3  C)-98.3  F (36.8  C)] 97.8  F (36.6  C)  Heart Rate:  [108-126] 126  Resp:  [18-20] 20  BP: (101-145)/(64-88) 145/88  SpO2:  [94 %-97 %] 95 %    Weights:   Vitals:    07/24/17 2048   Weight: 72 kg (158 lb 11.7 oz)    Body mass index is 28.12 kg/(m^2).    Constitutional: Not  in any acute distress   EYES: Extra Occular movements are intact, Conjunctiva is clear   NECK: no JVD  CVS: S1 S2 tachycardia is present    Respiratory: Please decreased breath sounds bilaterally Right > Left without crepitations or Wheezing  bilaterally  GI: Soft, non tender , Bowel Sounds are Positive    Skin: Warm and dry, No Rashes   CNS: Alert and Oriented x 3   Musculoskeletal: Lower Limbs without Pedal Edema            Data     Recent Labs  Lab 07/27/17  0545 07/26/17  1510 07/26/17  0605  07/25/17  0630 07/24/17  1545   WBC 3.6*  --  3.5*  --  3.8* 5.3   HGB 10.1*  --  10.4*  --  9.9* 6.7*   MCV 86  --  86  --  86 93     --  178  --  174 257   NA  --   --  138  --  137 138   POTASSIUM 3.5 3.7 3.3*  < > 2.9* 3.3*   CHLORIDE  --   --  107  --  105 105   CO2  --   --  22  --  23 23   BUN  --   --  9  --  8 8   CR 0.98  --  0.87  --  0.66 0.60   ANIONGAP  --   --  9  --  9 10   JARED  --   --  7.6*  --  7.4* 7.7*   GLC  --   --  97  --  115* 99   ALBUMIN  --   --   --   --   --  1.7*   PROTTOTAL  --   --   --   --   --  4.7*   BILITOTAL  --   --   --   --   --  0.4   ALKPHOS  --   --   --   --   --  129   ALT  --   --   --   --   --  15   AST  --   --   --   --   --  17   TROPI  --   --   --   --   --  <0.015The 99th percentile for upper reference range is 0.045 ug/L.  Troponin values in the range of 0.045 - 0.120 ug/L may be associated with risks of adverse clinical events.   < > = values in this interval not displayed.      Recent Labs  Lab 07/26/17  0605 07/25/17  0630 07/24/17  1545   GLC 97 115* 99        Unresulted Labs Ordered in the Past 30 Days of this Admission     Date and Time Order Name Status Description    7/24/2017 1534 Blood culture Preliminary     7/24/2017 1534 Blood culture Preliminary            Medications        ondansetron  8 mg Intravenous Q8H     prochlorperazine  5 mg Intravenous Q6H     LORazepam  0.5 mg Oral At Bedtime    Or     LORazepam  0.5 mg Intravenous At Bedtime      fexofenadine  60 mg Oral Daily     sodium chloride (PF)  3 mL Intracatheter Q8H     piperacillin-tazobactam  4.5 g Intravenous Q6H     vancomycin (VANCOCIN) IV  1,500 mg Intravenous Q24H     carboxymethylcellulose  2 drop Both Eyes Daily     cholecalciferol  2,000 Units Oral Daily     fluticasone-vilanterol  1 puff Inhalation Daily     multivitamin, therapeutic with minerals  1 tablet Oral Daily     omeprazole  40 mg Oral Daily     fluticasone  2 spray Both Nostrils Daily     levofloxacin  750 mg Intravenous Q24H              Abhijeet MASSEY.  Hospitalist - Internal Medicine  [facility]

## 2017-07-28 NOTE — PROGRESS NOTES
RADIOLOGY PROCEDURE NOTE  Patient name: Bren Rendon  MRN: [id number]  : 1950    Pre-procedure diagnosis: Right pleural effusion.  Post-procedure diagnosis: Same    Procedure Date/Time: 2017  11:02 AM  Procedure: US guided right thoracentesis.  Estimated blood loss: None  Specimen(s) collected:  2.5 L straw colored fluid.  The patient tolerated the procedure well with no immediate complications.    See imaging dictation for procedural details.    Provider name: Amilcar Keller  Assistant(s):None

## 2017-07-28 NOTE — PROGRESS NOTES
"Pt off BiPap for dinner, ate 25% of her tray and drank 400 mL. 02 in the mid-upper 90s on 4 LPM via nasal cannula. Pt states her breathing feels \"much better\" off the BiPap now than it did earlier today. Is alert and oriented. Denies any questions or concerns at this time. Call light within reach.   "

## 2017-07-28 NOTE — PROGRESS NOTES
Pt has been on the BiPap throughout the day, awakens for questions but has been really lethargic all day, likely from a busy night, the PRN dilaudid she received before her thoracentesis and the scheduled compazine this afternoon.  has been present at bedside throughout the day. 02 has been in the upper 90s on 35% Fi02 via BiPap. LS are diminished. BP remains soft but has been stable throughout the day. Scheduled antibiotics given per orders. Pt denies any questions or concerns at this time. Call light within reach.

## 2017-07-29 NOTE — PROGRESS NOTES
"Pt ambulated to the restroom with SBA, sat up in the recliner afterwards and washed herself up. Pt was worried she would become dyspneic with activity and need oxygen again but this did not happen. Pt is smiling, alert, oriented and pleasant today. States she hasn't been able to eat very much \"in months\" so having a little appetite is really lifting her spirits. LS remain diminished. BP remains soft, MD aware of this. Pt asymptotic.  Pt sitting up in the recliner eating breakfast, visiting with . Declines any questions or concerns. Call light within reach.   "

## 2017-07-29 NOTE — PROGRESS NOTES
[facility] Medicine Progress Note  Date of Service: 07/29/2017        Assessment & Plan   Bren Rendon is a 66 year old female with esophageal and gastric cancer  Undergoing palliative chemotherapy who presents with increased shortness of breath over the weekend    Principal Problem:    Acute hypoxic respiratory failure  Probably on the basis of bilateral pleural effusion larger on the right side and moderate left-sided  Patient was treated with BiPAP  Is much better with removal of 2.5 L of right pleural effusion\      Pneumonia -probably aspiration related - healthcare associated - of right lower lobe due to infectious organism (H)  On vancomycin and Zosyn and Levaquin which will be continued  We'll change  IV antibiotics to Levaquin and Augmentin at the time of discharge    Bilateral Pleural Effusion  Right greater than Left  Had Thoracentesis on the right side with removal of 2.5 L of pleural fluid which was sent for regular test as well as cytology          Esophageal cancer as well as 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    Consider talking with lymphedema clinic for recommendations      DVT Prophylaxis: With low platelets will avoid chemical prophylaxis for now   Code Status: DNR/DNI         Plan:   Start heparin now that the platelet count is that and also creatinine is elevated  Will continue BiPAP as needed    CBC and BMP  Antinausea medications have been recommended per palliative care  Disposition: Anticipate discharge in 1-2 days          Interval History    Patient had much decrease in the shortness of breath when she is able to go to the bathroom with less shortness of breath    No fever or chills heparin    Physical Exam   Temp:  [97.3  F (36.3  C)-98.1  F (36.7  C)] 98.1  F (36.7  C)  Heart Rate:  [101-115] 106  Resp:  [11-21] 17  BP: ()/(47-81) 85/54  FiO2 (%):  [35 %] 35 %  SpO2:  [91 %-100 %] 97 %    Weights:   Vitals:    07/24/17 2048 07/28/17 0600 07/29/17 0600   Weight: 72 kg (158 lb 11.7 oz) 76 kg (167 lb 8.8 oz) 75.4 kg (166 lb 3.6 oz)    Body mass index is 29.45 kg/(m^2).    Constitutional: Not in any acute distress   EYES: Extra Occular movements are intact, Conjunctiva is clear   NECK: no JVD  CVS: S1 S2 tachycardia is present    Respiratory: There are crackles in the right lung base but the breath sounds are much better as compared to yesterday she continues to have decreased breath sounds in the left side in the base   GI: Soft, non tender , Bowel Sounds are Positive    Skin: Warm and dry, No Rashes   CNS: Alert and Oriented x 3   Musculoskeletal: Lower Limbs without Pedal Edema            Data     Recent Labs  Lab 07/29/17  0620 07/28/17  0650 07/28/17  0244 07/27/17  0545 07/26/17  1510 07/26/17  0605  07/25/17  0630 07/24/17  1545   WBC  --   --  7.9 3.6*  --  3.5*  --  3.8* 5.3   HGB  --   --  11.2* 10.1*  --  10.4*  --  9.9* 6.7*   MCV  --   --  87 86  --  86  --  86 93   PLT  --   --  345 200  --  178  --  174 257   INR  --  1.36*  --   --   --   --   --   --   --    NA  --  140  --   --   --  138  --  137 138   POTASSIUM  --  3.5  --  3.5 3.7 3.3*  < > 2.9* 3.3*   CHLORIDE  --  109  --   --   --  107  --  105 105   CO2  --  20  --   --   --  22  --  23 23   BUN  --  12  --   --   --  9  --  8 8   CR 1.52* 1.36*  --  0.98  --  0.87  --  0.66 0.60   ANIONGAP  --  11  --   --   --  9  --  9 10   JARED  --  8.2*  --   --   --  7.6*  --  7.4* 7.7*   GLC  --  139*  --   --   --  97  --  115* 99   ALBUMIN  --   --   --   --   --   --   --   --  1.7*   PROTTOTAL  --  4.3*  --   --   --   --   --   --  4.7*   BILITOTAL  --   --   --   --   --   --   --   --  0.4   ALKPHOS   --   --   --   --   --   --   --   --  129   ALT  --   --   --   --   --   --   --   --  15   AST  --   --   --   --   --   --   --   --  17   TROPI  --   --   --   --   --   --   --   --  <0.015The 99th percentile for upper reference range is 0.045 ug/L.  Troponin values in the range of 0.045 - 0.120 ug/L may be associated with risks of adverse clinical events.   < > = values in this interval not displayed.      Recent Labs  Lab 07/28/17  0650 07/26/17  0605 07/25/17  0630 07/24/17  1545   * 97 115* 99        Unresulted Labs Ordered in the Past 30 Days of this Admission     Date and Time Order Name Status Description    7/28/2017 1441 Cytology Non Gyn In process     7/27/2017 1901 Fluid Culture Aerobic Bacterial Preliminary     7/24/2017 1534 Blood culture Preliminary     7/24/2017 1534 Blood culture Preliminary            Medications        levofloxacin  750 mg Intravenous Q48H     piperacillin-tazobactam  3.375 g Intravenous Q6H     vancomycin (VANCOCIN) IV  1,500 mg Intravenous Q48H     ondansetron  8 mg Intravenous Q8H     prochlorperazine  5 mg Intravenous Q6H     LORazepam  0.5 mg Oral At Bedtime    Or     LORazepam  0.5 mg Intravenous At Bedtime     fexofenadine  60 mg Oral Daily     sodium chloride (PF)  3 mL Intracatheter Q8H     carboxymethylcellulose  2 drop Both Eyes Daily     cholecalciferol  2,000 Units Oral Daily     fluticasone-vilanterol  1 puff Inhalation Daily     multivitamin, therapeutic with minerals  1 tablet Oral Daily     omeprazole  40 mg Oral Daily     fluticasone  2 spray Both Nostrils Daily              Abhijeet FUENTES  Hospitalist - Internal Medicine  [facility], [place]

## 2017-07-29 NOTE — DISCHARGE INSTRUCTIONS
From Néstor Brown, Palliative Care NP, Cell [phone]    Vitamin B6  I seem to remember you saying that the the B vitamin pill (B6, Pyridoxine) was hard to get down.  So I found 2 different versions of it that you can try--I sent the order over to [facility].  One is a once-a-day capsule, and the other is a twice-a-day liquid.  You can try one or both.  Once you decide which format you prefer, stick with just one of the 3 options--tablet, capsule or liquid.      Nausea  Here's your new schedule:  1. Granisetron/Sancuso patch, replace every 7 days.  2. Prochlorperazine/Compazine 10 mg (whole tablet) every 8 hours  3. Lorazepam/Ativan 0.5mg every night at bedtime  Notes:    I increased the Prochlorperazine/Compazine from 5 up to 10 mg so you wouldn't have to wake up in the middle of the night to take the medicine every 6 hours.  Many many people take and tolerate the 10 mg dose.    During the first 24 hours that the very first Granisetron patch is worn, I want you to also take the Ondansetron/Zofran 8 mg dissolvable tablet every 8 hours.  This is just because it may take a day for the medicine to seep through your skin and get absorbed into your blood stream to become effective.      Since you will be taking these medicines on a schedule (you can decide the times), you do NOT have to record each dose unless you want to.      Back-up Nausea Medicines:  If you have any nausea/vomiting despite the above, here are my suggestions.  I would ranked them starting with the medicine I think you should take first, second, third, etc.  If you need to take ANY of these, record the date, time and dose on the color-coded sheets I am sending home with you.    1. Ondansetron 8mg (dissolving) every 6 hours as needed  2. Lorazepam 0.5 mg ever 4 hours as needed  3. Olanzapine 5 mg (dissolving) every 24 hours--best at bedtime as it will make you sleepy  There are several more medications that can be used, but call me first--I can  call in a prescription to [facility].    Nutrition:  The dietician has indicated that you need approximately 75 g protein daily.    They non-grainy protein that I like is this:    EAS Soy protein made by [employer].  I found it at [employer] but you could probably find it at [facility] [place] and [facility] could probably order it in.  It does have a vanilla flavor to it so you can't add it to vegetables or mashed potatoes.      Breathlessness    I did place an order for concentrated oxycodone just in case you are struggling to breath.  Just follow the directions on the label.  You may never need to use it, but it's better and safer for you to have some on hand.     Many people find significant relief from having a fan blowing gently on the FACE.  Aiming it at the feet or chest doesn't work.  God made us with mechanoreceptors in the skin on our faces that detect air movement and somehow processes that sensation into a degree of relief from breathlessness.  It doesn't have to be as strong as a wind tunnel, just enough to feel the air moving.       I am really serious when I tell you can call me next week if you are struggling with any symptoms.      God Bless you both!    ++++++++++++++++++++++++++++++++++++++++++++++++++++++++++++++++++++++++++

## 2017-07-29 NOTE — PHARMACY-VANCOMYCIN DOSING SERVICE
O :  Last dose of vancomycin was 7/27 @ 1730.    A:  Since then, creat has increased to 1.26 and now 1.52.  Estimated creatinine clearance now 34 ml/min.  Vancomycin level evening of 7/28 was elevated at 29.  P:  Vancomycin held for now.  Will check creatinine and Vancomycin level tomorrow morning to determine if further doses should be given.  (Zosyn dose also renally adjusted).

## 2017-07-29 NOTE — PLAN OF CARE
Problem: Goal Outcome Summary  Goal: Goal Outcome Summary  Outcome: Improving  Patient has remained off BIPAP overnight and oxygen levels have been 99-98% with 1 L.  Attempted to turn off oxygen, but it was post activity, and patient was feeling dyspnea from exertion and all though her oxygen levels were 92-94% she requested oxygen be put back on for comfort.  Anticipate at rest she will tolerate being off oxygen.  BP has been low, 87/51 while asleep.

## 2017-07-29 NOTE — PROGRESS NOTES
Pt remains on NC O2. O2 turned down to 2 liters, sats %. Pt states she is feeling better, and perhaps may not wear the BIPAP tonight. Will continue to monitor.

## 2017-07-29 NOTE — PROGRESS NOTES
Palliative Care Follow-up Note  Date:  July 29, 2017    HPI:  67 y/o female with esophagogastric ca with liver mets, admitted with weakness and dyspnea.  Yesterday had R-sided thoracentesis and had 2500 cc straw-colored fluid withdrawn.  Two days ago she was started, by me, on around-the-clock IV Zofran and Compazine to gain control of persistent chemo- and cancer-induced N/V.    Subjective:  Breathing improved after throacentesis so much so that she came off Bipap and uses O2 per n/c on exertion, room air at rest.  No N/V since around-the-clock meds initiated; able to eat squash yesterday for the first time in months.  The sight of meat is still nauseating so she avoids it.   Has a muscle milk shake, but only 1/2 of a single dose, ~ 15 g protein daily.  Per dietician, needs ~ 75 g protein daily.  Watery green mucoid stool x 1 yesterday.   No new complaints.    OBJECTIVE:  Patient Vitals for the past 24 hrs:   BP Temp Temp src Heart Rate Resp SpO2 Weight   07/29/17 0600 (!) 85/54 - - 106 17 - 166 lb 3.6 oz (75.4 kg)   07/29/17 0439 - 98.1  F (36.7  C) Oral 106 16 - -   07/29/17 0400 (!) 87/51 - - 106 17 97 % -   07/29/17 0200 (!) 81/55 - - 106 19 99 % -   07/29/17 0052 93/65 - - 115 14 98 % -   07/29/17 0000 (!) 75/47 97.8  F (36.6  C) Oral 104 16 100 % -   07/28/17 2200 - - - 106 - 100 % -   07/28/17 2014 - 97.3  F (36.3  C) Oral - - - -   07/28/17 2010 100/66 - - 115 16 99 % -   07/28/17 1900 97/78 - - 107 17 99 % -   07/28/17 1800 (!) 86/57 - - 110 15 99 % -   07/28/17 1745 96/81 - - 111 21 95 % -   07/28/17 1733 - - - - - 97 % -   07/28/17 1730 - - - - - 99 % -   07/28/17 1700 (!) 78/62 - - 104 14 97 % -   07/28/17 1600 (!) 85/64 - - 101 13 100 % -   07/28/17 1530 - - - 102 - - -   07/28/17 1500 (!) 86/65 - - 105 14 99 % -   07/28/17 1400 (!) 87/63 97.8  F (36.6  C) Oral 105 13 99 % -   07/28/17 1300 (!) 88/66 - - 105 11 99 % -   07/28/17 1200 (!) 87/63 - - 107 13 100 % -   07/28/17 1100 (!) 82/60 - - 106 13 100  % -   07/28/17 1005 95/74 - - 113 17 100 % -   07/28/17 0900 (!) 89/73 - - 113 12 100 % -   07/28/17 0830 - - - - - 95 % -   Very pleasant,  Naren at side, bathing with assistance from Nursing  CV RRR, tachy  Lungs remain diminished in both lung bases, few crackles at R base, otherwise clear  Abd obese, NT  Ext w/lymphedema wraps in place.    Intake/Output Summary (Last 24 hours) at 07/29/17 0826  Last data filed at 07/29/17 0600   Gross per 24 hour   Intake              910 ml   Output              550 ml   Net              360 ml      ROUTINE ICU LABS (Last four results)  CMP  Recent Labs  Lab 07/29/17  0620 07/28/17  0650 07/27/17  0545 07/26/17  1510 07/26/17  0605  07/25/17  0630 07/24/17  1545   NA  --  140  --   --  138  --  137 138   POTASSIUM  --  3.5 3.5 3.7 3.3*  < > 2.9* 3.3*   CHLORIDE  --  109  --   --  107  --  105 105   CO2  --  20  --   --  22  --  23 23   ANIONGAP  --  11  --   --  9  --  9 10   GLC  --  139*  --   --  97  --  115* 99   BUN  --  12  --   --  9  --  8 8   CR 1.52* 1.36* 0.98  --  0.87  --  0.66 0.60   GFRESTIMATED 34* 39* 56*  --  64  --  89 >90Non  GFR Calc   GFRESTBLACK 41* 47* 68  --  78  --  >90African American GFR Calc >90African American GFR Calc   JARED  --  8.2*  --   --  7.6*  --  7.4* 7.7*   PROTTOTAL  --  4.3*  --   --   --   --   --  4.7*   ALBUMIN  --   --   --   --   --   --   --  1.7*   BILITOTAL  --   --   --   --   --   --   --  0.4   ALKPHOS  --   --   --   --   --   --   --  129   AST  --   --   --   --   --   --   --  17   ALT  --   --   --   --   --   --   --  15   < > = values in this interval not displayed.  CBC  Recent Labs  Lab 07/28/17  0244 07/27/17  0545 07/26/17  0605 07/25/17  0630   WBC 7.9 3.6* 3.5* 3.8*   RBC 3.79* 3.37* 3.54* 3.36*   HGB 11.2* 10.1* 10.4* 9.9*   HCT 33.0* 29.1* 30.4* 28.8*   MCV 87 86 86 86   MCH 29.6 30.0 29.4 29.5   MCHC 33.9 34.7 34.2 34.4   RDW 18.3* 18.2* 18.7* 17.9*    200 178 174     INR  Recent  Labs  Lab 07/28/17  0650   INR 1.36*     Arterial Blood GasNo lab results found in last 7 days.    Assessment/Plan:  1. Chronic daily N/V 2o cancer and chemo, improved on scheduled around-the-clock antiemetics (2 drugs)   >to initiate Sancusa patch once home--see discharge instructions    2. Protein calorie malnutrition 2o #1    3. Goals of care   >resume chemo   >live till January, God willing, to [facility]   >DNR/DNI    Thank you for the opportunity to be of service to this patient and family.    See Discharge Instructions below    0750 - 0930, 100 min, > 50% spent discussing nausea management plan and patient response, reviewing and writing prescriptions and complex discharge symptom management instructions in anticipation of discharge, and coordinating care with  attending and nursing.    Néstor Brown (Ann), CNP  Palliative Care  Private cell:  [phone]         From Néstor Brown, Palliative Care NP, Cell [phone]    Vitamin B6  I seem to remember you saying that the the B vitamin pill (B6, Pyridoxine) was hard to get down.  So I found 2 different versions of it that you can try--I sent the order over to [facility].  One is a once-a-day capsule, and the other is a twice-a-day liquid.  You can try one or both.  Once you decide which format you prefer, stick with just one of the 3 options--tablet, capsule or liquid.      Nausea  Here's your new schedule:  1. Granisetron/Sancuso patch, replace every 7 days.  2. Prochlorperazine/Compazine 10 mg (whole tablet) every 8 hours  3. Lorazepam/Ativan 0.5mg every night at bedtime  Notes:    I increased the Prochlorperazine/Compazine from 5 up to 10 mg so you wouldn't have to wake up in the middle of the night to take the medicine every 6 hours.  Many many people take and tolerate the 10 mg dose.    During the first 24 hours that the very first Granisetron patch is worn, I want you to also take the Ondansetron/Zofran 8 mg dissolvable tablet every 8  hours.  This is just because it may take a day for the medicine to seep through your skin and get absorbed into your blood stream to become effective.      Since you will be taking these medicines on a schedule (you can decide the times), you do NOT have to record each dose unless you want to.      Back-up Nausea Medicines:  If you have any nausea/vomiting despite the above, here are my suggestions.  I would ranked them starting with the medicine I think you should take first, second, third, etc.  If you need to take ANY of these, record the date, time and dose on the color-coded sheets I am sending home with you.    1. Ondansetron 8mg (dissolving) every 6 hours as needed  2. Lorazepam 0.5 mg ever 4 hours as needed  3. Olanzapine 5 mg (dissolving) every 24 hours--best at bedtime as it will make you sleepy  There are several more medications that can be used, but call me first--I can call in a prescription to [facility].    Nutrition:  The dietician has indicated that you need approximately 75 g protein daily.    They non-grainy protein that I like is this:    EAS Soy protein made by [employer].  I found it at [employer] but you could probably find it at [facility] and [facility] could probably order it in.  It does have a vanilla flavor to it so you can't add it to vegetables or mashed potatoes.      Breathlessness    I did place an order for concentrated oxycodone just in case you are struggling to breath.  Just follow the directions on the label.  You may never need to use it, but it's better and safer for you to have some on hand.     Many people find significant relief from having a fan blowing gently on the FACE.  Aiming it at the feet or chest doesn't work.  God made us with mechanoreceptors in the skin on our faces that detect air movement and somehow processes that sensation into a degree of relief from breathlessness.  It doesn't have to be as strong as a wind tunnel, just enough to feel the air moving.

## 2017-07-30 NOTE — PLAN OF CARE
Problem: Pneumonia (Adult)  Goal: Signs and Symptoms of Listed Potential Problems Will be Absent or Manageable (Pneumonia)  Signs and symptoms of listed potential problems will be absent or manageable by discharge/transition of care (reference Pneumonia (Adult) CPG).   Outcome: Improving  Patient up to chair for most of shift.  Lung sounds are clear anteriorly with diminished posterior lung sounds in bilateral bases with fine crackles.  Patient remains on room air with adequate oxygen saturation (92-94%.) Patient has remained off of BiPAP.  Denies pain.  Has had one loose, yellow - green tinged stool.   at bedside.  Will continue to monitor.  Thoracentesis scheduled for 7/31

## 2017-07-30 NOTE — PROGRESS NOTES
[facility] Medicine Progress Note  Date of Service: 07/30/2017        Assessment & Plan   Bren Rendon is a 66 year old female with esophageal and gastric cancer  Undergoing palliative chemotherapy who presents with increased shortness of breath.  She was diagnosed with a pneumonia and treated with Zosyn and Levaquin.  On 07/28/2017 she had the acute hypoxic respiratory failure and had to be put on BiPAP.  PH improved to the therapy.  2.5 L of pleural fluid was removed from the right side on 07/28/2017.  Her breathing improved but is again getting worse as she also has moderate pleural effusion on the left side.  Requested ultrasound-guided thoracentesis for the left side.     Principal Problem:    Acute hypoxic respiratory failure  Probably on the basis of bilateral pleural effusion larger on the right side and moderate left-sided  Patient was treated with BiPAP  Was better with removal of 2.5 L of right pleural effusion\ but Her breathing is slowly getting worse.       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    Consider talking with lymphedema clinic for  recommendations      DVT Prophylaxis: With low platelets will avoid chemical prophylaxis for now   Code Status: DNR/DNI         Plan:   US Thoracentesis on the Left side.   Will continue BiPAP as needed    CBC and BMP  Antinausea medications have been recommended per palliative care  We'll transfer the patient to [facility] status  Disposition: Anticipate discharge in ? 1-2 days          Interval History    Patient had progressive worsening of shortness of breath when she is going to the bathroom with less shortness of breath at rest  No fever or chills heparin    Physical Exam   Temp:  [97.3  F (36.3  C)-97.6  F (36.4  C)] 97.6  F (36.4  C)  Heart Rate:  [103-125] 106  Resp:  [12-26] 18  BP: ()/(58-78) 93/58  SpO2:  [95 %-98 %] 95 %    Weights:   Vitals:    07/28/17 0600 07/29/17 0600 07/30/17 0600   Weight: 76 kg (167 lb 8.8 oz) 75.4 kg (166 lb 3.6 oz) 76.6 kg (168 lb 14 oz)    Body mass index is 29.91 kg/(m^2).    Constitutional: Not in any acute distress   EYES: Extra Occular movements are intact, Conjunctiva is clear   NECK: no JVD  CVS: S1 S2 tachycardia is present    Respiratory: There are crackles in the right lung base but the breath sounds are much better as compared to yesterday she continues to have decreased breath sounds in the left side in the base   GI: Soft, non tender , Bowel Sounds are Positive    Skin: Warm and dry, No Rashes   CNS: Alert and Oriented x 3   Musculoskeletal: Lower Limbs without Pedal Edema            Data     Recent Labs  Lab 07/30/17  0630 07/29/17  0620 07/28/17  0650 07/28/17  0244 07/27/17  0545  07/26/17  0605  07/24/17  1545   WBC 3.5*  --   --  7.9 3.6*  --  3.5*  < > 5.3   HGB 9.6*  --   --  11.2* 10.1*  --  10.4*  < > 6.7*   MCV 85  --   --  87 86  --  86  < > 93     --   --  345 200  --  178  < > 257   INR  --   --  1.36*  --   --   --   --   --   --      --  140  --   --   --  138  < > 138   POTASSIUM 2.7*  --  3.5  --  3.5  < > 3.3*  < > 3.3*   CHLORIDE  106  --  109  --   --   --  107  < > 105   CO2 22  --  20  --   --   --  22  < > 23   BUN 17  --  12  --   --   --  9  < > 8   CR 1.31* 1.52* 1.36*  --  0.98  --  0.87  < > 0.60   ANIONGAP 11  --  11  --   --   --  9  < > 10   JARED 7.7*  --  8.2*  --   --   --  7.6*  < > 7.7*   GLC 89  --  139*  --   --   --  97  < > 99   ALBUMIN  --   --   --   --   --   --   --   --  1.7*   PROTTOTAL  --   --  4.3*  --   --   --   --   --  4.7*   BILITOTAL  --   --   --   --   --   --   --   --  0.4   ALKPHOS  --   --   --   --   --   --   --   --  129   ALT  --   --   --   --   --   --   --   --  15   AST  --   --   --   --   --   --   --   --  17   TROPI  --   --   --   --   --   --   --   --  <0.015The 99th percentile for upper reference range is 0.045 ug/L.  Troponin values in the range of 0.045 - 0.120 ug/L may be associated with risks of adverse clinical events.   < > = values in this interval not displayed.      Recent Labs  Lab 07/30/17  0630 07/28/17  0650 07/26/17  0605 07/25/17  0630 07/24/17  1545   GLC 89 139* 97 115* 99        Unresulted Labs Ordered in the Past 30 Days of this Admission     Date and Time Order Name Status Description    7/28/2017 1441 Cytology Non Gyn In process     7/27/2017 1901 Fluid Culture Aerobic Bacterial Preliminary            Medications        piperacillin-tazobactam  2.25 g Intravenous Q6H     heparin  5,000 Units Subcutaneous Q12H     levofloxacin  750 mg Intravenous Q48H     ondansetron  8 mg Intravenous Q8H     prochlorperazine  5 mg Intravenous Q6H     LORazepam  0.5 mg Oral At Bedtime    Or     LORazepam  0.5 mg Intravenous At Bedtime     fexofenadine  60 mg Oral Daily     carboxymethylcellulose  2 drop Both Eyes Daily     cholecalciferol  2,000 Units Oral Daily     fluticasone-vilanterol  1 puff Inhalation Daily     multivitamin, therapeutic with minerals  1 tablet Oral Daily     omeprazole  40 mg Oral Daily     fluticasone  2 spray Both Nostrils Daily              Abhijeet Delgado  BRYSON.  Hospitalist - Internal Medicine  [facility]

## 2017-07-30 NOTE — PROGRESS NOTES
Patient has been off oxygen and BIPAP still overnight with oxygen saturations remaining greater than 94%.  She appeared to rest overnight and lung sound were diminished.  However patient reports poor sleep and she feels as if her breathing is worsening.  Feeling the urge to sit upright.  Heart rate and respiratory rate slightly increased also overnight.

## 2017-07-30 NOTE — PLAN OF CARE
Problem: Discharge Planning  Goal: Discharge Planning (Adult, OB, Behavioral, Peds)  Outcome: Improving  Writer spoke with patient and her  and presented home care service again, since she has had prolonged hospital stay. She is now agreeable to Home Care. Patient chooses to stay within the [facility] and has chosen [facility] as their home health agency for RN services and possibly PT.     Rossana Stringer CTS-RN  [phone]

## 2017-07-31 NOTE — PROGRESS NOTES
"RADIOLOGY PROCEDURE NOTE  Patient name: Bren Rendon  MRN: [id number]  : 1950    Pre-procedure diagnosis: Left pleural effusion.  Post-procedure diagnosis: Same    Procedure Date/Time: 2017  11:30 AM  Procedure: US guided left thoracentesis.  Estimated blood loss: None  Specimen(s) collected:  1.3 L straw colored fluid.}  The patient tolerated the procedure well with no immediate complications.  Significant findings:{NONE DEFAULTED:[id number]::\"none\".  See imaging dictation for procedural details.    Provider name: Amilcar Keller  Assistant(s):None      "

## 2017-07-31 NOTE — PROGRESS NOTES
Patient plans to sleep in the chair overnight because the upright position is easier for her to breath.

## 2017-07-31 NOTE — PROGRESS NOTES
Checked in with patient this morning. She appeared comfortable, on room air, and had no complaints. Pt was made aware of her options of calling for a prn neb, if she needs it. Continue to monitor.    Thank you,  Annette Raymundo, RRT-NPS

## 2017-07-31 NOTE — PLAN OF CARE
Problem: Goal Outcome Summary  Goal: Goal Outcome Summary  Outcome: Improving  Appetite remains poor, continues to have some slight nausea but states it has improved with current antiemetic plan.   Dyspnea on exertion lung sounds very diminished. sats remain stable, currently getting thoracentesis in room by Rad.   Alert, oriented, was unsure of date.

## 2017-07-31 NOTE — PROGRESS NOTES
Clinic Care Coordination Contact  Care Team Conversations  D/I: Received CTS on this patient on 7/26. She remains hospitalized at this time.  P: RN Cc will continue to monitor chart and f/u per protocol after DC.  Neftali TIRADO,RN- BC  Clinic Care Coordinator  [facility]  Phone: [phone]

## 2017-07-31 NOTE — PROGRESS NOTES
Thoracentesis completed, bandage site dry and intact.   Blood pressure in the 80's systolic, pt denies dizziness, pain, remains weak, Dr. Matthews notified. Blood pressure has been in the 80-90 systolic.   Appetite remains poor for lunch but denies nausea this afternoon, up in chair.

## 2017-07-31 NOTE — PLAN OF CARE
Problem: Goal Outcome Summary  Goal: Goal Outcome Summary  Outcome: Improving  B LE's are reducing, thighs still edematous, discussed tentative plan for discharge with pt and spouse, at time of discharge pt is planning on going home with [facility], informed that [facility] has a Lymphedema therapist on staff and she can continue with GCB and compression garment education, while pt is hospitalized Edema will continue to tx pt and tomorrow therapist will take girth and garment measurements and instruct pt and spouse where and how to order compression garments through [employer].

## 2017-07-31 NOTE — PROGRESS NOTES
[facility] Medicine Progress Note  Date of Service: 07/31/2017    Assessment & Plan   Bren Rendon is a 66 year old female with esophageal and gastric cancer undergoing palliative chemotherapy who who presented on 7/24/2017 with increased shortness of breath.  She was diagnosed with a pneumonia and treated with Zosyn and Levaquin.  On 07/28/2017 she had the acute hypoxic respiratory failure and had to be put on BiPAP.  Patient improved..  2.5 L of pleural fluid was removed from the right side on 07/28/2017.  Her breathing improved but is again getting worse as she also has moderate pleural effusion on the left side.  1.2 liters clear yellow fluid removed from right pleural space today 7/31/2017     Acute hypoxic respiratory failure  Probably on the basis of bilateral pleural effusion larger on the right side and moderate left-sided  Patient was treated with BiPAP  Was better with removal of 2.5 L of right pleural effusion and improved further with tap today on left.    - follow off O2      Pneumonia, right middle-lower lobes  Diagnosed originally on PET imaging 7/19.  Suspected to be aspiration related - healthcare associated - of right middle and lower. CT chest 7/28 infiltrates seen in right lower lobe, right middle lobe and left lower lobe but are most consistent with atelectasis related to pleural effusions. Pct on admission was 0.07. No fever. Did have tachycardia and tachypnea.   On vancomycin and Zosyn and Levaquin since admission, 7/24. Clinically improved.    - stop vancomycin, zosyn and levofloxacin tomorrow after full 7 days if continues to do well      Bilateral Pleural Effusion  Right greater than Left  Had Thoracentesis on the right side with removal of 2.5 L of pleural fluid 7/28/2017 which was sent for regular test as well as cytology  Thoracentesis done on the left side 07/31/2070 and again breathing better.   - follow. 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    Consider talking with lymphedema clinic for recommendations    Edema is relatively recent onset over last couple of months and etiology is not clear.    - continue lymphedema wraps   - check echo    Generalized weakness  Patient with marked weakness due to deconditioning, chemotherapy, cancer and age. Very motivated to go home with services on discharge.    - physical therapy   - occupational therapy    - encouraged activity     DVT Prophylaxis: With low platelets will avoid chemical prophylaxis for now   Code Status: DNR/DNI    Discussion: improving but high risk for readmission    Disposition: Anticipate discharge 2-3 days     Attestation:  Total time: 40 minutes    Julián Matthews MD  [facility] Medicine        Interval History   Breathing much better after thoracentesis today. C/o generalized weakness and unsteady with standing. No nausea    Physical Exam   Temp:  [96.3  F (35.7  C)-97.7  F (36.5  C)] 96.3  F (35.7  C)  Pulse:  [103-106] 106  Heart Rate:  [] 104  Resp:  [16-24] 16  BP: (82-96)/(50-62) 83/54  SpO2:  [95 %-97 %] 96 %    Weights:   Vitals:    07/28/17 0600 07/29/17 0600 07/30/17 0600   Weight: 76 kg (167 lb 8.8 oz) 75.4 kg (166 lb 3.6 oz) 76.6 kg (168 lb 14 oz)    Body mass index is 29.91 kg/(m^2).    Constitutional: alert and oriented, mild anxious, nontoxic, cooperative  CV: Regular, wrist pulses palpable, no JVD, 4+ bilateral lower extremity edema   Respiratory: a few scattered coarse crackles in both bases right > left, otherwise CTA bilaterally  GI: Soft, non-tender   Skin: Warm and dry  Musculoskeletal:    Data     Recent Labs  Lab  07/31/17  0610 07/30/17  1500 07/30/17  0630 07/29/17  0620 07/28/17  0650 07/28/17  0244   WBC 3.8*  --  3.5*  --   --  7.9   HGB 9.6*  --  9.6*  --   --  11.2*   MCV 85  --  85  --   --  87     --  174  --   --  345   INR 1.50*  --   --   --  1.36*  --      --  139  --  140  --    POTASSIUM 4.1 2.5* 2.7*  --  3.5  --    CHLORIDE 110*  --  106  --  109  --    CO2 20  --  22  --  20  --    BUN 17  --  17  --  12  --    CR 1.23*  --  1.31* 1.52* 1.36*  --    ANIONGAP 9  --  11  --  11  --    JARED 7.9*  --  7.7*  --  8.2*  --    GLC 92  --  89  --  139*  --    PROTTOTAL 4.2*  --   --   --  4.3*  --          Recent Labs  Lab 07/31/17  0610 07/30/17  0630 07/28/17  0650 07/26/17  0605 07/25/17  0630   GLC 92 89 139* 97 115*        Unresulted Labs Ordered in the Past 30 Days of this Admission     Date and Time Order Name Status Description    7/28/2017 1441 Cytology Non Gyn In process     7/27/2017 1901 Fluid Culture Aerobic Bacterial Preliminary            Imaging:   Recent Results (from the past 24 hour(s))   US Thoracentesis    Narrative    US THORACENTESIS 7/31/2017 11:11 AM    HISTORY: Short of breath.    PROCEDURE: Risks and benefits of the procedure are discussed with the  patient. Under sonographic guidance and utilizing 8 mL of 1% lidocaine  as local anesthetic, a needle is inserted into the posterior left  hemithorax. This is followed by a standard 8 [place] catheter. 1,300 mL  of straw colored fluid is evacuated. The patient tolerated the  procedure well.      Impression    IMPRESSION: Technically uneventful ultrasound-guided thoracentesis.     HOA RIVERA MD   XR Chest Port 1 View    Narrative    XR CHEST PORT 1 VW   7/31/2017 11:12 AM     HISTORY: post thoracentesis    COMPARISON: 7/30/2017.      Impression    IMPRESSION: No evidence for pneumothorax post left thoracentesis. The  left lung is clear.    Again noted is increased pleural parenchymal density in the right  thorax. A right IJ port is  unchanged in position.    KANDIS HERRON MD        I reviewed all new labs and imaging results over the last 24 hours. I personally reviewed no images or EKG's today.    Medications     - MEDICATION INSTRUCTIONS -       NaCl 20 mL/hr at 07/31/17 1700       vancomycin place aguila - receiving intermittent dosing  1 each Does not apply See Admin Instructions     piperacillin-tazobactam  2.25 g Intravenous Q6H     heparin  5,000 Units Subcutaneous Q12H     levofloxacin  750 mg Intravenous Q48H     ondansetron  8 mg Intravenous Q8H     prochlorperazine  5 mg Intravenous Q6H     LORazepam  0.5 mg Oral At Bedtime    Or     LORazepam  0.5 mg Intravenous At Bedtime     fexofenadine  60 mg Oral Daily     carboxymethylcellulose  2 drop Both Eyes Daily     cholecalciferol  2,000 Units Oral Daily     fluticasone-vilanterol  1 puff Inhalation Daily     multivitamin, therapeutic with minerals  1 tablet Oral Daily     omeprazole  40 mg Oral Daily     fluticasone  2 spray Both Nostrils Daily   Reviewed meds    Julián Matthews MD  [facility] Medicine

## 2017-07-31 NOTE — PLAN OF CARE
"Problem: Goal Outcome Summary  Goal: Goal Outcome Summary  Outcome: Improving  Patient states that she is \"breathing better after this morning\", had thoracentesis this am, band-aid CDI, walked to bathroom with stand by assist, gait steady, bilateral lymphedema wraps CDI. Patient drinking \"chocolate muscle milk\"  Will continue to monitor closely. Angie Rodriguez RN, BC, CCRN      "

## 2017-07-31 NOTE — PROGRESS NOTES
Left sided thoracentesis performed by radiologist.   1200  ml of clear yellow fluid removed. Pressure held at site after catheter removed.  Band aid applied. No bleeding noted. Post procedure CXR to be performed.

## 2017-08-01 NOTE — PLAN OF CARE
Problem: Goal Outcome Summary  Goal: Goal Outcome Summary  Up in chair with legs elevated most of the evening.  In bed now with head elevated 80 degrees.  Denies dyspnea at rest when sitting.  Does get short of air with exertion.  Denies pain.

## 2017-08-01 NOTE — PLAN OF CARE
Problem: Goal Outcome Summary  Goal: Goal Outcome Summary  PT-  Evaluation completed. Pt completes in room mobility - approx 20 feet  w/ walker. steady with walker use Walker ht adjusted- required initial cues  not to lift walker . Steady gait,  No SOA noted . Pt required min/ moderate assistance for sitting> supine for LE tracking-  reporting  he can assist at home     REC- home w/ home care services, home care PT recommended as pt deconitioned

## 2017-08-01 NOTE — PROGRESS NOTES
"SPIRITUAL HEALTH SERVICES  SPIRITUAL ASSESSMENT Progress Note  [facility] [place]    PRIMARY FOCUS:     Emotional/spiritual/Gnosticism distress    Support for coping    ILLNESS CIRCUMSTANCES:   Reviewed documentation. Reflective conversation shared with Bren, , Naren and long-time friend, Corie Montero, which integrated elements of illness narrative.     Context of Serious Illness/Symptom(s) - Esophageal and gastric cancer    Resources for Support - , Naren, is very supportive as well as Bren's two sons    DISTRESS:     Emotional/Existential/Relational Distress - Bren was positive and displayed no distress today    Spiritual/Anglican Distress - None    Social/Cultural/Economic Distress - None addressed today    SPIRITUAL/Scientology COPING:     Jehovah's witness/Lali - Anabaptist    Spiritual Practice(s) - Prayer and Rastafari involvement    Emotional/Existential/Relational Connections - Friend, Corie, stated that she and Bren and Naren have been friends (and neighbors) for 38 years.  Corie stated, \"This lady is loved by many, many people.\"    GOALS OF CARE:    Goals of Care - Hopefully to be able to discharge home to the care of     Meaning/Sense-Making - Family, lali and friends are very significant for Bren    PLAN: No follow up visit planned unless requested by staff or pt.    Montana Parra M.A., [facility]  Staff [facility]  Office: [phone]  Cell: [phone]  Pager [phone]    "

## 2017-08-01 NOTE — PLAN OF CARE
"Problem: Goal Outcome Summary  Goal: Goal Outcome Summary  OT: Eval complete and treatment initiated. Pt's  present and purchased: FWW, shower chair and Raised toilet seat with safety frame for at home. Pt completes sit to stand, ambulates to/from sink and stands x3 min for AM grooming tasks all with SBA- steady on feet. Pt reports activity as \"7/10\" on OMNI effort scale (Tired-really tired). Discussed benefits of home therapy and pt/ in agreement.      REC: Home with home therapy to increase strength/activity tolerance for ADLs and functional mobility      "

## 2017-08-01 NOTE — CONSULTS
Care Management Progress Note:    Reason for Follow up:  Met with the patient and her  to review the discharge plan.  She will transition home with [facility] ([phone] Fax: [phone]).   is very supportive and available to assist when needed.    Discharge Plan:  Home with [facility]    Discharge Planner   Discharge Plans in progress: Home care  Barriers to discharge plan: Medical stablility  Follow up plan: Clinic care coordination       Entered by: Trupti Mac 08/01/2017 11:34 AM         Trupti Mac RN, Care Coordinator  [phone]

## 2017-08-01 NOTE — PLAN OF CARE
Problem: Goal Outcome Summary  Goal: Goal Outcome Summary  Outcome: Improving  Pt has had significant decreased in girth measurements since being hospitalized, spouse ordered B LE velcro wraps from [employer], pt still planning of discharging home with spouse, spouse has been gathering items pt may need for toileting and ambulating. There is a family friend who is a nurse that is willing to come over daily to help pt and had been bandaging pt's legs. Lymphedema to rebandage legs again tomorrow around 11:00.

## 2017-08-01 NOTE — PLAN OF CARE
Problem: Goal Outcome Summary  Goal: Goal Outcome Summary  Up to commode with SBA.  Stated she was slightly short of breath with exertion of getting up.  Denies pain.

## 2017-08-01 NOTE — PLAN OF CARE
Problem: Goal Outcome Summary  Goal: Goal Outcome Summary  Outcome: Improving  Patient A/O x4. Lung sounds diminished. VSS. Denies pain. Ambulate with SBA x3; unable to measure urine as hat not in place. Spouse at bedside much of shift. Possible discharge tomorrow depending on result of iv Lasix initiated today at 1100. Resting comfortably between cares.     Continue to monitor and implement poc.

## 2017-08-01 NOTE — PROGRESS NOTES
08/01/17 1000   Quick Adds   Type of Visit Initial Occupational Therapy Evaluation   Living Environment   Lives With spouse   Living Arrangements house   Home Accessibility (walk-in shower)   Number of Stairs to Enter Home 1   Number of Stairs Within Home 0  (stairs to basement- does not need to use. )   Self-Care   Equipment Currently Used at Home (lymphedema wraps)   Activity/Exercise/Self-Care Comment Has built-in shower chair and portable.  plans on getting RTS with safety frame as well.   Functional Level Prior   Ambulation 1-->assistive equipment   Transferring 1-->assistive equipment   Toileting 0-->independent  (Pt's  plans on getting RTS with safety bars)   Bathing 1-->assistive equipment   Dressing 0-->independent   Eating 0-->independent   Communication 0-->understands/communicates without difficulty   Swallowing 0-->swallows foods/liquids without difficulty   Cognition 0 - no cognition issues reported   Fall history within last six months no   General Information   Onset of Illness/Injury or Date of Surgery - Date 07/24/17   Referring Physician Julián Matthews MD   Patient/Family Goals Statement To return home   Additional Occupational Profile Info/Pertinent History of Current Problem Bren Rendon is a 66 year old female with esophageal and gastric cancer undergoing palliative chemotherapy who who presented on 7/24/2017 with increased shortness of breath.  She was diagnosed with a pneumonia and treated with Zosyn and Levaquin.  On 07/28/2017 she had the acute hypoxic respiratory failure and had to be put on BiPAP.  Patient improved..  2.5 L of pleural fluid was removed from the right side on 07/28/2017.  Her breathing improved but is again getting worse as she also has moderate pleural effusion on the left side.  1.2 liters clear yellow fluid removed from right pleural space today 7/31/2017    Cognitive Status Examination   Orientation orientation to person, place and time   Level  of Consciousness alert   Able to Follow Commands WNL/WFL   Pain Assessment   Patient Currently in Pain No   Range of Motion (ROM)   ROM Comment B UE ROM: WNL   Strength   Strength Comments B UE strength: not formally assessed, however observed to have generalized weakness. Pt reports she is below baseline for strength and endurance.    Transfer Skill: Sit to Stand   Level of [place]: Sit/Stand stand-by assist   Physical Assist/Nonphysical Assist: Sit/Stand 1 person assist   Transfer Skill: Sit to Stand full weight-bearing   Assistive Device for Transfer: Sit/Stand standard walker   Transfer Skill: Toilet Transfer   Level of [place]: Toilet stand-by assist   Physical Assist/Nonphysical Assist: Toilet 1 person assist   Weight-Bearing Restrictions: Toilet full weight-bearing   Assistive Device standard walker   Upper Body Dressing   Level of [place]: Dress Upper Body stand-by assist   Physical Assist/Nonphysical Assist: Dress Upper Body supervision   Lower Body Dressing   Level of [place]: Dress Lower Body minimum assist (75% patients effort)  (needs assist with socks. )   Physical Assist/Nonphysical Assist: Dress Lower Body 1 person assist   Grooming   Level of [place]: Grooming stand-by assist  (standing at sink)   Physical Assist/Nonphysical Assist: Grooming 1 person assist   Eating/Self Feeding   Level of [place]: Eating independent   Instrumental Activities of Daily Living (IADL)   IADL Comments  completes IADL tasks such as meals, laundry and cleaning.    Activities of Daily Living Analysis   Impairments Contributing to Impaired Activities of Daily Living strength decreased   General Therapy Interventions   Planned Therapy Interventions ADL retraining;strengthening;progressive activity/exercise   Clinical Impression   Criteria for Skilled Therapeutic Interventions Met yes, treatment indicated   OT Diagnosis decreased independence with aDLs and functional mobility   Influenced  "by the following impairments generalized weakness, decreased activity tolernace.    Assessment of Occupational Performance 3-5 Performance Deficits   Identified Performance Deficits bathing, dressing, toileting. Overall activity tolerance for ADLs.    Clinical Decision Making (Complexity) Low complexity   Therapy Frequency daily   Predicted Duration of Therapy Intervention (days/wks) 2-3 days   Anticipated Equipment Needs at Discharge ( getting RTS with safety frame and shower chair. )   Anticipated Discharge Disposition Home with Home Therapy   Risks and Benefits of Treatment have been explained. Yes   Patient, Family & other staff in agreement with plan of care Yes   [facility] AM-PAC  \"6 Clicks\" Daily Activity Inpatient Short Form   1. Putting on and taking off regular lower body clothing? 3 - A Little   2. Bathing (including washing, rinsing, drying)? 3 - A Little   3. Toileting, which includes using toilet, bedpan or urinal? 3 - A Little   4. Putting on and taking off regular upper body clothing? 3 - A Little   5. Taking care of personal grooming such as brushing teeth? 4 - None   6. Eating meals? 4 - None   Daily Activity Raw Score (Score out of 24.Lower scores equate to lower levels of function) 20   Total Evaluation Time   Total Evaluation Time (Minutes) 8     Tamara Bess OTR/L  "

## 2017-08-01 NOTE — PHARMACY-CONSULT NOTE
Pharmacy was consulted to review patient's chemotherapy history for potential cause of cardiotoxicity.  Patient has new cardiomyopathy.      I reviewed the chemotherapy received by the patient.  They are as follows:    -Carboplatin weekly (12/27-2/7/17)    Drug info handbook <1% incidence of cardiac failure   -Paclitaxel weekly (12/27-2/7/17)    DIH <1% incidence CHF, MI    [facility]- reported cases of CHF, especially when combined with other chemo, including anthracyclines   -Fluorouracil infusion every 2 weeks (3/7 - 7/11/17)    DIH 1-19% incidence depending on studies. Higher incidence with infusional administration.     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 The patient developed cardiac and renal failure approximately 1 day after a 5-day course of fluorouracil and cisplatin for laryngeal neoplasm. Dopamine administration was required to control blood pressure. Echocardiography and right heart catheterization confirmed alteration of the myocardial contractility. The patient recovered uneventfully after a period of 2 weeks. This appears to be the first case report of acute dilated myocardiopathy secondary to fluorouracil.   b) A case of fatal acute dilated cardiomyopathy associated with continuous infusion of fluorouracil (1 g/m(2)/day for 120 hours) pretreated with cisplatin 100 mg/m(2) on day 1 has been reported. Symptoms from a massive aortic embolism began 16 hours after the end of the infusion. The patient had no previous cardiac symptoms or ECG/chest radiogram abnormalities [237].   Cardiotoxicity  1) General Information   a) Although uncommon, fluorouracil has been associated with cardiotoxicity, including angina, myocardial infarction/ischemia, arrhythmia, heart failure [212], ischemic electrocardiographic changes,  cardiomyopathy, and very rarely, sudden death [218][219][220][221][222].    b) In a review of fluorouracil-associated cardiotoxicity, investigators estimated the overall incidence as varying from 1.2% to 18% with resultant mortality in 2.2% to 13.3% of cases [218].   c) Most commonly, these effects occur when the drug is administered as a continuous infusion. Patients with pre-existing coronary artery disease (CAD) appear to be at greater risk [212]; however, most reported cases are in those with no previous history of CAD [221][222].   2) Prevention and Management   a) Withhold treatment if cardiotoxicity occurs [212].         -Oxaliplatin every 2 weeks (3/7-4/4/17)-   Nothing sig   -Leucovorin every 2 weeks (3/7-5/27/17) Nothing sig   -Irinotecan every 2 weeks (5/23-7/11/17)     DIH- <1% case reports MI, bradycardia, cardiac arrest

## 2017-08-01 NOTE — PROGRESS NOTES
08/01/17 1300   Quick Adds   Type of Visit Initial PT Evaluation   Living Environment   Lives With spouse   Living Arrangements house   Number of Stairs to Enter Home 1   Number of Stairs Within Home 0   Functional Level Prior   Ambulation 0-->independent   Transferring 0-->independent   Toileting 0-->independent   Bathing 1-->assistive equipment   Dressing 0-->independent   Eating 0-->independent   Communication 0-->understands/communicates without difficulty   Swallowing 0-->swallows foods/liquids without difficulty   Cognition 0 - no cognition issues reported   Fall history within last six months no   Prior Functional Level Comment PLOF- Pt indep. with ambulation with no device;  recently purchased a walker for the pt    General Information   Onset of Illness/Injury or Date of Surgery - Date 07/24/17   Referring Physician Byron   Patient/Family Goals Statement Pt w/ goal of returning home   Pertinent History of Current Problem (include personal factors and/or comorbidities that impact the POC) 66 year old female with esophageal and gastric cancer  Undergoing palliative chemotherapy who presents with increased shortness of breath over the weekend; admitted w/ pneuomina,bilateral GA-  s/p thoracentesis 7/28/201   General Observations Pt alert, pleasant. Hausband present   Cognitive Status Examination   Orientation orientation to person, place and time   Pain Assessment   Patient Currently in Pain No   Integumentary/Edema   Integumentary/Edema Comments - see lymphedema notes   Range of Motion (ROM)   ROM Comment WFL   MMT: Hip, Rehab Eval   Hip Flexion - Left Side (4-/5) good minus, left   Hip Flexion - Right Side (4-/5) good minus, right   MMT: Knee, Rehab Eval   Knee Flexion - Left Side (4/5) good, left   Knee Extension - Left Side (4-/5) good minus, left   Knee Flexion - Right Side (4/5) good, right   Knee Extension - Right Side (4-/5) good minus, right   MMT: Ankle, Rehab Eval   Ankle Dorsiflexion -  "Left Side (4/5) good, left   Ankle Dorsiflexion - Right Side (4/5) good, left   Bed Mobility   Bed Mobility Comments Min/ moderate. Assistance  to track LEs in top bed.  stating he can assist at home   Transfer Skills   Transfer Comments SBA   Gait   Gait Comments Pt completes in room mobility - approx 20 feet  w/ walker. steady with walker use Walker ht adjusted- required initial cues  not to lift walker . No SOA noted    General Therapy Interventions   Planned Therapy Interventions gait training;ROM;strengthening   Clinical Impression   Criteria for Skilled Therapeutic Intervention yes, treatment indicated   PT Diagnosis generalized weakness    Influenced by the following impairments Decreased strength, activity tolerance   Functional limitations due to impairments altered mobility    Clinical Presentation Stable/Uncomplicated   Clinical Presentation Rationale clinical judgement    Clinical Decision Making (Complexity) Low complexity   Therapy Frequency` daily   Predicted Duration of Therapy Intervention (days/wks) 1-2 days   Anticipated Equipment Needs at Discharge (Pt has equipment for home use)   Anticipated Discharge Disposition Home with Assist;Home with Home Therapy   Risk & Benefits of therapy have been explained Yes   Patient, Family & other staff in agreement with plan of care Yes   [facility] AM-PAC  \"6 Clicks\" V.2 Basic Mobility Inpatient Short Form   1. Turning from your back to your side while in a flat bed without using bedrails? 3 - A Little   2. Moving from lying on your back to sitting on the side of a flat bed without using bedrails? 3 - A Little   3. Moving to and from a bed to a chair (including a wheelchair)? 3 - A Little   4. Standing up from a chair using your arms (e.g., wheelchair, or bedside chair)? 4 - None   5. To walk in hospital room? 3 - A Little   6. Climbing 3-5 steps with a railing? 2 - A Lot   Basic Mobility Raw Score (Score out of 24.Lower scores equate to lower " levels of function) 18

## 2017-08-01 NOTE — PROGRESS NOTES
[facility] Medicine Progress Note  Date of Service: 08/01/2017    Assessment & Plan   Bren Rendon is a 66 year old female with esophageal and gastric cancer undergoing palliative chemotherapy who who presented on 7/24/2017 with increased shortness of breath.  She was diagnosed with a pneumonia and treated with Zosyn and Levaquin.  On 07/28/2017 she had the acute hypoxic respiratory failure and had to be put on BiPAP.  Patient improved..  2.5 L of pleural fluid was removed from the right side on 07/28/2017.  Her breathing improved but is again getting worse as she also has moderate pleural effusion on the left side.  1.2 liters clear yellow fluid removed from right pleural space 7/31/2017     Acute hypoxic respiratory failure  Probably on the basis of bilateral pleural effusion larger on the right side and moderate left-sided and possible pneumonia.  Patient was initially requiring BiPAP and weaned off 7/29.   Was better with removal of 2.5 L of right pleural effusion and improved further with tap on left of 1.2 L.   Resolved 7/31.       Pneumonia, right middle-lower lobes  Diagnosed originally on PET imaging 7/19.  Suspected to be aspiration related - healthcare associated - of right middle and lower. CT chest 7/28 infiltrates seen in right lower lobe, right middle lobe and left lower lobe but are most consistent with atelectasis related to pleural effusions. Pct on admission was 0.07. No fever. Did have tachycardia and tachypnea.   On vancomycin and Zosyn and Levaquin since admission, 7/24. Clinically improved. Completed 7 days of antibiotics today 8/1/2017.     Bilateral Pleural Effusion  Right greater than left. Had Thoracentesis on the right side with removal of 2.5 L of pleural fluid 7/28/2017 which was sent for regular test as well as cytology (pending). Thoracentesis done on the left side 07/31/2070 and again breathing better.   - follow. 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    Edema is relatively recent onset over last couple of months and etiology is not clear. Echo today 8/1 shows cardiomyopathy with moderate apical wall hypokinesis and LV EF 40-45% (see below).   - continue lymphedema wraps   - trial diuretics starting today    Cardiomyopathy  On echo, new decreased LV function with LVEF 40-45% with apical wall hypokinesis. On stress test in 2006, normal LVEF 66% and no ischemia seen. Possibly cardiotoxicity of chemotherapy. With RWMA, consider coronary artery disease though no history of this.    - no aspirin due to low platelets   - no ACEi/ARB or B-blocker due to relative low BP    Generalized weakness  Patient with marked weakness due to deconditioning, chemotherapy, cancer and age. Very motivated to go home with services on discharge.    - physical therapy   - occupational therapy    - encouraged activity     Acute kidney injury   Baseline creatinine 0.6 increased to 1.5 at peak. No NSAIDs. No hypotension prior to creatinine starting to rise. Vancomycin toxicity possible. Creatinine is improving 1.23.    - avoid nephrotoxic meds, stopping vancomycin   - follow renal function     DVT Prophylaxis: With low platelets will avoid chemical prophylaxis for now   Code Status: DNR/DNI    Discussion: Improvement is gradual but is improving daily    Disposition: Anticipate discharge 1-2 days to home with home care     Attestation:  Total time: 30 minutes    Julián Matthews MD  Hospital Medicine        Interval History   Feels less shortness of breath, no chest pain. C/o feeling very weak. Nausea is controlled on current regimen IV.     Physical  Exam   Temp:  [96.3  F (35.7  C)-97.9  F (36.6  C)] 97.9  F (36.6  C)  Pulse:  [100-108] 108  Heart Rate:  [100-104] 102  Resp:  [16-22] 22  BP: ()/(50-68) 92/57  SpO2:  [93 %-96 %] 94 %    Weights:   Vitals:    07/29/17 0600 07/30/17 0600 08/01/17 0656   Weight: 75.4 kg (166 lb 3.6 oz) 76.6 kg (168 lb 14 oz) 77.3 kg (170 lb 6.7 oz)    Body mass index is 30.19 kg/(m^2).    Constitutional: alert and oriented, brighter, NAD, nontoxic, smiles  CV: Regular, edema to hips  Respiratory: bibasilar coarse crackles and a few mid lung field fine crackles  GI: Soft, non-tender   Skin: Warm and dry    Data     Recent Labs  Lab 07/31/17  0610 07/30/17  1500 07/30/17  0630 07/29/17  0620 07/28/17  0650 07/28/17  0244   WBC 3.8*  --  3.5*  --   --  7.9   HGB 9.6*  --  9.6*  --   --  11.2*   MCV 85  --  85  --   --  87     --  174  --   --  345   INR 1.50*  --   --   --  1.36*  --      --  139  --  140  --    POTASSIUM 4.1 2.5* 2.7*  --  3.5  --    CHLORIDE 110*  --  106  --  109  --    CO2 20  --  22  --  20  --    BUN 17  --  17  --  12  --    CR 1.23*  --  1.31* 1.52* 1.36*  --    ANIONGAP 9  --  11  --  11  --    JARED 7.9*  --  7.7*  --  8.2*  --    GLC 92  --  89  --  139*  --    PROTTOTAL 4.2*  --   --   --  4.3*  --          Recent Labs  Lab 07/31/17  0610 07/30/17  0630 07/28/17  0650 07/26/17  0605   GLC 92 89 139* 97        Unresulted Labs Ordered in the Past 30 Days of this Admission     Date and Time Order Name Status Description    7/28/2017 1441 Cytology Non Gyn In process     7/27/2017 1901 Fluid Culture Aerobic Bacterial Preliminary            Imaging:   Recent Results (from the past 24 hour(s))   US Thoracentesis    Narrative    US THORACENTESIS 7/31/2017 11:11 AM    HISTORY: Short of breath.    PROCEDURE: Risks and benefits of the procedure are discussed with the  patient. Under sonographic guidance and utilizing 8 mL of 1% lidocaine  as local anesthetic, a needle is inserted into the posterior  left  hemithorax. This is followed by a standard 8 [place] catheter. 1,300 mL  of straw colored fluid is evacuated. The patient tolerated the  procedure well.      Impression    IMPRESSION: Technically uneventful ultrasound-guided thoracentesis.     HOA RIVERA MD   XR Chest Port 1 View    Narrative    XR CHEST PORT 1 VW   7/31/2017 11:12 AM     HISTORY: post thoracentesis    COMPARISON: 7/30/2017.      Impression    IMPRESSION: No evidence for pneumothorax post left thoracentesis. The  left lung is clear.    Again noted is increased pleural parenchymal density in the right  thorax. A right IJ port is unchanged in position.    KANDIS HERRON MD        I reviewed all new labs and imaging results over the last 24 hours. I personally reviewed no images or EKG's today.    Medications     - MEDICATION INSTRUCTIONS -       NaCl 20 mL/hr at 08/01/17 0740       vancomycin place aguila - receiving intermittent dosing  1 each Does not apply See Admin Instructions     piperacillin-tazobactam  2.25 g Intravenous Q6H     heparin  5,000 Units Subcutaneous Q12H     levofloxacin  750 mg Intravenous Q48H     ondansetron  8 mg Intravenous Q8H     prochlorperazine  5 mg Intravenous Q6H     LORazepam  0.5 mg Oral At Bedtime    Or     LORazepam  0.5 mg Intravenous At Bedtime     fexofenadine  60 mg Oral Daily     carboxymethylcellulose  2 drop Both Eyes Daily     cholecalciferol  2,000 Units Oral Daily     fluticasone-vilanterol  1 puff Inhalation Daily     multivitamin, therapeutic with minerals  1 tablet Oral Daily     omeprazole  40 mg Oral Daily     fluticasone  2 spray Both Nostrils Daily   Reviewed    Julián Matthews MD  [facility] Medicine

## 2017-08-02 NOTE — PROGRESS NOTES
Uneventful evening, Pt continues to sit in recliner chair with legs elevated. Pt states comfort and may sleep in recliner chair overnight. Pt assisted to BR with walker and back to chair without difficulty. Pt has small loose brown, green stool. Will continue to monitor.

## 2017-08-02 NOTE — PLAN OF CARE
Problem: Goal Outcome Summary  Goal: Goal Outcome Summary  Outcome: Improving  Patient A/O x4. VSS. Neuro intact. Ambulated in ash x2; experiences dyspnea on exertion. IV lasix infusing; with low results-med switched to BUMEX. Appetite remains low for breakfast and dinner; better at lunch time. Moving to Med-Surge unit this evening.     Continue to monitor and implement poc.

## 2017-08-02 NOTE — PROGRESS NOTES
[facility] Medicine Follow up Note  Date of Service: 08/02/2017      Urine output not very robust on furosemide 10 mg/hr or on 20 mg/h.    - change to Bumex drmatheus Matthews MD  [facility] Medicine

## 2017-08-02 NOTE — PLAN OF CARE
Problem: Goal Outcome Summary  Goal: Goal Outcome Summary  Outcome: Improving  Pt will need Lymphedema order for ongoing Lymphedema tx through [facility] home caring pt will need GCB until garments arrive at pt's home then pt and spouse will need garment education from Lymphedema therapist spouse stated garments were shipped yesterday. Therapist rebandaged B LE's and will f/u for rebandaging and gt tomorrow at 3pm nursing notified.

## 2017-08-02 NOTE — PLAN OF CARE
Problem: Goal Outcome Summary  Goal: Goal Outcome Summary  Outcome: Improving  Pt assisted into bed now per her request.VSS. Pt states comfort, wishes to sleep. Will continue to monitor.

## 2017-08-02 NOTE — PLAN OF CARE
"Problem: Goal Outcome Summary  Goal: Goal Outcome Summary  OT: Pt ambulates to/from sink and stands x5 min for AM grooming tasks all with SBA and FWW. Participates in 6 B UE exercises x10 reps with good tolerance. Following all activity pt rates \"4/10\" on OMNI effort scale (getting more tired). Improved since yesterday 8/1/17, scoring same activity as \"7/10\".      REC: Home with home therapy to continue to advance strength/activity tolerance for ADLs and functional mobility       "

## 2017-08-02 NOTE — PROGRESS NOTES
[facility] Medicine Progress Note  Date of Service: 08/02/2017    Assessment & Plan   Bren Rendon is a 66 year old female with esophageal and gastric cancer undergoing palliative chemotherapy who who presented on 7/24/2017 with increased shortness of breath.  She was diagnosed with a pneumonia and treated with Zosyn, vancomycin and Levaquin.  On 07/28/2017 she had the acute hypoxic respiratory failure and had to be put on BiPAP.  Patient improved..  2.5 L of pleural fluid was removed from the right side on 07/28/2017.  Her breathing improved but is again getting worse as she also has moderate pleural effusion on the left side.  1.2 liters clear yellow fluid removed from right pleural space 7/31/2017     Acute hypoxic respiratory failure  Probably on the basis of bilateral pleural effusion larger on the right side and moderate left-sided and possible pneumonia.  Patient was initially requiring BiPAP and weaned off 7/29.   Was better with removal of 2.5 L of right pleural effusion and improved further with tap on left of 1.2 L.   Resolved 7/31.       Pneumonia, right middle-lower lobes  Diagnosed originally on PET imaging 7/19.  Suspected to be aspiration related - healthcare associated - of right middle and lower. CT chest 7/28 infiltrates seen in right lower lobe, right middle lobe and left lower lobe but are most consistent with atelectasis related to pleural effusions. Pct on admission was 0.07. No fever. Did have tachycardia and tachypnea.   On vancomycin and Zosyn and Levaquin since admission, 7/24. Clinically improved. Completed 7 days of antibiotics 8/1/2017. Resolved.    Bilateral Pleural Effusion  Right greater than left. Had Thoracentesis on the right side with removal of 2.5 L of pleural fluid 7/28/2017 which was sent for regular test as well as cytology (pending). Thoracentesis done on the left side 07/31/2070 and again breathing better.   - follow. 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 On scheduled Zofran and compazine for intractable nausea.     Chemotherapy-induced neutropenia (H) and Anemia  Hemoglobin is better white count and platelets are decreased  Follow CBC    Lymphedema  Receiving treatment from the lymphedema clinic  On gradient compression wraps in place now.    Edema is relatively recent onset over last couple of months and etiology is not clear. Echo 8/1 shows cardiomyopathy with moderate apical wall hypokinesis and LV EF 40-45% (see below).   - continue lymphedema wraps   - diuresis: trial of furosemide drip today   - venous US to rule out DVT    Cardiomyopathy  On echo, new decreased LV function with LVEF 40-45% with apical wall hypokinesis. On stress test in 2006, normal LVEF 66% and no ischemia seen. Possibly cardiotoxicity of chemotherapy. With RWMA, consider coronary artery disease though no history of this.    - no aspirin due to low platelets   - no ACEi/ARB or B-blocker due to relative low BP    Generalized weakness  Patient with marked weakness due to deconditioning, chemotherapy, cancer and age. Very motivated to go home with services on discharge.    - physical therapy   - occupational therapy    - encouraged activity     Acute kidney injury   Baseline creatinine 0.6 increased to 1.5 at peak. No NSAIDs. No hypotension prior to creatinine starting to rise. Vancomycin toxicity possible. Creatinine is improved but not at baseline.    - avoid nephrotoxic meds, stopped vanco 8/1   - follow renal function     DVT Prophylaxis: With low platelets will avoid chemical prophylaxis for now   Code Status: DNR/DNI    Discussion: Improving daily. Olegario like more diuresis prior to dc as patient with high risk for recurrent effusions and readmission    Disposition: Anticipate discharge 1-2 days if able to  alfred     Attestation:  Total time: 35 minutes    Julián Matthews MD  [facility] Medicine        Interval History   No UOP after 20 mg furosemide last evening until this morning voided 200 mL. Some dyspnea on exertion otherwise no shortness of breath. No chest pain, abdominal pain. Nausea controlled, tolerating some.    Physical Exam   Temp:  [97.1  F (36.2  C)-97.8  F (36.6  C)] 97.8  F (36.6  C)  Heart Rate:  [] 106  Resp:  [16-20] 16  BP: ()/(61-84) 109/71  SpO2:  [92 %-100 %] 92 %    Weights:   Vitals:    07/29/17 0600 07/30/17 0600 08/01/17 0656   Weight: 75.4 kg (166 lb 3.6 oz) 76.6 kg (168 lb 14 oz) 77.3 kg (170 lb 6.7 oz)    Body mass index is 30.19 kg/(m^2).    Constitutional: alert and oriented, mild increased work of breathing but was just to BR.   CV: Regular, edema through upper thighs  Respiratory: crackles posterior lung fields 1/2 up  GI: Soft, non-tender   Skin: Warm and dry    Data     Recent Labs  Lab 08/02/17  0520 07/31/17  0610 07/30/17  1500 07/30/17  0630  07/28/17  0650   WBC 4.8 3.8*  --  3.5*  --   --    HGB 9.6* 9.6*  --  9.6*  --   --    MCV 85 85  --  85  --   --     182  --  174  --   --    INR  --  1.50*  --   --   --  1.36*    139  --  139  --  140   POTASSIUM 3.4 4.1 2.5* 2.7*  --  3.5   CHLORIDE 109 110*  --  106  --  109   CO2 20 20  --  22  --  20   BUN 17 17  --  17  --  12   CR 1.34* 1.23*  --  1.31*  < > 1.36*   ANIONGAP 11 9  --  11  --  11   JARED 7.4* 7.9*  --  7.7*  --  8.2*   * 92  --  89  --  139*   PROTTOTAL  --  4.2*  --   --   --  4.3*   TROPI 0.110*  --   --   --   --   --    < > = values in this interval not displayed.      Recent Labs  Lab 08/02/17  0520 07/31/17  0610 07/30/17  0630 07/28/17  0650   * 92 89 139*        Unresulted Labs Ordered in the Past 30 Days of this Admission     Date and Time Order Name Status Description    7/28/2017 1441 Cytology Non Gyn In process            Imaging: No results found for this or any  previous visit (from the past 24 hour(s)).     I reviewed all new labs and imaging results over the last 24 hours. I personally reviewed no images or EKG's today.    Medications     furosemide (LASIX) infusion ADULT       IV infusion builder WITH additives       - MEDICATION INSTRUCTIONS -         furosemide  20 mg Intravenous Once     ondansetron  8 mg Oral Q8H     prochlorperazine  5 mg Oral Q6H     heparin lock flush  5-10 mL Intracatheter Q24H     heparin  5,000 Units Subcutaneous Q12H     LORazepam  0.5 mg Oral At Bedtime     fexofenadine  60 mg Oral Daily     carboxymethylcellulose  2 drop Both Eyes Daily     cholecalciferol  2,000 Units Oral Daily     fluticasone-vilanterol  1 puff Inhalation Daily     multivitamin, therapeutic with minerals  1 tablet Oral Daily     omeprazole  40 mg Oral Daily     fluticasone  2 spray Both Nostrils Daily   Reviewed    Julián Matthews MD  [facility] Medicine

## 2017-08-02 NOTE — PLAN OF CARE
"Problem: Goal Outcome Summary  Goal: Goal Outcome Summary  Outcome: Improving  0935-patient walked to bathroom with assist of one person and walker, gait steady and slow, then walked out into ahs with assist of one and walker, stated \"I am a little winded\", patient walked back to recliner.  Will continue to monitor closely. Angie Rodriguez RN, BC, CCRN      "

## 2017-08-02 NOTE — PROGRESS NOTES
Pt has appeared to be sleeping at long intervals since 0030. Pt offers no complaints. AM labs drawn at this time and sent to lab. Await results.

## 2017-08-02 NOTE — PROGRESS NOTES
Phone call to Naren, , to inform him of patient transferring to 2306.  Angie Rodriguez, RN,BC, CCRN

## 2017-08-03 NOTE — PROGRESS NOTES
Clinic Care Coordination Contact  Care Team Conversations  D/I: Received CTS on this patient on 7/26. Per chart review, she continues to remain hospitalized. Transferred to M/S floor from ICU late last evening. No eminent plan for DC at this time.  P: RN CC will continue to monitor chart and f/u per protocol after DC.    Neftali TIRADO,RN- BC  Clinic Care Coordinator  [facility]  Phone: [phone]

## 2017-08-03 NOTE — PLAN OF CARE
Problem: Goal Outcome Summary  Goal: Goal Outcome Summary  Outcome: No Change  1550- continue to hold bumex. No liquid stolls this pm. Port a cath access and  IV fluids infusing at 20ml/hr.  Received albumin this am.lung sounds- Fine crackes scattered-no cough.romo draining yellow urine  Alert and oriented.

## 2017-08-03 NOTE — PLAN OF CARE
Problem: Goal Outcome Summary  Goal: Goal Outcome Summary  Outcome: Improving  0830 bladder ctat=126qi. Rosa chkkhh=875 clear yellow urine immediate return. Pt ambulating to bathroom with assist 1, walker. 2 loose stool green/brown this shift. Dyspnea noted with ambulation. Poor appetite, encourage po fluids. Reports nausea under control. K+ 2.9, completed po  K+ replacement, recheck level at 1500. Albumin infusion completed, Bumex was on hold for 90 min while completing infusion as not compatible .  present this am, updated on plan of care, questions answered.

## 2017-08-03 NOTE — PLAN OF CARE
Problem: Goal Outcome Summary  Goal: Goal Outcome Summary  Outcome: Improving  Lymphedema will continue to rebandage legs daily during the wk while hospitalized, extended stockinet to B mid-thighs if this begins to roll and tourniquet pt's legs nursing can fold down over top of bandages.

## 2017-08-03 NOTE — PLAN OF CARE
Problem: Goal Outcome Summary  Goal: Goal Outcome Summary  Outcome: No Change  Had soreness at neck and upper back, up to BR w/ assist of one and walker, moves fairly well once OOB. Not able to position self in bed, not able to lay on sides, coccyx a little red. Voiding more w/ increased bumex drip. Is soft spoken but alert.

## 2017-08-03 NOTE — PROGRESS NOTES
[facility] Medicine Progress Note  Date of Service: 08/03/2017    Assessment & Plan   Bren Rendon is a 66 year old female with esophageal and gastric cancer undergoing palliative chemotherapy who who presented on 7/24/2017 with increased shortness of breath.  She was diagnosed with a pneumonia and treated with Zosyn, vancomycin and Levaquin.  On 07/28/2017 she had the acute hypoxic respiratory failure and had to be put on BiPAP.  Patient improved..  2.5 L of pleural fluid was removed from the right side on 07/28/2017.  Her breathing improved but is again getting worse as she also has moderate pleural effusion on the left side.  1.2 liters clear yellow fluid removed from right pleural space 7/31/2017     Acute hypoxic respiratory failure  Probably on the basis of bilateral pleural effusion larger on the right side and moderate left-sided and possible pneumonia.  Patient was initially requiring BiPAP and weaned off 7/29.   Was better with removal of 2.5 L of right pleural effusion and improved further with tap on left of 1.2 L.   Resolved 7/31.       Pneumonia, right middle-lower lobes  Diagnosed originally on PET imaging 7/19.  Suspected to be aspiration related - healthcare associated - of right middle and lower. CT chest 7/28 infiltrates seen in right lower lobe, right middle lobe and left lower lobe but are most consistent with atelectasis related to pleural effusions. Pct on admission was 0.07. No fever. Did have tachycardia and tachypnea.   On vancomycin and Zosyn and Levaquin since admission, 7/24. Clinically improved. Completed 7 days of antibiotics 8/1/2017. Resolved.    Bilateral Pleural Effusion  Right greater than left. Had Thoracentesis on the right side with removal of 2.5 L of pleural fluid 7/28/2017 which was sent for regular test as well as cytology (pending). Thoracentesis done on the left side 07/31/2070 and again breathing better.   - follow. 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 On scheduled Zofran and compazine for intractable nausea.     Chemotherapy-induced neutropenia (H) and Anemia  Hemoglobin is better white count and platelets are decreased  Follow CBC    Lymphedema  Receiving treatment from the lymphedema clinic  On gradient compression wraps in place now.    Edema is relatively recent onset over last couple of months and etiology is not clear. Echo 8/1 shows cardiomyopathy with moderate apical wall hypokinesis and LV EF 40-45% (see below). Venous US negative for DVT 8/2.   Trial of furosemide, then Bumex infusion with modest results but dropped BP.    - continue lymphedema wraps   - continue Bumex today, add albumin infusion   - venous US to rule out DVT    Cardiomyopathy  On echo, new decreased LV function with LVEF 40-45% with apical wall hypokinesis. On stress test in 2006, normal LVEF 66% and no ischemia seen. Possibly cardiotoxicity of chemotherapy. With RWMA, consider coronary artery disease though no history of this.    - no aspirin due to low platelets   - no ACEi/ARB or B-blocker due to relative low BP    Generalized weakness  Patient with marked weakness due to deconditioning, chemotherapy, cancer and age. Very motivated to go home with services on discharge.    - physical therapy   - occupational therapy    - encouraged activity     Acute kidney injury   Baseline creatinine 0.6 increased to 1.5 at peak. No NSAIDs. No hypotension prior to creatinine starting to rise. Vancomycin toxicity possible. Creatinine initially improved but worsening with diuresis   - avoid nephrotoxic meds, stopped vanco 8/1   - follow renal function     DVT Prophylaxis: With low platelets will avoid chemical prophylaxis for now   Code Status: DNR/DNI    Discussion: Diuresis is not very robust and BP dropping. No dc  plan ye    Disposition: Anticipate discharge possibly this weekend     Attestation:  Total time: 30 minutes    Julián Matthews MD  [facility] Medicine        Interval History   Feels more tired today, no shortness of breath or chest pain. No abdominal pain. No nausea but not much appetite.     Physical Exam   Temp:  [97  F (36.1  C)-97.8  F (36.6  C)] 97.8  F (36.6  C)  Pulse:  [106] 106  Heart Rate:  [103-113] 107  Resp:  [18-20] 18  BP: ()/(51-75) 92/51  SpO2:  [92 %-94 %] 93 %    Weights:   Vitals:    08/02/17 0800 08/02/17 1906 08/03/17 0603   Weight: 77.1 kg (169 lb 15.6 oz) 79.2 kg (174 lb 9.7 oz) 78.9 kg (173 lb 15.1 oz)    Body mass index is 30.81 kg/(m^2).    Constitutional: alert and oriented, glistening complexion otherwise nontoxic appearing, NAD  CV: Regular, edema through out thighs  Respiratory: decreased BS in right base, left basilar coarse crackles, left anterior crackles   GI: Soft, nontender  Skin: Warm and dry peripherally    Data     Recent Labs  Lab 08/03/17  0430 08/02/17  0520 07/31/17  0610  07/30/17  0630  07/28/17  0650   WBC  --  4.8 3.8*  --  3.5*  --   --    HGB  --  9.6* 9.6*  --  9.6*  --   --    MCV  --  85 85  --  85  --   --    PLT  --  176 182  --  174  --   --    INR  --   --  1.50*  --   --   --  1.36*    140 139  --  139  --  140   POTASSIUM 2.9* 3.4 4.1  < > 2.7*  --  3.5   CHLORIDE 107 109 110*  --  106  --  109   CO2 22 20 20  --  22  --  20   BUN 18 17 17  --  17  --  12   CR 1.55* 1.34* 1.23*  --  1.31*  < > 1.36*   ANIONGAP 11 11 9  --  11  --  11   JARED 7.6* 7.4* 7.9*  --  7.7*  --  8.2*   * 100* 92  --  89  --  139*   PROTTOTAL  --   --  4.2*  --   --   --  4.3*   TROPI  --  0.110*  --   --   --   --   --    < > = values in this interval not displayed.      Recent Labs  Lab 08/03/17  0430 08/02/17  0520 07/31/17  0610 07/30/17  0630 07/28/17  0650   * 100* 92 89 139*        Unresulted Labs Ordered in the Past 30 Days of this Admission     Date and  Time Order Name Status Description    7/28/2017 1441 Cytology Non Gyn In process            Imaging:   Recent Results (from the past 24 hour(s))   US leg bilateral venous    Narrative    VENOUS ULTRASOUND BOTH LEGS  8/2/2017 9:30 AM     HISTORY: Lower extremity swelling.    COMPARISON: None.    FINDINGS:  Examination of the deep veins with graded compression and  color flow Doppler with spectral wave form analysis was performed.      Impression    IMPRESSION: No evidence of deep venous thrombosis.    KANDIS HERRON MD        I reviewed all new labs and imaging results over the last 24 hours. I personally reviewed no images or EKG's today.    Medications     IV infusion builder WITH additives 20 mL/hr at 08/02/17 1600     bumetanide 1 mg/hr (08/03/17 0429)     - MEDICATION INSTRUCTIONS -         albumin human  12.5 g Intravenous Once     cholecalciferol  2,000 Units Oral QPM     multivitamin, therapeutic with minerals  1 tablet Oral QPM     ondansetron  8 mg Oral Q8H     prochlorperazine  5 mg Oral Q6H     heparin lock flush  5-10 mL Intracatheter Q24H     heparin  5,000 Units Subcutaneous Q12H     LORazepam  0.5 mg Oral At Bedtime     fexofenadine  60 mg Oral Daily     carboxymethylcellulose  2 drop Both Eyes Daily     fluticasone-vilanterol  1 puff Inhalation Daily     omeprazole  40 mg Oral Daily     fluticasone  2 spray Both Nostrils Daily   Reviewed    Julián Matthews MD  [facility] Medicine

## 2017-08-03 NOTE — PROGRESS NOTES
[facility] Medicine Follow up Note  Date of Service: 08/03/2017      Patient with ongoing low BP despite albumin infusion. Has had 2 large watery stools.   A/P   - Will hold Bumex for rest of today. Restart if BP improves.    - check stool for C dif  Discussed with patient and  at bedside.  10 minutes  Julián Matthews MD  [facility] Medicine

## 2017-08-03 NOTE — PROGRESS NOTES
1100 BP 85/51 HR 95, 1300 BP 86/52 HR 88. Pt sitting in recliner with legs elevated, asymptomatic. Paged Dr Matthews with update. 1314 Bumex infusion stopped per orders.

## 2017-08-03 NOTE — PROGRESS NOTES
[facility] TRANSPORT NOTE  Data:   Reason for Transport:  Transferred to Med/surg    Bren Rendon was transported to M/S room 2306 from ICU via wheel chair at 1905.  Patient was accompanied by Registered Nurse. Equipment used for transport: None. Family was aware of reason for transport: yes    Action:  Report: received from Geovanna SANCHEZ    Response:  Patient's condition when transferred to M/S unit was stable. Pt denies pain and stated SOA with transfer from W/C to bed via walker and one assist that was quickly relieved at rest with SAO2 93% on room air. Pt smiling and talkative.    Jolie Wheeler

## 2017-08-03 NOTE — PLAN OF CARE
"Problem: Goal Outcome Summary  Goal: Goal Outcome Summary  OT: Participated in standing UE exercises with fair tolerance. Stands total of 6 minutes for exercises before needing a seated rest break. x2 minute rest break then stood an additional 5 minutes. Rates activity as \"5/10\" on OMNI effort scale. (getting more tired/tired)     REC: Home with home therapy and support/assist from  as needed.       "

## 2017-08-04 NOTE — PLAN OF CARE
Problem: Goal Outcome Summary  Goal: Goal Outcome Summary  Outcome: Therapy, progress towards functional goals is fair  Lymphedema plan for over weekend:1- if pt is discharged today pt will need lymphedema order for [facility] to continue with GCB and garment education.  2-if garments arrive at pt's home today or tomorrow spouse is to bring them to hospital and if pt is to be discharged over the weekend charge nurse is to contact this therapist phone number is in charge nurses contact information of phone so therapist can come in and educate pt and spouse with compression garments if garments do not arrive and pt discharges home over weekend then MD will need to write order for Lymphedema tx through [facility].  3-or if pt does not discharge until next wk and garments are brought in over weekend then therapist will f/u with pt and spouse on Monday for GCB or garment education.

## 2017-08-04 NOTE — PROGRESS NOTES
[facility] Medicine Progress Note  Date of Service: 08/04/2017    Assessment & Plan   Bren Rendon is a 66 year old female with esophageal and gastric cancer undergoing palliative chemotherapy who who presented on 7/24/2017 with increased shortness of breath.  She was diagnosed with a pneumonia and treated with Zosyn, vancomycin and Levaquin.  On 07/28/2017 she had the acute hypoxic respiratory failure and had to be put on BiPAP.  Patient improved..  2.5 L of pleural fluid was removed from the right side on 07/28/2017.  Her breathing improved but is again getting worse as she also has moderate pleural effusion on the left side.  1.2 liters clear yellow fluid removed from right pleural space 7/31/2017     Acute hypoxic respiratory failure  Probably on the basis of bilateral pleural effusion larger on the right side and moderate left-sided and possible pneumonia.  Patient was initially requiring BiPAP and weaned off 7/29.   Was better with removal of 2.5 L of right pleural effusion and improved further with tap on left of 1.2 L.   Resolved 7/31.       Pneumonia, right middle-lower lobes  Diagnosed originally on PET imaging 7/19.  Suspected to be aspiration related - healthcare associated - of right middle and lower. CT chest 7/28 infiltrates seen in right lower lobe, right middle lobe and left lower lobe but are most consistent with atelectasis related to pleural effusions. Pct on admission was 0.07. No fever. Did have tachycardia and tachypnea.   On vancomycin and Zosyn and Levaquin since admission, 7/24. Clinically improved. Completed 7 days of antibiotics 8/1/2017. Resolved.    Bilateral Pleural Effusion  Right greater than left. Had Thoracentesis on the right side with removal of 2.5 L of pleural fluid 7/28/2017 which was sent for regular test as well as cytology (pending). Thoracentesis done on the left side 07/31/2070 and again breathing better.   - follow. 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 On scheduled Zofran and compazine for intractable nausea.     Chemotherapy-induced neutropenia (H) and Anemia  Hemoglobin is better white count and platelets are decreased  Follow CBC    Lymphedema  Receiving treatment from the lymphedema clinic  On gradient compression wraps in place now.    Edema is relatively recent onset over last couple of months and etiology is not clear. Echo 8/1 shows cardiomyopathy with moderate apical wall hypokinesis and LV EF 40-45% (see below). Venous US negative for DVT 8/2.   Trial of furosemide, then Bumex infusion with modest results but dropped BP 8/3. Infusion stopped. Patient with slow UOP since and creatinine is trending up. Extensive peripheral fluids in bilateral lower extremities, ascities and suspect pulmonary effusions are re-accumulating.    - continue lymphedema wraps   - holding on further diuretic for now but would consider to restart one if UOP and BP picked up spontaneously    Cardiomyopathy  On echo, new decreased LV function with LVEF 40-45% with apical wall hypokinesis. On stress test in 2006, normal LVEF 66% and no ischemia seen. Possibly cardiotoxicity of chemotherapy. With RWMA, consider coronary artery disease though no history of this.    - no aspirin due to low platelets   - no ACEi/ARB or B-blocker due to relative low BP    Generalized weakness  Patient with marked weakness due to deconditioning, chemotherapy, cancer and age. Very motivated to go home with services on discharge.    - physical therapy   - occupational therapy    - encouraged activity     Acute kidney injury   Baseline creatinine 0.6 increased to 1.5 at peak. No NSAIDs. No hypotension prior to creatinine starting to rise. Vancomycin toxicity possible - stopped on 8/1. Creatinine initially improved but worsening  with diuresis. Creatinine now 1.8. Suspect partially due to prerenal state but total body volume is increased so avoiding further fluid boluses except for symptomatic hypotension.    - check renal ultrasound to rule out obstruction   - avoid nephrotoxic meds   - follow renal function    - consider FENa tomorrow    Diarrhea 8/3   Slowed down. C diff tox screen pending.    DVT Prophylaxis: SQ heparin   Code Status: DNR/DNI    Discussion/DIspo: renal US today, otherwise following course. If patient doing ok, considering dc home tomorrow     Attestation:  Total time: 30 minutes    Julián Matthews MD  Hospital Medicine        Interval History   Feels better. Appetite improving. No chest pain, shortness of breath.     Physical Exam   Temp:  [96.2  F (35.7  C)-98  F (36.7  C)] 96.2  F (35.7  C)  Pulse:  [88-96] 96  Heart Rate:  [102-105] 105  Resp:  [16-18] 18  BP: ()/(51-63) 107/62  SpO2:  [91 %-97 %] 91 %    Weights:   Vitals:    08/02/17 0800 08/02/17 1906 08/03/17 0603   Weight: 77.1 kg (169 lb 15.6 oz) 79.2 kg (174 lb 9.7 oz) 78.9 kg (173 lb 15.1 oz)    Body mass index is 30.81 kg/(m^2).    Constitutional: alert and oriented, NAD, nontoxic, looks brighter  CV: Regular, extensive edema to waist  Respiratory: decreased BS right base 1/2 up, a few crackles left base otherwise CTA bilaterally  GI: Soft, non-tender  Skin: Warm and dry    Data     Recent Labs  Lab 08/04/17  0655 08/03/17  1501 08/03/17  0430 08/02/17  0520 07/31/17  0610  07/30/17  0630   WBC  --   --   --  4.8 3.8*  --  3.5*   HGB  --   --   --  9.6* 9.6*  --  9.6*   MCV  --   --   --  85 85  --  85   PLT  --   --   --  176 182  --  174   INR  --   --   --   --  1.50*  --   --      --  140 140 139  --  139   POTASSIUM 3.4 3.8 2.9* 3.4 4.1  < > 2.7*   CHLORIDE 108  --  107 109 110*  --  106   CO2 19*  --  22 20 20  --  22   BUN 19  --  18 17 17  --  17   CR 1.85*  --  1.55* 1.34* 1.23*  --  1.31*   ANIONGAP 11  --  11 11 9  --  11   JARED 8.0*  --   7.6* 7.4* 7.9*  --  7.7*   *  --  115* 100* 92  --  89   PROTTOTAL  --   --   --   --  4.2*  --   --    TROPI  --   --   --  0.110*  --   --   --    < > = values in this interval not displayed.      Recent Labs  Lab 08/04/17  0655 08/03/17  0430 08/02/17  0520 07/31/17  0610 07/30/17  0630   * 115* 100* 92 89        Unresulted Labs Ordered in the Past 30 Days of this Admission     Date and Time Order Name Status Description    8/3/2017 1828 Clostridium difficile toxin B PCR In process            Imaging: No results found for this or any previous visit (from the past 24 hour(s)).     I reviewed all new labs and imaging results over the last 24 hours. I personally reviewed no images or EKG's today.    Medications     IV infusion builder WITH additives 20 mL/hr at 08/02/17 1600     - MEDICATION INSTRUCTIONS -         cholecalciferol  2,000 Units Oral QPM     multivitamin, therapeutic with minerals  1 tablet Oral QPM     ondansetron  8 mg Oral Q8H     prochlorperazine  5 mg Oral Q6H     heparin lock flush  5-10 mL Intracatheter Q24H     heparin  5,000 Units Subcutaneous Q12H     LORazepam  0.5 mg Oral At Bedtime     fexofenadine  60 mg Oral Daily     carboxymethylcellulose  2 drop Both Eyes Daily     fluticasone-vilanterol  1 puff Inhalation Daily     omeprazole  40 mg Oral Daily     fluticasone  2 spray Both Nostrils Daily   Reviewed    Julián Matthews MD  [facility]

## 2017-08-04 NOTE — PLAN OF CARE
Problem: Goal Outcome Summary  Goal: Goal Outcome Summary  Outcome: Improving  BP soft today but improved from yesterday, pt denies any dizziness or lightheadedness with activity.  Denies pain.  UOP is low, 75cc from romo catheter today and 50cc output from last night.  Dr. Matthews aware.  Catheter was flushed easily with 30cc NS, no pain reported from pt with flushing, immediate output of fluid following flush.  Also did bladder scan pt which showed about 300cc but pt has a large abdomen and edema so unsure of accuracy from scan.  Denies any pain or pressure in lower abdomen.

## 2017-08-04 NOTE — PLAN OF CARE
Problem: Discharge Planning  Goal: Discharge Planning (Adult, OB, Behavioral, Peds)  Outcome: No Change  Reason for Follow up: concern that patient's long hospitalization may have caused more weakness than can be handled at home with      Anticipated discharge needs: Home w/[facility] RN and PT     Next steps: CTS available if needed.     Rossana Stringer CTS-RN  *[phone]  [phone]

## 2017-08-04 NOTE — PROGRESS NOTES
Clinic Care Coordination Contact  Care Team Conversations  D/I: Received CTS 7/26. As of today, 8/4, remains hospitalized with no plan for DC.  P: RN CC will continue to monitor chart and f/u per protocol after DC.    Neftali TIRADO,RN- BC  Clinic Care Coordinator  [facility]  Phone: [phone]

## 2017-08-04 NOTE — PLAN OF CARE
Problem: Goal Outcome Summary  Goal: Goal Outcome Summary  Outcome: No Change  Still denies pain, had another loose stool at HS, spec. collected and taken to lab per NST. BS hyper. LS mild fine cxs. Up in chair for several hours, sleeps easily between cares. UPO low per demetrius, Bumex is on hold. IV at TKO.

## 2017-08-04 NOTE — PLAN OF CARE
Problem: Goal Outcome Summary  Goal: Goal Outcome Summary  Outcome: No Change  Pt reports that she thinks edema is improved from yesterday.  Bilateral legs wrapped per lymphedema.  Up in chair most of evening with legs elevated.  Ate 25% of her meal, 50 cc of liquids.  Rosa patent, draining clear yellow urine.

## 2017-08-04 NOTE — PLAN OF CARE
Problem: Goal Outcome Summary  Goal: Goal Outcome Summary  OT: Pt participated in remainder of B UE home exercise program with good tolerance. Educated on recommended frequency, s/sx on when to stop exercises and recommended progression of exercises.      REC: Home with home therapy and assist from  as needed.

## 2017-08-04 NOTE — PLAN OF CARE
Problem: PT General Care Plan  Goal: PT Goal 3  PT Goal 3   Outcome: Change based on patient need/priority Date Met:  08/04/17

## 2017-08-05 NOTE — PLAN OF CARE
"Problem: Goal Outcome Summary  Goal: Goal Outcome Summary  Pt alert/oriented. Denies pain. Has had 150cc's clear yellow urine out of romo this shift. Pt prefers to lay on her back, encouraging pt to reposition every few hours. Have been shifting hips, legs, head. Encouraging po fluids. Pt states breathing is \"better\". Has lymphedema wraps on BLE. On RA with sats 92%. Lungs diminished. Pt requested & given ativan x1 this shift \"to help me sleep\".       "

## 2017-08-05 NOTE — PLAN OF CARE
Problem: Goal Outcome Summary  Goal: Goal Outcome Summary  Outcome: No Change  Pt continues to have low UOP, 35cc over 4 hours, romo catheter was removed at 1100 and pt has not voided since removal.  BP 85/55 and  at 1100, sepsis BPA triggered, lactic was drawn and came back normal at 1.4.  Pt asymptomatic with low BP. New orders received for albumin and 1L bolus, albumin infused and bolus infusing now. Pt's  brought lymphedema wraps from home, therapist is coming in to educate pt and spouse on wraps.  Possible discharge home later this evening, will continue to monitor.

## 2017-08-05 NOTE — PLAN OF CARE
Problem: Goal Outcome Summary  Goal: Goal Outcome Summary  Outcome: No Change  OT NOTE: Pt progressing slowly toward goals. LE dressing training with AE with pt requiring min A due to LE edema and B LE wraps using reacher and sock aid. Toilet transfer with SBA with use of toilet rails with pt needing to return to chair before washing hands due to c/o SOB. She only was able to audrey 3 1/2 min stand today due to fatigue for g/h skills.     Recommendation: Return home with 24 hr assist

## 2017-08-05 NOTE — PROGRESS NOTES
Pt triggered sepsis BPA earlier for WBC 4.8 & BP 88/52, . Pt sitting up in chair. Denies pain, dizziness. Rechecked /56. No lactic ordered at this time.

## 2017-08-05 NOTE — PLAN OF CARE
Problem: Goal Outcome Summary  Goal: Goal Outcome Summary  Outcome: Improving  Therapist educated pt and spouse donning, wear time and care of garments and skin, issued edema h/o with main number and numbers of both therapist incase pt or spouse have questions otherwise instructed them to ask [facility] nursing and if increased swelling occurs spouse to ask [facility] nursing to request referral for [facility] Lymphedema therapist. If pt is still hospitalized on Monday Lymphedema therapy will check to make sure garments are not causing any problems.

## 2017-08-05 NOTE — PLAN OF CARE
Problem: Goal Outcome Summary  Goal: Goal Outcome Summary  Outcome: Adequate for Discharge Date Met:  08/05/17  ALIRIO ROGERS DISCHARGE NOTE     Patient discharged to home at 5:45 PM via wheel chair. Accompanied by spouse and staff. Denies pain or SOA, nausea well controlled on current medications. Portacath flushed and de accessed.  Discharge instructions reviewed with patient and spouse, opportunity offered to ask questions. Prescriptions sent to patients preferred pharmacy. All belongings sent with patient.     Harriett Whitehead

## 2017-08-05 NOTE — PLAN OF CARE
Problem: Goal Outcome Summary  Goal: Goal Outcome Summary  PT:  Garments are here and spouse is bringing into hospital.

## 2017-08-05 NOTE — DISCHARGE SUMMARY
[facility]    Discharge Summary  Hospital Medicine    Date of Admission:  7/24/2017  Date of Discharge:  8/5/2017   Discharging Provider: Julián Matthews MD  Date of Service: 8/5/2017     Primary Care     Dillan Amos  [facility] [address] [address]      Identification and Chief Compaint: Bren Rendon is a 66 year old female who presented on 7/24/2017 with complaint of increasing shortness of breath.    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  The following labs/tests are recommended: BMP when you see Dr. Amos.     Activity   Your activity upon discharge: activity as tolerated with walker     Diet   Follow this diet upon discharge:     Regular Diet Adult as tolerated       Further instructions from your care team       From Néstor Brown, Palliative Care NP, Cell [phone]    Vitamin B6  I seem to remember you saying that the the B vitamin pill (B6, Pyridoxine) was hard to get down.  So I found 2 different versions of it that you can try--I sent the order over to [facility].  One is a once-a-day capsule, and the other is a twice-a-day liquid.  You can try one or both.  Once you decide which format you prefer, stick with just one  of the 3 options--tablet, capsule or liquid.      Nausea  Here's your new schedule:  1. Granisetron/Sancuso patch, replace every 7 days.  2. Prochlorperazine/Compazine 10 mg (whole tablet) every 8 hours  3. Lorazepam/Ativan 0.5mg every night at bedtime  Notes:    I increased the Prochlorperazine/Compazine from 5 up to 10 mg so you wouldn't have to wake up in the middle of the night to take the medicine every 6 hours.  Many many people take and tolerate the 10 mg dose.    During the first 24 hours that the very first Granisetron patch is worn, I want you to also take the Ondansetron/Zofran 8 mg dissolvable tablet every 8 hours.  This is just because it may take a day for the medicine to seep through your skin and get absorbed into your blood stream to become effective.      Since you will be taking these medicines on a schedule (you can decide the times), you do NOT have to record each dose unless you want to.      Back-up Nausea Medicines:  If you have any nausea/vomiting despite the above, here are my suggestions.  I would ranked them starting with the medicine I think you should take first, second, third, etc.  If you need to take ANY of these, record the date, time and dose on the color-coded sheets I am sending home with you.    1. Ondansetron 8mg (dissolving) every 6 hours as needed  2. Lorazepam 0.5 mg ever 4 hours as needed  3. Olanzapine 5 mg (dissolving) every 24 hours--best at bedtime as it will make you sleepy  There are several more medications that can be used, but call me first--I can call in a prescription to [facility].    Nutrition:  The dietician has indicated that you need approximately 75 g protein daily.    They non-grainy protein that I like is this:    EAS Soy protein made by [employer].  I found it at "[employer]" but you could probably find it at [employer] and [facility] could probably order it in.  It does have a vanilla flavor to it so you can't add it to vegetables or mashed potatoes.       Breathlessness    I did place an order for concentrated oxycodone just in case you are struggling to breath.  Just follow the directions on the label.  You may never need to use it, but it's better and safer for you to have some on hand.     Many people find significant relief from having a fan blowing gently on the FACE.  Aiming it at the feet or chest doesn't work.  God made us with mechanoreceptors in the skin on our faces that detect air movement and somehow processes that sensation into a degree of relief from breathlessness.  It doesn't have to be as strong as a wind tunnel, just enough to feel the air moving.       I am really serious when I tell you can call me next week if you are struggling with any symptoms.      God Bless you both!    ++++++++++++++++++++++++++++++++++++++++++++++++++++++++++++++++++++++++++         Discharge Medications   Current Discharge Medication List      START taking these medications    Details   OLANZapine zydis (ZYPREXA) 5 MG ODT tab Take 1 tablet (5 mg) by mouth nightly as needed (nausea)  Qty: 15 tablet, Refills: 1    Associated Diagnoses: Chronic nausea; GE junction carcinoma (H)      Pyridoxine HCl 250 MG CAPS Take 1 capsule by mouth daily  Qty: 30 capsule, Refills: 3    Comments: Dispense only a few capsules initially and if desired by patient to see if she can tolerate them.  Patient can have both this and the oral concentrate to try; please instruct to use only one or the other in lieu of the tablet whch she now has trouble tolerating.    OK to advance to a 90 day supply i  Associated Diagnoses: GE junction carcinoma (H)      pyridOXINE (VITAMIN B-6) 100 mg/ml suspension Take 1 mL (100 mg) by mouth 2 times daily  Qty: 60 mL, Refills: 3    Comments: Dispense only a few ml initially and if desired by patient to see if she can tolerate them.  Patient can have both this and the capsules; please instruct to use only one or the other in lieu of the tablet whch she now has  trouble tolerating.    OK to advance to a 90 day supply if tolerated.  Associated Diagnoses: GE junction carcinoma (H)      oxyCODONE (ROXICODONE INTENSOL) 100 MG/5ML (HIGH CONC) solution Take 0.25 mLs (5 mg) by mouth every 4 hours as needed (difficulty breathing)  Qty: 30 mL, Refills: 0    Comments: Dispense with oral syringe and demonstrate correct volume please .  Associated Diagnoses: GE junction carcinoma (H); Dyspnea on exertion      Granisetron 3.1 MG/24HR PTCH Place 1 patch onto the skin every 7 days  Qty: 4 patch, Refills: 11    Comments: Please call me at [phone] if PA required for this drug.  Thanks.  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)      Ascorbic Acid (VITAMIN C PO) Take 250 mg by mouth daily      ondansetron (ZOFRAN ODT) 8 MG ODT tab Take 1 tablet (8 mg) by mouth every 8 hours as needed for nausea  Qty: 60 tablet, Refills: 1    Associated Diagnoses: GE junction carcinoma (H); Chemotherapy-induced nausea      triamcinolone (NASACORT AQ) 55 MCG/ACT Inhaler Spray 2 sprays into both nostrils daily      multivitamin, therapeutic with minerals (MULTI-VITAMIN) TABS tablet Take 1 tablet by mouth daily  Refills: 0      Carboxymethylcellulose Sodium (THERATEARS OP) Place 2 drops into both eyes daily     Associated Diagnoses: 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  Qty: 90 capsule, Refills: 3    Associated Diagnoses: Gastroesophageal reflux disease with esophagitis      Cholecalciferol (VITAMIN D) 2000 UNITS tablet Take 2,000 Units by mouth daily         STOP taking these medications       Pyridoxine HCl (VITAMIN B6 PO) Comments:   Reason for Stopping:         omega 3 1000 MG CAPS Comments:   Reason for Stopping:         fexofenadine (ALLEGRA) 60 MG tablet Comments:   Reason for Stopping:             Allergies   Allergies   Allergen Reactions     Cats Other (See Comments)     sneezing     Dogs Other (See Comments)     Seasonal Allergies Other (See Comments)     Sneezing and sinus drainage     Nkda [No Known Drug Allergies]        Consultations  This Hospital Stay   Consultation during this admission received from oncology and palliative care.      Significant Results and Procedures   Procedures    Right therapeutic and diagnostic thoracentesis    Left therapeutic thoracentesis    Data   Results for orders placed or performed during the hospital encounter of 07/24/17   XR Chest 2 Views    Narrative    CHEST TWO VIEWS 7/24/2017 4:09 PM     HISTORY: Fever. Cough. Chemo patient with recent CT with concerns for  right infiltrate.    COMPARISON: Chest x-ray from 9/25/2014. PET/CT from 7/19/2017.      Impression    IMPRESSION: Right internal jugular chest port in place with catheter  tip in the SVC. Moderate-sized right pleural effusion and small left  pleural effusion. There is infiltrate in the right lung base. The  cardiac silhouette is partially obscured by the effusions but probably  unchanged.    MARIZOL SPRINGER MD   XR Chest Port 1 View    Narrative    XR CHEST PORT 1 VW  7/28/2017 2:22 AM      HISTORY: Question worsening fluid overload or pneumonia - compare to  previous.     COMPARISON: 7/24/2017.    FINDINGS: Upright portable chest. Right IJ port in place. No  pneumothorax. There is a large right pleural effusion which has  increased in size. There is associated right lung infiltrate. There is  mild left basilar atelectasis.      Impression    IMPRESSION: Increasing right pleural effusion.    MAGGI LO MD   CT Chest pulmonary embolism w contrast    Narrative    CT CHEST PULMONARY EMBOLISM W CONTRAST  7/28/2017 5:52 AM      HISTORY: Rule out PE.     TECHNIQUE: CT chest with intravenous contrast using the pulmonary  embolus protocol. Radiation dose for this scan was reduced using  automated exposure control, adjustment of the mA and/or kV according  to patient size, or iterative reconstruction technique. 71 mL  [id number].     COMPARISON: None.    FINDINGS: Evaluation of the pulmonary arterial system shows no  evidence of embolus. There is no aortic  aneurysm or dissection. The  heart size is normal. There is a large right pleural effusion and a  moderate-sized left pleural effusion. There is near complete  consolidation of the right middle lobe and right lower lobe. Large  consolidation in the left lower lobe. There is no mediastinal, hilar  or axillary lymph node enlargement. There are a few bands of scar or  atelectasis in the lungs bilaterally. There are two areas of  ground-glass infiltrate in the left upper lobe. Images through the  upper abdomen show a common bile duct stent in place. There is  degenerative disease in the spine. Body wall edema.      Impression    IMPRESSION:  1. There is no pulmonary embolus, aortic aneurysm or dissection.  2. Large right pleural effusion and moderate-sized left pleural  effusion.  3. Large consolidations, likely atelectasis, in the right lower lobe,  right middle lobe, and left lower lobe.  4. Left upper lobe ground-glass infiltrates may be  infectious/inflammatory.    MAGGI LO MD   US Thoracentesis Portable    Narrative    7/28/2017 11:00 AM    HISTORY: Short of breath.    PROCEDURE: Risks and benefits of the procedure are discussed with the  patient. Under sonographic guidance and utilizing 8 mL of 1% lidocaine  as local anesthetic, a needle is inserted into the posterior right  hemithorax. This is followed by a standard 8 [place] catheter. 2,500 mL  of straw colored fluid is evacuated. The patient tolerated the  procedure well.      Impression    IMPRESSION: Technically uneventful ultrasound-guided thoracentesis.    HOA RIVERA MD   XR Chest Port 1 View    Narrative    XR CHEST PORT 1 VW 7/28/2017 10:59 AM    HISTORY: Right thoracentesis.    COMPARISON: 7/28/2017 at 02:15    FINDINGS: Volume of right pleural effusion is significantly decreased.  No pneumothorax. Small left effusion is redemonstrated.      Impression    IMPRESSION: No pneumothorax.    DOMINGO HERRERA MD   XR Chest Port 1 View    Narrative     PORTABLE CHEST ONE VIEW   7/30/2017 10:49 AM     HISTORY: Shortness of air.    COMPARISON: 7/28/2017      Impression    IMPRESSION: Increasing infiltrate in right lung base central venous  line with tip in superior vena cava. Bilateral pleural effusions which  are similar to that seen on the prior exam. Heart size is unchanged.  Pulmonary vasculature is mildly indistinct.    ALYX KIMBALL MD   US Thoracentesis    Narrative    US THORACENTESIS 7/31/2017 11:11 AM    HISTORY: Short of breath.    PROCEDURE: Risks and benefits of the procedure are discussed with the  patient. Under sonographic guidance and utilizing 8 mL of 1% lidocaine  as local anesthetic, a needle is inserted into the posterior left  hemithorax. This is followed by a standard 8 [place] catheter. 1,300 mL  of straw colored fluid is evacuated. The patient tolerated the  procedure well.      Impression    IMPRESSION: Technically uneventful ultrasound-guided thoracentesis.     HOA RIVERA MD   XR Chest Port 1 View    Narrative    XR CHEST PORT 1 VW   7/31/2017 11:12 AM     HISTORY: post thoracentesis    COMPARISON: 7/30/2017.      Impression    IMPRESSION: No evidence for pneumothorax post left thoracentesis. The  left lung is clear.    Again noted is increased pleural parenchymal density in the right  thorax. A right IJ port is unchanged in position.    KANDIS HERRON MD   US leg bilateral venous    Narrative    VENOUS ULTRASOUND BOTH LEGS  8/2/2017 9:30 AM     HISTORY: Lower extremity swelling.    COMPARISON: None.    FINDINGS:  Examination of the deep veins with graded compression and  color flow Doppler with spectral wave form analysis was performed.      Impression    IMPRESSION: No evidence of deep venous thrombosis.    KANDIS HERRON MD   US Retroperitoneal complete    Narrative    US RENAL COMPLETE  8/4/2017 11:14 AM      HISTORY: MICHAEL, malignancy, evaluate for obstruction.    COMPARISON: None.    FINDINGS: The kidneys are normal in size, shape  and position. The  lower pole of left kidney is not well seen due to overlying bowel gas.  The right kidney measures 10.1 x 4.3 x 5.2 cm with cortical thickness  of 1.1 cm. The left kidney measures 9.8 x 4.5 x 4.1 cm with a cortical  thickness 1.3 cm. There is no renal mass, cyst, stone or collecting  system dilatation. Urinary bladder is moderately distended and appears  normal. There is ascites in the upper quadrants.      Impression    IMPRESSION:  1. Normal kidneys.  2. Ascites.    MAGGI LO MD     Echo - Interpretation Summary  The visual ejection fraction is estimated at 40-45%.  There is moderate apical wall hypokinesis.  Trivial to small pericardial effusion is present.  There is no comparison study available.    History of Present Illness   (From H&P) Bren Rendon is a 66 year old female with PMH significant for esophageal and gastric cancer currently on palliative chemotherapy who presents with right lower lobe infiltrate (seen on PET scan) and increased shortness of breath and weakness.  She has had some cough and shortness of breath over the past few months but she noted a marked increase in that over the weekend with dyspnea with minimal exertion.  She denies chest pain/pressure. She denies hemoptysis, melena or brbpr.    Hospital Course   Bren Rendon is a 66 year old female with esophageal and gastric cancer undergoing palliative chemotherapy who who presented on 7/24/2017 with increased shortness of breath.  She was diagnosed with a pneumonia and treated with Zosyn, vancomycin and Levaquin.  On 07/28/2017 she had the acute hypoxic respiratory failure and had to be put on BiPAP.  Patient improved. Thoracentesis of 2.5 L of pleural fluid removed from the right side on 07/28/2017.  Her breathing improved but was again getting worse as she also has moderate pleural effusion on the left side.  She underwent thoracentesis of 1.2 liters clear yellow fluid removed from right pleural space  7/31/2017. Dyspnea resolved afterwards. Patient was improving but has marked bilateral lower extremity edema and ascites with high risk for recurrence of bilateral effusions. Attempted to diurese patient with furosemide boluses, then furosemide drip and then Bumex drip with only modest results. Patient became hypotensive and had worsening renal function. On day of discharge, FENa was 0.1 with low urine output and low BP. Patient is not mobilizing the peripheral fluid at this time. Opted to give her albumin and a 1 liter IV fluid bolus prior to discharging patient to home. Although renal function is decreased, I feel it is not in her interest to aggressively hydrate her given extent of peripheral fluid already present. Best to take this window of relative stability regarding her symptoms to discharge home. Nausea was a significant symptom prior to admission, and this is well controlled with scheduled antiemetics as managed by palliative care. Patient will have home care services. Patient is likely to need repeat thoracentesis at least on the right side in the next week or so but currently is asymptomatic.      Acute hypoxic respiratory failure  Probably on the basis of bilateral pleural effusion larger on the right side and moderate left-sided and possible pneumonia. Patient was initially requiring BiPAP and weaned off 7/29.  Was better with removal of 2.5 L of right pleural effusion and improved further with tap on left of 1.2 L. Resolved 7/31.       Pneumonia, right middle-lower lobes, possible pseudomonas or MRSA  Diagnosed originally on PET imaging 7/19.  Suspected to be aspiration related - healthcare associated - of right middle and lower. CT chest 7/28 infiltrates seen in right lower lobe, right middle lobe and left lower lobe but are most consistent with atelectasis related to pleural effusions. Pct on admission was 0.07. No fever. Did have tachycardia and tachypnea. On vancomycin and Zosyn and Levaquin since  admission, 7/24. Clinically improved. Completed 7 days of antibiotics 8/1/2017. Resolved.     Bilateral Pleural Effusion  Right greater than left. Had Thoracentesis on the right side with removal of 2.5 L of pleural fluid 7/28/2017 which was sent for regular test as well as cytology. Thoracentesis done on the left side 07/31/2070 and again breathing better. Cytology on right pleural effusion was positive for a few malignant cells consistent with known cancer. Attempted to diurese patient to better control re-accumulation of effusions but this failed as patient is not mobilizing the peripheral fluids. Expect patient will need repeat thoracentesis on at least the right side in the next 1-2 weeks if she develops worsening dyspnea.       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 On scheduled Zofran and compazine for intractable nausea which is working well. Oral intake modestly improved.      Chemotherapy-induced neutropenia (H) and Anemia  Hemoglobin is better. White count and platelets are decreased but stable.      Lymphedema  Receiving treatment from the lymphedema clinic. Edema is relatively recent onset over last couple of months and etiology is not clear. Echo 8/1 shows cardiomyopathy with moderate apical wall hypokinesis and LV EF 40-45% (see below). Venous US negative for DVT 8/2.   Trial of furosemide, then Bumex infusion with modest results but dropped BP 8/3. Infusion stopped. Patient with slow UOP since and creatinine is trending up. Extensive peripheral fluids in bilateral lower extremities, ascities and suspect pulmonary effusions are re-accumulating. Needed to give some fluids on day of discharge for dropping BP and poor urine output.    - continue lymphedema wraps   - holding on further diuretic for now but would consider to restart one if UOP and BP picked up spontaneously     Cardiomyopathy  On echo, new decreased LV function with LVEF  40-45% with apical wall hypokinesis. On stress test in 2006, normal LVEF 66% and no ischemia seen. Possibly cardiotoxicity of chemotherapy. With RWMA, consider coronary artery disease though no history of this. No ACEi/ARB or B-blocker due to relative low BP and renal failure.      Generalized weakness  Patient with marked weakness due to deconditioning, chemotherapy, cancer and age. Very motivated to go home with services on discharge. Modest improvement over last few days prior to discharge.      Acute kidney injury, suspect due to prerenal azotemia  Baseline creatinine 0.6 increased to 1.5 at peak. No NSAIDs. No hypotension prior to creatinine starting to rise. Vancomycin toxicity possible - stopped on 8/1. Creatinine initially improved but worsening with diuresis. Creatinine now 1.98 on discharge. Renal US did not show signs of obstruction. FENa on day of discharge was < 0.1% consistent with prerenal azotemia. Given one liter NS and 12.5 gm IV albumin on day of discharge. I don't feel it is in her interest to keep her hospitalized for aggressive hydration to improve renal function as likely to significantly worsen peripheral edema and hasten re-accumulation of pleural effusions. Would recheck renal function when seen by PCP next week.      Diarrhea 8/3   Slowed down. C diff tox screen negative.    Disposition: At this point, patient is high risk for future complications and readmission, but I don't think she will improve much in the next few days and would be better to give her some time at home.  and patient in agreement. Palliative care following. Follow up with Dr. El for possible restart of palliative chemotherapy.     Pending Results   Unresulted Labs Ordered in the Past 30 Days of this Admission     No orders found from 5/25/2017 to 7/25/2017.          Physical Exam   Temp:  [96  F (35.6  C)-97.8  F (36.6  C)] 96  F (35.6  C)  Pulse:  [101-118] 107  Heart Rate:  [] 104  Resp:  [18-20] 18  BP:  ()/(52-56) 90/55  SpO2:  [91 %-95 %] 95 %  Vitals:    08/02/17 0800 08/02/17 1906 08/03/17 0603   Weight: 77.1 kg (169 lb 15.6 oz) 79.2 kg (174 lb 9.7 oz) 78.9 kg (173 lb 15.1 oz)       Constitutional: alert and oriented, NAD, bright but weak appearing.   CV: Regular. Edema extrensively in bilateral lower extremity edema to waist.   Respiratory: a few crackles left base, decreased breath sounds 1/2-2/3 up on left with a few crackles upper lung fields posteriorly.   GI: Soft, non-tender   Skin: Warm and dry    The discharge plan was discussed with the patient and her  at length. They expressed understanding.     Total time on this discharge was 70 minutes over three visits today..    Julián Matthews MD  [facility] Medicine

## 2017-08-06 NOTE — PLAN OF CARE
Problem: Goal Outcome Summary  Goal: Goal Outcome Summary  Occupational Therapy Discharge Summary     Reason for therapy discharge:    Discharged to home.     Progress towards therapy goal(s). See [facility] for goal details.  Goals partially met.  Barriers to achieving goals:   limited tolerance for therapy.     Therapy recommendation(s):    Continued therapy is recommended.  Rationale/Recommendations:  increased ADL ind and act audrey for transfers and ADLs.     Hayley Coffey OTR/L

## 2017-08-07 NOTE — TELEPHONE ENCOUNTER
"Talked with Naren (pt ) regarding pt recent hospital visit and the plan moving forward. Naren reports Bren is not doing too well and she \"wished to leave the hospital and they let her go\". She \"is not eating enough to keep a bird alive\", reports she had a couple bites of cereal for breakfast but pretty much sleeps a lot. Does report they have the nausea under control. Asked if and when then plan to follow up with Dr. El since they missed an appointment.  thinks for now they will follow with primary for now as they do not plan to do chemotherapy at this time and are having \"home care out today to look at their options\". Pt and  aware to call us if they need anything from us or would like to continue services. Verbalized understanding.   "

## 2017-08-07 NOTE — PROGRESS NOTES
Clinic Care Coordination Contact  Care Team Conversations  D/I: Spoke with patient's , Naren. Consent to communicate is on file. He reports she has gone down hill a lot since DC from Hospital. Reports they really did not want her to DC but she wanted to go home so they let her. She does not wish to return to hospital. [facility] came out today and per Naren, she is not a candidate in her current state. Hospice team will be coming out tomorrow. Segun reports he has good family support. He has called their sons as for awhile today he did not think she would make it through the day. One son is visiting at present. He reports that their  and a sister have visited as well. This has made Bren quinteros. He expects she will be tired now, after all the activity. He has been in contact with Onc providers office. Denied any particular needs/concerns at this time.   P: patient to start hospice cares. RN CC will f/u in 2 weeks.    Neftali TIRADO,RN- BC  Clinic Care Coordinator  [facility]  Phone: [phone]

## 2017-08-15 NOTE — PROGRESS NOTES
Outpatient Physical Therapy Discharge Note     Patient: Bren Rendon  : 1950    Beginning/End Dates of Reporting Period:  2017 to 8/15/2017    Referring Provider: Dr. Nikko MD    Therapy Diagnosis: BLE secondary lymphedema      Client Self Report: friend who is a nurse will come to [facility], pt asked if ok to take pictures of bandaging technique    Outcome Measures (most recent score):   LLIS = 28 on date of initial eval; didn't complete due to  prior to returning      Goals:  Goal Identifier stg   Goal Description pt to have around the clock tolerance to BLE GCB for edema reduction response   Target Date 17   Date Met  17   Progress:     Goal Identifier stg   Goal Description pt to be independent with BLE GCB for edema reduction response   Target Date 17   Date Met      Progress:     Goal Identifier ltg   Goal Description once appropriate, pt to be independent with donning, doffing and care of compression garments for longterm edema management for maintenance   Target Date 17   Date Met      Progress:     Goal Identifier ltg   Goal Description pt to be independent with longterm edema management via HEP, elevation and compression garment wear/use   Target Date 17   Date Met      Progress:     Goal Identifier ltg   Goal Description pt to have at least 5 point improvement on LLIS due to decreased edema reduction response in BLEs   Target Date 17   Date Met      Progress:       Progress Toward Goals:   Goals discontinued  - pt          Plan:  Discharge from therapy.    Discharge:    Reason for Discharge:

## 2017-08-15 NOTE — ADDENDUM NOTE
Encounter addended by: Iram Dela Cruz, PT on: 8/15/2017  4:03 PM<BR>     Actions taken: Sign clinical note, Episode resolved

## 2017-08-17 ENCOUNTER — MEDICAL CORRESPONDENCE (OUTPATIENT)
Dept: HEALTH INFORMATION MANAGEMENT | Facility: CLINIC | Age: 67
End: 2017-08-17

## 2017-08-18 ENCOUNTER — CARE COORDINATION (OUTPATIENT)
Dept: CARE COORDINATION | Facility: CLINIC | Age: 67
End: 2017-08-18

## 2017-08-18 NOTE — PROGRESS NOTES
Clinic Care Coordination Contact  Care Team Conversations  D/I: Patient due for follow up. Was at home with Hospice. Noted in record that she passed away. Daeth noted to be 8/12/17.  P: Will close to active CC.    Neftali TIRADO,RN- BC  Clinic Care Coordinator  [facility]  Phone: [phone]

## 2021-02-03 NOTE — MR AVS SNAPSHOT
After Visit Summary   2/7/2017    Bren Rendon    MRN: [id number]           Patient Information     Date Of Birth          1950        Visit Information        Provider Department      2/7/2017 10:45 AM Cassy Ortega MD [facility]         Follow-ups after your visit        Your next 10 appointments already scheduled     Feb 09, 2017  9:00 AM   Level 2 with ROOM 1 [facility] ([facility])    [facility]  [address]  [facility] [address]   [phone]            Feb 14, 2017  9:15 AM   Return Visit with Aline El MD   [facility] ([facility])    [facility]  [address]  [facility] [address]   [phone]            Feb 14, 2017  9:30 AM   Level 2 with ROOM 2 [facility] ([facility])    [facility]  [address]  [facility] [address]   [phone]            Apr 04, 2017 11:15 AM   Return Visit with Cassy Ortega MD   [facility] ([facility])    [address] [facility]  [facility] [address]   [phone]              Who to contact     Please call your clinic at [phone] to:    Ask questions about your health    Make or cancel appointments    Discuss your medicines    Learn about your test results    Speak to your doctor   If you have compliments or concerns about an experience at your clinic, or if you wish to file a complaint, please contact [facility] Patient Relations at [phone] or email us at [email or web address]         Additional Information About Your Visit        MyChart Information     [employer] gives you secure access to your electronic health record. If you see a primary care provider, you can also send messages to your care team and make appointments. If you have questions, please call your primary care  clinic.  If you do not have a primary care provider, please call [phone] and they will assist you.      [employer] is an electronic gateway that provides easy, online access to your medical records. With [employer], you can request a clinic appointment, read your test results, renew a prescription or communicate with your care team.     To access your existing account, please contact your [facility] or call [phone] for assistance.        Care EveryWhere ID     This is your Care EveryWhere ID. This could be used by other organizations to access your [place] medical records  [id number]         Blood Pressure from Last 3 Encounters:   02/07/17 114/67   02/03/17 121/70   02/02/17 127/68    Weight from Last 3 Encounters:   02/07/17 83.734 kg (184 lb 9.6 oz)   02/07/17 83.462 kg (184 lb)   02/03/17 85.004 kg (187 lb 6.4 oz)              Today, you had the following     No orders found for display       Primary Care Provider Office Phone # Fax #    Dillan Amos [phone]       [facility] [address] [facility] [address]        Thank you!     Thank you for choosing [facility]  for your care. Our goal is always to provide you with excellent care. Hearing back from our patients is one way we can continue to improve our services. Please take a few minutes to complete the written survey that you may receive in the mail after your visit with us. Thank you!             Your Updated Medication List - Protect others around you: Learn how to safely use, store and throw away your medicines at www.disposemymeds.org.          This list is accurate as of: 2/7/17 11:25 AM.  Always use your most recent med list.                   Brand Name Dispense Instructions for use    ADVIL PO      Take 200 mg by mouth as needed for moderate pain       ALLEGRA 60 MG tablet   Generic drug:  fexofenadine      Take 60 mg by mouth daily       dronabinol 5 MG  capsule    MARINOL    60 capsule    Take 1 capsule (5 mg) by mouth 2 times daily as needed       lidocaine-prilocaine cream    EMLA    30 g    Apply 30 g topically as needed for moderate pain Apply to port site 1 hour before access.       LORazepam 0.5 MG tablet    ATIVAN    30 tablet    Take 1 tablet (0.5 mg) by mouth every 4 hours as needed (Anxiety, Nausea/Vomiting or Sleep)       Multi-vitamin Tabs tablet      Take 1 tablet by mouth daily       NASACORT AQ 55 MCG/ACT Inhaler   Generic drug:  triamcinolone      Spray 2 sprays into both nostrils daily       omega 3 1000 MG Caps      Take 1 g by mouth daily       omeprazole 40 MG capsule    priLOSEC    90 capsule    Take 1 capsule (40 mg) by mouth daily Take 30-60 minutes before a meal.       ondansetron 8 MG ODT tab    ZOFRAN ODT    60 tablet    Take 1 tablet (8 mg) by mouth every 8 hours as needed for nausea       THERATEARS OP      Apply 2 drops to eye daily       TYLENOL 325 MG tablet   Generic drug:  acetaminophen      Take 325 mg by mouth every 6 hours as needed for mild pain       VITAMIN C PO      Take 250 mg by mouth daily       vitamin D 2000 UNITS tablet      Take 2,000 Units by mouth daily          No

## 2021-07-13 NOTE — PROGRESS NOTES
CLINICAL NUTRITION SERVICES - REASSESSMENT NOTE    EVALUATION OF PROGRESS TOWARD GOALS   Diet:  Regular  Intake:  The patient has been eating about 25% of her meals, intake has improved today to 50%.    Dosing Weight::  57 kg (adjusted weight)    ASSESSED NUTRITION NEEDS:  Estimated energy needs:3224-7820 kcals (30-35 Kcal/Kg)  Estimated protein needs:68-86 grams protein (1.2-1.5 g pro/Kg)      NEW FINDINGS:   The patient reports that her appetite has improved today and she was able to eat 50% of her meal    Previous Goals:   Patient to drink 75% of the high protein, high calorie oral nutrition supplement on her meal trays.  Evaluation: Not met, but improving    Previous Nutrition Diagnosis:   Inadequate oral intake related to nausea, decreased appetite as evidenced by 30# weight loss in the last 6 months, the patient is eating 25% or less of her meal trays.  Evaluation: No change      CURRENT NUTRITION DIAGNOSIS  Same as previous    INTERVENTIONS  Recommendations / Nutrition Prescription  Continue high calorie, high protein oral nutrition supplement TID    Implementation  Medical Food Supplement- the patient prefers Boost    Goals  Same as previous goal      MONITORING AND EVALUATION:  Progress towards goals will be monitored and evaluated per protocol and Practice Guidelines and Liquid meal replacement or supplement intake    Christel Aviles RD,LD  Clinical Dietitian         Nsaids Counseling: NSAID Counseling: I discussed with the patient that NSAIDs should be taken with food. Prolonged use of NSAIDs can result in the development of stomach ulcers.  Patient advised to stop taking NSAIDs if abdominal pain occurs.  The patient verbalized understanding of the proper use and possible adverse effects of NSAIDs.  All of the patient's questions and concerns were addressed.

## 2022-08-25 NOTE — NURSING NOTE
"Bren Rendon is a 66 year old female who presents for:  Chief Complaint   Patient presents with     Oncology Clinic Visit     Recheck Esophageal CA, Labs & Chemo today         Initial Vitals:  /81 mmHg  Pulse 107  Temp(Src) 98.7  F (37.1  C) (Tympanic)  Resp 18  Ht 1.562 m (5' 1.5\")  Wt 87.771 kg (193 lb 8 oz)  BMI 35.97 kg/m2  SpO2 97%  Breastfeeding? No Estimated body mass index is 35.97 kg/(m^2) as calculated from the following:    Height as of this encounter: 1.562 m (5' 1.5\").    Weight as of this encounter: 87.771 kg (193 lb 8 oz).. Body surface area is 1.95 meters squared. BP completed using cuff size: large  No Pain (0) No LMP recorded. Patient is postmenopausal. Allergies and medications reviewed.     Medications: Medication refills not needed today.  Pharmacy name entered into [facility]:      [facility] [id number] [place] [address]    Comments: Recheck Esophageal CA, Labs & Chemo today . Patient reports rash on back x 4 days. Legs got stiff during last chemo, thought it might be from Benadryl.     10 minutes for nursing intake (face to face time)   Vanesa Hickey [facility]          " Methotrexate Counseling:  Patient counseled regarding adverse effects of methotrexate including but not limited to nausea, vomiting, abnormalities in liver function tests. Patients may develop mouth sores, rash, diarrhea, and abnormalities in blood counts. The patient understands that monitoring is required including LFT's and blood counts.  There is a rare possibility of scarring of the liver and lung problems that can occur when taking methotrexate. Persistent nausea, loss of appetite, pale stools, dark urine, cough, and shortness of breath should be reported immediately. Patient advised to discontinue methotrexate treatment at least three months before attempting to become pregnant.  I discussed the need for folate supplements while taking methotrexate.  These supplements can decrease side effects during methotrexate treatment. The patient verbalized understanding of the proper use and possible adverse effects of methotrexate.  All of the patient's questions and concerns were addressed.

## 2022-12-16 NOTE — MR AVS SNAPSHOT
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)           Follow-ups after your visit        Your next 10 appointments already scheduled     Jun 29, 2017 12:00 PM CDT   Level O with ROOM 5 [facility] ([facility])    [facility]  [address]  [facility] [address]   [phone]            Jul 11, 2017  9:00 AM CDT   Level 4 with ROOM 5 [facility] ([facility])    [facility]  [address]  [facility] [address]   [phone]            Jul 11, 2017  9:30 AM CDT   Return Visit with Aline El MD   [facility] ([facility])    [facility]  [address]  [facility] [address]   [phone]            Jul 13, 2017 12:00 PM CDT   Level O with ROOM 7 [facility] ([facility])    [facility]  [address]  [facility] [address]   [phone]              Who to contact     If you have questions or need follow up information about today's clinic visit or your schedule please contact [facility] directly at [phone].  Normal or non-critical lab and imaging results will be communicated to you by MyChart, letter or phone within 4 business days after the clinic has received the results. If you do not hear from us within 7 days, please contact the clinic through MyChart or phone. If you have a critical or abnormal lab result, we will notify you by phone as soon as possible.  Submit refill requests through [employer] or call your pharmacy and they will  Depth In Mm: 0.6 Depth In Mm: 0.1 forward the refill request to us. Please allow 3 business days for your refill to be completed.          Additional Information About Your Visit        And[employer] Information     [employer] gives you secure access to your electronic health record. If you see a primary care provider, you can also send messages to your care team and make appointments. If you have questions, please call your primary care clinic.  If you do not have a primary care provider, please call [phone] and they will assist you.        Care EveryWhere ID     This is your Care EveryWhere ID. This could be used by other organizations to access your [place] medical records  [id number]        Your Vitals Were     Pulse Temperature Pulse Oximetry BMI (Body Mass Index)          90 97.6  F (36.4  C) 96% 28.53 kg/m2         Blood Pressure from Last 3 Encounters:   06/27/17 122/70   06/22/17 105/50   06/21/17 112/59    Weight from Last 3 Encounters:   06/27/17 73 kg (161 lb)   06/22/17 71.8 kg (158 lb 6.4 oz)   06/20/17 69.7 kg (153 lb 11.2 oz)              We Performed the Following     CBC with platelets differential     Comprehensive metabolic panel        Primary Care Provider Office Phone # Fax #    Dillan Amos [phone]       [facility] [address] [address]        Equal Access to Services     DUNCAN VITALE : Hadii koko ku hadasho Soomaali, waaxda luqadaha, qaybta kaalmada adeshawn, mamadou babb. So [facility] [phone].    ATENCIÓN: Si habla español, tiene a lucero disposición servicios gratuitos de asistencia lingüística. Llame al [phone].    We comply with applicable federal civil rights laws and [place] laws. We do not discriminate on the basis of race, color, national origin, age, disability sex, sexual orientation or gender identity.            Thank you!     Thank you for choosing [facility]  for your care. Our goal is always to provide you with  Depth In Mm: 1.1 excellent care. Hearing back from our patients is one way we can continue to improve our services. Please take a few minutes to complete the written survey that you may receive in the mail after your visit with us. Thank you!             Your Updated Medication List - Protect others around you: Learn how to safely use, store and throw away your medicines at www.disposemymeds.org.          This list is accurate as of: 6/27/17  3:42 PM.  Always use your most recent med list.                   Brand Name Dispense Instructions for use Diagnosis    ALLEGRA 60 MG tablet   Generic drug:  fexofenadine      Take 60 mg by mouth daily        alum & mag hydroxide-simethicone 200-200-20 MG/5ML Susp suspension    MYLANTA/MAALOX     Take 30 mLs by mouth every 4 hours as needed for indigestion        lidocaine-prilocaine cream    EMLA    30 g    Apply 30 g topically as needed for moderate pain Apply to port site 1 hour before access.    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 Do not use more than 8 caps (16 mg) per day.    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)       TYLENOL 325 MG tablet   Generic drug:  acetaminophen      Take 325 mg by mouth every 6 hours as needed for mild pain    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  Patient understands the procedure is cosmetic in nature and will require out of pocket payment. Pain Scale: 0 Post-Care Instructions: After the procedure, take precautions agains sun exposure. Do not apply sunscreen for 12 hours after the procedure. Do not apply make-up for 12 hours after the procedure. Avoid alcohol based toners for 10-14 days. After 2-3 days patients can return to their regular skin regimen. Fluence (Optional): 500 Pre-Procedure Text: After consent was obtained the treatment areas were treated using the above parameters. Rf Time In Msec (Optional): 4 Rf Time In Msec (Optional): 5 Price (Use Numbers Only, No Special Characters Or $): 600.00 Fluence (Optional): 700 Fluence (Optional): 500 Location #2: face Location #3: nose Detail Level: Zone Location #1: forehead

## 2023-01-27 NOTE — PROGRESS NOTES
5FU pump DC.  Pt offers no complaints.  Port flushed per protocol. Kathryn Sykes RN     no wheezing/no dyspnea/no cough

## (undated) DEVICE — WIRE GUIDE 0.025"X270CM ANG VISIGLIDE G-240-2527A

## (undated) DEVICE — CANNULA BILIARY CONTOUR ERCP .018"X210CM 5-4-3 TIP

## (undated) DEVICE — ENDO PROBE COVER ULTRASOUND BALLOON LATEX  MAJ-249

## (undated) DEVICE — ENDO FUSION OMNI-TOME G31903

## (undated) DEVICE — ENDO NDL ASPIRATION 19GA FLEX SLIMLINE EXPECT EUS M00555530

## (undated) DEVICE — PAD CHUX UNDERPAD 23X24" 7136

## (undated) DEVICE — Device

## (undated) DEVICE — PACK ENDOSCOPY GI CUSTOM UMMC

## (undated) DEVICE — WIRE GUIDE ROADRUNNER X-SUPPORT .018X480CM STR TIP G22419

## (undated) DEVICE — BIOPSY VALVE BIOSHIELD 00711135

## (undated) DEVICE — ENDO FUSION OMNI-TOME 21 FS-OMNI-21 G48675

## (undated) DEVICE — CATH RETRIEVAL BALLOON EXTRACTOR PRO RX-S INJ ABOVE 9-12MM

## (undated) DEVICE — SUCTION MANIFOLD DORNOCH ULTRA CART UL-CL500

## (undated) DEVICE — ENDO BITE BLOCK ADULT OMNI-BLOC

## (undated) DEVICE — ESU GROUND PAD ADULT W/CORD E7507

## (undated) DEVICE — WIRE GUIDE ROADRUNNER X-SUPPORT .018"X260CM STR TIP G23038

## (undated) DEVICE — SOL WATER IRRIG 1000ML BOTTLE 2F7114

## (undated) DEVICE — TAPE DURAPORE 3" SILK 1538-3

## (undated) DEVICE — INFLATION DEVICE BIG 60 ENDO-AN6012

## (undated) DEVICE — GUIDEWIRE NOVAGOLD .018X260CM STR TIP M00552000

## (undated) DEVICE — KIT ENDO FIRST STEP DISINFECTANT 200ML W/POUCH EP-4

## (undated) DEVICE — ENDO TUBING CO2 SMARTCAP STERILE DISP 100145CO2EXT

## (undated) DEVICE — WIRE GUIDE 0.025"X270CM STR VISIGLIDE G-240-2527S

## (undated) DEVICE — GUIDEWIRE NOVAGOLD .018X480CM STR TIP M00552010

## (undated) DEVICE — WIRE GUIDE 0.025"X450CM STR VISIGLIDE G-240-2545S

## (undated) DEVICE — ENDO ADPT CATH ROTATABLE SIDE ARM G22677

## (undated) DEVICE — ENDO NDL BALL TIP ULTRASOUND 22GA 5.2FR ECHO-3-22

## (undated) DEVICE — ENDO CATH BALLOON DILATION HURRICANE 04MMX4X180CM M00545900

## (undated) DEVICE — WIRE GUIDE TRACER METRO DIRECT .021"X480CM STR TIP G26862

## (undated) DEVICE — WIRE GUIDE 0.025"X450CM ANG VISIGLIDE G-240-2545A

## (undated) RX ORDER — IOPAMIDOL 408 MG/ML
INJECTION, SOLUTION INTRAVASCULAR
Status: DISPENSED
Start: 2017-01-01

## (undated) RX ORDER — LIDOCAINE 40 MG/G
CREAM TOPICAL
Status: DISPENSED
Start: 2017-01-01

## (undated) RX ORDER — INDOMETHACIN 50 MG/1
SUPPOSITORY RECTAL
Status: DISPENSED
Start: 2017-01-01

## (undated) RX ORDER — SODIUM CHLORIDE, SODIUM LACTATE, POTASSIUM CHLORIDE, CALCIUM CHLORIDE 600; 310; 30; 20 MG/100ML; MG/100ML; MG/100ML; MG/100ML
INJECTION, SOLUTION INTRAVENOUS
Status: DISPENSED
Start: 2017-01-01

## (undated) RX ORDER — HEPARIN SODIUM (PORCINE) LOCK FLUSH IV SOLN 100 UNIT/ML 100 UNIT/ML
SOLUTION INTRAVENOUS
Status: DISPENSED
Start: 2017-01-01